# Patient Record
Sex: MALE | Race: WHITE | NOT HISPANIC OR LATINO | Employment: OTHER | URBAN - NONMETROPOLITAN AREA
[De-identification: names, ages, dates, MRNs, and addresses within clinical notes are randomized per-mention and may not be internally consistent; named-entity substitution may affect disease eponyms.]

---

## 2017-04-05 ENCOUNTER — OFFICE VISIT (OUTPATIENT)
Dept: UROLOGY | Facility: CLINIC | Age: 67
End: 2017-04-05

## 2017-04-05 DIAGNOSIS — R79.89 LOW TESTOSTERONE: ICD-10-CM

## 2017-04-05 DIAGNOSIS — R97.20 ELEVATED PROSTATE SPECIFIC ANTIGEN (PSA): ICD-10-CM

## 2017-04-05 DIAGNOSIS — N40.0 BPH WITHOUT URINARY OBSTRUCTION: Primary | ICD-10-CM

## 2017-04-05 LAB — TESTOST SERPL-MCNC: 498.62 NG/DL (ref 86.98–780.1)

## 2017-04-05 PROCEDURE — 84153 ASSAY OF PSA TOTAL: CPT | Performed by: UROLOGY

## 2017-04-05 PROCEDURE — 99214 OFFICE O/P EST MOD 30 MIN: CPT | Performed by: UROLOGY

## 2017-04-05 PROCEDURE — 36415 COLL VENOUS BLD VENIPUNCTURE: CPT | Performed by: UROLOGY

## 2017-04-05 PROCEDURE — 84403 ASSAY OF TOTAL TESTOSTERONE: CPT | Performed by: UROLOGY

## 2017-04-05 PROCEDURE — 84154 ASSAY OF PSA FREE: CPT | Performed by: UROLOGY

## 2017-04-05 NOTE — PROGRESS NOTES
Chief Complaint:          Chief Complaint   Patient presents with   • Benign Prostatic Hypertrophy   • Hypogonadism       HPI:   67 y.o. male.    67-year-old white male who was a relatively poor story and he gets up 1-2 times at night no dysuria no daytime frequency.  His dad  of prostate cancer his PSA is elevated at 4.6 he also has a low testosterone poorly controlled diabetes.  A normal examination and a PSA free and total and repeat his testosterone as he only had a free testosterone he will not be a candidate for testosterone therapy as long as we are  in the midst of a prostate workup      Past Medical History:        Past Medical History:   Diagnosis Date   • COPD (chronic obstructive pulmonary disease)    • Diabetes mellitus    • Hypertension          Current Meds:     Current Outpatient Prescriptions   Medication Sig Dispense Refill   • albuterol (PROVENTIL) (2.5 MG/3ML) 0.083% nebulizer solution Take 2.5 mg by nebulization every 4 (four) hours as needed for wheezing.     • budesonide-formoterol (SYMBICORT) 160-4.5 MCG/ACT inhaler Inhale 2 puffs 2 (two) times a day.     • hydrochlorothiazide (HYDRODIURIL) 25 MG tablet Take 25 mg by mouth daily.     • HYDROcodone-acetaminophen (NORCO) 7.5-325 MG per tablet Take 1 tablet by mouth every 8 (eight) hours as needed for moderate pain (4-6). 10 tablet 0   • insulin NPH-insulin regular (Novolin 70/30) (70-30) 100 UNIT/ML injection Inject 40 Units under the skin 2 (two) times a day with meals.     • lisinopril (PRINIVIL,ZESTRIL) 40 MG tablet Take 40 mg by mouth daily.     • oxyCODONE (ROXICODONE) 5 MG immediate release tablet Take 1 tablet by mouth every 4 (four) hours as needed for moderate pain (4-6) or severe pain (7-10). 10 tablet 0   • simvastatin (ZOCOR) 80 MG tablet Take 40 mg by mouth every night.       No current facility-administered medications for this visit.         Allergies:      No Known Allergies     Past Surgical History:     No past surgical  history on file.      Social History:     Social History     Social History   • Marital status:      Spouse name: N/A   • Number of children: N/A   • Years of education: N/A     Occupational History   • Not on file.     Social History Main Topics   • Smoking status: Current Every Day Smoker     Packs/day: 1.00     Years: 25.00     Types: Cigarettes   • Smokeless tobacco: Not on file   • Alcohol use No   • Drug use: No   • Sexual activity: Defer     Other Topics Concern   • Not on file     Social History Narrative       Family History:     Family History   Problem Relation Age of Onset   • No Known Problems Mother    • Heart failure Father        Review of Systems:     Review of Systems   Constitutional: Negative.    HENT: Negative.    Eyes: Negative.    Respiratory: Negative.    Cardiovascular: Negative.    Gastrointestinal: Negative.    Endocrine: Negative.    Genitourinary: Negative.    Musculoskeletal: Negative.    Allergic/Immunologic: Negative.    Neurological: Negative.    Hematological: Negative.    Psychiatric/Behavioral: Negative.        Physical Exam:     Physical Exam   Constitutional: He is oriented to person, place, and time. He appears well-developed and well-nourished.   HENT:   Head: Normocephalic and atraumatic.   Eyes: Conjunctivae and EOM are normal. Pupils are equal, round, and reactive to light.   Neck: Normal range of motion.   Cardiovascular: Normal rate, regular rhythm, normal heart sounds and intact distal pulses.    Pulmonary/Chest: Effort normal and breath sounds normal.   Abdominal: Soft. Bowel sounds are normal.   Genitourinary: Rectum normal, prostate normal and penis normal.   Genitourinary Comments: Uncircumcised normal phallus freely movable foreskin bilaterally descended testes without hernias good rectal tone and a very firm large about 40 g prostate   Musculoskeletal: Normal range of motion.   Neurological: He is alert and oriented to person, place, and time. He has normal  reflexes.   Skin: Skin is warm and dry.   Psychiatric: He has a normal mood and affect. His behavior is normal. Judgment and thought content normal.   Nursing note and vitals reviewed.      Procedure:       Assessment:   No diagnosis found.  No orders of the defined types were placed in this encounter.      Plan:   Elevated PSA recommend PSA free and total as well as a total testosterone level           This document has been electronically signed by WERNER ELLIOTT MD April 5, 2017 1:05 PM

## 2017-04-07 LAB
PSA FREE MFR SERPL: 29.7 %
PSA FREE SERPL-MCNC: 1.01 NG/ML
PSA SERPL-MCNC: 3.4 NG/ML (ref 0–4)

## 2017-05-31 ENCOUNTER — OFFICE VISIT (OUTPATIENT)
Dept: UROLOGY | Facility: CLINIC | Age: 67
End: 2017-05-31

## 2017-05-31 DIAGNOSIS — R97.20 ELEVATED PROSTATE SPECIFIC ANTIGEN (PSA): ICD-10-CM

## 2017-05-31 DIAGNOSIS — R53.82 CHRONIC FATIGUE: ICD-10-CM

## 2017-05-31 DIAGNOSIS — N40.0 BPH WITHOUT URINARY OBSTRUCTION: Primary | ICD-10-CM

## 2017-05-31 PROCEDURE — 99213 OFFICE O/P EST LOW 20 MIN: CPT | Performed by: NURSE PRACTITIONER

## 2018-01-10 ENCOUNTER — LAB REQUISITION (OUTPATIENT)
Dept: LAB | Facility: HOSPITAL | Age: 68
End: 2018-01-10

## 2018-01-10 DIAGNOSIS — I10 ESSENTIAL (PRIMARY) HYPERTENSION: ICD-10-CM

## 2018-01-10 LAB
ANION GAP SERPL CALCULATED.3IONS-SCNC: 3.9 MMOL/L (ref 3.6–11.2)
BUN BLD-MCNC: 7 MG/DL (ref 7–21)
BUN/CREAT SERPL: 9.3 (ref 7–25)
CALCIUM SPEC-SCNC: 8.9 MG/DL (ref 7.7–10)
CHLORIDE SERPL-SCNC: 93 MMOL/L (ref 99–112)
CO2 SERPL-SCNC: 38.1 MMOL/L (ref 24.3–31.9)
CREAT BLD-MCNC: 0.75 MG/DL (ref 0.43–1.29)
GFR SERPL CREATININE-BSD FRML MDRD: 103 ML/MIN/1.73
GLUCOSE BLD-MCNC: 89 MG/DL (ref 70–110)
OSMOLALITY SERPL CALC.SUM OF ELEC: 267.5 MOSM/KG (ref 273–305)
POTASSIUM BLD-SCNC: 4.6 MMOL/L (ref 3.5–5.3)
SODIUM BLD-SCNC: 135 MMOL/L (ref 135–153)

## 2018-01-10 PROCEDURE — 80048 BASIC METABOLIC PNL TOTAL CA: CPT | Performed by: INTERNAL MEDICINE

## 2018-07-09 ENCOUNTER — HOSPITAL ENCOUNTER (INPATIENT)
Facility: HOSPITAL | Age: 68
LOS: 6 days | Discharge: HOME HEALTH | End: 2018-07-15
Attending: FAMILY MEDICINE | Admitting: INTERNAL MEDICINE

## 2018-07-09 ENCOUNTER — APPOINTMENT (OUTPATIENT)
Dept: GENERAL RADIOLOGY | Facility: HOSPITAL | Age: 68
End: 2018-07-09

## 2018-07-09 ENCOUNTER — APPOINTMENT (OUTPATIENT)
Dept: ULTRASOUND IMAGING | Facility: HOSPITAL | Age: 68
End: 2018-07-09
Attending: FAMILY MEDICINE

## 2018-07-09 DIAGNOSIS — J96.01 ACUTE RESPIRATORY FAILURE WITH HYPOXIA AND HYPERCAPNIA (HCC): Primary | ICD-10-CM

## 2018-07-09 DIAGNOSIS — L03.115 CELLULITIS OF RIGHT LOWER LEG: ICD-10-CM

## 2018-07-09 DIAGNOSIS — I21.4 NSTEMI (NON-ST ELEVATED MYOCARDIAL INFARCTION) (HCC): ICD-10-CM

## 2018-07-09 DIAGNOSIS — J96.02 ACUTE RESPIRATORY FAILURE WITH HYPOXIA AND HYPERCAPNIA (HCC): Primary | ICD-10-CM

## 2018-07-09 LAB
A-A DO2: 130.6 MMHG (ref 0–300)
A-A DO2: 29.5 MMHG (ref 0–300)
A-A DO2: 41.5 MMHG (ref 0–300)
A-A DO2: 44 MMHG (ref 0–300)
A-A DO2: 53.1 MMHG (ref 0–300)
ALBUMIN SERPL-MCNC: 3.6 G/DL (ref 3.4–4.8)
ALBUMIN/GLOB SERPL: 1.1 G/DL (ref 1.5–2.5)
ALP SERPL-CCNC: 96 U/L (ref 40–129)
ALT SERPL W P-5'-P-CCNC: 22 U/L (ref 10–44)
ANION GAP SERPL CALCULATED.3IONS-SCNC: 2.8 MMOL/L (ref 3.6–11.2)
APTT PPP: 35 SECONDS (ref 23.8–36.1)
ARTERIAL PATENCY WRIST A: ABNORMAL
ARTERIAL PATENCY WRIST A: ABNORMAL
ARTERIAL PATENCY WRIST A: POSITIVE
AST SERPL-CCNC: 21 U/L (ref 10–34)
ATMOSPHERIC PRESS: 734 MMHG
BACTERIA UR QL AUTO: ABNORMAL /HPF
BASE EXCESS BLDA CALC-SCNC: 7.5 MMOL/L
BASE EXCESS BLDA CALC-SCNC: 7.9 MMOL/L
BASE EXCESS BLDA CALC-SCNC: 8.7 MMOL/L
BASE EXCESS BLDA CALC-SCNC: 9 MMOL/L
BASE EXCESS BLDA CALC-SCNC: 9.1 MMOL/L
BASOPHILS # BLD AUTO: 0.02 10*3/MM3 (ref 0–0.3)
BASOPHILS NFR BLD AUTO: 0.2 % (ref 0–2)
BDY SITE: ABNORMAL
BILIRUB SERPL-MCNC: 0.3 MG/DL (ref 0.2–1.8)
BILIRUB UR QL STRIP: NEGATIVE
BNP SERPL-MCNC: 69 PG/ML (ref 0–100)
BODY TEMPERATURE: 98.6 C
BUN BLD-MCNC: 16 MG/DL (ref 7–21)
BUN/CREAT SERPL: 16 (ref 7–25)
CALCIUM SPEC-SCNC: 9.1 MG/DL (ref 7.7–10)
CHLORIDE SERPL-SCNC: 89 MMOL/L (ref 99–112)
CK MB SERPL-CCNC: 4.89 NG/ML (ref 0–5)
CK MB SERPL-CCNC: 6.13 NG/ML (ref 0–5)
CK MB SERPL-RTO: 3.8 % (ref 0–3)
CK MB SERPL-RTO: 4.4 % (ref 0–3)
CK SERPL-CCNC: 130 U/L (ref 24–204)
CK SERPL-CCNC: 130 U/L (ref 24–204)
CK SERPL-CCNC: 139 U/L (ref 24–204)
CLARITY UR: CLEAR
CO2 SERPL-SCNC: 38.2 MMOL/L (ref 24.3–31.9)
COHGB MFR BLD: 10.6 % (ref 0–5)
COHGB MFR BLD: 5.4 % (ref 0–5)
COHGB MFR BLD: 6.5 % (ref 0–5)
COHGB MFR BLD: 9.1 % (ref 0–5)
COHGB MFR BLD: 9.8 % (ref 0–5)
COLOR UR: YELLOW
CREAT BLD-MCNC: 1 MG/DL (ref 0.43–1.29)
CREAT UR-MCNC: 47.6 MG/DL
CRP SERPL-MCNC: 1.72 MG/DL (ref 0–0.99)
D-LACTATE SERPL-SCNC: 1.5 MMOL/L (ref 0.5–2)
DEPRECATED RDW RBC AUTO: 47 FL (ref 37–54)
EOSINOPHIL # BLD AUTO: 0.07 10*3/MM3 (ref 0–0.7)
EOSINOPHIL NFR BLD AUTO: 0.9 % (ref 0–7)
EPAP: 7
EPAP: 7
ERYTHROCYTE [DISTWIDTH] IN BLOOD BY AUTOMATED COUNT: 13.7 % (ref 11.5–14.5)
GAS FLOW AIRWAY: 2 LPM
GFR SERPL CREATININE-BSD FRML MDRD: 74 ML/MIN/1.73
GLOBULIN UR ELPH-MCNC: 3.2 GM/DL
GLUCOSE BLD-MCNC: 149 MG/DL (ref 70–110)
GLUCOSE BLDC GLUCOMTR-MCNC: 122 MG/DL (ref 70–130)
GLUCOSE BLDC GLUCOMTR-MCNC: 167 MG/DL (ref 70–130)
GLUCOSE UR STRIP-MCNC: NEGATIVE MG/DL
HCO3 BLDA-SCNC: 34.4 MMOL/L (ref 22–26)
HCO3 BLDA-SCNC: 38.2 MMOL/L (ref 22–26)
HCO3 BLDA-SCNC: 38.5 MMOL/L (ref 22–26)
HCO3 BLDA-SCNC: 38.6 MMOL/L (ref 22–26)
HCO3 BLDA-SCNC: 39.4 MMOL/L (ref 22–26)
HCT VFR BLD AUTO: 51 % (ref 42–52)
HCT VFR BLD CALC: 50 % (ref 42–52)
HCT VFR BLD CALC: 51 % (ref 42–52)
HCT VFR BLD CALC: 51 % (ref 42–52)
HCT VFR BLD CALC: 52 % (ref 42–52)
HCT VFR BLD CALC: 54 % (ref 42–52)
HGB BLD-MCNC: 17.2 G/DL (ref 14–18)
HGB BLDA-MCNC: 17 G/DL (ref 12–16)
HGB BLDA-MCNC: 17.2 G/DL (ref 12–16)
HGB BLDA-MCNC: 17.2 G/DL (ref 12–16)
HGB BLDA-MCNC: 17.8 G/DL (ref 12–16)
HGB BLDA-MCNC: 18.3 G/DL (ref 12–16)
HGB UR QL STRIP.AUTO: ABNORMAL
HOROWITZ INDEX BLD+IHG-RTO: 21 %
HOROWITZ INDEX BLD+IHG-RTO: 28 %
HOROWITZ INDEX BLD+IHG-RTO: 30 %
HOROWITZ INDEX BLD+IHG-RTO: 30 %
HOROWITZ INDEX BLD+IHG-RTO: 40 %
HYALINE CASTS UR QL AUTO: ABNORMAL /LPF
IMM GRANULOCYTES # BLD: 0.01 10*3/MM3 (ref 0–0.03)
IMM GRANULOCYTES NFR BLD: 0.1 % (ref 0–0.5)
INR PPP: 1.03 (ref 0.9–1.1)
IPAP: 20
IPAP: 24
KETONES UR QL STRIP: NEGATIVE
L PNEUMO1 AG UR QL IA: NEGATIVE
LEUKOCYTE ESTERASE UR QL STRIP.AUTO: NEGATIVE
LYMPHOCYTES # BLD AUTO: 0.96 10*3/MM3 (ref 1–3)
LYMPHOCYTES NFR BLD AUTO: 11.7 % (ref 16–46)
M PNEUMO IGM SER QL: NEGATIVE
MCH RBC QN AUTO: 31.6 PG (ref 27–33)
MCHC RBC AUTO-ENTMCNC: 33.7 G/DL (ref 33–37)
MCV RBC AUTO: 93.8 FL (ref 80–94)
METHGB BLD QL: 0.2 % (ref 0–3)
METHGB BLD QL: 0.3 % (ref 0–3)
MODALITY: ABNORMAL
MONOCYTES # BLD AUTO: 0.73 10*3/MM3 (ref 0.1–0.9)
MONOCYTES NFR BLD AUTO: 8.9 % (ref 0–12)
NEUTROPHILS # BLD AUTO: 6.4 10*3/MM3 (ref 1.4–6.5)
NEUTROPHILS NFR BLD AUTO: 78.2 % (ref 40–75)
NITRITE UR QL STRIP: NEGATIVE
OSMOLALITY SERPL CALC.SUM OF ELEC: 264.8 MOSM/KG (ref 273–305)
OXYHGB MFR BLDV: 76.3 % (ref 85–100)
OXYHGB MFR BLDV: 78.4 % (ref 85–100)
OXYHGB MFR BLDV: 86.3 % (ref 85–100)
OXYHGB MFR BLDV: 86.4 % (ref 85–100)
OXYHGB MFR BLDV: 87.8 % (ref 85–100)
PCO2 BLDA: 55.7 MM HG (ref 35–45)
PCO2 BLDA: 71.6 MM HG (ref 35–45)
PCO2 BLDA: 74.7 MM HG (ref 35–45)
PCO2 BLDA: 77 MM HG (ref 35–45)
PCO2 BLDA: 78.1 MM HG (ref 35–45)
PH BLDA: 7.31 PH UNITS (ref 7.35–7.45)
PH BLDA: 7.33 PH UNITS (ref 7.35–7.45)
PH BLDA: 7.33 PH UNITS (ref 7.35–7.45)
PH BLDA: 7.35 PH UNITS (ref 7.35–7.45)
PH BLDA: 7.41 PH UNITS (ref 7.35–7.45)
PH UR STRIP.AUTO: 6.5 [PH] (ref 5–8)
PLATELET # BLD AUTO: 197 10*3/MM3 (ref 130–400)
PMV BLD AUTO: 11.5 FL (ref 6–10)
PO2 BLDA: 47.1 MM HG (ref 80–100)
PO2 BLDA: 48.1 MM HG (ref 80–100)
PO2 BLDA: 61.9 MM HG (ref 80–100)
PO2 BLDA: 71.6 MM HG (ref 80–100)
PO2 BLDA: 76.8 MM HG (ref 80–100)
POTASSIUM BLD-SCNC: 4.9 MMOL/L (ref 3.5–5.3)
PROT SERPL-MCNC: 6.8 G/DL (ref 6–8)
PROT UR QL STRIP: ABNORMAL
PROT UR-MCNC: 182.3 MG/DL
PROT/CREAT UR: 3829.8 MG/G CREA (ref 0–200)
PROTHROMBIN TIME: 13.7 SECONDS (ref 11–15.4)
RBC # BLD AUTO: 5.44 10*6/MM3 (ref 4.7–6.1)
RBC # UR: ABNORMAL /HPF
REF LAB TEST METHOD: ABNORMAL
SAO2 % BLDCOA: 84.9 % (ref 90–100)
SAO2 % BLDCOA: 87.9 % (ref 90–100)
SAO2 % BLDCOA: 91.5 % (ref 90–100)
SAO2 % BLDCOA: 94.2 % (ref 90–100)
SAO2 % BLDCOA: 95.4 % (ref 90–100)
SET MECH RESP RATE: 20
SODIUM BLD-SCNC: 130 MMOL/L (ref 135–153)
SODIUM UR-SCNC: 36 MMOL/L
SP GR UR STRIP: 1.01 (ref 1–1.03)
SQUAMOUS #/AREA URNS HPF: ABNORMAL /HPF
TROPONIN I SERPL-MCNC: 0.18 NG/ML
TROPONIN I SERPL-MCNC: 0.28 NG/ML
TROPONIN I SERPL-MCNC: 0.3 NG/ML
UROBILINOGEN UR QL STRIP: ABNORMAL
WBC NRBC COR # BLD: 8.19 10*3/MM3 (ref 4.5–12.5)
WBC UR QL AUTO: ABNORMAL /HPF

## 2018-07-09 PROCEDURE — 94799 UNLISTED PULMONARY SVC/PX: CPT

## 2018-07-09 PROCEDURE — 25010000002 VANCOMYCIN PER 500 MG: Performed by: FAMILY MEDICINE

## 2018-07-09 PROCEDURE — 82550 ASSAY OF CK (CPK): CPT | Performed by: FAMILY MEDICINE

## 2018-07-09 PROCEDURE — 83050 HGB METHEMOGLOBIN QUAN: CPT | Performed by: FAMILY MEDICINE

## 2018-07-09 PROCEDURE — 82570 ASSAY OF URINE CREATININE: CPT | Performed by: PHYSICIAN ASSISTANT

## 2018-07-09 PROCEDURE — 93005 ELECTROCARDIOGRAM TRACING: CPT | Performed by: FAMILY MEDICINE

## 2018-07-09 PROCEDURE — 82805 BLOOD GASES W/O2 SATURATION: CPT | Performed by: PHYSICIAN ASSISTANT

## 2018-07-09 PROCEDURE — 86140 C-REACTIVE PROTEIN: CPT | Performed by: FAMILY MEDICINE

## 2018-07-09 PROCEDURE — 87040 BLOOD CULTURE FOR BACTERIA: CPT | Performed by: FAMILY MEDICINE

## 2018-07-09 PROCEDURE — 81001 URINALYSIS AUTO W/SCOPE: CPT | Performed by: FAMILY MEDICINE

## 2018-07-09 PROCEDURE — 74022 RADEX COMPL AQT ABD SERIES: CPT

## 2018-07-09 PROCEDURE — 87899 AGENT NOS ASSAY W/OPTIC: CPT | Performed by: PHYSICIAN ASSISTANT

## 2018-07-09 PROCEDURE — 94640 AIRWAY INHALATION TREATMENT: CPT

## 2018-07-09 PROCEDURE — 25010000002 METHYLPREDNISOLONE PER 40 MG: Performed by: PHYSICIAN ASSISTANT

## 2018-07-09 PROCEDURE — 84484 ASSAY OF TROPONIN QUANT: CPT | Performed by: FAMILY MEDICINE

## 2018-07-09 PROCEDURE — 99223 1ST HOSP IP/OBS HIGH 75: CPT | Performed by: INTERNAL MEDICINE

## 2018-07-09 PROCEDURE — 25010000002 HEPARIN (PORCINE) PER 1000 UNITS: Performed by: PHYSICIAN ASSISTANT

## 2018-07-09 PROCEDURE — 82375 ASSAY CARBOXYHB QUANT: CPT | Performed by: PHYSICIAN ASSISTANT

## 2018-07-09 PROCEDURE — 25010000002 METHYLPREDNISOLONE PER 125 MG: Performed by: FAMILY MEDICINE

## 2018-07-09 PROCEDURE — 82550 ASSAY OF CK (CPK): CPT | Performed by: PHYSICIAN ASSISTANT

## 2018-07-09 PROCEDURE — 84156 ASSAY OF PROTEIN URINE: CPT | Performed by: PHYSICIAN ASSISTANT

## 2018-07-09 PROCEDURE — 82375 ASSAY CARBOXYHB QUANT: CPT | Performed by: FAMILY MEDICINE

## 2018-07-09 PROCEDURE — 93010 ELECTROCARDIOGRAM REPORT: CPT | Performed by: INTERNAL MEDICINE

## 2018-07-09 PROCEDURE — 99285 EMERGENCY DEPT VISIT HI MDM: CPT

## 2018-07-09 PROCEDURE — 82553 CREATINE MB FRACTION: CPT | Performed by: FAMILY MEDICINE

## 2018-07-09 PROCEDURE — 83880 ASSAY OF NATRIURETIC PEPTIDE: CPT | Performed by: FAMILY MEDICINE

## 2018-07-09 PROCEDURE — 94660 CPAP INITIATION&MGMT: CPT

## 2018-07-09 PROCEDURE — 36600 WITHDRAWAL OF ARTERIAL BLOOD: CPT | Performed by: HOSPITALIST

## 2018-07-09 PROCEDURE — 93971 EXTREMITY STUDY: CPT

## 2018-07-09 PROCEDURE — 93971 EXTREMITY STUDY: CPT | Performed by: RADIOLOGY

## 2018-07-09 PROCEDURE — 83605 ASSAY OF LACTIC ACID: CPT | Performed by: FAMILY MEDICINE

## 2018-07-09 PROCEDURE — 82805 BLOOD GASES W/O2 SATURATION: CPT | Performed by: HOSPITALIST

## 2018-07-09 PROCEDURE — 85610 PROTHROMBIN TIME: CPT | Performed by: FAMILY MEDICINE

## 2018-07-09 PROCEDURE — 82805 BLOOD GASES W/O2 SATURATION: CPT | Performed by: FAMILY MEDICINE

## 2018-07-09 PROCEDURE — 71045 X-RAY EXAM CHEST 1 VIEW: CPT | Performed by: RADIOLOGY

## 2018-07-09 PROCEDURE — 85025 COMPLETE CBC W/AUTO DIFF WBC: CPT | Performed by: FAMILY MEDICINE

## 2018-07-09 PROCEDURE — 63710000001 INSULIN ASPART PER 5 UNITS: Performed by: NURSE PRACTITIONER

## 2018-07-09 PROCEDURE — 84484 ASSAY OF TROPONIN QUANT: CPT | Performed by: PHYSICIAN ASSISTANT

## 2018-07-09 PROCEDURE — 25010000002 MORPHINE PER 10 MG: Performed by: HOSPITALIST

## 2018-07-09 PROCEDURE — 93926 LOWER EXTREMITY STUDY: CPT

## 2018-07-09 PROCEDURE — 84300 ASSAY OF URINE SODIUM: CPT | Performed by: PHYSICIAN ASSISTANT

## 2018-07-09 PROCEDURE — 25010000002 ONDANSETRON PER 1 MG: Performed by: FAMILY MEDICINE

## 2018-07-09 PROCEDURE — 82962 GLUCOSE BLOOD TEST: CPT

## 2018-07-09 PROCEDURE — 83050 HGB METHEMOGLOBIN QUAN: CPT | Performed by: PHYSICIAN ASSISTANT

## 2018-07-09 PROCEDURE — 36600 WITHDRAWAL OF ARTERIAL BLOOD: CPT | Performed by: PHYSICIAN ASSISTANT

## 2018-07-09 PROCEDURE — 86738 MYCOPLASMA ANTIBODY: CPT | Performed by: PHYSICIAN ASSISTANT

## 2018-07-09 PROCEDURE — 36415 COLL VENOUS BLD VENIPUNCTURE: CPT

## 2018-07-09 PROCEDURE — 83050 HGB METHEMOGLOBIN QUAN: CPT | Performed by: HOSPITALIST

## 2018-07-09 PROCEDURE — 74022 RADEX COMPL AQT ABD SERIES: CPT | Performed by: RADIOLOGY

## 2018-07-09 PROCEDURE — 25010000002 PIPERACILLIN-TAZOBACTAM: Performed by: FAMILY MEDICINE

## 2018-07-09 PROCEDURE — 93926 LOWER EXTREMITY STUDY: CPT | Performed by: RADIOLOGY

## 2018-07-09 PROCEDURE — 85730 THROMBOPLASTIN TIME PARTIAL: CPT | Performed by: FAMILY MEDICINE

## 2018-07-09 PROCEDURE — 71045 X-RAY EXAM CHEST 1 VIEW: CPT

## 2018-07-09 PROCEDURE — 82375 ASSAY CARBOXYHB QUANT: CPT | Performed by: HOSPITALIST

## 2018-07-09 PROCEDURE — 36600 WITHDRAWAL OF ARTERIAL BLOOD: CPT | Performed by: FAMILY MEDICINE

## 2018-07-09 PROCEDURE — 25010000002 MORPHINE PER 10 MG: Performed by: FAMILY MEDICINE

## 2018-07-09 PROCEDURE — 80053 COMPREHEN METABOLIC PANEL: CPT | Performed by: FAMILY MEDICINE

## 2018-07-09 PROCEDURE — 82553 CREATINE MB FRACTION: CPT | Performed by: PHYSICIAN ASSISTANT

## 2018-07-09 RX ORDER — METHYLPREDNISOLONE SODIUM SUCCINATE 125 MG/2ML
125 INJECTION, POWDER, LYOPHILIZED, FOR SOLUTION INTRAMUSCULAR; INTRAVENOUS ONCE
Status: COMPLETED | OUTPATIENT
Start: 2018-07-09 | End: 2018-07-09

## 2018-07-09 RX ORDER — NICOTINE POLACRILEX 4 MG
15 LOZENGE BUCCAL
Status: DISCONTINUED | OUTPATIENT
Start: 2018-07-09 | End: 2018-07-14

## 2018-07-09 RX ORDER — METHYLPREDNISOLONE SODIUM SUCCINATE 40 MG/ML
40 INJECTION, POWDER, LYOPHILIZED, FOR SOLUTION INTRAMUSCULAR; INTRAVENOUS EVERY 12 HOURS
Status: DISCONTINUED | OUTPATIENT
Start: 2018-07-09 | End: 2018-07-11

## 2018-07-09 RX ORDER — LEVOCETIRIZINE DIHYDROCHLORIDE 5 MG/1
5 TABLET, FILM COATED ORAL DAILY PRN
COMMUNITY

## 2018-07-09 RX ORDER — NICOTINE 21 MG/24HR
1 PATCH, TRANSDERMAL 24 HOURS TRANSDERMAL EVERY 24 HOURS
Status: DISCONTINUED | OUTPATIENT
Start: 2018-07-10 | End: 2018-07-15 | Stop reason: HOSPADM

## 2018-07-09 RX ORDER — HEPARIN SODIUM 5000 [USP'U]/ML
5000 INJECTION, SOLUTION INTRAVENOUS; SUBCUTANEOUS EVERY 12 HOURS SCHEDULED
Status: DISCONTINUED | OUTPATIENT
Start: 2018-07-09 | End: 2018-07-15 | Stop reason: HOSPADM

## 2018-07-09 RX ORDER — SODIUM CHLORIDE 0.9 % (FLUSH) 0.9 %
10 SYRINGE (ML) INJECTION AS NEEDED
Status: DISCONTINUED | OUTPATIENT
Start: 2018-07-09 | End: 2018-07-15 | Stop reason: HOSPADM

## 2018-07-09 RX ORDER — IPRATROPIUM BROMIDE AND ALBUTEROL SULFATE 2.5; .5 MG/3ML; MG/3ML
3 SOLUTION RESPIRATORY (INHALATION) ONCE
Status: COMPLETED | OUTPATIENT
Start: 2018-07-09 | End: 2018-07-09

## 2018-07-09 RX ORDER — ALBUTEROL SULFATE 90 UG/1
2 AEROSOL, METERED RESPIRATORY (INHALATION) EVERY 6 HOURS PRN
COMMUNITY

## 2018-07-09 RX ORDER — IPRATROPIUM BROMIDE AND ALBUTEROL SULFATE 2.5; .5 MG/3ML; MG/3ML
3 SOLUTION RESPIRATORY (INHALATION) EVERY 6 HOURS PRN
Status: DISCONTINUED | OUTPATIENT
Start: 2018-07-09 | End: 2018-07-10

## 2018-07-09 RX ORDER — NICOTINE POLACRILEX 4 MG
15 LOZENGE BUCCAL
Status: DISCONTINUED | OUTPATIENT
Start: 2018-07-09 | End: 2018-07-15 | Stop reason: HOSPADM

## 2018-07-09 RX ORDER — ONDANSETRON 2 MG/ML
4 INJECTION INTRAMUSCULAR; INTRAVENOUS ONCE
Status: COMPLETED | OUTPATIENT
Start: 2018-07-09 | End: 2018-07-09

## 2018-07-09 RX ORDER — IPRATROPIUM BROMIDE AND ALBUTEROL SULFATE 2.5; .5 MG/3ML; MG/3ML
3 SOLUTION RESPIRATORY (INHALATION)
Status: DISCONTINUED | OUTPATIENT
Start: 2018-07-09 | End: 2018-07-15 | Stop reason: HOSPADM

## 2018-07-09 RX ORDER — DONEPEZIL HYDROCHLORIDE 10 MG/1
10 TABLET, FILM COATED ORAL NIGHTLY
COMMUNITY

## 2018-07-09 RX ORDER — DEXTROSE MONOHYDRATE 25 G/50ML
25 INJECTION, SOLUTION INTRAVENOUS
Status: DISCONTINUED | OUTPATIENT
Start: 2018-07-09 | End: 2018-07-14

## 2018-07-09 RX ORDER — ASPIRIN 81 MG/1
324 TABLET, CHEWABLE ORAL ONCE
Status: COMPLETED | OUTPATIENT
Start: 2018-07-09 | End: 2018-07-09

## 2018-07-09 RX ORDER — TAMSULOSIN HYDROCHLORIDE 0.4 MG/1
1 CAPSULE ORAL EVERY EVENING
COMMUNITY

## 2018-07-09 RX ORDER — DEXTROSE MONOHYDRATE 25 G/50ML
25 INJECTION, SOLUTION INTRAVENOUS
Status: DISCONTINUED | OUTPATIENT
Start: 2018-07-09 | End: 2018-07-15 | Stop reason: HOSPADM

## 2018-07-09 RX ORDER — HYDROCHLOROTHIAZIDE 25 MG/1
25 TABLET ORAL DAILY
Status: CANCELLED | OUTPATIENT
Start: 2018-07-09

## 2018-07-09 RX ORDER — SODIUM CHLORIDE 0.9 % (FLUSH) 0.9 %
1-10 SYRINGE (ML) INJECTION AS NEEDED
Status: DISCONTINUED | OUTPATIENT
Start: 2018-07-09 | End: 2018-07-15 | Stop reason: HOSPADM

## 2018-07-09 RX ORDER — ONDANSETRON HYDROCHLORIDE 8 MG/1
8 TABLET, FILM COATED ORAL EVERY 12 HOURS PRN
COMMUNITY
End: 2018-07-15 | Stop reason: HOSPADM

## 2018-07-09 RX ORDER — GABAPENTIN 800 MG/1
800 TABLET ORAL 3 TIMES DAILY
COMMUNITY

## 2018-07-09 RX ORDER — ASPIRIN 81 MG/1
81 TABLET ORAL DAILY
Status: DISCONTINUED | OUTPATIENT
Start: 2018-07-10 | End: 2018-07-10 | Stop reason: SDUPTHER

## 2018-07-09 RX ORDER — ASPIRIN 325 MG
325 TABLET ORAL DAILY
COMMUNITY

## 2018-07-09 RX ORDER — MORPHINE SULFATE 2 MG/ML
1 INJECTION, SOLUTION INTRAMUSCULAR; INTRAVENOUS EVERY 4 HOURS PRN
Status: DISCONTINUED | OUTPATIENT
Start: 2018-07-09 | End: 2018-07-11

## 2018-07-09 RX ADMIN — MORPHINE SULFATE 1 MG: 2 INJECTION, SOLUTION INTRAMUSCULAR; INTRAVENOUS at 22:14

## 2018-07-09 RX ADMIN — MORPHINE SULFATE 2 MG: 4 INJECTION, SOLUTION INTRAMUSCULAR; INTRAVENOUS at 10:08

## 2018-07-09 RX ADMIN — ONDANSETRON 4 MG: 2 INJECTION INTRAMUSCULAR; INTRAVENOUS at 11:38

## 2018-07-09 RX ADMIN — METHYLPREDNISOLONE SODIUM SUCCINATE 125 MG: 125 INJECTION, POWDER, FOR SOLUTION INTRAMUSCULAR; INTRAVENOUS at 14:03

## 2018-07-09 RX ADMIN — Medication 10 ML: at 10:09

## 2018-07-09 RX ADMIN — INSULIN ASPART 2 UNITS: 100 INJECTION, SOLUTION INTRAVENOUS; SUBCUTANEOUS at 21:57

## 2018-07-09 RX ADMIN — IPRATROPIUM BROMIDE AND ALBUTEROL SULFATE 3 ML: .5; 3 SOLUTION RESPIRATORY (INHALATION) at 13:45

## 2018-07-09 RX ADMIN — VANCOMYCIN HYDROCHLORIDE 2000 MG: 5 INJECTION, POWDER, LYOPHILIZED, FOR SOLUTION INTRAVENOUS at 13:32

## 2018-07-09 RX ADMIN — HEPARIN SODIUM 5000 UNITS: 5000 INJECTION, SOLUTION INTRAVENOUS; SUBCUTANEOUS at 21:28

## 2018-07-09 RX ADMIN — TAZOBACTAM SODIUM AND PIPERACILLIN SODIUM 4.5 G: .5; 4 INJECTION, POWDER, LYOPHILIZED, FOR SOLUTION INTRAVENOUS at 16:15

## 2018-07-09 RX ADMIN — IPRATROPIUM BROMIDE AND ALBUTEROL SULFATE 3 ML: .5; 3 SOLUTION RESPIRATORY (INHALATION) at 18:11

## 2018-07-09 RX ADMIN — ASPIRIN 324 MG: 81 TABLET, CHEWABLE ORAL at 11:37

## 2018-07-09 RX ADMIN — PIPERACILLIN SODIUM,TAZOBACTAM SODIUM 3.38 G: 3; .375 INJECTION, POWDER, FOR SOLUTION INTRAVENOUS at 23:04

## 2018-07-09 RX ADMIN — METHYLPREDNISOLONE SODIUM SUCCINATE 40 MG: 40 INJECTION, POWDER, FOR SOLUTION INTRAMUSCULAR; INTRAVENOUS at 18:35

## 2018-07-09 RX ADMIN — MORPHINE SULFATE 2 MG: 4 INJECTION, SOLUTION INTRAMUSCULAR; INTRAVENOUS at 14:04

## 2018-07-09 NOTE — ED PROVIDER NOTES
Subjective   69 yo male with history of COPD- states he has oxygen he wears when he feels like he needs it ; continuesto smoke; reports increasing SOA, says he feels like his belly is swollen and that he is retaining fluid; however he says his biggest concern is his right lower leg- home health has been trying to tend to it- they apparently treated his left leg; he says the pain and redness is worse. HE says he feels terrible and when the breathing issue started yesterday he just couldn't get comfortble        History provided by:  Patient  Illness   Location:  SOA/ leg tenderness/ abdominal bloating  Severity:  Moderate  Onset quality:  Gradual  Timing:  Intermittent  Progression:  Worsening  Chronicity:  Recurrent  Context:  HIstory of COPD/ and has had venous stasis ulcers on lower legs for a long time but the last week his right leg has gotten worse  Relieved by:  Nothing  Worsened by:  Nothing  Ineffective treatments:  None  Associated symptoms: cough, fatigue, myalgias, nausea and shortness of breath    Associated symptoms: no abdominal pain and no diarrhea    Shortness of breath:     Severity:  Moderate    Onset quality:  Gradual    Duration:  3 days    Timing:  Intermittent    Progression:  Worsening      Review of Systems   Constitutional: Positive for fatigue.   HENT: Negative for drooling, facial swelling, mouth sores, trouble swallowing and voice change.    Eyes: Negative for pain and itching.   Respiratory: Positive for cough and shortness of breath. Negative for apnea and chest tightness.    Cardiovascular: Negative for leg swelling.   Gastrointestinal: Positive for nausea. Negative for abdominal pain, constipation, diarrhea and rectal pain.   Endocrine: Negative for cold intolerance, heat intolerance, polydipsia, polyphagia and polyuria.   Genitourinary: Negative for decreased urine volume.   Musculoskeletal: Positive for myalgias. Negative for neck pain and neck stiffness.   Skin: Negative for color  change and pallor.   Allergic/Immunologic: Negative for immunocompromised state.   Neurological: Negative for dizziness, tremors, facial asymmetry and speech difficulty.   Hematological: Negative for adenopathy.   Psychiatric/Behavioral: Negative for agitation, behavioral problems, decreased concentration and dysphoric mood. The patient is not nervous/anxious.    All other systems reviewed and are negative.      Past Medical History:   Diagnosis Date   • COPD (chronic obstructive pulmonary disease) (CMS/[facility])    • Diabetes mellitus (CMS/[facility])    • Hypertension        No Known Allergies    History reviewed. No pertinent surgical history.    Family History   Problem Relation Age of Onset   • No Known Problems Mother    • Heart failure Father        Social History     Social History   • Marital status:      Social History Main Topics   • Smoking status: Current Every Day Smoker     Packs/day: 1.00     Years: 25.00     Types: Cigarettes   • Smokeless tobacco: Never Used   • Alcohol use No   • Drug use: No   • Sexual activity: Defer     Other Topics Concern   • Not on file           Objective   Physical Exam   Constitutional: He is oriented to person, place, and time. He appears well-developed and well-nourished. He has a sickly appearance.   Cardiovascular: Normal rate and regular rhythm.    Pulmonary/Chest: Effort normal. He has decreased breath sounds. He has wheezes.   Abdominal: Soft. Bowel sounds are normal. He exhibits no distension and no mass. There is no tenderness. There is no guarding.   Musculoskeletal: He exhibits edema and tenderness.   Venous stasis changes present on both lower extremities; redness and blisters and painful to touch on right lower extremity  Decreased pulses on R>L lower extremitiy   Neurological: He is alert and oriented to person, place, and time.   Skin: Skin is warm. Capillary refill takes less than 2 seconds.   Psychiatric: He has a normal mood and affect. His behavior is  normal. Judgment and thought content normal.   Nursing note and vitals reviewed.      Procedures           ED Course  ED Course as of Jul 10 1100   Mon Jul 09, 2018   1308 EKG interpretation time is 10:00 normal sinus rhythm with arrhythmia 74 bpm QRS is 80 QT is 344 QTc is 381 nonspecific ST changes are noted however no evidence of acute ischemic change  [MH]   1457 DIscussed with Dr Stern - agree to admit to CCU- increase IPA to 24  [MH]      ED Course User Index  [MH] Jasmin Roberson DO                  MDM  Number of Diagnoses or Management Options  Acute respiratory failure with hypoxia and hypercapnia (CMS/HCC): new and requires workup  Cellulitis of right lower leg: new and requires workup  NSTEMI (non-ST elevated myocardial infarction) (CMS/HCC): new and requires workup     Amount and/or Complexity of Data Reviewed  Clinical lab tests: ordered and reviewed  Tests in the radiology section of CPT®: reviewed and ordered  Tests in the medicine section of CPT®: reviewed and ordered  Decide to obtain previous medical records or to obtain history from someone other than the patient: yes  Discuss the patient with other providers: yes  Independent visualization of images, tracings, or specimens: yes    Risk of Complications, Morbidity, and/or Mortality  Presenting problems: high  Diagnostic procedures: high  Management options: high    Critical Care  Total time providing critical care: 30-74 minutes (40 min)    Patient Progress  Patient progress: (guarded)        Final diagnoses:   Acute respiratory failure with hypoxia and hypercapnia (CMS/HCC)   NSTEMI (non-ST elevated myocardial infarction) (CMS/HCC)            Jasmin Roberson DO  07/10/18 0452

## 2018-07-09 NOTE — NURSING NOTE
Patient says he is going home if he doesn't get the BIPAP off, something to eat, and something for pain. Discussed risks with patient. Attempted to call [facility]. Home phone is out of service. Cell phone is wrong number. Dr. Hebert's paged.

## 2018-07-09 NOTE — ED NOTES
Pt remains on 2lpm nc, oxygen sat 89%, pt to be placed on bipap.      Clementine Cueto, RN  07/09/18 2325

## 2018-07-09 NOTE — ED NOTES
Pt alert and oriented, skin pwd, pt remains on bipap with even and unlabored respirations. Pt remains in normal sinus rhythm. rr 12.      Clementine Cueto RN  07/09/18 [id number]

## 2018-07-09 NOTE — ED NOTES
Pt alert and oriented, skin pwd, respirations even and unlabored, pt continues to cough, remains on 2lpm nc, remains in ns rhythm. Fall precautions maintained. Wctm.      Clementine Cueto RN  07/09/18 0149

## 2018-07-09 NOTE — PLAN OF CARE
Problem: Fall Risk (Adult)  Goal: Identify Related Risk Factors and Signs and Symptoms   07/09/18 1752   Fall Risk (Adult)   Related Risk Factors (Fall Risk) gait/mobility problems;slippery/uneven surfaces;environment unfamiliar   Signs and Symptoms (Fall Risk) presence of risk factors     Goal: Absence of Fall   07/09/18 1752   Fall Risk (Adult)   Absence of Fall making progress toward outcome      07/09/18 1752   Fall Risk (Adult)   Absence of Fall making progress toward outcome       Problem: Skin Injury Risk (Adult)  Goal: Identify Related Risk Factors and Signs and Symptoms   07/09/18 1752   Skin Injury Risk (Adult)   Related Risk Factors (Skin Injury Risk) body weight extremes;critical care admission;mobility impaired     Goal: Skin Health and Integrity   07/09/18 1752   Skin Injury Risk (Adult)   Skin Health and Integrity making progress toward outcome       Problem: Breathing Pattern Ineffective (Adult)  Goal: Identify Related Risk Factors and Signs and Symptoms   07/09/18 1752   Breathing Pattern Ineffective (Adult)   Related Risk Factors (Breathing Pattern Ineffective) immobility;pain;underlying condition   Signs and Symptoms (Breathing Pattern Ineffective) activity intolerance;breath sounds abnormal     Goal: Effective Oxygenation/Ventilation   07/09/18 1752   Breathing Pattern Ineffective (Adult)   Effective Oxygenation/Ventilation making progress toward outcome     Goal: Anxiety/Fear Reduction   07/09/18 1752   Breathing Pattern Ineffective (Adult)   Anxiety/Fear Reduction making progress toward outcome

## 2018-07-09 NOTE — ED NOTES
md made aware of pt oxygen sat, new orders noted. Called respiratory for a repeat abg.     Clementine Cueto RN  07/09/18 [id number]

## 2018-07-09 NOTE — H&P
[facility] Medicine Services  History & Physical    Patient Identification:  Name:  Heath Bajwa  Age:  68 y.o.  Sex:  male  :  1950  MRN:  [id number]   Visit Number:  [id number]  Primary Care Physician:  No Known Provider    I have seen the patient in conjunction with Ivon Hernandze PA-C and I agree with the following statements:    Subjective        Chief complaint:  Shortness of breath       History of presenting illness:      Heath Bajwa is a 68 y.o. male Heath Bajwa presented to the ED via EMS secondary to shortness of breath. He has known COPD and reports worsened shortness of breath over the course of the last 2 weeks but significant worsening over the past 48 hours making daily activities of living difficult to complete. He reports cough productive of yellow/green sputum.  He denies chest pain.  His initial ABG revealed pH 7.408, pCO2 of 55.7, pO2 of 48.1, and bicarbonate of 34.4.   When placed on 2 liters via NC, Mr. Bajwa began retaining CO2 with increase of PCO2 to 78.1 with an accompanying lower pH of 7.307.   He was placed on BiPAP with only slight improvement of pCO2 with improvement of pO2.   CXR revealed some bibasilar atelectasis but without mention of acute infiltrate.   Given acute hypoxic and hypercarbic respiratory failure, Mr. Bajwa has been admitted to the CCU for further evaluation and treatment.  He denies chest pain or known history of heart disease, but was found to have indeterminate troponin.  Much of his ROS and HPI were obtained from the patient, though very difficult to understand secondary to BiPAP treatment, though this could obviously not be removed at this time.      In addition, Mr. Bajwa has noted cellulitis of the bilateral lower extremities with ulcerations about the calf with the appearance of possible Unna boots or dressings, though it is currently difficulty for Mr. Bajwa to relay to me what he has been putting on his legs given  "BiPAP mask.  He does tell me his RLE has increased redness, but that his LLE (also with cellulitis) looks around baseline.   He does also tell me that he is supposed to wear 2 liters of oxygen when he sleeps at night.  He is able to tell me that his legs were more swollen but this has since went down.     Heinss (she does state cellulitis feels like a sharp pain in his legs.  This is of moderate intensity at least and worsens when he tries to walk.  He states the erythema worsened has been there for about a week.  There is also some ulcerations especially on his right leg he thinks has been there about a week.  He has had no associated fever.  He states he has had no increase cough but has been bringing up more phlegm but this is been white.  No nausea no vomiting, he states his abdomen feels bloated at times however he states he does not feel like he is voiding like he should, his bowels have been moving.  No emesis.  Patient states she wears oxygen at home \"when he needs it\" he continues to smoke and is well aware the need to quit, patient ambulates at home with a cane)  ---------------------------------------------------------------------------------------------------------------------     Review of Systems   Constitutional: Negative for activity change and chills.   HENT: Negative for congestion and drooling.    Eyes: Negative for pain and discharge.   Respiratory: Positive for cough, shortness of breath and wheezing.    Cardiovascular: Positive for leg swelling. Negative for chest pain.   Gastrointestinal: Negative for abdominal distention, diarrhea and nausea.   Endocrine: Negative for cold intolerance and heat intolerance.   Genitourinary: Negative for difficulty urinating and dysuria.   Musculoskeletal: Negative for arthralgias and back pain.   Skin: Positive for wound. Negative for color change.   Allergic/Immunologic: Negative for environmental allergies and food allergies.   Neurological: Negative for " dizziness and headaches.   Psychiatric/Behavioral: Negative for agitation and confusion.      ---------------------------------------------------------------------------------------------------------------------   Past Medical History:   Diagnosis Date   • COPD (chronic obstructive pulmonary disease) (CMS/HCC)    • Diabetes mellitus (CMS/HCC)    • Hypertension      History reviewed. No pertinent surgical history.  Family History   Problem Relation Age of Onset   • No Known Problems Mother    • Heart failure Father      Social History     Social History   • Marital status:      Social History Main Topics   • Smoking status: Current Every Day Smoker     Packs/day: 1.00     Years: 25.00     Types: Cigarettes   • Smokeless tobacco: Never Used   • Alcohol use No   • Drug use: No   • Sexual activity: Defer     Other Topics Concern   • Not on file     ---------------------------------------------------------------------------------------------------------------------   Allergies:  Patient has no known allergies.  ---------------------------------------------------------------------------------------------------------------------     Home medications:    Medications below are reported home medications pulling from within the system; at this time, these medications have not been reconciled unless otherwise specified and are in the verification process in order to verify them as current home medications.    (Not in a hospital admission)      Hospital Scheduled Meds:    piperacillin-tazobactam 4.5 g Intravenous Once          Current listed hospital scheduled medications may not yet reflect those currently placed in orders that are signed and held awaiting patient's arrival to floor.   ---------------------------------------------------------------------------------------------------------------------     Objective     Vital Signs:  Temp:  [98.2 °F (36.8 °C)] 98.2 °F (36.8 °C)  Heart Rate:  [65-84] 76  Resp:  [20-25]  23  BP: (110-151)/(60-98) 116/60  FiO2 (%):  [30 %] 30 %  1    07/09/18  0934   Weight: 109 kg (240 lb)     Body mass index is 32.55 kg/m².  ---------------------------------------------------------------------------------------------------------------------       Physical Exam    Physical Exam:    Constitutional: Awake, alert, well-nourished, well-developed, nontoxic, appears chronically ill however.  BiPAP in place.  HEENT: Normocephalic, atraumatic. PERRLA, EOMI, sclerae anicteric, conjunctivae without injection, mucous membranes moist, no oropharyngeal erythema appreciated.    Neck: Supple. No JVD appreciated.  Pulmonary: Diminished breath sounds bilaterally. Bilateral wheezing appreciated throughout.  No significant rhonchi or rales appreciated.  Wheezing was mostly in the upper lobes.  Mildly increased respiratory effort.   CV: Normal rate, regular rhythm. Normal s1/s2 with no murmur appreciated.   Abdominal: Soft, No distension or tenderness appreciated. Bowel sounds appreciated in all four quadrants, no guarding or rebound  Musculoskeletal: No erythema or swelling in joints of upper and lower extremities   Extremities: No clubbing, cyanosis,1+edema  Vascular: Decreased but palpable DP/PT pulses bilaterally, warm extremities Semsation intact in ext  Skin: RLE with cellulitis about the foot extending proximally into the calf and stopping had a fairly well demarcated line below the knee with some vesicles noted proximal tibial area the erythema appears to be fairly circumferential..   LLE with cellulitis about the calf that is less impressive than the RLE.  RLE with multiple open wounds/ulcerations about the skin   Neuro: Alert and oriented to person, place, and time. Strength symmetric in all extremities, Cranial Nerves grossly intact to confrontation, speech clear, sensation intact to fine touch..  No slurred speech.  No facial droop.    Psych: Appropriate mood and affect.  Judgement and though content  appropriate.       ---------------------------------------------------------------------------------------------------------------------  EKG:        Telemetry:   SR in the 60s-80s during bedside evaluation     I have personally looked at both the EKG and the telemetry strips.  EKG fairly benign  ---------------------------------------------------------------------------------------------------------------------     Results from last 7 days  Lab Units 07/09/18  0953   CRP mg/dL 1.72*   LACTATE mmol/L 1.5   WBC 10*3/mm3 8.19   HEMOGLOBIN g/dL 17.2   HEMATOCRIT % 51.0   MCV fL 93.8   MCHC g/dL 33.7   PLATELETS 10*3/mm3 197   INR  1.03       Results from last 7 days  Lab Units 07/09/18  1336   PH, ARTERIAL pH units 7.330*   PO2 ART mm Hg 76.8*   PCO2, ARTERIAL mm Hg 74.7*   HCO3 ART mmol/L 38.5*           Results from last 7 days  Lab Units 07/09/18  0953   SODIUM mmol/L 130*   POTASSIUM mmol/L 4.9   CHLORIDE mmol/L 89*   CO2 mmol/L 38.2*   BUN mg/dL 16   CREATININE mg/dL 1.00   EGFR IF NONAFRICN AM mL/min/1.73 74   CALCIUM mg/dL 9.1   GLUCOSE mg/dL 149*   ALBUMIN g/dL 3.60   BILIRUBIN mg/dL 0.3   ALK PHOS U/L 96   AST (SGOT) U/L 21   ALT (SGPT) U/L 22   Estimated Creatinine Clearance: 87.7 mL/min (by C-G formula based on SCr of 1 mg/dL).  No results found for: AMMONIA    Results from last 7 days  Lab Units 07/09/18  1153 07/09/18  0953   CK TOTAL U/L  --  139   TROPONIN I ng/mL 0.275* 0.303*   CK MB INDEX %  --  4.4*         Lab Results   Component Value Date    HGBA1C 7.10 (H) 08/05/2016     No results found for: TSH, FREET4  No results found for: PREGTESTUR, PREGSERUM, HCG, HCGQUANT  Pain Management Panel     There is no flowsheet data to display.                        ---------------------------------------------------------------------------------------------------------------------  Imaging Results (last 7 days)     Procedure Component Value Units Date/Time    XR Abdomen 2 View With Chest 1 View [[id number]]  Collected:  07/09/18 1552     Updated:  07/09/18 1556    Narrative:          XR ABDOMEN 2 VW W CHEST 1 VW-     CLINICAL INDICATION: abd distention; J96.01-Acute respiratory failure  with hypoxia; J96.02-Acute respiratory failure with hypercapnia;  I21.4-Non-ST elevation (NSTEMI) myocardial infarction          COMPARISON: 07/09/2018      FINDINGS:  Chest:  Bibasilar airspace disease, either atelectasis or pneumonia     Abdomen:  Two views of the abdomen show no free air. I do not see abnormal bowel  distention to suggest complete obstruction. The bony structures are  intact. No abnormal soft tissue masses are seen. No suspicious appearing  calcifications are identified on today's exam.       Impression:       1. 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 Bibasilar airspace disease.  3. No evidence of bowel obstruction.  4. No free air.     5.            This report was finalized on 7/9/2018 3:54 PM by Dr. Jose Luis Romero MD.       US Arterial Doppler Lower Extremity Right [[id number]] Collected:  07/09/18 1339     Updated:  07/09/18 1407    Narrative:       US ARTERIAL DOPPLER LOWER EXTREMITY RIGHT-     REASON FOR EXAM:  Color change/ decreased pulses.     TECHNIQUE:  Multiple real-time color doppler images were obtained.  M-mode Doppler was used for velocity determination and spectral pattern.  Stenosis was evaluated using the NASCET or similar measurement  technique.     FINDINGS:   Right leg: The external iliac artery had extensive calcific plaque in  the lumen. It is narrowed. The Doppler wave pattern was monophasic. The  velocity is significantly elevated measuring 416 cm/s. There is calcific  plaque at the common femoral artery level. The Doppler wave pattern was  monophasic. The peak systolic velocity was 213 cm/s. Color Doppler flow  was seen in the profunda femoral and superficial femoral artery. There  were no areas of occlusion in the superficial femoral artery. The  Doppler wave pattern was monophasic. The popliteal  artery has a  monophasic flow pattern velocity of 87 cm/s. There was monophasic flow  in the posterior tibial artery. At the ankle the velocity measured 26  cm/s.       Impression:       Heavy atherosclerotic plaquing and stenosis is noted at the  level of the external iliac and common femoral arteries in the proximal  leg. The velocities were elevated to over 400 cm/s. The runoff arteries      showed no focal areas of stenosis or occlusion in the right leg.     This report was finalized on 7/9/2018 2:05 PM by Dr. Jose Ramon Hatch II, MD.       US Venous Doppler Lower Extremity Right (duplex) [[id number]] Collected:  07/09/18 1311     Updated:  07/09/18 1313    Narrative:       US VENOUS DOPPLER LOWER EXTREMITY RIGHT (DUPLEX)-     REASON FOR EXAM:  redness and pain     Multiple real-time images were obtained. The deep veins were well  demonstrated sonographically. There is good color doppler signal seen  filling the deep veins. They were completely compressed by the  ultrasound transducer. There was good spontaneous venous flow and  augmentation. There are no echoes seen along the deep veins to suggest  clot.          Impression:       No sonographic findings of DVT in the right lower extremity     This report was finalized on 7/9/2018 1:11 PM by Dr. Jose Ramon Hatch II, MD.       XR Chest 1 View [[id number]] Collected:  07/09/18 1006     Updated:  07/09/18 1017    Narrative:       XR CHEST 1 VW-     CLINICAL INDICATION: soa          COMPARISON: 01/16/2016      TECHNIQUE: Single frontal view of the chest.     FINDINGS:     Bibasilar atelectasis  The cardiac silhouette is normal. The pulmonary vasculature is  unremarkable.  There is no evidence of an acute osseous abnormality.   There are no suspicious-appearing parenchymal soft tissue nodules.            Impression:       Bibasilar atelectasis         This report was finalized on 7/9/2018 10:06 AM by Dr. Jose Luis Romero MD.         Chest x-ray appears to have bilateral  atelectasis, no obstructive pattern on acute abdominal series, this is to my viewing    Cultures:         I have personally reviewed the radiology images and read the final radiology report.  ---------------------------------------------------------------------------------------------------------------------  Assessment / Plan       Assessment and Plan:    -Acute hypoxic and hypercarbic respiratory failure superimposed on chronic lung disease, likely 2/2 to COPD exacerbation:  Mr. Bajwa has been admitted to the CCU for further evaluation and treatment.  BiPAP settings were increased while in ED with orders to continue in CCU with ABG following arrival. Discussed important of BiPAP compliance with patient at bedside, as he did express not being pleased with wearing it.  IV Rocephin and doxycycline initially ordered given COPD exacerbation; however, Rocephin was escalated to Zosyn following evaluation of bilateral lower extremity cellulitis.  Doxycycline.  Considering there is no obvious pneumonia just atelectasis and need vancomycin for the cellulitis.  Pulmonlogy consultation has been placed.  IV solumedrol 40 mg BID has been ordered in addition to duoneb treatments. Testing for atypicals ordered     -Bilateral cellulitis, Right>Left with multiple diabetic ulcers of legs:  IV Zosyn has been ordered for further coverage.  IV vancomycin ordered as well.   Will consult ID for further evaluation and treatment.  Will consult wound care.  WBC and lactic acid unremarkable.  C-RP elevated mildly.   HR is less than 90.  Mr. Bajwa has been afebrile. Venous dopplers in ED unremarkable.  Arterial doppler with heavy plaquing and stenosis at the level fo the externial iliac and common femoral arteries in the proximal leg with arterial doppler report available within document.      Abdominal bloating, KUB negative, no free air, he feels like he is having some urinary hesitancy, will check post void residual    -Indeterminate  troponin possibly 2/2 to strain from respiratory issues as previously outlined: Serial cardiac isoenzymes have been ordered.  Echocardiogram and cardiology consultation ordered.  Will add daily baby aspirin.     -Proteinuria: Urine protein/creatinine ratio has been added.     -Hypoosmolar Hyponatremia: Will review home medications.  Previously on thiazide diuretic, unclear at present if he is still taking this at home. Urine sodium is pending.  Denies alcohol use.  Will repeat a basic profile on arrival to the CCU.    -Diabetes mellitus type II, ID: [id number].  Hemoglobin a1c has been ordered.      -DVT Prophylaxis: SQ Heparin     INPATIENT status due to the need for care which can only be reasonably provided in an hospital setting such as aggressive/expedited ancillary services and/or consultation services, the necessity for IV medications, close physician monitoring and/or the possible need for procedures.  In such, I feel patient’s risk for adverse outcomes and need for care warrant INPATIENT evaluation and predict the patient’s care encounter to likely last beyond 2 midnights.    Considered high risk 2/2 to acute hypoxic and hypercarbic respiratory failure with underlying exacerbation of COPD. (severe exacerbation of chronic illness)    Ivon Hernandez PA-C  07/09/18  4:06 PM  ---------------------------------------------------------------------------------------------------------------------

## 2018-07-09 NOTE — ED NOTES
Oxygen therapy initiated by rt edin at 2lpm nc.      Clementine Cueto RN  07/09/18 1012       Clementine Cueto RN  07/09/18 1019

## 2018-07-09 NOTE — PROGRESS NOTES
Patient initiated on vancomycin and Zosyn for cellulitis. Based on patient and population kinetics (ke = 0.055 hrs-1, t 1/2 = 12.7 hrs, Vd = 61.2 L), will start vancomycin at 1.5 gm q18 hrs. Will check a trough level at steady state and follow. Will start Zosyn at 3.375 gm q8 hrs extended interval dosing. Will follow.    Thank you,    Georgiana Simmons, RPH

## 2018-07-09 NOTE — ED NOTES
Non-invasive in room with patient at this time. Will draw blood when finished.     Bridget Holguin  07/09/18 1146

## 2018-07-09 NOTE — ED NOTES
Pt alert and oriented, skin pwd, pt in normal sinus rhythm, pt remains on bipap. Pt watching television, complains of leg pain, fall precautions maintained, wctm.      Clementine Cueto RN  07/09/18 5658

## 2018-07-10 ENCOUNTER — APPOINTMENT (OUTPATIENT)
Dept: ULTRASOUND IMAGING | Facility: HOSPITAL | Age: 68
End: 2018-07-10
Attending: INTERNAL MEDICINE

## 2018-07-10 ENCOUNTER — APPOINTMENT (OUTPATIENT)
Dept: CARDIOLOGY | Facility: HOSPITAL | Age: 68
End: 2018-07-10

## 2018-07-10 PROBLEM — I87.2 VENOUS STASIS DERMATITIS OF BOTH LOWER EXTREMITIES: Status: ACTIVE | Noted: 2018-07-10

## 2018-07-10 LAB
A-A DO2: 127.3 MMHG (ref 0–300)
A-A DO2: 159.3 MMHG (ref 0–300)
ANION GAP SERPL CALCULATED.3IONS-SCNC: 0.6 MMOL/L (ref 3.6–11.2)
ARTERIAL PATENCY WRIST A: POSITIVE
ARTERIAL PATENCY WRIST A: POSITIVE
ATMOSPHERIC PRESS: 734 MMHG
ATMOSPHERIC PRESS: 734 MMHG
BACTERIA UR QL AUTO: ABNORMAL /HPF
BASE EXCESS BLDA CALC-SCNC: 3.1 MMOL/L
BASE EXCESS BLDA CALC-SCNC: 9.2 MMOL/L
BASOPHILS # BLD AUTO: 0 10*3/MM3 (ref 0–0.3)
BASOPHILS NFR BLD AUTO: 0 % (ref 0–2)
BDY SITE: ABNORMAL
BDY SITE: ABNORMAL
BH CV ECHO MEAS - ACS: 2.5 CM
BH CV ECHO MEAS - AO ROOT AREA (BSA CORRECTED): 1.7
BH CV ECHO MEAS - AO ROOT AREA: 12 CM^2
BH CV ECHO MEAS - AO ROOT DIAM: 3.9 CM
BH CV ECHO MEAS - BSA(HAYCOCK): 2.5 M^2
BH CV ECHO MEAS - BSA: 2.4 M^2
BH CV ECHO MEAS - BZI_BMI: 34.3 KILOGRAMS/M^2
BH CV ECHO MEAS - BZI_METRIC_HEIGHT: 182.9 CM
BH CV ECHO MEAS - BZI_METRIC_WEIGHT: 114.8 KG
BH CV ECHO MEAS - CONTRAST EF 4CH: 66.1 ML/M^2
BH CV ECHO MEAS - EDV(MOD-SP4): 127 ML
BH CV ECHO MEAS - EF(MOD-SP4): 66.1 %
BH CV ECHO MEAS - ESV(MOD-SP4): 43 ML
BH CV ECHO MEAS - LA DIMENSION: 3.9 CM
BH CV ECHO MEAS - LA/AO: 0.99
BH CV ECHO MEAS - LV DIASTOLIC VOL/BSA (35-75): 53.9 ML/M^2
BH CV ECHO MEAS - LV SYSTOLIC VOL/BSA (12-30): 18.3 ML/M^2
BH CV ECHO MEAS - LVLD AP4: 9.4 CM
BH CV ECHO MEAS - LVLS AP4: 7.2 CM
BH CV ECHO MEAS - LVOT AREA (M): 4.2 CM^2
BH CV ECHO MEAS - LVOT AREA: 4.2 CM^2
BH CV ECHO MEAS - LVOT DIAM: 2.3 CM
BH CV ECHO MEAS - MV A MAX VEL: 95.3 CM/SEC
BH CV ECHO MEAS - MV E MAX VEL: 101.2 CM/SEC
BH CV ECHO MEAS - MV E/A: 1.1
BH CV ECHO MEAS - PA ACC SLOPE: 1744 CM/SEC^2
BH CV ECHO MEAS - PA ACC TIME: 0.07 SEC
BH CV ECHO MEAS - PA PR(ACCEL): 45.7 MMHG
BH CV ECHO MEAS - RAP SYSTOLE: 10 MMHG
BH CV ECHO MEAS - RVSP: 41.9 MMHG
BH CV ECHO MEAS - SI(MOD-SP4): 35.7 ML/M^2
BH CV ECHO MEAS - SV(MOD-SP4): 84 ML
BH CV ECHO MEAS - TR MAX VEL: 282.3 CM/SEC
BILIRUB UR QL STRIP: NEGATIVE
BODY TEMPERATURE: 98.6 C
BODY TEMPERATURE: 98.6 C
BUN BLD-MCNC: 21 MG/DL (ref 7–21)
BUN/CREAT SERPL: 18.1 (ref 7–25)
CALCIUM SPEC-SCNC: 8.6 MG/DL (ref 7.7–10)
CHLORIDE SERPL-SCNC: 90 MMOL/L (ref 99–112)
CHLORIDE UR-SCNC: 50 MMOL/L
CK MB SERPL-CCNC: 4.67 NG/ML (ref 0–5)
CK MB SERPL-CCNC: 5.92 NG/ML (ref 0–5)
CK MB SERPL-RTO: 2.8 % (ref 0–3)
CK MB SERPL-RTO: 3.9 % (ref 0–3)
CK SERPL-CCNC: 153 U/L (ref 24–204)
CK SERPL-CCNC: 165 U/L (ref 24–204)
CK SERPL-CCNC: 165 U/L (ref 24–204)
CLARITY UR: CLEAR
CO2 SERPL-SCNC: 36.4 MMOL/L (ref 24.3–31.9)
COHGB MFR BLD: 3.6 % (ref 0–5)
COHGB MFR BLD: 4.4 % (ref 0–5)
COLOR UR: YELLOW
CREAT BLD-MCNC: 1.16 MG/DL (ref 0.43–1.29)
DEPRECATED RDW RBC AUTO: 45.1 FL (ref 37–54)
EOSINOPHIL # BLD AUTO: 0 10*3/MM3 (ref 0–0.7)
EOSINOPHIL NFR BLD AUTO: 0 % (ref 0–7)
ERYTHROCYTE [DISTWIDTH] IN BLOOD BY AUTOMATED COUNT: 13.7 % (ref 11.5–14.5)
GAS FLOW AIRWAY: 30 LPM
GFR SERPL CREATININE-BSD FRML MDRD: 62 ML/MIN/1.73
GLUCOSE BLD-MCNC: 321 MG/DL (ref 70–110)
GLUCOSE BLDC GLUCOMTR-MCNC: 108 MG/DL (ref 70–130)
GLUCOSE BLDC GLUCOMTR-MCNC: 176 MG/DL (ref 70–130)
GLUCOSE BLDC GLUCOMTR-MCNC: 251 MG/DL (ref 70–130)
GLUCOSE BLDC GLUCOMTR-MCNC: 93 MG/DL (ref 70–130)
GLUCOSE UR STRIP-MCNC: ABNORMAL MG/DL
HCO3 BLDA-SCNC: 31.4 MMOL/L (ref 22–26)
HCO3 BLDA-SCNC: 38.3 MMOL/L (ref 22–26)
HCT VFR BLD AUTO: 48.3 % (ref 42–52)
HCT VFR BLD CALC: 49 % (ref 42–52)
HCT VFR BLD CALC: 50 % (ref 42–52)
HGB BLD-MCNC: 15.9 G/DL (ref 14–18)
HGB BLDA-MCNC: 16.7 G/DL (ref 12–16)
HGB BLDA-MCNC: 16.9 G/DL (ref 12–16)
HGB UR QL STRIP.AUTO: ABNORMAL
HOROWITZ INDEX BLD+IHG-RTO: 38 %
HOROWITZ INDEX BLD+IHG-RTO: 45 %
HYALINE CASTS UR QL AUTO: ABNORMAL /LPF
IMM GRANULOCYTES # BLD: 0.01 10*3/MM3 (ref 0–0.03)
IMM GRANULOCYTES NFR BLD: 0.1 % (ref 0–0.5)
KETONES UR QL STRIP: NEGATIVE
LEUKOCYTE ESTERASE UR QL STRIP.AUTO: NEGATIVE
LYMPHOCYTES # BLD AUTO: 0.41 10*3/MM3 (ref 1–3)
LYMPHOCYTES NFR BLD AUTO: 5.5 % (ref 16–46)
MAGNESIUM SERPL-MCNC: 2.2 MG/DL (ref 1.7–2.6)
MAXIMAL PREDICTED HEART RATE: 150 BPM
MCH RBC QN AUTO: 30.9 PG (ref 27–33)
MCHC RBC AUTO-ENTMCNC: 32.9 G/DL (ref 33–37)
MCV RBC AUTO: 94 FL (ref 80–94)
METHGB BLD QL: 0.4 % (ref 0–3)
METHGB BLD QL: 0.4 % (ref 0–3)
MODALITY: ABNORMAL
MODALITY: ABNORMAL
MONOCYTES # BLD AUTO: 0.2 10*3/MM3 (ref 0.1–0.9)
MONOCYTES NFR BLD AUTO: 2.7 % (ref 0–12)
NEUTROPHILS # BLD AUTO: 6.77 10*3/MM3 (ref 1.4–6.5)
NEUTROPHILS NFR BLD AUTO: 91.7 % (ref 40–75)
NITRITE UR QL STRIP: NEGATIVE
OSMOLALITY SERPL CALC.SUM OF ELEC: 270.6 MOSM/KG (ref 273–305)
OXYHGB MFR BLDV: 86.1 % (ref 85–100)
OXYHGB MFR BLDV: 90.1 % (ref 85–100)
PCO2 BLDA: 62.6 MM HG (ref 35–45)
PCO2 BLDA: 70.2 MM HG (ref 35–45)
PH BLDA: 7.32 PH UNITS (ref 7.35–7.45)
PH BLDA: 7.36 PH UNITS (ref 7.35–7.45)
PH UR STRIP.AUTO: 6 [PH] (ref 5–8)
PHOSPHATE SERPL-MCNC: 4 MG/DL (ref 2.7–4.5)
PLATELET # BLD AUTO: 224 10*3/MM3 (ref 130–400)
PMV BLD AUTO: 10.8 FL (ref 6–10)
PO2 BLDA: 61.5 MM HG (ref 80–100)
PO2 BLDA: 70 MM HG (ref 80–100)
POTASSIUM BLD-SCNC: 5 MMOL/L (ref 3.5–5.3)
POTASSIUM BLD-SCNC: 5.8 MMOL/L (ref 3.5–5.3)
POTASSIUM UR-SCNC: 75.5 MMOL/L
PROT UR QL STRIP: ABNORMAL
RBC # BLD AUTO: 5.14 10*6/MM3 (ref 4.7–6.1)
RBC # UR: ABNORMAL /HPF
REF LAB TEST METHOD: ABNORMAL
SAO2 % BLDCOA: 90.4 % (ref 90–100)
SAO2 % BLDCOA: 93.9 % (ref 90–100)
SODIUM BLD-SCNC: 127 MMOL/L (ref 135–153)
SODIUM UR-SCNC: 43 MMOL/L
SP GR UR STRIP: 1.02 (ref 1–1.03)
SQUAMOUS #/AREA URNS HPF: ABNORMAL /HPF
STRESS TARGET HR: 128 BPM
TROPONIN I SERPL-MCNC: 0.13 NG/ML
TROPONIN I SERPL-MCNC: 0.14 NG/ML
UROBILINOGEN UR QL STRIP: ABNORMAL
WBC NRBC COR # BLD: 7.39 10*3/MM3 (ref 4.5–12.5)
WBC UR QL AUTO: ABNORMAL /HPF

## 2018-07-10 PROCEDURE — 93306 TTE W/DOPPLER COMPLETE: CPT

## 2018-07-10 PROCEDURE — 84133 ASSAY OF URINE POTASSIUM: CPT | Performed by: INTERNAL MEDICINE

## 2018-07-10 PROCEDURE — 99222 1ST HOSP IP/OBS MODERATE 55: CPT | Performed by: INTERNAL MEDICINE

## 2018-07-10 PROCEDURE — 25010000002 PIPERACILLIN-TAZOBACTAM: Performed by: INTERNAL MEDICINE

## 2018-07-10 PROCEDURE — 25010000002 HEPARIN (PORCINE) PER 1000 UNITS: Performed by: PHYSICIAN ASSISTANT

## 2018-07-10 PROCEDURE — 93005 ELECTROCARDIOGRAM TRACING: CPT | Performed by: HOSPITALIST

## 2018-07-10 PROCEDURE — 82375 ASSAY CARBOXYHB QUANT: CPT | Performed by: HOSPITALIST

## 2018-07-10 PROCEDURE — 25010000002 VANCOMYCIN PER 500 MG: Performed by: INTERNAL MEDICINE

## 2018-07-10 PROCEDURE — 83735 ASSAY OF MAGNESIUM: CPT | Performed by: PHYSICIAN ASSISTANT

## 2018-07-10 PROCEDURE — 36600 WITHDRAWAL OF ARTERIAL BLOOD: CPT | Performed by: HOSPITALIST

## 2018-07-10 PROCEDURE — 82962 GLUCOSE BLOOD TEST: CPT

## 2018-07-10 PROCEDURE — 84300 ASSAY OF URINE SODIUM: CPT | Performed by: INTERNAL MEDICINE

## 2018-07-10 PROCEDURE — 25010000002 METHYLPREDNISOLONE PER 40 MG: Performed by: PHYSICIAN ASSISTANT

## 2018-07-10 PROCEDURE — 94799 UNLISTED PULMONARY SVC/PX: CPT

## 2018-07-10 PROCEDURE — 84484 ASSAY OF TROPONIN QUANT: CPT | Performed by: FAMILY MEDICINE

## 2018-07-10 PROCEDURE — 86235 NUCLEAR ANTIGEN ANTIBODY: CPT | Performed by: INTERNAL MEDICINE

## 2018-07-10 PROCEDURE — 93926 LOWER EXTREMITY STUDY: CPT | Performed by: RADIOLOGY

## 2018-07-10 PROCEDURE — 63710000001 INSULIN ASPART PER 5 UNITS: Performed by: NURSE PRACTITIONER

## 2018-07-10 PROCEDURE — 81001 URINALYSIS AUTO W/SCOPE: CPT | Performed by: INTERNAL MEDICINE

## 2018-07-10 PROCEDURE — 93926 LOWER EXTREMITY STUDY: CPT

## 2018-07-10 PROCEDURE — 82805 BLOOD GASES W/O2 SATURATION: CPT | Performed by: HOSPITALIST

## 2018-07-10 PROCEDURE — 93010 ELECTROCARDIOGRAM REPORT: CPT | Performed by: INTERNAL MEDICINE

## 2018-07-10 PROCEDURE — 25010000002 MORPHINE PER 10 MG: Performed by: HOSPITALIST

## 2018-07-10 PROCEDURE — 84100 ASSAY OF PHOSPHORUS: CPT | Performed by: PHYSICIAN ASSISTANT

## 2018-07-10 PROCEDURE — 82550 ASSAY OF CK (CPK): CPT | Performed by: PHYSICIAN ASSISTANT

## 2018-07-10 PROCEDURE — 84484 ASSAY OF TROPONIN QUANT: CPT | Performed by: PHYSICIAN ASSISTANT

## 2018-07-10 PROCEDURE — 82553 CREATINE MB FRACTION: CPT | Performed by: PHYSICIAN ASSISTANT

## 2018-07-10 PROCEDURE — 63710000001 INSULIN REGULAR HUMAN PER 5 UNITS: Performed by: HOSPITALIST

## 2018-07-10 PROCEDURE — 84132 ASSAY OF SERUM POTASSIUM: CPT | Performed by: HOSPITALIST

## 2018-07-10 PROCEDURE — 87205 SMEAR GRAM STAIN: CPT | Performed by: INTERNAL MEDICINE

## 2018-07-10 PROCEDURE — 82436 ASSAY OF URINE CHLORIDE: CPT | Performed by: INTERNAL MEDICINE

## 2018-07-10 PROCEDURE — 83050 HGB METHEMOGLOBIN QUAN: CPT | Performed by: HOSPITALIST

## 2018-07-10 PROCEDURE — 63710000001 INSULIN ASPART PER 5 UNITS: Performed by: INTERNAL MEDICINE

## 2018-07-10 PROCEDURE — 99233 SBSQ HOSP IP/OBS HIGH 50: CPT | Performed by: INTERNAL MEDICINE

## 2018-07-10 PROCEDURE — 80048 BASIC METABOLIC PNL TOTAL CA: CPT | Performed by: PHYSICIAN ASSISTANT

## 2018-07-10 PROCEDURE — 99223 1ST HOSP IP/OBS HIGH 75: CPT | Performed by: INTERNAL MEDICINE

## 2018-07-10 PROCEDURE — 93306 TTE W/DOPPLER COMPLETE: CPT | Performed by: INTERNAL MEDICINE

## 2018-07-10 PROCEDURE — 85025 COMPLETE CBC W/AUTO DIFF WBC: CPT | Performed by: PHYSICIAN ASSISTANT

## 2018-07-10 PROCEDURE — 99407 BEHAV CHNG SMOKING > 10 MIN: CPT | Performed by: INTERNAL MEDICINE

## 2018-07-10 RX ORDER — SODIUM POLYSTYRENE SULFONATE 15 G/60ML
30 SUSPENSION ORAL; RECTAL ONCE
Status: DISCONTINUED | OUTPATIENT
Start: 2018-07-10 | End: 2018-07-10 | Stop reason: ALTCHOICE

## 2018-07-10 RX ORDER — DOXYCYCLINE 100 MG/1
100 CAPSULE ORAL EVERY 12 HOURS SCHEDULED
Status: DISCONTINUED | OUTPATIENT
Start: 2018-07-10 | End: 2018-07-15 | Stop reason: HOSPADM

## 2018-07-10 RX ORDER — SODIUM CHLORIDE 0.9 % (FLUSH) 0.9 %
5 SYRINGE (ML) INJECTION AS NEEDED
Status: DISCONTINUED | OUTPATIENT
Start: 2018-07-10 | End: 2018-07-15 | Stop reason: HOSPADM

## 2018-07-10 RX ORDER — ATORVASTATIN CALCIUM 20 MG/1
20 TABLET, FILM COATED ORAL DAILY
Status: DISCONTINUED | OUTPATIENT
Start: 2018-07-10 | End: 2018-07-10

## 2018-07-10 RX ORDER — DONEPEZIL HYDROCHLORIDE 5 MG/1
10 TABLET, FILM COATED ORAL NIGHTLY
Status: DISCONTINUED | OUTPATIENT
Start: 2018-07-10 | End: 2018-07-15 | Stop reason: HOSPADM

## 2018-07-10 RX ORDER — ATORVASTATIN CALCIUM 40 MG/1
40 TABLET, FILM COATED ORAL DAILY
Status: DISCONTINUED | OUTPATIENT
Start: 2018-07-11 | End: 2018-07-15 | Stop reason: HOSPADM

## 2018-07-10 RX ORDER — ONDANSETRON 4 MG/1
8 TABLET, FILM COATED ORAL EVERY 12 HOURS PRN
Status: DISCONTINUED | OUTPATIENT
Start: 2018-07-10 | End: 2018-07-14

## 2018-07-10 RX ORDER — L.ACID,PARA/B.BIFIDUM/S.THERM 8B CELL
1 CAPSULE ORAL DAILY
Status: DISCONTINUED | OUTPATIENT
Start: 2018-07-10 | End: 2018-07-15 | Stop reason: HOSPADM

## 2018-07-10 RX ORDER — BUDESONIDE AND FORMOTEROL FUMARATE DIHYDRATE 160; 4.5 UG/1; UG/1
2 AEROSOL RESPIRATORY (INHALATION)
Status: DISCONTINUED | OUTPATIENT
Start: 2018-07-10 | End: 2018-07-15 | Stop reason: HOSPADM

## 2018-07-10 RX ORDER — INSULIN ASPART 100 [IU]/ML
30 INJECTION, SUSPENSION SUBCUTANEOUS 2 TIMES DAILY WITH MEALS
Status: DISCONTINUED | OUTPATIENT
Start: 2018-07-10 | End: 2018-07-11

## 2018-07-10 RX ORDER — CETIRIZINE HYDROCHLORIDE 10 MG/1
10 TABLET ORAL DAILY
Status: DISCONTINUED | OUTPATIENT
Start: 2018-07-10 | End: 2018-07-15 | Stop reason: HOSPADM

## 2018-07-10 RX ORDER — GABAPENTIN 300 MG/1
600 CAPSULE ORAL EVERY 8 HOURS SCHEDULED
Status: DISCONTINUED | OUTPATIENT
Start: 2018-07-10 | End: 2018-07-15 | Stop reason: HOSPADM

## 2018-07-10 RX ORDER — ASPIRIN 325 MG
325 TABLET ORAL DAILY
Status: DISCONTINUED | OUTPATIENT
Start: 2018-07-10 | End: 2018-07-15 | Stop reason: HOSPADM

## 2018-07-10 RX ORDER — ALBUTEROL SULFATE 2.5 MG/3ML
2.5 SOLUTION RESPIRATORY (INHALATION) EVERY 6 HOURS PRN
Status: DISCONTINUED | OUTPATIENT
Start: 2018-07-10 | End: 2018-07-15 | Stop reason: HOSPADM

## 2018-07-10 RX ORDER — CEFDINIR 300 MG/1
300 CAPSULE ORAL EVERY 12 HOURS SCHEDULED
Status: DISCONTINUED | OUTPATIENT
Start: 2018-07-10 | End: 2018-07-15 | Stop reason: HOSPADM

## 2018-07-10 RX ORDER — SODIUM POLYSTYRENE SULFONATE 4.1 MEQ/G
30 POWDER, FOR SUSPENSION ORAL; RECTAL ONCE
Status: COMPLETED | OUTPATIENT
Start: 2018-07-10 | End: 2018-07-10

## 2018-07-10 RX ORDER — SODIUM CHLORIDE 0.9 % (FLUSH) 0.9 %
3 SYRINGE (ML) INJECTION EVERY 8 HOURS
Status: DISCONTINUED | OUTPATIENT
Start: 2018-07-10 | End: 2018-07-15 | Stop reason: HOSPADM

## 2018-07-10 RX ORDER — TAMSULOSIN HYDROCHLORIDE 0.4 MG/1
0.4 CAPSULE ORAL EVERY EVENING
Status: DISCONTINUED | OUTPATIENT
Start: 2018-07-10 | End: 2018-07-15 | Stop reason: HOSPADM

## 2018-07-10 RX ORDER — LISINOPRIL 10 MG/1
40 TABLET ORAL DAILY
Status: DISCONTINUED | OUTPATIENT
Start: 2018-07-10 | End: 2018-07-15 | Stop reason: HOSPADM

## 2018-07-10 RX ORDER — SODIUM CHLORIDE 9 MG/ML
75 INJECTION, SOLUTION INTRAVENOUS CONTINUOUS
Status: DISCONTINUED | OUTPATIENT
Start: 2018-07-10 | End: 2018-07-10

## 2018-07-10 RX ORDER — FLUTICASONE PROPIONATE 50 MCG
1 SPRAY, SUSPENSION (ML) NASAL 2 TIMES DAILY
Status: DISCONTINUED | OUTPATIENT
Start: 2018-07-10 | End: 2018-07-15 | Stop reason: HOSPADM

## 2018-07-10 RX ADMIN — TAMSULOSIN HYDROCHLORIDE 0.4 MG: 0.4 CAPSULE ORAL at 17:23

## 2018-07-10 RX ADMIN — MORPHINE SULFATE 1 MG: 2 INJECTION, SOLUTION INTRAMUSCULAR; INTRAVENOUS at 10:10

## 2018-07-10 RX ADMIN — DOXYCYCLINE 100 MG: 100 CAPSULE ORAL at 21:23

## 2018-07-10 RX ADMIN — CETIRIZINE HCL 10 MG: 10 TABLET ORAL at 10:17

## 2018-07-10 RX ADMIN — HEPARIN SODIUM 5000 UNITS: 5000 INJECTION, SOLUTION INTRAVENOUS; SUBCUTANEOUS at 10:16

## 2018-07-10 RX ADMIN — FLUTICASONE PROPIONATE 1 SPRAY: 50 SPRAY, METERED NASAL at 21:23

## 2018-07-10 RX ADMIN — SODIUM CHLORIDE 500 ML: 9 INJECTION, SOLUTION INTRAVENOUS at 10:17

## 2018-07-10 RX ADMIN — CEFDINIR 300 MG: 300 CAPSULE ORAL at 10:15

## 2018-07-10 RX ADMIN — INSULIN ASPART 30 UNITS: 100 INJECTION, SUSPENSION SUBCUTANEOUS at 10:44

## 2018-07-10 RX ADMIN — NICOTINE 1 PATCH: 21 PATCH TRANSDERMAL at 00:38

## 2018-07-10 RX ADMIN — ASPIRIN 325 MG: 325 TABLET ORAL at 10:20

## 2018-07-10 RX ADMIN — IPRATROPIUM BROMIDE AND ALBUTEROL SULFATE 3 ML: .5; 3 SOLUTION RESPIRATORY (INHALATION) at 00:57

## 2018-07-10 RX ADMIN — Medication 1 CAPSULE: at 17:23

## 2018-07-10 RX ADMIN — INSULIN ASPART 6 UNITS: 100 INJECTION, SOLUTION INTRAVENOUS; SUBCUTANEOUS at 08:28

## 2018-07-10 RX ADMIN — INSULIN ASPART 2 UNITS: 100 INJECTION, SOLUTION INTRAVENOUS; SUBCUTANEOUS at 12:05

## 2018-07-10 RX ADMIN — GABAPENTIN 600 MG: 300 CAPSULE ORAL at 13:46

## 2018-07-10 RX ADMIN — METHYLPREDNISOLONE SODIUM SUCCINATE 40 MG: 40 INJECTION, POWDER, FOR SOLUTION INTRAMUSCULAR; INTRAVENOUS at 18:42

## 2018-07-10 RX ADMIN — FLUTICASONE PROPIONATE 1 SPRAY: 50 SPRAY, METERED NASAL at 10:15

## 2018-07-10 RX ADMIN — MORPHINE SULFATE 1 MG: 2 INJECTION, SOLUTION INTRAMUSCULAR; INTRAVENOUS at 18:43

## 2018-07-10 RX ADMIN — MORPHINE SULFATE 1 MG: 2 INJECTION, SOLUTION INTRAMUSCULAR; INTRAVENOUS at 13:46

## 2018-07-10 RX ADMIN — DOXYCYCLINE 100 MG: 100 CAPSULE ORAL at 10:14

## 2018-07-10 RX ADMIN — VANCOMYCIN HYDROCHLORIDE 1500 MG: 5 INJECTION, POWDER, LYOPHILIZED, FOR SOLUTION INTRAVENOUS at 06:28

## 2018-07-10 RX ADMIN — CEFDINIR 300 MG: 300 CAPSULE ORAL at 21:23

## 2018-07-10 RX ADMIN — BUDESONIDE AND FORMOTEROL FUMARATE DIHYDRATE 2 PUFF: 160; 4.5 AEROSOL RESPIRATORY (INHALATION) at 10:52

## 2018-07-10 RX ADMIN — IPRATROPIUM BROMIDE AND ALBUTEROL SULFATE 3 ML: .5; 3 SOLUTION RESPIRATORY (INHALATION) at 07:02

## 2018-07-10 RX ADMIN — NICOTINE 1 PATCH: 21 PATCH TRANSDERMAL at 23:51

## 2018-07-10 RX ADMIN — Medication 3 ML: at 21:24

## 2018-07-10 RX ADMIN — MORPHINE SULFATE 1 MG: 2 INJECTION, SOLUTION INTRAMUSCULAR; INTRAVENOUS at 05:08

## 2018-07-10 RX ADMIN — IPRATROPIUM BROMIDE AND ALBUTEROL SULFATE 3 ML: .5; 3 SOLUTION RESPIRATORY (INHALATION) at 13:14

## 2018-07-10 RX ADMIN — DONEPEZIL HYDROCHLORIDE 10 MG: 5 TABLET, FILM COATED ORAL at 21:23

## 2018-07-10 RX ADMIN — METHYLPREDNISOLONE SODIUM SUCCINATE 40 MG: 40 INJECTION, POWDER, FOR SOLUTION INTRAMUSCULAR; INTRAVENOUS at 05:08

## 2018-07-10 RX ADMIN — IPRATROPIUM BROMIDE AND ALBUTEROL SULFATE 3 ML: .5; 3 SOLUTION RESPIRATORY (INHALATION) at 18:12

## 2018-07-10 RX ADMIN — SODIUM POLYSTYRENE SULFONATE 30 G: 1 POWDER ORAL; RECTAL at 07:59

## 2018-07-10 RX ADMIN — HUMAN INSULIN 10 UNITS: 100 INJECTION, SOLUTION SUBCUTANEOUS at 07:59

## 2018-07-10 RX ADMIN — Medication 3 ML: at 05:08

## 2018-07-10 RX ADMIN — GABAPENTIN 600 MG: 300 CAPSULE ORAL at 21:23

## 2018-07-10 RX ADMIN — SODIUM CHLORIDE 75 ML/HR: 9 INJECTION, SOLUTION INTRAVENOUS at 08:00

## 2018-07-10 RX ADMIN — LISINOPRIL 40 MG: 10 TABLET ORAL at 10:17

## 2018-07-10 RX ADMIN — HEPARIN SODIUM 5000 UNITS: 5000 INJECTION, SOLUTION INTRAVENOUS; SUBCUTANEOUS at 21:23

## 2018-07-10 RX ADMIN — Medication 3 ML: at 13:47

## 2018-07-10 NOTE — CONSULTS
Date of Admit: 7/9/2018  Date of Consult: 07/10/18  No ref. provider found      Principal Problem:    Venous stasis dermatitis of both lower extremities  Active Problems:    Acute respiratory failure with hypoxia and hypercapnia (CMS/[facility])        Assessment:    1. Indeterminate range troponin elevation (possible small acute non-ST elevation myocardial infarction) which is probably due to demand ischemia from acute hypoxic respiratory failure, cannot exclude underlying significant coronary artery disease given his age and risk factors for coronary artery disease.  2. Acute hypoxic/hypercapnic respiratory failure due to acute exacerbation of COPD, being managed by the hospitalist service.  3. Cellulitis of the lower extremities, being managed by the hospitalist service.  4. Tobacco abuse.  5. Peripheral arterial disease of both lower extremities with ulcers on the right leg.      Recommendations:    1. Continue treatment with aspirin and statin for now.  2. Continued to follow the troponin trend.  3. Consider further cardiac evaluation later with a dobutamine sestamibi study when his respiratory status improves adequately.  4. I have also strongly emphasized for him to quit smoking and explained the risks of continued smoking.  5. Evaluate his LV wall motion and systolic function with an echo Doppler study and tailor further therapy accordingly.  6. Evaluate his peripheral arterial disease with arterial duplex scan of both lower extremities.  7. Check fasting lipid panel and adjust dose of statin as needed.    Reason for consultation: Elevated troponin.    Lobo Bajwa is a 68 y.o. male with problems as listed above presents with    History of Present Illness: Mr. Bajwa is a pleasant 68-year-old  male with no history of known coronary artery disease, has long history of smoking of one to 2 packs a day for about 25 years with underlying significant COPD, was admitted with complaints of  "increasing shortness of breath for the last 2-3 days and was noted to be having acute exacerbation of COPD with acute hypoxic/hypercarbic respiratory failure.  Since admission his troponins have been elevated in the indeterminate range and have been trending down since.  It peaked at 0.303 at admission and has been trending down.  On further questioning Mr. Bajwa denies any compressive chest pain or discomfort in the recent or remote past.  He denies any history of known coronary artery disease.  His main complaint is having shortness of breath.  He does complain of occasional \"gas-like pains\".  He has been on aspirin 325 mg daily and atorvastatin 20 mg daily.  His EKG revealed normal sinus rhythm with poor R wave progression across leads V1 through V4.  His echo Doppler study which I reviewed at the bedside while it was being done revealed normal LV wall motion and systolic function with an estimated ejection fraction of about 60-65%.  There was mild mitral regurgitation with evidence of mild to moderate pulmonary hypertension.    Cardiac risk factors:diabetes mellitus, hypertension, Obesity and Age and male gender.      Past Medical History:   Diagnosis Date   • COPD (chronic obstructive pulmonary disease) (CMS/[facility])    • Diabetes mellitus (CMS/[facility])    • Hypertension      History reviewed. No pertinent surgical history.  Family History   Problem Relation Age of Onset   • No Known Problems Mother    • Heart failure Father      Social History   Substance Use Topics   • Smoking status: Current Every Day Smoker     Packs/day: 1.00     Years: 25.00     Types: Cigarettes   • Smokeless tobacco: Never Used   • Alcohol use No     Prescriptions Prior to Admission   Medication Sig Dispense Refill Last Dose   • albuterol (PROVENTIL HFA;VENTOLIN HFA) 108 (90 Base) MCG/ACT inhaler Inhale 2 puffs Every 6 (Six) Hours As Needed for Wheezing.   7/8/2018 at Unknown time   • aspirin 325 MG tablet Take 325 mg by mouth Daily.   " 7/8/2018 at Unknown time   • budesonide-formoterol (SYMBICORT) 160-4.5 MCG/ACT inhaler Inhale 2 puffs 2 (two) times a day.   7/8/2018 at Unknown time   • donepezil (ARICEPT) 10 MG tablet Take 10 mg by mouth Every Night.   7/8/2018 at Unknown time   • gabapentin (NEURONTIN) 800 MG tablet Take 800 mg by mouth 3 (Three) Times a Day.   7/8/2018 at Unknown time   • hydrochlorothiazide (HYDRODIURIL) 25 MG tablet Take 25 mg by mouth daily.   7/8/2018 at Unknown time   • insulin NPH-insulin regular (Novolin 70/30) (70-30) 100 UNIT/ML injection Inject 30 Units under the skin 2 (Two) Times a Day With Meals.   7/8/2018 at Unknown time   • levocetirizine (XYZAL) 5 MG tablet Take 5 mg by mouth Daily As Needed for Allergies.   7/8/2018 at Unknown time   • Liraglutide (VICTOZA) 18 MG/3ML solution pen-injector injection Inject 1.8 mg under the skin Daily.   7/8/2018 at Unknown time   • lisinopril (PRINIVIL,ZESTRIL) 40 MG tablet Take 40 mg by mouth daily.   7/8/2018 at Unknown time   • ondansetron (ZOFRAN) 8 MG tablet Take 8 mg by mouth Every 12 (Twelve) Hours As Needed for Nausea or Vomiting.   7/8/2018 at Unknown time   • simvastatin (ZOCOR) 80 MG tablet Take 40 mg by mouth every night.   7/8/2018 at Unknown time   • tamsulosin (FLOMAX) 0.4 MG capsule 24 hr capsule Take 1 capsule by mouth Every Evening.   Unknown at Unknown time     Allergies:  Patient has no known allergies.    Review of Systems   Constitutional: Negative for appetite change, chills and fever.   HENT: Negative for congestion, ear discharge, ear pain and sore throat.    Eyes: Negative for pain and redness.   Respiratory: Positive for shortness of breath. Negative for cough and wheezing.    Cardiovascular: Positive for leg swelling. Negative for palpitations.   Gastrointestinal: Negative for abdominal pain, diarrhea, nausea and vomiting.   Endocrine: Negative for cold intolerance, heat intolerance, polydipsia and polyuria.   Genitourinary: Negative for dysuria and  hematuria.   Skin: Negative for rash.        Has erythema and induration of the skin over both lower extremities.   Neurological: Negative for seizures, syncope and headaches.   Psychiatric/Behavioral: Negative for confusion. The patient is not nervous/anxious.          Objective      Vital Signs  Temp:  [96.8 °F (36 °C)-98.7 °F (37.1 °C)] 98.7 °F (37.1 °C)  Heart Rate:  [] 92  Resp:  [16-30] 22  BP: ()/(56-92) 152/84  FiO2 (%):  [28 %-30 %] 28 %  Vital Signs (last 72 hrs)       07/07 0700  -  07/08 0659 07/08 0700  -  07/09 0659 07/09 0700  -  07/10 0659 07/10 0700  -  07/10 0934   Most Recent    Temp (°F)     96.8 -  98.7       98.7 (37.1)    Heart Rate     65 -  108      92     92    Resp     16 -  (!)30      22     22    BP     97/62 -  171/92       152/84    SpO2 (%)     (!)86 -  99      92     92        Body mass index is 34.34 kg/m².    Intake/Output Summary (Last 24 hours) at 07/10/18 0934  Last data filed at 07/10/18 0744   Gross per 24 hour   Intake             1400 ml   Output             1650 ml   Net             -250 ml     Physical Exam   Constitutional: He appears well-developed and well-nourished.   Well-developed, well-nourished  male who is alert, oriented ×3 and is in moderate respiratory distress.  Wearing high flow oxygen with O2 sats in the mid 90s on the monitor.   HENT:   Head: Normocephalic and atraumatic.   Eyes: EOM are normal. Right eye exhibits no discharge. Left eye exhibits no discharge.   Neck: No JVD present. No tracheal deviation present. No thyromegaly present.   Cardiovascular: S1 normal and S2 normal.  PMI is not displaced.  Exam reveals no gallop, no S3, no S4 and no friction rub.    Murmur heard.   Systolic murmur is present with a grade of 2/6   Pulses:       Dorsalis pedis pulses are 0 on the right side, and 0 on the left side.        Posterior tibial pulses are 0 on the right side, and 0 on the left side.   Pulmonary/Chest: No stridor. No respiratory  distress. He has wheezes (and mild expiratory wheezing bilaterally.). He has no rales. He exhibits no tenderness.   Abdominal: Soft. He exhibits no distension and no mass. There is no tenderness. There is no guarding.   Musculoskeletal: He exhibits edema (as 1+ edema with erythema and induration on both lower extremities with superficial ulcers on the right lower extremity.).   Neurological: He is alert.   Skin: Skin is warm and dry. There is erythema.   Has erythema and induration of skin of both lower extremities.         Results Review:    I reviewed the patient's new clinical results.    Results from last 7 days  Lab Units 07/10/18  0545 07/10/18  0026 07/09/18  1737 07/09/18  1153 07/09/18  0953   CK TOTAL U/L 165  165 153 130  130  --  139   TROPONIN I ng/mL 0.144* 0.126* 0.184* 0.275* 0.303*   CKMB ng/mL 4.67 5.92* 4.89  --  6.13*       Results from last 7 days  Lab Units 07/10/18  0545 07/09/18  0953   WBC 10*3/mm3 7.39 8.19   HEMOGLOBIN g/dL 15.9 17.2   PLATELETS 10*3/mm3 224 197       Results from last 7 days  Lab Units 07/10/18  0545 07/09/18  0953   SODIUM mmol/L 127* 130*   POTASSIUM mmol/L 5.8* 4.9   CHLORIDE mmol/L 90* 89*   CO2 mmol/L 36.4* 38.2*   BUN mg/dL 21 16   CREATININE mg/dL 1.16 1.00   CALCIUM mg/dL 8.6 9.1   GLUCOSE mg/dL 321* 149*   ALT (SGPT) U/L  --  22   AST (SGOT) U/L  --  21     Lab Results   Component Value Date    INR 1.03 07/09/2018     Lab Results   Component Value Date    MG 2.2 07/10/2018        Lab Results   Component Value Date    BNP 69.0 07/09/2018    BNP 12 01/16/2016    BNP 34 04/10/2015       ECG        ECG/EMG Results (last 24 hours)     Procedure Component Value Units Date/Time    ECG 12 Lead [[id number]] Collected:  07/09/18 0937     Updated:  07/09/18 1049    ECG 12 Lead [[id number]] Collected:  07/10/18 0741     Updated:  07/10/18 0743    Narrative:       Test Reason : Hyperkalemia  Blood Pressure : **/** mmHG  Vent. Rate : 071 BPM     Atrial Rate : 071 BPM     P-R  Int : 190 ms          QRS Dur : 086 ms      QT Int : 362 ms       P-R-T Axes : 064 014 045 degrees     QTc Int : 393 ms    Normal sinus rhythm  Anteroseptal infarct (cited on or before 09-JUL-2018)  Abnormal ECG  When compared with ECG of 09-JUL-2018 09:37, (Unconfirmed)  No significant change was found    Referred By:  DEVENDRA           Confirmed By:           Imaging Results (last 72 hours)     Procedure Component Value Units Date/Time    XR Abdomen 2 View With Chest 1 View [[id number]] Collected:  07/09/18 1552     Updated:  07/09/18 1556    Narrative:          XR ABDOMEN 2 VW W CHEST 1 VW-     CLINICAL INDICATION: abd distention; J96.01-Acute respiratory failure  with hypoxia; J96.02-Acute respiratory failure with hypercapnia;  I21.4-Non-ST elevation (NSTEMI) myocardial infarction          COMPARISON: 07/09/2018      FINDINGS:  Chest:  Bibasilar airspace disease, either atelectasis or pneumonia     Abdomen:  Two views of the abdomen show no free air. I do not see abnormal bowel  distention to suggest complete obstruction. The bony structures are  intact. No abnormal soft tissue masses are seen. No suspicious appearing  calcifications are identified on today's exam.       Impression:       1. 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 Bibasilar airspace disease.  3. No evidence of bowel obstruction.  4. No free air.     5.            This report was finalized on 7/9/2018 3:54 PM by Dr. Jose Luis Romero MD.       US Arterial Doppler Lower Extremity Right [[id number]] Collected:  07/09/18 1339     Updated:  07/09/18 1407    Narrative:       US ARTERIAL DOPPLER LOWER EXTREMITY RIGHT-     REASON FOR EXAM:  Color change/ decreased pulses.     TECHNIQUE:  Multiple real-time color doppler images were obtained.  M-mode Doppler was used for velocity determination and spectral pattern.  Stenosis was evaluated using the NASCET or similar measurement  technique.     FINDINGS:   Right leg: The external iliac artery had extensive calcific  plaque in  the lumen. It is narrowed. The Doppler wave pattern was monophasic. The  velocity is significantly elevated measuring 416 cm/s. There is calcific  plaque at the common femoral artery level. The Doppler wave pattern was  monophasic. The peak systolic velocity was 213 cm/s. Color Doppler flow  was seen in the profunda femoral and superficial femoral artery. There  were no areas of occlusion in the superficial femoral artery. The  Doppler wave pattern was monophasic. The popliteal artery has a  monophasic flow pattern velocity of 87 cm/s. There was monophasic flow  in the posterior tibial artery. At the ankle the velocity measured 26  cm/s.       Impression:       Heavy atherosclerotic plaquing and stenosis is noted at the  level of the external iliac and common femoral arteries in the proximal  leg. The velocities were elevated to over 400 cm/s. The runoff arteries      showed no focal areas of stenosis or occlusion in the right leg.     This report was finalized on 7/9/2018 2:05 PM by Dr. Jose Ramon Hatch II, MD.       US Venous Doppler Lower Extremity Right (duplex) [[id number]] Collected:  07/09/18 1311     Updated:  07/09/18 1313    Narrative:       US VENOUS DOPPLER LOWER EXTREMITY RIGHT (DUPLEX)-     REASON FOR EXAM:  redness and pain     Multiple real-time images were obtained. The deep veins were well  demonstrated sonographically. There is good color doppler signal seen  filling the deep veins. They were completely compressed by the  ultrasound transducer. There was good spontaneous venous flow and  augmentation. There are no echoes seen along the deep veins to suggest  clot.          Impression:       No sonographic findings of DVT in the right lower extremity     This report was finalized on 7/9/2018 1:11 PM by Dr. Jose Ramon Hatch II, MD.       XR Chest 1 View [[id number]] Collected:  07/09/18 1006     Updated:  07/09/18 1017    Narrative:       XR CHEST 1 VW-     CLINICAL INDICATION: soa           COMPARISON: 01/16/2016      TECHNIQUE: Single frontal view of the chest.     FINDINGS:     Bibasilar atelectasis  The cardiac silhouette is normal. The pulmonary vasculature is  unremarkable.  There is no evidence of an acute osseous abnormality.   There are no suspicious-appearing parenchymal soft tissue nodules.            Impression:       Bibasilar atelectasis         This report was finalized on 7/9/2018 10:06 AM by Dr. Jose Luis Romero MD.                 Thank you very much for asking us to be involved in this patient's care.  We will follow along with you.      Jose Stewart MD,[facility]  07/10/18  9:34 AM    Dragon disclaimer:  Much of this encounter note is an electronic transcription/translation of spoken language to printed text. The electronic translation of spoken language may permit erroneous, or at times, nonsensical words or phrases to be inadvertently transcribed; Although I have reviewed the note for such errors, some may still exist.

## 2018-07-10 NOTE — PROGRESS NOTES
Assisted By: Fifi RAO, female friend also present, patient gave consent to talk in front of her.    CC: Follow-up on respiratory failure    Interview History/HPI: Patient states he is feeling better and breathing better his friend states that his color looks much better as well.  Patient denies any acute shortness of breath no chest pain.  He still having some leg pain but this overall has improved as well.    ROS: Tolerating his diet without abdominal pain no dysphagia    Vitals:    07/10/18 0702   BP:    Pulse: 92   Resp: 22   Temp:    SpO2: 92%       Intake/Output Summary (Last 24 hours) at 07/10/18 0903  Last data filed at 07/10/18 0744   Gross per 24 hour   Intake             1400 ml   Output             1650 ml   Net             -250 ml       EXAM: Patient overall looks to feel better.  He is not using any accessory muscles to breathe.  His lungs have better air movement some faint crackles midline anterior otherwise no wheezing no rhonchi.  Heart regular rate and rhythm without murmur gallop.  Sclerae nonicteric.  Abdomen is rounded soft benign.  Extremities look about the same there is no extremity edema light pulses ulcerations persist on his legs as previously described the right leg is significantly more erythematous than his left.  Mood is good, besides the erythema in the ulcerations described above skin warm and dry, no joint effusions.  Sclerae nonicteric conjunctiva unremarkable speech normal neuro exam nonfocal he is alert    Tele: Sinus rhythm, reviewed    LABS:   Lab Results (last 48 hours)     Procedure Component Value Units Date/Time    POC Glucose Once [[id number]]  (Abnormal) Collected:  07/10/18 0758    Specimen:  Blood Updated:  07/10/18 0814     Glucose 251 (H) mg/dL     Narrative:       Meter: [id number] : [id number] Medardo [place] [[id number]]  (Normal) Collected:  07/10/18 0545    Specimen:  Blood Updated:  07/10/18 0654     Creatine Kinase 165 U/L     Osmolality,  Calculated [[id number]]  (Abnormal) Collected:  07/10/18 0545    Specimen:  Blood Updated:  07/10/18 0650     Osmolality Calc 270.6 (L) mOsm/kg     Basic Metabolic Panel [[id number]]  (Abnormal) Collected:  07/10/18 0545    Specimen:  Blood Updated:  07/10/18 0650     Glucose 321 (H) mg/dL      BUN 21 mg/dL      Creatinine 1.16 mg/dL      Sodium 127 (L) mmol/L      Potassium 5.8 (C) mmol/L      Chloride 90 (L) mmol/L      CO2 36.4 (H) mmol/L      Calcium 8.6 mg/dL      eGFR Non African Amer 62 mL/min/1.73      BUN/Creatinine Ratio 18.1     Anion Gap 0.6 (L) mmol/L     Narrative:       GFR Normal >60  Chronic Kidney Disease <60  Kidney Failure <15    Troponin [[id number]]  (Abnormal) Collected:  07/10/18 0545    Specimen:  Blood Updated:  07/10/18 0647     Troponin I 0.144 (H) ng/mL     Narrative:       Ultra Troponin I Reference Range:         <=0.039 ng/mL: Negative    0.04-0.779 ng/mL: Indeterminate Range. Suspicious of MI.  Clinical correlation required.       >=0.78  ng/mL: Consistent with myocardial injury.  Clinical correlation required.    CK Total & CKMB [[id number]]  (Normal) Collected:  07/10/18 0545    Specimen:  Blood Updated:  07/10/18 0643     CKMB 4.67 ng/mL      Creatine Kinase 165 U/L     CK-MB Index [[id number]]  (Normal) Collected:  07/10/18 0545    Specimen:  Blood Updated:  07/10/18 0643     CK-MB Index 2.8 %     Magnesium [[id number]]  (Normal) Collected:  07/10/18 0545    Specimen:  Blood Updated:  07/10/18 0633     Magnesium 2.2 mg/dL     Phosphorus [[id number]]  (Normal) Collected:  07/10/18 0545    Specimen:  Blood Updated:  07/10/18 0633     Phosphorus 4.0 mg/dL     CBC & Differential [[id number]] Collected:  07/10/18 0545    Specimen:  Blood Updated:  07/10/18 0609    Narrative:       The following orders were created for panel order CBC & Differential.  Procedure                               Abnormality         Status                     ---------                               -----------          ------                     CBC Auto Differential[[id number]]        Abnormal            Final result                 Please view results for these tests on the individual orders.    CBC Auto Differential [[id number]]  (Abnormal) Collected:  07/10/18 0545    Specimen:  Blood Updated:  07/10/18 0609     WBC 7.39 10*3/mm3      RBC 5.14 10*6/mm3      Hemoglobin 15.9 g/dL      Hematocrit 48.3 %      MCV 94.0 fL      MCH 30.9 pg      MCHC 32.9 (L) g/dL      RDW 13.7 %      RDW-SD 45.1 fl      MPV 10.8 (H) fL      Platelets 224 10*3/mm3      Neutrophil % 91.7 (H) %      Lymphocyte % 5.5 (L) %      Monocyte % 2.7 %      Eosinophil % 0.0 %      Basophil % 0.0 %      Immature Grans % 0.1 %      Neutrophils, Absolute 6.77 (H) 10*3/mm3      Lymphocytes, Absolute 0.41 (L) 10*3/mm3      Monocytes, Absolute 0.20 10*3/mm3      Eosinophils, Absolute 0.00 10*3/mm3      Basophils, Absolute 0.00 10*3/mm3      Immature Grans, Absolute 0.01 10*3/mm3     Blood Gas, Arterial [[id number]]  (Abnormal) Collected:  07/10/18 0442    Specimen:  Arterial Blood Updated:  07/10/18 0454     Site Arterial: right radial     Chandan's Test Positive     pH, Arterial 7.355 pH units      pCO2, Arterial 70.2 (C) mm Hg      pO2, Arterial 70.0 (L) mm Hg      HCO3, Arterial 38.3 (C) mmol/L      Base Excess, Arterial 9.2 mmol/L      O2 Saturation, Arterial 93.9 %      Hemoglobin, Blood Gas 16.9 (H) g/dL      Hematocrit, Blood Gas 50.0 %      Oxyhemoglobin 90.1 %      Methemoglobin 0.40 %      Carboxyhemoglobin 3.6 %      A-a Gradiant 159.3 mmHg      Temperature 98.6 C      Barometric Pressure for Blood Gas 734 mmHg      Modality Cannula - High Flow     FIO2 45 %      Flow Rate 30 lpm     CK-MB Index [[id number]]  (Abnormal) Collected:  07/10/18 0026    Specimen:  Blood Updated:  07/10/18 0150     CK-MB Index 3.9 (H) %     CK Total & CKMB [[id number]]  (Abnormal) Collected:  07/10/18 0026    Specimen:  Blood Updated:  07/10/18 0150     CKMB 5.92 (C) ng/mL       Creatine Kinase 153 U/L     Troponin [[id number]]  (Abnormal) Collected:  07/10/18 0026    Specimen:  Blood Updated:  07/10/18 0144     Troponin I 0.126 (H) ng/mL     Narrative:       Ultra Troponin I Reference Range:         <=0.039 ng/mL: Negative    0.04-0.779 ng/mL: Indeterminate Range. Suspicious of MI.  Clinical correlation required.       >=0.78  ng/mL: Consistent with myocardial injury.  Clinical correlation required.    Blood Gas, Arterial [[id number]]  (Abnormal) Collected:  07/10/18 0056    Specimen:  Arterial Blood Updated:  07/10/18 0105     Site Arterial: right radial     Chandan's Test Positive     pH, Arterial 7.318 (L) pH units      pCO2, Arterial 62.6 (C) mm Hg      pO2, Arterial 61.5 (L) mm Hg      HCO3, Arterial 31.4 (C) mmol/L      Base Excess, Arterial 3.1 mmol/L      O2 Saturation, Arterial 90.4 %      Hemoglobin, Blood Gas 16.7 (H) g/dL      Hematocrit, Blood Gas 49.0 %      Oxyhemoglobin 86.1 %      Methemoglobin 0.40 %      Carboxyhemoglobin 4.4 %      A-a Gradiant 127.3 mmHg      Temperature 98.6 C      Barometric Pressure for Blood Gas 734 mmHg      Modality HFNC     FIO2 38 %     Blood Culture - Blood, [[id number]]  (Normal) Collected:  07/09/18 0945    Specimen:  Blood from Arm, Right Updated:  07/09/18 2215     Blood Culture No growth at less than 24 hours    Blood Culture - Blood, [[id number]]  (Normal) Collected:  07/09/18 0953    Specimen:  Blood from Arm, Left Updated:  07/09/18 2215     Blood Culture No growth at less than 24 hours    Blood Gas, Arterial [[id number]]  (Abnormal) Collected:  07/09/18 2144    Specimen:  Arterial Blood Updated:  07/09/18 2153     Site Arterial: right radial     Chandan's Test Positive     pH, Arterial 7.349 (L) pH units      pCO2, Arterial 71.6 (C) mm Hg      pO2, Arterial 61.9 (L) mm Hg      HCO3, Arterial 38.6 (C) mmol/L      Base Excess, Arterial 9.1 mmol/L      O2 Saturation, Arterial 91.5 %      Hemoglobin, Blood Gas 17.8 (H) g/dL      Hematocrit, Blood  Gas 52.0 %      Oxyhemoglobin 86.3 %      Methemoglobin 0.30 %      Carboxyhemoglobin 5.4 (H) %      A-a Gradiant 130.6 mmHg      Temperature 98.6 C      Barometric Pressure for Blood Gas 734 mmHg      Modality Nasal Cannula     FIO2 40 %     POC Glucose Once [[id number]]  (Abnormal) Collected:  07/09/18 2126    Specimen:  Blood Updated:  07/09/18 2143     Glucose 167 (H) mg/dL     Narrative:       Meter: [id number] : [id number] [place] Valle    CK Total & CKMB [[id number]]  (Normal) Collected:  07/09/18 1737    Specimen:  Blood Updated:  07/09/18 1841     CKMB 4.89 ng/mL      Creatine Kinase 130 U/L     Protein / Creatinine Ratio, Urine - Urine, Clean Catch [[id number]]  (Abnormal) Collected:  07/09/18 1112    Specimen:  Urine from Urine, Clean Catch Updated:  07/09/18 1839     Protein/Creatinine Ratio, Urine 3,829.8 (H) mg/G Crea      Creatinine, Urine 47.6 mg/dL      Total Protein, Urine 182.3 mg/dL     Mycoplasma Pneumoniae Antibody, IgM [[id number]]  (Normal) Collected:  07/09/18 1737    Specimen:  Blood Updated:  07/09/18 1831     Mycoplasma pneumo IgM Negative    Troponin [[id number]]  (Abnormal) Collected:  07/09/18 1737    Specimen:  Blood Updated:  07/09/18 1826     Troponin I 0.184 (H) ng/mL     Narrative:       Ultra Troponin I Reference Range:         <=0.039 ng/mL: Negative    0.04-0.779 ng/mL: Indeterminate Range. Suspicious of MI.  Clinical correlation required.       >=0.78  ng/mL: Consistent with myocardial injury.  Clinical correlation required.    CK-MB Index [[id number]]  (Abnormal) Collected:  07/09/18 1737    Specimen:  Blood Updated:  07/09/18 1826     CK-MB Index 3.8 (H) %     CK [[id number]]  (Normal) Collected:  07/09/18 1737    Specimen:  Blood Updated:  07/09/18 1821     Creatine Kinase 130 U/L     Sodium, Urine, Random - Urine, Clean Catch [[id number]] Collected:  07/09/18 1112    Specimen:  Urine from Urine, Clean Catch Updated:  07/09/18 1809     Sodium, Urine 36 mmol/L     Legionella  Antigen, Urine - Urine, Urine, Clean Catch [[id number]]  (Normal) Collected:  07/09/18 1112    Specimen:  Urine from Urine, Clean Catch Updated:  07/09/18 1742     LEGIONELLA ANTIGEN, URINE Negative    Narrative:         Presumptive negative for L. pneumophilia serogroup 1 antigen, suggesting no recent or current infection.    POC Glucose Once [[id number]]  (Normal) Collected:  07/09/18 1725    Specimen:  Blood Updated:  07/09/18 1741     Glucose 122 mg/dL     Narrative:       Meter: [id number] : [id number] herman stern    Blood Gas, Arterial [[id number]]  (Abnormal) Collected:  07/09/18 1727    Specimen:  Arterial Blood Updated:  07/09/18 1740     Site Arterial: right radial     Chandan's Test Positive     pH, Arterial 7.327 (L) pH units      pCO2, Arterial 77.0 (C) mm Hg      pO2, Arterial 71.6 (L) mm Hg      HCO3, Arterial 39.4 (C) mmol/L      Base Excess, Arterial 9.0 mmol/L      O2 Saturation, Arterial 94.2 %      Hemoglobin, Blood Gas 18.3 (C) g/dL      Hematocrit, Blood Gas 54.0 (H) %      Oxyhemoglobin 87.8 %      Methemoglobin 0.30 %      Carboxyhemoglobin 6.5 (H) %      A-a Gradiant 44.0 mmHg      Temperature 98.6 C      Barometric Pressure for Blood Gas 734 mmHg      Modality BiPap     FIO2 30 %      IPAP 24     EPAP 7    Blood Gas, Arterial [[id number]]  (Abnormal) Collected:  07/09/18 1336    Specimen:  Arterial Blood Updated:  07/09/18 1343     Site Arterial: right radial     Chandan's Test Positive     pH, Arterial 7.330 (L) pH units      pCO2, Arterial 74.7 (C) mm Hg      pO2, Arterial 76.8 (L) mm Hg      HCO3, Arterial 38.5 (C) mmol/L      Base Excess, Arterial 8.7 mmol/L      O2 Saturation, Arterial 95.4 %      Hemoglobin, Blood Gas 17.0 (H) g/dL      Hematocrit, Blood Gas 50.0 %      Oxyhemoglobin 86.4 %      Methemoglobin 0.30 %      Carboxyhemoglobin 9.1 (H) %      A-a Gradiant 41.5 mmHg      Temperature 98.6 C      Barometric Pressure for Blood Gas 734 mmHg      Modality BiPap     FIO2 30 %       Set [facility] Resp Rate 20     IPAP 20     EPAP 7    Blood Gas, Arterial [[id number]]  (Abnormal) Collected:  07/09/18 1204    Specimen:  Arterial Blood Updated:  07/09/18 1225     Site Arterial: left brachial     Chandan's Test N/A     pH, Arterial 7.307 (L) pH units      pCO2, Arterial 78.1 (C) mm Hg      pO2, Arterial 47.1 (C) mm Hg      HCO3, Arterial 38.2 (C) mmol/L      Base Excess, Arterial 7.9 mmol/L      O2 Saturation, Arterial 84.9 (C) %      Hemoglobin, Blood Gas 17.2 (H) g/dL      Hematocrit, Blood Gas 51.0 %      Oxyhemoglobin 76.3 (L) %      Methemoglobin 0.30 %      Carboxyhemoglobin 9.8 (H) %      A-a Gradiant 53.1 mmHg      Temperature 98.6 C      Barometric Pressure for Blood Gas 734 mmHg      Modality Cannula - Nasal     FIO2 28 %      Flow Rate 2 lpm     Troponin [[id number]]  (Abnormal) Collected:  07/09/18 1153    Specimen:  Blood from Arm, Left Updated:  07/09/18 1225     Troponin I 0.275 (H) ng/mL     Narrative:       Ultra Troponin I Reference Range:         <=0.039 ng/mL: Negative    0.04-0.779 ng/mL: Indeterminate Range. Suspicious of MI.  Clinical correlation required.       >=0.78  ng/mL: Consistent with myocardial injury.  Clinical correlation required.    CK-MB Index [[id number]]  (Abnormal) Collected:  07/09/18 0953    Specimen:  Blood Updated:  07/09/18 1216     CK-MB Index 4.4 (H) %     CK-MB [[id number]]  (Abnormal) Collected:  07/09/18 0953    Specimen:  Blood Updated:  07/09/18 1214     CKMB 6.13 (C) ng/mL     Urinalysis, Microscopic Only - Urine, Clean Catch [[id number]]  (Abnormal) Collected:  07/09/18 1112    Specimen:  Urine from Urine, Clean Catch Updated:  07/09/18 1200     RBC, UA 6-12 (A) /HPF      WBC, UA 0-2 /HPF      Bacteria, UA Trace (A) /HPF      Squamous Epithelial Cells, UA 3-6 (A) /HPF      Hyaline Casts, UA None Seen /LPF      Methodology Automated Microscopy    CK [[id number]]  (Normal) Collected:  07/09/18 0953    Specimen:  Blood Updated:  07/09/18 1159     Creatine  Kinase 139 U/L     Urinalysis With Culture If Indicated - Urine, Clean Catch [[id number]]  (Abnormal) Collected:  07/09/18 1112    Specimen:  Urine from Urine, Clean Catch Updated:  07/09/18 1124     Color, UA Yellow     Appearance, UA Clear     pH, UA 6.5     Specific Gravity, UA 1.013     Glucose, UA Negative     Ketones, UA Negative     Bilirubin, UA Negative     Blood, UA Small (1+) (A)     Protein, UA >=300 mg/dL (3+) (A)     Leuk Esterase, UA Negative     Nitrite, UA Negative     Urobilinogen, UA 0.2 E.U./dL    BNP [[id number]]  (Normal) Collected:  07/09/18 0953    Specimen:  Blood Updated:  07/09/18 1116     BNP 69.0 pg/mL     Comprehensive Metabolic Panel [[id number]]  (Abnormal) Collected:  07/09/18 0953    Specimen:  Blood Updated:  07/09/18 1039     Glucose 149 (H) mg/dL      BUN 16 mg/dL      Creatinine 1.00 mg/dL      Sodium 130 (L) mmol/L      Potassium 4.9 mmol/L      Chloride 89 (L) mmol/L      CO2 38.2 (H) mmol/L      Calcium 9.1 mg/dL      Total Protein 6.8 g/dL      Albumin 3.60 g/dL      ALT (SGPT) 22 U/L      AST (SGOT) 21 U/L      Alkaline Phosphatase 96 U/L      Comment: Note New Reference Ranges        Total Bilirubin 0.3 mg/dL      eGFR Non African Amer 74 mL/min/1.73      Globulin 3.2 gm/dL      A/G Ratio 1.1 (L) g/dL      BUN/Creatinine Ratio 16.0     Anion Gap 2.8 (L) mmol/L     Osmolality, Calculated [[id number]]  (Abnormal) Collected:  07/09/18 0953    Specimen:  Blood Updated:  07/09/18 1039     Osmolality Calc 264.8 (L) mOsm/kg     Troponin [[id number]]  (Abnormal) Collected:  07/09/18 0953    Specimen:  Blood Updated:  07/09/18 1031     Troponin I 0.303 (H) ng/mL     Narrative:       Ultra Troponin I Reference Range:         <=0.039 ng/mL: Negative    0.04-0.779 ng/mL: Indeterminate Range. Suspicious of MI.  Clinical correlation required.       >=0.78  ng/mL: Consistent with myocardial injury.  Clinical correlation required.    Protime-INR [[id number]]  (Normal) Collected:  07/09/18 0979     Specimen:  Blood Updated:  07/09/18 1026     Protime 13.7 Seconds      Comment: Note new Reference Range        INR 1.03    Narrative:       Suggested INR therapeutic range for stable oral anticoagulant therapy:    Low Intensity therapy:   1.5-2.0  Moderate Intensity therapy:   2.0-3.0  High Intensity therapy:   2.5-4.0    aPTT [[id number]]  (Normal) Collected:  07/09/18 0953    Specimen:  Blood Updated:  07/09/18 1026     PTT 35.0 seconds      Comment: Note new Reference Range       Narrative:       PTT Heparin Therapeutic Range:  59 - 95 seconds    C-reactive Protein [[id number]]  (Abnormal) Collected:  07/09/18 0953    Specimen:  Blood Updated:  07/09/18 1022     C-Reactive Protein 1.72 (H) mg/dL     Lactic Acid, Plasma [[id number]]  (Normal) Collected:  07/09/18 0953    Specimen:  Blood Updated:  07/09/18 1017     Lactate 1.5 mmol/L     CBC & Differential [[id number]] Collected:  07/09/18 0953    Specimen:  Blood Updated:  07/09/18 1008    Narrative:       The following orders were created for panel order CBC & Differential.  Procedure                               Abnormality         Status                     ---------                               -----------         ------                     CBC Auto Differential[[id number]]        Abnormal            Final result                 Please view results for these tests on the individual orders.    CBC Auto Differential [[id number]]  (Abnormal) Collected:  07/09/18 0953    Specimen:  Blood Updated:  07/09/18 1008     WBC 8.19 10*3/mm3      RBC 5.44 10*6/mm3      Hemoglobin 17.2 g/dL      Hematocrit 51.0 %      MCV 93.8 fL      MCH 31.6 pg      MCHC 33.7 g/dL      RDW 13.7 %      RDW-SD 47.0 fl      MPV 11.5 (H) fL      Platelets 197 10*3/mm3      Neutrophil % 78.2 (H) %      Lymphocyte % 11.7 (L) %      Monocyte % 8.9 %      Eosinophil % 0.9 %      Basophil % 0.2 %      Immature Grans % 0.1 %      Neutrophils, Absolute 6.40 10*3/mm3      Lymphocytes, Absolute 0.96  (L) 10*3/mm3      Monocytes, Absolute 0.73 10*3/mm3      Eosinophils, Absolute 0.07 10*3/mm3      Basophils, Absolute 0.02 10*3/mm3      Immature Grans, Absolute 0.01 10*3/mm3     Blood Gas, Arterial [[id number]]  (Abnormal) Collected:  07/09/18 0952    Specimen:  Arterial Blood Updated:  07/09/18 1001     Site Arterial: left brachial     Chandan's Test N/A     pH, Arterial 7.408 pH units      pCO2, Arterial 55.7 (C) mm Hg      pO2, Arterial 48.1 (C) mm Hg      HCO3, Arterial 34.4 (C) mmol/L      Base Excess, Arterial 7.5 mmol/L      O2 Saturation, Arterial 87.9 (L) %      Hemoglobin, Blood Gas 17.2 (H) g/dL      Hematocrit, Blood Gas 51.0 %      Oxyhemoglobin 78.4 (L) %      Methemoglobin 0.20 %      Carboxyhemoglobin 10.6 (H) %      A-a Gradiant 29.5 mmHg      Temperature 98.6 C      Barometric Pressure for Blood Gas 734 mmHg      Modality Room Air     FIO2 21 %           Radiology:  Imaging Results (last 72 hours)     Procedure Component Value Units Date/Time    XR Abdomen 2 View With Chest 1 View [[id number]] Collected:  07/09/18 1552     Updated:  07/09/18 1556    Narrative:          XR ABDOMEN 2 VW W CHEST 1 VW-     CLINICAL INDICATION: abd distention; J96.01-Acute respiratory failure  with hypoxia; J96.02-Acute respiratory failure with hypercapnia;  I21.4-Non-ST elevation (NSTEMI) myocardial infarction          COMPARISON: 07/09/2018      FINDINGS:  Chest:  Bibasilar airspace disease, either atelectasis or pneumonia     Abdomen:  Two views of the abdomen show no free air. I do not see abnormal bowel  distention to suggest complete obstruction. The bony structures are  intact. No abnormal soft tissue masses are seen. No suspicious appearing  calcifications are identified on today's exam.       Impression:       1. 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 Bibasilar airspace disease.  3. No evidence of bowel obstruction.  4. No free air.     5.            This report was finalized on 7/9/2018 3:54 PM by Dr. Jose Luis Romero,  MD.       US Arterial Doppler Lower Extremity Right [[id number]] Collected:  07/09/18 1339     Updated:  07/09/18 1407    Narrative:       US ARTERIAL DOPPLER LOWER EXTREMITY RIGHT-     REASON FOR EXAM:  Color change/ decreased pulses.     TECHNIQUE:  Multiple real-time color doppler images were obtained.  M-mode Doppler was used for velocity determination and spectral pattern.  Stenosis was evaluated using the NASCET or similar measurement  technique.     FINDINGS:   Right leg: The external iliac artery had extensive calcific plaque in  the lumen. It is narrowed. The Doppler wave pattern was monophasic. The  velocity is significantly elevated measuring 416 cm/s. There is calcific  plaque at the common femoral artery level. The Doppler wave pattern was  monophasic. The peak systolic velocity was 213 cm/s. Color Doppler flow  was seen in the profunda femoral and superficial femoral artery. There  were no areas of occlusion in the superficial femoral artery. The  Doppler wave pattern was monophasic. The popliteal artery has a  monophasic flow pattern velocity of 87 cm/s. There was monophasic flow  in the posterior tibial artery. At the ankle the velocity measured 26  cm/s.       Impression:       Heavy atherosclerotic plaquing and stenosis is noted at the  level of the external iliac and common femoral arteries in the proximal  leg. The velocities were elevated to over 400 cm/s. The runoff arteries      showed no focal areas of stenosis or occlusion in the right leg.     This report was finalized on 7/9/2018 2:05 PM by Dr. Jose Ramon Hatch II, MD.       US Venous Doppler Lower Extremity Right (duplex) [[id number]] Collected:  07/09/18 1311     Updated:  07/09/18 1313    Narrative:       US VENOUS DOPPLER LOWER EXTREMITY RIGHT (DUPLEX)-     REASON FOR EXAM:  redness and pain     Multiple real-time images were obtained. The deep veins were well  demonstrated sonographically. There is good color doppler signal seen  filling  the deep veins. They were completely compressed by the  ultrasound transducer. There was good spontaneous venous flow and  augmentation. There are no echoes seen along the deep veins to suggest  clot.          Impression:       No sonographic findings of DVT in the right lower extremity     This report was finalized on 7/9/2018 1:11 PM by Dr. Jose Ramon Hatch II, MD.       XR Chest 1 View [[id number]] Collected:  07/09/18 1006     Updated:  07/09/18 1017    Narrative:       XR CHEST 1 VW-     CLINICAL INDICATION: soa          COMPARISON: 01/16/2016      TECHNIQUE: Single frontal view of the chest.     FINDINGS:     Bibasilar atelectasis  The cardiac silhouette is normal. The pulmonary vasculature is  unremarkable.  There is no evidence of an acute osseous abnormality.   There are no suspicious-appearing parenchymal soft tissue nodules.            Impression:       Bibasilar atelectasis         This report was finalized on 7/9/2018 10:06 AM by Dr. Jose Luis Romero MD.           Discussed with Dr. Sahni       Cultures negative    Assessment/Plan:     Acute hypercarbic respiratory failure secondary to COPD exacerbation with possible pneumonia.  Legionella and mycoplasma screens negative, patient was on vancomycin and Zosyn for this and other reasons.  Infectious disease has change this to Omnicef and doxycycline.  Clinically is improving as is his blood gas, he is now on high flow and sees be tolerating this with an overall compensated ABG.    Cellulitis, ulcerations, on now Omnicef and doxycycline, per infectious,  Will follow the CRP trend.    Hyperkalemia, he was given Kayexalate and some insulin for this.  This is to be followed up.    Proteinuria over 3 g.  Patient was unaware of this, I've consult to nephrology.    Hyponatremia, he overall appears euvolemic to hypervolemic.  This being the case I have stopped IV fluids, urine sodium was in the 30s, I'm going to fluid restrict and I have asked nephrology to  evaluate    Peripheral vascular disease, surgery to see    Diabetes, patient is to have his Victoza restarted as well as sliding scale insulin and 70/30, follow on this regimen, will check A1c in the a.m.    DVT prophylaxis, subcutaneous heparin    Indeterminant troponin, echo pending, possibly type II, EKG nonspecific, CPK normal, trend is stable, cardiology is to evaluate    Ongoing tobacco use, again counseled to quit

## 2018-07-10 NOTE — CONSULTS
Nephrology Consult Note    Referring Provider: Dr. Soria  Reason for Consultation: Proteinuria    Subjective short of breath      History of present illness:  Heath Bajwa is a 68 y.o. male who presented to [facility] emergency department with chief complaint of shortness of breath.  Patient has a history of COPD which he has been waiting oxygen at home also for last 2 weeks it has been getting worse his oxygen requirement is also going up into the hospital has bilateral swelling and multiple ulcers of the legs to 3.2 g of proteinuria patient stated that he has seen a kidney doctor long time ago but has not followed up in making urine and has diabetes for many years also patient denies any urgency frequency fever chills now but his shortness of breath distal not that bad he has a history of hypertension and diabetes for many years.  No  Chronic NSAIDS use. Patient denies hematuria, dysuria, difficulty passing urine. No prior history of renal stones . No family history of renal disease    Acute Kidney Injury Risk Factors :    Medications : Reviewed   Contrast : No   NSAIDS : No   Volume depletion : no   Hemodynamic Instability : No   History  Past Medical History:   Diagnosis Date   • COPD (chronic obstructive pulmonary disease) (CMS/[facility])    • Diabetes mellitus (CMS/[facility])    • Hypertension    , History reviewed. No pertinent surgical history., Family History   Problem Relation Age of Onset   • No Known Problems Mother    • Heart failure Father    , Social History   Substance Use Topics   • Smoking status: Current Every Day Smoker     Packs/day: 1.00     Years: 25.00     Types: Cigarettes   • Smokeless tobacco: Never Used   • Alcohol use No   , Prescriptions Prior to Admission   Medication Sig Dispense Refill Last Dose   • albuterol (PROVENTIL HFA;VENTOLIN HFA) 108 (90 Base) MCG/ACT inhaler Inhale 2 puffs Every 6 (Six) Hours As Needed for Wheezing.   7/8/2018 at Unknown time   • aspirin 325 MG tablet Take  325 mg by mouth Daily.   7/8/2018 at Unknown time   • budesonide-formoterol (SYMBICORT) 160-4.5 MCG/ACT inhaler Inhale 2 puffs 2 (two) times a day.   7/8/2018 at Unknown time   • donepezil (ARICEPT) 10 MG tablet Take 10 mg by mouth Every Night.   7/8/2018 at Unknown time   • gabapentin (NEURONTIN) 800 MG tablet Take 800 mg by mouth 3 (Three) Times a Day.   7/8/2018 at Unknown time   • hydrochlorothiazide (HYDRODIURIL) 25 MG tablet Take 25 mg by mouth daily.   7/8/2018 at Unknown time   • insulin NPH-insulin regular (Novolin 70/30) (70-30) 100 UNIT/ML injection Inject 30 Units under the skin 2 (Two) Times a Day With Meals.   7/8/2018 at Unknown time   • levocetirizine (XYZAL) 5 MG tablet Take 5 mg by mouth Daily As Needed for Allergies.   7/8/2018 at Unknown time   • Liraglutide (VICTOZA) 18 MG/3ML solution pen-injector injection Inject 1.8 mg under the skin Daily.   7/8/2018 at Unknown time   • lisinopril (PRINIVIL,ZESTRIL) 40 MG tablet Take 40 mg by mouth daily.   7/8/2018 at Unknown time   • ondansetron (ZOFRAN) 8 MG tablet Take 8 mg by mouth Every 12 (Twelve) Hours As Needed for Nausea or Vomiting.   7/8/2018 at Unknown time   • simvastatin (ZOCOR) 80 MG tablet Take 40 mg by mouth every night.   7/8/2018 at Unknown time   • tamsulosin (FLOMAX) 0.4 MG capsule 24 hr capsule Take 1 capsule by mouth Every Evening.   Unknown at Unknown time   , Scheduled Meds:    aspirin 325 mg Oral Daily   [START ON 7/11/2018] atorvastatin 40 mg Oral Daily   budesonide-formoterol 2 puff Inhalation BID - RT   cefdinir 300 mg Oral Q12H   cetirizine 10 mg Oral Daily   donepezil 10 mg Oral Nightly   doxycycline 100 mg Oral Q12H   fluticasone 1 spray Each Nare BID   gabapentin 600 mg Oral Q8H   heparin (porcine) 5,000 Units Subcutaneous Q12H   insulin aspart 0-9 Units Subcutaneous 4x Daily AC & at Bedtime   insulin aspart prot-insulin aspart 30 Units Subcutaneous BID With Meals   ipratropium-albuterol 3 mL Nebulization 4x Daily - RT    Liraglutide 1.8 mg Subcutaneous Daily   lisinopril 40 mg Oral Daily   methylPREDNISolone sodium succinate 40 mg Intravenous Q12H   nicotine 1 patch Transdermal Q24H   sodium chloride 3 mL Intravenous Q8H   tamsulosin 0.4 mg Oral Q PM   , Continuous Infusions:   , PRN Meds:  •  albuterol  •  dextrose  •  dextrose  •  dextrose  •  dextrose  •  glucagon (human recombinant)  •  glucagon (human recombinant)  •  Morphine  •  ondansetron  •  sodium chloride  •  Insert peripheral IV **AND** sodium chloride  •  sodium chloride and Allergies:  Patient has no known allergies.    Review of Systems  More than 10 point review of systems was done. Pertinent items are noted in HPI, all other systems reviewed and negative    Objective     Vital Signs  Temp:  [96.8 °F (36 °C)-98.7 °F (37.1 °C)] 98 °F (36.7 °C)  Heart Rate:  [] 96  Resp:  [16-30] 22  BP: ()/(56-92) 175/91  FiO2 (%):  [28 %-30 %] 28 %    I/O this shift:  In: 500 [IV Piggyback:500]  Out: 200 [Urine:200]  I/O last 3 completed shifts:  In: 1400 [P.O.:200; I.V.:600; IV Piggyback:600]  Out: 1450 [Urine:1450]    Physical Examination:    General Appearance : alert, appears stated age, cooperative   Head : normocephalic, without obvious abnormality and atraumatic  Eyes : conjunctivae and sclerae normal, no icterus, no pallor and PERRLA  Throat : oral mucosa moist  Neck: no adenopathy, suppple, no carotid bruit and no JVD  Lungs :Poor air entry   Heart : regular rhythm & normal rate, normal S1, S2, no murmur, no gillian, no rub Abdomen :  normal bowel sounds, no masses and soft non-tender  Rectal : Deferred  Extremities : moves extremities well, no redness and 2+  Edema,   Pulses :  palpable and equal bilaterally  Skin :Lower extremity has some redness and small ulcers on lower shin area   Neurologic : orientated to person, place, time, grossly no focal deficitis    Laboratory Data :      WBC WBC   Date Value Ref Range Status   07/10/2018 7.39 4.50 - 12.50 10*3/mm3  Final   07/09/2018 8.19 4.50 - 12.50 10*3/mm3 Final      HGB Hemoglobin   Date Value Ref Range Status   07/10/2018 15.9 14.0 - 18.0 g/dL Final   07/09/2018 17.2 14.0 - 18.0 g/dL Final      HCT Hematocrit   Date Value Ref Range Status   07/10/2018 48.3 42.0 - 52.0 % Final   07/09/2018 51.0 42.0 - 52.0 % Final      Platlets No results found for: LABPLAT   MCV MCV   Date Value Ref Range Status   07/10/2018 94.0 80.0 - 94.0 fL Final   07/09/2018 93.8 80.0 - 94.0 fL Final          Sodium Sodium   Date Value Ref Range Status   07/10/2018 127 (L) 135 - 153 mmol/L Final   07/09/2018 130 (L) 135 - 153 mmol/L Final      Potassium Potassium   Date Value Ref Range Status   07/10/2018 5.8 (C) 3.5 - 5.3 mmol/L Final   07/09/2018 4.9 3.5 - 5.3 mmol/L Final      Chloride Chloride   Date Value Ref Range Status   07/10/2018 90 (L) 99 - 112 mmol/L Final   07/09/2018 89 (L) 99 - 112 mmol/L Final      CO2 CO2   Date Value Ref Range Status   07/10/2018 36.4 (H) 24.3 - 31.9 mmol/L Final   07/09/2018 38.2 (H) 24.3 - 31.9 mmol/L Final      BUN BUN   Date Value Ref Range Status   07/10/2018 21 7 - 21 mg/dL Final   07/09/2018 16 7 - 21 mg/dL Final      Creatinine Creatinine   Date Value Ref Range Status   07/10/2018 1.16 0.43 - 1.29 mg/dL Final   07/09/2018 1.00 0.43 - 1.29 mg/dL Final      Calcium Calcium   Date Value Ref Range Status   07/10/2018 8.6 7.7 - 10.0 mg/dL Final   07/09/2018 9.1 7.7 - 10.0 mg/dL Final      PO4 No results found for: CAPO4   Albumin Albumin   Date Value Ref Range Status   07/09/2018 3.60 3.40 - 4.80 g/dL Final      Magnesium Magnesium   Date Value Ref Range Status   07/10/2018 2.2 1.7 - 2.6 mg/dL Final      Uric Acid No results found for: URICACID     Radiology results :     Imaging Results (last 72 hours)     Procedure Component Value Units Date/Time    US Arterial Doppler Lower Extremity Left [[id number]] Updated:  07/10/18 1119    XR Abdomen 2 View With Chest 1 View [[id number]] Collected:  07/09/18 1555      Updated:  07/09/18 1556    Narrative:          XR ABDOMEN 2 VW W CHEST 1 VW-     CLINICAL INDICATION: abd distention; J96.01-Acute respiratory failure  with hypoxia; J96.02-Acute respiratory failure with hypercapnia;  I21.4-Non-ST elevation (NSTEMI) myocardial infarction          COMPARISON: 07/09/2018      FINDINGS:  Chest:  Bibasilar airspace disease, either atelectasis or pneumonia     Abdomen:  Two views of the abdomen show no free air. I do not see abnormal bowel  distention to suggest complete obstruction. The bony structures are  intact. No abnormal soft tissue masses are seen. No suspicious appearing  calcifications are identified on today's exam.       Impression:       1. 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 Bibasilar airspace disease.  3. No evidence of bowel obstruction.  4. No free air.     5.            This report was finalized on 7/9/2018 3:54 PM by Dr. Jose Luis Romero MD.       US Arterial Doppler Lower Extremity Right [[id number]] Collected:  07/09/18 1339     Updated:  07/09/18 1407    Narrative:       US ARTERIAL DOPPLER LOWER EXTREMITY RIGHT-     REASON FOR EXAM:  Color change/ decreased pulses.     TECHNIQUE:  Multiple real-time color doppler images were obtained.  M-mode Doppler was used for velocity determination and spectral pattern.  Stenosis was evaluated using the NASCET or similar measurement  technique.     FINDINGS:   Right leg: The external iliac artery had extensive calcific plaque in  the lumen. It is narrowed. The Doppler wave pattern was monophasic. The  velocity is significantly elevated measuring 416 cm/s. There is calcific  plaque at the common femoral artery level. The Doppler wave pattern was  monophasic. The peak systolic velocity was 213 cm/s. Color Doppler flow  was seen in the profunda femoral and superficial femoral artery. There  were no areas of occlusion in the superficial femoral artery. The  Doppler wave pattern was monophasic. The popliteal artery has a  monophasic flow  pattern velocity of 87 cm/s. There was monophasic flow  in the posterior tibial artery. At the ankle the velocity measured 26  cm/s.       Impression:       Heavy atherosclerotic plaquing and stenosis is noted at the  level of the external iliac and common femoral arteries in the proximal  leg. The velocities were elevated to over 400 cm/s. The runoff arteries      showed no focal areas of stenosis or occlusion in the right leg.     This report was finalized on 7/9/2018 2:05 PM by Dr. Jose Ramon Hatch II, MD.       US Venous Doppler Lower Extremity Right (duplex) [[id number]] Collected:  07/09/18 1311     Updated:  07/09/18 1313    Narrative:       US VENOUS DOPPLER LOWER EXTREMITY RIGHT (DUPLEX)-     REASON FOR EXAM:  redness and pain     Multiple real-time images were obtained. The deep veins were well  demonstrated sonographically. There is good color doppler signal seen  filling the deep veins. They were completely compressed by the  ultrasound transducer. There was good spontaneous venous flow and  augmentation. There are no echoes seen along the deep veins to suggest  clot.          Impression:       No sonographic findings of DVT in the right lower extremity     This report was finalized on 7/9/2018 1:11 PM by Dr. Jose Ramon Hatch II, MD.       XR Chest 1 View [[id number]] Collected:  07/09/18 1006     Updated:  07/09/18 1017    Narrative:       XR CHEST 1 VW-     CLINICAL INDICATION: soa          COMPARISON: 01/16/2016      TECHNIQUE: Single frontal view of the chest.     FINDINGS:     Bibasilar atelectasis  The cardiac silhouette is normal. The pulmonary vasculature is  unremarkable.  There is no evidence of an acute osseous abnormality.   There are no suspicious-appearing parenchymal soft tissue nodules.            Impression:       Bibasilar atelectasis         This report was finalized on 7/9/2018 10:06 AM by Dr. Jose Luis Romero MD.               Medications:        aspirin 325 mg Oral Daily   [START ON  7/11/2018] atorvastatin 40 mg Oral Daily   budesonide-formoterol 2 puff Inhalation BID - RT   cefdinir 300 mg Oral Q12H   cetirizine 10 mg Oral Daily   donepezil 10 mg Oral Nightly   doxycycline 100 mg Oral Q12H   fluticasone 1 spray Each Nare BID   gabapentin 600 mg Oral Q8H   heparin (porcine) 5,000 Units Subcutaneous Q12H   insulin aspart 0-9 Units Subcutaneous 4x Daily AC & at Bedtime   insulin aspart prot-insulin aspart 30 Units Subcutaneous BID With Meals   ipratropium-albuterol 3 mL Nebulization 4x Daily - RT   Liraglutide 1.8 mg Subcutaneous Daily   lisinopril 40 mg Oral Daily   methylPREDNISolone sodium succinate 40 mg Intravenous Q12H   nicotine 1 patch Transdermal Q24H   sodium chloride 3 mL Intravenous Q8H   tamsulosin 0.4 mg Oral Q PM          Assessment/Plan     Principal Problem:    Venous stasis dermatitis of both lower extremities  Active Problems:    Acute respiratory failure with hypoxia and hypercapnia (CMS/HCC)      1.  Nephrotic syndrome: Patient creatinine is 1.0 stable.  Proteinuria is 3800 mg, patient has diabetes mellitus for 20 years, along with hypertension can be all diabetic nephropathy we will check for vasculitis serology and also will add lisinopril back and the patient was taken at home.    2.  Bilateral edema: Most likely multifactorial as the patient has nephrotic syndrome and can have on May hypertension also secondary to severe COPD    3.  Hypertension; will adjust medicine may need to add lisinopril back    4.  Hyponatremia: Most likely dilutional, fluid restrict the patient to 1200 cc/h check urine lites and TSH and cortisol    5.  Diabetes mellitus type 2    6.  lower extremity cellulitis; on antibiotic     Prognosis guarded    Thanks Dr Marr for the consult. We will follow with you.  I discussed the patient's findings and my recommendations with patient and nursing staff    S Denis Brooks MD  07/10/18  12:08 PM

## 2018-07-10 NOTE — PROGRESS NOTES
Brief Hospitalist Note:    Patient has now decided he will no longer wear bipap, again talking about leaving AMA. Spoke with him in depth once again at the bedside. RN Ivon, girlfriend, House Supervisor Alex and respiratory therapist all present for conversation. Patient voices understanding of risks of refusing bipap, including risk for worsening respiratory failure particularly with hypercarbia which could lead to confusion, somnolence and even death. Oxygen requirements continue to increase, now on 6 L to keep sat at 88%. He is agreeable to intubation if his condition worsens. Also agreeable to CPR. Will try high flow oxygen to decrease FiO2 requirements. Repeat ABG in 1 hour. If ABG shows significantly worsening hypercarbia or if he shows clinical signs of deterioration such as respiratory distress, confusion or somnolence, will proceed with intubation.

## 2018-07-10 NOTE — PROGRESS NOTES
THC Physician - Brief Progress Note  PERMANENT  07/09/2018 20:32    [facility]  [facility] - U - 10 - [place], [place] ([facility])    SAAD MCKENNA.    Date of Service 07/09/2018 20:32    HPI/Events of Note AICU Provider Assessment Note    69 y/o male admitted to ICU from ED with acute respiratory failure, COPD exacerbation, bilateral LE cellulitis    Hx of COPD, on oxygen at home, smoker, DM, HTN  Pt presented with c/o SOB. CXR 7/9/2018: Bibasilar atelectasis. ABG on room air: pH 7.408, pCO2-55.7, pO2-48.1, bicarb 34.4. Tx to ICU on BiPAP.     Current VS: HR 80, /73, RR 24, O2 sat 92% on room air, temp 96.8 F    Labs: Sodium 130, potassium 4.9, glucose 149, creatinine 1, lactate 1.5, CRP 1.72, troponin 0.303 -> 0.275 -> 0.184, CK-MB 6.13 -> 4.89, WBC 8.19    ABG on room air: pH 7.408, pCO2-55.7, pO2-48.1, bicarb 34.4  ABG on 2 L NC:  pH 7.307, pCO2-78.1, pO2-47.1, bicarb 38.2  Repeat ABG: pH 7.327, pCO2-77, pO2-71.6, bicarb 39.4    CXR 7/9/2018: Bibasilar atelectasis.    XR abdomen 7/9/2018:  Arthritic change in the spine. Bibasilar airspace disease. No evidence of bowel obstruction. No free air    US RLE arterial doppler 7/9/2018: Heavy atherosclerotic plaquing and stenosis is noted at the level of the external iliac and common femoral arteries in the proximal leg. The velocities were elevated to over 400 cm/s. The runoff arteries showed no focal   areas of stenosis or occlusion in the right leg.    US RLE venous dopper 7/9/2018: No sonographic findings of DVT in the right lower extremity.    EKG 7/9/2018: Normal sinus rhythm with sinu arrhythmia rate 74. Anteroseptal infarct.    Assessment and Plan:    1. Acute hypoxic hypercapnic respiratory failure likely secondary to COPD exacerbation  - Pulmonary consulted  - Off BiPAP and on 6 L nasal cannula with O2 sat 92%. No SOB at this time, AAOx3. Pt refuses to wear a BiPAP at this time.  - Neb treatments  - Repeat ABG to be done now per bedside  RN  - Solumedrol 125 mg IV x1, then 40 mg IV q12h. Will order accuchecks SSI AC/HS  - ECHO ordered    2. Severe sepsis secondary to bilateral LE cellulitis, diabetic ulcers of legs  - Zosyn and IV Vancomycin  - Blood and urine cultures ordered  - ID consulted  - Genral surgery consulted to evaluate PVD    3. Indeterminate troponins  - Cardiology consulted  - Aspirin  - Monitor EKG and troponins  - No pain at this time      __Y___   Video Assessment performed  __Y___   Most recent labs reviewed  __Y___   Vital Signs reviewed  __Y___   Best Practices addressed:                 VTE prophylaxis: Heparin subq                 SUP (when indicated): N/A                 Glycemic control:                      Please notify bedside physician when present or [facility] if glc > 180 X 2                 Sepsis guidelines: Yes                 Lung protective strategy: N/A    __Y___     Spoke with bedside RN  __Y___     Orders written    Contact [facility] for any needs if bedside physician is not present.  Note drafted by Lisette Streeter, ELIZABET-BC, CCRN      Interventions Intermediate-Respiratory distress - evaluation and management        Electronically Signed by: Juan Marie) on 07/09/2018 21:42

## 2018-07-10 NOTE — PLAN OF CARE
Problem: Patient Care Overview  Goal: Plan of Care Review  Outcome: Ongoing (interventions implemented as appropriate)      Problem: Skin Injury Risk (Adult)  Goal: Identify Related Risk Factors and Signs and Symptoms  Outcome: Ongoing (interventions implemented as appropriate)    Goal: Skin Health and Integrity  Outcome: Ongoing (interventions implemented as appropriate)      Problem: Breathing Pattern Ineffective (Adult)  Goal: Identify Related Risk Factors and Signs and Symptoms  Outcome: Ongoing (interventions implemented as appropriate)    Goal: Effective Oxygenation/Ventilation  Outcome: Ongoing (interventions implemented as appropriate)    Goal: Anxiety/Fear Reduction  Outcome: Ongoing (interventions implemented as appropriate)

## 2018-07-10 NOTE — PLAN OF CARE
Problem: Fall Risk (Adult)  Goal: Identify Related Risk Factors and Signs and Symptoms  Outcome: Ongoing (interventions implemented as appropriate)      Problem: Patient Care Overview  Goal: Plan of Care Review  Outcome: Ongoing (interventions implemented as appropriate)    Goal: Individualization and Mutuality  Outcome: Ongoing (interventions implemented as appropriate)    Goal: Discharge Needs Assessment  Outcome: Ongoing (interventions implemented as appropriate)    Goal: Interprofessional Rounds/Family Conf  Outcome: Ongoing (interventions implemented as appropriate)      Problem: Skin Injury Risk (Adult)  Goal: Identify Related Risk Factors and Signs and Symptoms  Outcome: Ongoing (interventions implemented as appropriate)    Goal: Skin Health and Integrity  Outcome: Ongoing (interventions implemented as appropriate)      Problem: Breathing Pattern Ineffective (Adult)  Goal: Identify Related Risk Factors and Signs and Symptoms  Outcome: Ongoing (interventions implemented as appropriate)    Goal: Effective Oxygenation/Ventilation  Outcome: Ongoing (interventions implemented as appropriate)    Goal: Anxiety/Fear Reduction  Outcome: Ongoing (interventions implemented as appropriate)

## 2018-07-10 NOTE — CONSULTS
Referring Provider: Dr. Stern  Reason for Consultation: respiratory distress      Chief complaint shortness of breath      History of present illness:  Mr. Bajwa is a 68 year old male that presented to the emergency department for evaluation of shortness of breath.  He has a history of COPD and reports worsening shortness of breath over the last couple of weeks.  He states that it became significantly worse 48 hours before coming to the emergency department.  He does report a cough that is productive of yellowish-green sputum.  He was hypoxic on ABG and placed on 2 L nasal cannula but started to retain CO2.  He was placed on BiPAP at this time.  Chest x-ray revealed bibasilar atelectasis.  He was admitted to the critical care unit for further evaluation and treatment.    Upon assessment this morning Mr. Bajwa was in respiratory distress and using accessory muscles to breathe.  He states that he is a smoker.    Review Of Systems:    Review of Systems - History obtained from chart review and the patient  General ROS: negative for - chills, fatigue or fever  Psychological ROS: negative for - anxiety or depression  ENT ROS: negative for - headaches, visual changes or vocal changes  Allergy and Immunology ROS: negative for - nasal congestion, postnasal drip or seasonal allergies  Endocrine ROS: negative for - polydipsia/polyuria  Respiratory ROS: positive for shortness of breath and cough  Cardiovascular ROS: no chest pain or dyspnea on exertion  Gastrointestinal ROS: no abdominal pain, change in bowel habits, or black or bloody stools  Musculoskeletal ROS: negative for - joint pain, joint stiffness or joint swelling  Neurological ROS: no TIA or stroke symptoms        History  Past Medical History:   Diagnosis Date   • COPD (chronic obstructive pulmonary disease) (CMS/[facility])    • Diabetes mellitus (CMS/[facility])    • Hypertension    , History reviewed. No pertinent surgical history., Family History   Problem Relation Age  of Onset   • No Known Problems Mother    • Heart failure Father    , Social History   Substance Use Topics   • Smoking status: Current Every Day Smoker     Packs/day: 1.00     Years: 25.00     Types: Cigarettes   • Smokeless tobacco: Never Used   • Alcohol use No   , Prescriptions Prior to Admission   Medication Sig Dispense Refill Last Dose   • albuterol (PROVENTIL HFA;VENTOLIN HFA) 108 (90 Base) MCG/ACT inhaler Inhale 2 puffs Every 6 (Six) Hours As Needed for Wheezing.   7/8/2018 at Unknown time   • aspirin 325 MG tablet Take 325 mg by mouth Daily.   7/8/2018 at Unknown time   • budesonide-formoterol (SYMBICORT) 160-4.5 MCG/ACT inhaler Inhale 2 puffs 2 (two) times a day.   7/8/2018 at Unknown time   • donepezil (ARICEPT) 10 MG tablet Take 10 mg by mouth Every Night.   7/8/2018 at Unknown time   • gabapentin (NEURONTIN) 800 MG tablet Take 800 mg by mouth 3 (Three) Times a Day.   7/8/2018 at Unknown time   • hydrochlorothiazide (HYDRODIURIL) 25 MG tablet Take 25 mg by mouth daily.   7/8/2018 at Unknown time   • insulin NPH-insulin regular (Novolin 70/30) (70-30) 100 UNIT/ML injection Inject 30 Units under the skin 2 (Two) Times a Day With Meals.   7/8/2018 at Unknown time   • levocetirizine (XYZAL) 5 MG tablet Take 5 mg by mouth Daily As Needed for Allergies.   7/8/2018 at Unknown time   • Liraglutide (VICTOZA) 18 MG/3ML solution pen-injector injection Inject 1.8 mg under the skin Daily.   7/8/2018 at Unknown time   • lisinopril (PRINIVIL,ZESTRIL) 40 MG tablet Take 40 mg by mouth daily.   7/8/2018 at Unknown time   • ondansetron (ZOFRAN) 8 MG tablet Take 8 mg by mouth Every 12 (Twelve) Hours As Needed for Nausea or Vomiting.   7/8/2018 at Unknown time   • simvastatin (ZOCOR) 80 MG tablet Take 40 mg by mouth every night.   7/8/2018 at Unknown time   • tamsulosin (FLOMAX) 0.4 MG capsule 24 hr capsule Take 1 capsule by mouth Every Evening.   Unknown at Unknown time   , Scheduled Meds:    aspirin 325 mg Oral Daily    [START ON 7/11/2018] atorvastatin 40 mg Oral Daily   budesonide-formoterol 2 puff Inhalation BID - RT   cefdinir 300 mg Oral Q12H   cetirizine 10 mg Oral Daily   donepezil 10 mg Oral Nightly   doxycycline 100 mg Oral Q12H   fluticasone 1 spray Each Nare BID   gabapentin 600 mg Oral Q8H   heparin (porcine) 5,000 Units Subcutaneous Q12H   insulin aspart 0-9 Units Subcutaneous 4x Daily AC & at Bedtime   insulin aspart prot-insulin aspart 30 Units Subcutaneous BID With Meals   ipratropium-albuterol 3 mL Nebulization 4x Daily - RT   Liraglutide 1.8 mg Subcutaneous Daily   lisinopril 40 mg Oral Daily   methylPREDNISolone sodium succinate 40 mg Intravenous Q12H   nicotine 1 patch Transdermal Q24H   sodium chloride 500 mL Intravenous Once   sodium chloride 3 mL Intravenous Q8H   tamsulosin 0.4 mg Oral Q PM   , Continuous Infusions:    and Allergies:  Patient has no known allergies.    Objective     Vital Signs   Temp:  [96.8 °F (36 °C)-98.7 °F (37.1 °C)] 98 °F (36.7 °C)  Heart Rate:  [] 93  Resp:  [16-30] 22  BP: ()/(56-92) 135/88  FiO2 (%):  [28 %-30 %] 28 %    Physical Exam:               GENERAL APPEARANCE: Well developed, well nourished, alert and cooperative, and appears to be in no acute distress.    HEAD: normocephalic.    EYES: PERRL, EOMI. Fundi normal, vision is grossly intact.    THROAT: Oral cavity and pharynx normal. No inflammation, swelling, exudate, or lesions.     NECK: Neck supple.     CARDIAC: Normal S1 and S2. No S3, S4 or murmurs. Rhythm is regular. There is no peripheral edema, cyanosis or pallor. Extremities are warm and well perfused. Capillary refill is less than 2 seconds. No carotid bruits.    RESPIRATORY: Clear to auscultation without rales, rhonchi, wheezing or diminished breath sounds.    GI: Positive bowel sounds. Soft, nondistended, nontender.     MUSCULOSKELETAL: No significant deformity or joint abnormality. No edema. Peripheral pulses intact. No varicosities.    NEUROLOGICAL:  Strength and sensation symmetric and intact throughout.     PSYCHIATRIC: The mental examination revealed the patient was oriented to person, place, and time.                Results Review:    LABS:    Lab Results   Component Value Date    GLUCOSE 321 (H) 07/10/2018    BUN 21 07/10/2018    CREATININE 1.16 07/10/2018    EGFRIFNONA 62 07/10/2018    BCR 18.1 07/10/2018    CO2 36.4 (H) 07/10/2018    CALCIUM 8.6 07/10/2018    ALBUMIN 3.60 07/09/2018    LABIL2 1.1 (L) 07/09/2018    AST 21 07/09/2018    ALT 22 07/09/2018    WBC 7.39 07/10/2018    HGB 15.9 07/10/2018    HCT 48.3 07/10/2018    MCV 94.0 07/10/2018     07/10/2018     (L) 07/10/2018    K 5.8 (C) 07/10/2018    CL 90 (L) 07/10/2018    ANIONGAP 0.6 (L) 07/10/2018       Lab Results   Component Value Date    INR 1.03 07/09/2018    PROTIME 13.7 07/09/2018         Results from last 7 days  Lab Units 07/09/18  0953   INR  1.03   APTT seconds 35.0                     I reviewed the patient's new clinical results.  I reviewed the patient's new imaging results and agree with the interpretation.      Assessment/Plan       Neuro: Patient is alert and awake. No acute mental status changes.  Will continue to monitor mental status.    Respiratory failure:  Likely related to underlying lung disease-COPD.  ABG on BiPAP versus reviewed.  He is on settings of 24/7.  Continue BiPAP at night and as needed.     Started patient on high flow oxygen for more comfort during the day.  He is currently on 30 L and 46%.    Chest x-ray reviewed.  We'll continue IV steroids, scheduled inhalants and as needed neb treatments.    ID:  Continue current antibiotics.  Continue to monitor current lab trends.    Cardiac: BP within normal range.  NSR.  Continuous ECG, BP and pulse ox monitoring. Maintain MAP > 65.    Elevated troponin: likely related to respiratory distress.  Cardiology has been consulted.    Renal: BUN/Creatinine WNL.  Strict I&Os.   Continue to monitor.  Creatinine is  increasing-1.16 today.  Chloride has also increased.  This could likely be related to dehydration.  Ordered a 500 ml bolus of normal saline.  Continue current IV fluids.    Endocrine:  hyperkalemia and hyponatremia noted-could be related to adrenal insufficiency.  We'll continue IV steroids.    monitor glucose targeting 140-180.      Electrolytes:  Monitor levels, manage and replete per protocols.    GI: Continue current diet.  Continue GI prophylaxis.    DVT prophylaxis:  Continue heparin.      Smoker:  I advised the patient of the risks in continuing to use tobacco, and I provided this patient with smoking cessation educational materials.  I also discussed how to quit smoking and the patient has expressed the willingness to quit.      During this visit, I spent over 10 minutes counseling the patient regarding smoking cessation.     Principal Problem:    Venous stasis dermatitis of both lower extremities  Active Problems:    Acute respiratory failure with hypoxia and hypercapnia (CMS/HCC)          Findings and my recommendations were discussed with patient, family, nursing staff and consulting provider    PANCHO Brito  07/10/18  10:38 AM      Attestation: Scribed for Dr. Sahni by PANCHO Gillespie        I, Carlos Sahni M.D. attest that the above note accurately reflects the work and decisions made  by me.  Patient was seen and evaluated by Dr. Sahni, including history of present illness, physical exam, assessment, and treatment plan.  The above note was reviewed and edited by Dr. Sahni.

## 2018-07-10 NOTE — PROGRESS NOTES
Brief Hospitalist Note:    Informed by RN that patient was threatening to leave AMA. Spoke with patient and urged him to continue with bipap, which he wanted to remove. He states he is hungry and wants pain medication for his legs. At that time, O2 sat was in mid 90s on bipap. Most recent ABG from 1727 showed stable but mildly low pH with rising PCO2 of 77. However, checked with respiratory therapist and no changes had been made to bipap settings after this last gas. Placed orders to increase rate and titrate fiO2 down to keep O2 sat at or slightly above 90%. I informed patient that if gas improved with bipap changes, I would give him a break off mask, allow him to eat if tolerated, and order him some low-dose IV morphine for pain related to his cellulitis once he went back on the bipap later this evening. Patient was agreeable to this plan initially. However, shortly thereafter, he removed his bipap mask and decided to leave AMA. He voiced understanding of the risks of leaving prematurely, including worsening respiratory failure and possibly death. However, he stated he was miserable. I once again urged him to reconsider, and offered to give him a trial off the bipap for now so he could eat, then we could check an ABG on him in about 30 minute to see how he was ventilating on his own with nasal cannula only (on 5 L at this time). Again, he agreed to this trial. Repeat ABG showed nearly compensated pH with improved pCO2, but RN informed me this was only after about 15 minutes on nasal cannula; prior to that, he had been on the bipap with my new settings. Instructed RN to repeat ABG for midnight, will reassess then and if bipap is still indicated, will discuss with patient further and urge him to wear  bipap through the night.

## 2018-07-10 NOTE — NURSING NOTE
Patient has general surgery consult for PVD.  Will defer tx until seen by surgery.  Spoke to Sirisha

## 2018-07-10 NOTE — CONSULTS
INFECTIOUS DISEASE CONSULTATION REPORT      Referring Provider: Dr. Stern   Reason for Consultation: Cellulitis of Bilateral Lower Extremities      Principal problem:     Subjective .     History of present illness:    As you well know Dr. Stern, Mr. Heath Bajwa is a 68 y.o. years old male with past medical history significant for COPD and diabetes, who presented to [facility] Emergency Department on 7/9/2018 for shortness of breath, oxygen dependent at home.  Upon arrival to the ED patient was found to have significant bilateral lower extremity ulcers significant ascending erythema, foul odor, and drainage.  Patient wearing unaboots at home for treatment.  WBC normal.  Mycoplasma pneumoniae negative.  Urinary Legionella negative.  Chest x-ray reveals bibasilar airspace disease/atelectasis.  Blood cultures show no growth at this time.    Infectious Disease consultation was requested for antimicrobial management.     History taken from: patient chart RN    Case was discussed with patient, nursing staff, primary care team and consulting provider    Review of Systems     Constitutional: See history of present illness  Eyes: no eye drainage, itching or redness.  HEENT: no mouth sores, dysphagia or nose bleed.  Respiratory: See history of present illness  Cardiovascular: no chest pain, no palpitations, no orthopnea.  Gastrointestinal: no nausea, vomiting or diarrhea. No abdominal pain, hematemesis or rectal bleeding.  Genitourinary: no dysuria or polyuria.  Hematologic/lymphatic: no lymph node abnormalities, no easy bruising or easy bleeding.  Musculoskeletal: See history of present illness  Skin: No rash and no itching.  Neurological: no loss of consciousness, no seizure, no headache.  Behavioral/Psych: no depression or suicidal ideation.  Endocrine: no hot flashes.  Immunologic: negative.    Past Medical History    Past Medical History:   Diagnosis Date   • COPD (chronic obstructive pulmonary disease)  "(CMS/Formerly [facility])    • Diabetes mellitus (CMS/Formerly [facility])    • Hypertension        Past Surgical History    History reviewed. No pertinent surgical history.    Family History    Family History   Problem Relation Age of Onset   • No Known Problems Mother    • Heart failure Father        Social History    Social History   Substance Use Topics   • Smoking status: Current Every Day Smoker     Packs/day: 1.00     Years: 25.00     Types: Cigarettes   • Smokeless tobacco: Never Used   • Alcohol use No       Allergies    Patient has no known allergies.    Objective     /84   Pulse 92   Temp 98.7 °F (37.1 °C) (Oral)   Resp 22   Ht 182.9 cm (72\")   Wt 115 kg (253 lb 3.2 oz)   SpO2 92%   BMI 34.34 kg/m²     Temp:  [96.8 °F (36 °C)-98.7 °F (37.1 °C)] 98.7 °F (37.1 °C)        Intake/Output Summary (Last 24 hours) at 07/10/18 0907  Last data filed at 07/10/18 0744   Gross per 24 hour   Intake             1400 ml   Output             1650 ml   Net             -250 ml         Physical Exam:      General Appearance:    Alert, cooperative, in no acute distress, lady friend at bedside, poor hygiene    Head:    Normocephalic, without obvious abnormality, atraumatic   Eyes:            Lids and lashes normal, conjunctivae and sclerae normal, no   icterus, no pallor, corneas clear, PERRLA   Ears:    Ears appear intact with no abnormalities noted   Throat:   No oral lesions, no thrush, oral mucosa moist   Neck:   No adenopathy, supple, trachea midline, no thyromegaly, no   carotid bruit, no JVD   Back:     No tenderness to percussion or palpation, range of motion   normal   Lungs:     Decreased at the bases,respirations regular, even and unlabored. No wheezing, no ronchi and no crackles.    Heart:    Regular rhythm and normal rate, normal S1 and S2, no            murmur, no gallop, no rub, no click   Chest Wall:    No abnormalities observed   Abdomen:     Normal bowel sounds, no masses, no organomegaly, soft        non-tender, non-distended, " no guarding, no rebound                tenderness   Rectal:     Deferred   Extremities:  Bilateral lower extremity venous stasis dermatitis with foul odor and drainage, associated redness and warmth.  Right worse than left.    Pulses:   Pulses palpable and equal bilaterally   Skin:   Bilateral lower extremity venous stasis dermatitis with foul odor and drainage, associated redness and warmth.  Right worse than left.   Lymph nodes:   No palpable adenopathy   Neurologic:   Awake, alert and oriented x 3. Following commands.       Results:      Results from last 7 days  Lab Units 07/10/18  0545 07/09/18  0953   WBC 10*3/mm3 7.39 8.19     Lab Results   Component Value Date    NEUTROABS 6.77 (H) 07/10/2018         Results from last 7 days  Lab Units 07/10/18  0545   CREATININE mg/dL 1.16         Results from last 7 days  Lab Units 07/09/18  0953   CRP mg/dL 1.72*       Imaging Results (last 24 hours)     Procedure Component Value Units Date/Time    XR Abdomen 2 View With Chest 1 View [[id number]] Collected:  07/09/18 1552     Updated:  07/09/18 1556    Narrative:          XR ABDOMEN 2 VW W CHEST 1 VW-     CLINICAL INDICATION: abd distention; J96.01-Acute respiratory failure  with hypoxia; J96.02-Acute respiratory failure with hypercapnia;  I21.4-Non-ST elevation (NSTEMI) myocardial infarction          COMPARISON: 07/09/2018      FINDINGS:  Chest:  Bibasilar airspace disease, either atelectasis or pneumonia     Abdomen:  Two views of the abdomen show no free air. I do not see abnormal bowel  distention to suggest complete obstruction. The bony structures are  intact. No abnormal soft tissue masses are seen. No suspicious appearing  calcifications are identified on today's exam.       Impression:       1. 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 Bibasilar airspace disease.  3. No evidence of bowel obstruction.  4. No free air.     5.            This report was finalized on 7/9/2018 3:54 PM by Dr. Jose Luis Romero MD.        Arterial  Doppler Lower Extremity Right [[id number]] Collected:  07/09/18 1339     Updated:  07/09/18 1407    Narrative:       US ARTERIAL DOPPLER LOWER EXTREMITY RIGHT-     REASON FOR EXAM:  Color change/ decreased pulses.     TECHNIQUE:  Multiple real-time color doppler images were obtained.  M-mode Doppler was used for velocity determination and spectral pattern.  Stenosis was evaluated using the NASCET or similar measurement  technique.     FINDINGS:   Right leg: The external iliac artery had extensive calcific plaque in  the lumen. It is narrowed. The Doppler wave pattern was monophasic. The  velocity is significantly elevated measuring 416 cm/s. There is calcific  plaque at the common femoral artery level. The Doppler wave pattern was  monophasic. The peak systolic velocity was 213 cm/s. Color Doppler flow  was seen in the profunda femoral and superficial femoral artery. There  were no areas of occlusion in the superficial femoral artery. The  Doppler wave pattern was monophasic. The popliteal artery has a  monophasic flow pattern velocity of 87 cm/s. There was monophasic flow  in the posterior tibial artery. At the ankle the velocity measured 26  cm/s.       Impression:       Heavy atherosclerotic plaquing and stenosis is noted at the  level of the external iliac and common femoral arteries in the proximal  leg. The velocities were elevated to over 400 cm/s. The runoff arteries      showed no focal areas of stenosis or occlusion in the right leg.     This report was finalized on 7/9/2018 2:05 PM by Dr. Jose Ramon Hatch II, MD.       US Venous Doppler Lower Extremity Right (duplex) [[id number]] Collected:  07/09/18 1311     Updated:  07/09/18 1313    Narrative:       US VENOUS DOPPLER LOWER EXTREMITY RIGHT (DUPLEX)-     REASON FOR EXAM:  redness and pain     Multiple real-time images were obtained. The deep veins were well  demonstrated sonographically. There is good color doppler signal seen  filling the deep veins. They  were completely compressed by the  ultrasound transducer. There was good spontaneous venous flow and  augmentation. There are no echoes seen along the deep veins to suggest  clot.          Impression:       No sonographic findings of DVT in the right lower extremity     This report was finalized on 7/9/2018 1:11 PM by Dr. Jose Ramon Hatch II, MD.       XR Chest 1 View [[id number]] Collected:  07/09/18 1006     Updated:  07/09/18 1017    Narrative:       XR CHEST 1 VW-     CLINICAL INDICATION: soa          COMPARISON: 01/16/2016      TECHNIQUE: Single frontal view of the chest.     FINDINGS:     Bibasilar atelectasis  The cardiac silhouette is normal. The pulmonary vasculature is  unremarkable.  There is no evidence of an acute osseous abnormality.   There are no suspicious-appearing parenchymal soft tissue nodules.            Impression:       Bibasilar atelectasis         This report was finalized on 7/9/2018 10:06 AM by Dr. Jose Luis Romero MD.               Cultures:    Blood Culture   Date Value Ref Range Status   07/09/2018 No growth at less than 24 hours  Preliminary   07/09/2018 No growth at less than 24 hours  Preliminary       Results Review:    I have personally reviewed laboratory data, culture results, radiology studies and antimicrobial therapy.    Hospital Medications (active)       Dose Frequency Start End    albuterol (PROVENTIL) nebulizer solution 0.083% 2.5 mg/3mL 2.5 mg Every 6 Hours PRN 7/10/2018     Sig - Route: Take 2.5 mg by nebulization Every 6 (Six) Hours As Needed for Wheezing or Shortness of Air. - Nebulization    aspirin chewable tablet 324 mg 324 mg Once 7/9/2018 7/9/2018    Sig - Route: Chew 4 tablets 1 (One) Time. - Oral    aspirin EC tablet 81 mg 81 mg Daily 7/10/2018     Sig - Route: Take 1 tablet by mouth Daily. - Oral    Cosign for Ordering: Accepted by Julian Stern MD on 7/10/2018  8:54 AM    aspirin tablet 325 mg 325 mg Daily 7/10/2018     Sig - Route: Take 1 tablet by mouth Daily.  - Oral    atorvastatin (LIPITOR) tablet 20 mg 20 mg Daily 7/10/2018     Sig - Route: Take 1 tablet by mouth Daily. - Oral    budesonide-formoterol (SYMBICORT) 160-4.5 MCG/ACT inhaler 2 puff 2 puff 2 Times Daily - RT 7/10/2018     Sig - Route: Inhale 2 puffs 2 (Two) Times a Day. - Inhalation    cefdinir (OMNICEF) capsule 300 mg 300 mg Every 12 Hours Scheduled 7/10/2018 7/24/2018    Sig - Route: Take 1 capsule by mouth Every 12 (Twelve) Hours. - Oral    cetirizine (zyrTEC) tablet 10 mg 10 mg Daily 7/10/2018     Sig - Route: Take 1 tablet by mouth Daily. - Oral    dextrose (D50W) solution 25 g 25 g Every 15 Minutes PRN 7/9/2018     Sig - Route: Infuse 50 mL into a venous catheter Every 15 (Fifteen) Minutes As Needed for Low Blood Sugar (Blood Sugar Less Than 70). - Intravenous    Cosign for Ordering: Accepted by Julian Stern MD on 7/9/2018  5:29 PM    dextrose (D50W) solution 25 g 25 g Every 15 Minutes PRN 7/9/2018     Sig - Route: Infuse 50 mL into a venous catheter Every 15 (Fifteen) Minutes As Needed for Low Blood Sugar (Blood Sugar Less Than 70). - Intravenous    dextrose (GLUTOSE) oral gel 15 g 15 g Every 15 Minutes PRN 7/9/2018     Sig - Route: Take 15 g by mouth Every 15 (Fifteen) Minutes As Needed for Low Blood Sugar (Blood sugar less than 70). - Oral    Cosign for Ordering: Accepted by Julian Stern MD on 7/9/2018  5:29 PM    dextrose (GLUTOSE) oral gel 15 g 15 g Every 15 Minutes PRN 7/9/2018     Sig - Route: Take 15 g by mouth Every 15 (Fifteen) Minutes As Needed for Low Blood Sugar (Blood sugar less than 70). - Oral    donepezil (ARICEPT) tablet 10 mg 10 mg Nightly 7/10/2018     Sig - Route: Take 2 tablets by mouth Every Night. - Oral    doxycycline (MONODOX) capsule 100 mg 100 mg Every 12 Hours Scheduled 7/10/2018 7/24/2018    Sig - Route: Take 1 capsule by mouth Every 12 (Twelve) Hours. - Oral    fluticasone (FLONASE) 50 MCG/ACT nasal spray 1 spray 1 spray 2 Times Daily 7/10/2018     Sig - Route: 1  spray by Each Nare route 2 (Two) Times a Day. - Each Nare    gabapentin (NEURONTIN) capsule 600 mg 600 mg Every 8 Hours Scheduled 7/10/2018     Sig - Route: Take 2 capsules by mouth Every 8 (Eight) Hours. - Oral    glucagon (human recombinant) (GLUCAGEN DIAGNOSTIC) injection 1 mg 1 mg As Needed 7/9/2018     Sig - Route: Inject 1 mg under the skin As Needed (Blood Glucose Less Than 70). - Subcutaneous    Cosign for Ordering: Accepted by Julian Stern MD on 7/9/2018  5:29 PM    glucagon (human recombinant) (GLUCAGEN DIAGNOSTIC) injection 1 mg 1 mg As Needed 7/9/2018     Sig - Route: Inject 1 mg under the skin As Needed (Blood Glucose Less Than 70). - Subcutaneous    heparin (porcine) 5000 UNIT/ML injection 5,000 Units 5,000 Units Every 12 Hours Scheduled 7/9/2018     Sig - Route: Inject 1 mL under the skin Every 12 (Twelve) Hours. - Subcutaneous    Cosign for Ordering: Accepted by Julian Stern MD on 7/9/2018  5:29 PM    insulin aspart (novoLOG) injection 0-9 Units 0-9 Units 4 Times Daily Before Meals & Nightly 7/9/2018     Sig - Route: Inject 0-9 Units under the skin 4 (Four) Times a Day Before Meals & at Bedtime. - Subcutaneous    insulin aspart prot-insulin aspart (novoLOG 70/30) injection 30 Units 30 Units 2 Times Daily With Meals 7/10/2018     Sig - Route: Inject 30 Units under the skin 2 (Two) Times a Day With Meals. - Subcutaneous    insulin regular (humuLIN R,novoLIN R) injection 10 Units 10 Units Once 7/10/2018 7/10/2018    Sig - Route: Infuse 10 Units into a venous catheter 1 (One) Time. - Intravenous    ipratropium-albuterol (DUO-NEB) nebulizer solution 3 mL 3 mL Once 7/9/2018 7/9/2018    Sig - Route: Take 3 mL by nebulization 1 (One) Time. - Nebulization    ipratropium-albuterol (DUO-NEB) nebulizer solution 3 mL 3 mL 4 Times Daily - RT 7/9/2018     Sig - Route: Take 3 mL by nebulization 4 (Four) Times a Day. - Nebulization    Cosign for Ordering: Accepted by Julian Stern MD on 7/9/2018  5:29 PM     ipratropium-albuterol (DUO-NEB) nebulizer solution 3 mL 3 mL Every 6 Hours PRN 7/9/2018     Sig - Route: Take 3 mL by nebulization Every 6 (Six) Hours As Needed for Shortness of Air. - Nebulization    Cosign for Ordering: Accepted by Julian Stern MD on 7/9/2018  5:29 PM    lisinopril (PRINIVIL,ZESTRIL) tablet 40 mg 40 mg Daily 7/10/2018     Sig - Route: Take 4 tablets by mouth Daily. - Oral    methylPREDNISolone sodium succinate (SOLU-Medrol) injection 125 mg 125 mg Once 7/9/2018 7/9/2018    Sig - Route: Infuse 2 mL into a venous catheter 1 (One) Time. - Intravenous    methylPREDNISolone sodium succinate (SOLU-Medrol) injection 40 mg 40 mg Every 12 Hours 7/9/2018     Sig - Route: Infuse 1 mL into a venous catheter Every 12 (Twelve) Hours. - Intravenous    Cosign for Ordering: Accepted by Julian Stern MD on 7/9/2018  5:29 PM    morphine injection 1 mg 1 mg Every 4 Hours PRN 7/9/2018 7/19/2018    Sig - Route: Infuse 0.5 mL into a venous catheter Every 4 (Four) Hours As Needed for Severe Pain  (hold for oversedation, SBP<100). - Intravenous    morphine injection 2 mg 2 mg Once 7/9/2018 7/9/2018    Sig - Route: Infuse 0.5 mL into a venous catheter 1 (One) Time. - Intravenous    morphine injection 2 mg 2 mg Once 7/9/2018 7/9/2018    Sig - Route: Infuse 0.5 mL into a venous catheter 1 (One) Time. - Intravenous    nicotine (NICODERM CQ) 21 MG/24HR patch 1 patch 1 patch Every 24 Hours 7/10/2018     Sig - Route: Place 1 patch on the skin Daily. - Transdermal    ondansetron (ZOFRAN) injection 4 mg 4 mg Once 7/9/2018 7/9/2018    Sig - Route: Infuse 2 mL into a venous catheter 1 (One) Time. - Intravenous    ondansetron (ZOFRAN) tablet 8 mg 8 mg Every 12 Hours PRN 7/10/2018     Sig - Route: Take 2 tablets by mouth Every 12 (Twelve) Hours As Needed for Nausea or Vomiting. - Oral    PATIENT SUPPLIED MEDICATION 1.8 mg Daily 7/10/2018     Sig - Route: Inject 1.8 mg under the skin Daily. - Subcutaneous    Non-formulary Exception  "Code: Patient supplied medication    piperacillin-tazobactam (ZOSYN) 4.5 g/100 mL 0.9% NS IVPB (mbp) 4.5 g Once 7/9/2018 7/9/2018    Sig - Route: Infuse 100 mL into a venous catheter 1 (One) Time. - Intravenous    sodium chloride 0.9 % bolus 500 mL 500 mL Once 7/10/2018     Sig - Route: Infuse 500 mL into a venous catheter 1 (One) Time. - Intravenous    sodium chloride 0.9 % flush 1-10 mL 1-10 mL As Needed 7/9/2018     Sig - Route: Infuse 1-10 mL into a venous catheter As Needed for Line Care. - Intravenous    Cosign for Ordering: Accepted by Julian Stern MD on 7/9/2018  5:29 PM    sodium chloride 0.9 % flush 10 mL 10 mL As Needed 7/9/2018     Sig - Route: Infuse 10 mL into a venous catheter As Needed for Line Care. - Intravenous    Linked Group 1:  \"And\" Linked Group Details        sodium chloride 0.9 % flush 3 mL 3 mL Every 8 Hours 7/10/2018     Sig - Route: Infuse 3 mL into a venous catheter Every 8 (Eight) Hours. - Intravenous    sodium chloride 0.9 % flush 5 mL 5 mL As Needed 7/10/2018     Sig - Route: Infuse 5 mL into a venous catheter As Needed for Line Care (After Medication Administration or Blood Draw). - Intravenous    sodium polystyrene (KAYEXALATE) powder 30 g 30 g Once 7/10/2018 7/10/2018    Sig - Route: Take 30 g by mouth 1 (One) Time. - Oral    tamsulosin (FLOMAX) 24 hr capsule 0.4 mg 0.4 mg Every Evening 7/10/2018     Sig - Route: Take 1 capsule by mouth Every Evening. - Oral    vancomycin (VANCOCIN) 2,000 mg in sodium chloride 0.9 % 500 mL IVPB 2,000 mg Once 7/9/2018 7/9/2018    Sig - Route: Infuse 2,000 mg into a venous catheter 1 (One) Time. - Intravenous    Pharmacy Consult - Pharmacy to dose (Discontinued)  Continuous PRN 7/9/2018 7/9/2018    Sig - Route: Continuous As Needed for Consult. - Does not apply    Reason for Discontinue: Dose adjustment    Pharmacy to Dose Zosyn (Discontinued)  Continuous PRN 7/9/2018 7/9/2018    Sig - Route: Continuous As Needed for Consult (Please discontinue " Rocephin.  It will not allow me at present, as it is in signed and held.). - Does not apply    Reason for Discontinue: Dose adjustment    Cosign for Ordering: Accepted by Julian Stern MD on 7/9/2018  5:29 PM    piperacillin-tazobactam (ZOSYN) 3.375 g/100 mL 0.9% NS IVPB (mbp) (Discontinued) 3.375 g Every 8 Hours 7/10/2018 7/10/2018    Sig - Route: Infuse 100 mL into a venous catheter Every 8 (Eight) Hours. - Intravenous    sodium chloride 0.9 % infusion (Discontinued) 75 mL/hr Continuous 7/10/2018 7/10/2018    Sig - Route: Infuse 75 mL/hr into a venous catheter Continuous. - Intravenous    sodium polystyrene (KAYEXALATE) 15 GM/60ML suspension 30 g (Discontinued) 30 g Once 7/10/2018 7/10/2018    Sig - Route: Take 120 mL by mouth 1 (One) Time. - Oral    Reason for Discontinue: Alternate therapy    vancomycin (VANCOCIN) 1,500 mg in sodium chloride 0.9 % 500 mL IVPB (Discontinued) 1,500 mg Every 18 Hours 7/10/2018 7/10/2018    Sig - Route: Infuse 1,500 mg into a venous catheter Every 18 (Eighteen) Hours. - Intravenous              Assessment/Plan     ASSESSMENT:    1.  Venous stasis dermatitis   2.  Pneumonia versus COPD exacerbation    PLAN:    Patient presents with shortness of breath, oxygen dependent at home.  Upon arrival to the ED patient was found to have significant bilateral lower extremity ulcers significant ascending erythema, foul odor, and drainage.  Patient wearing unaboots at home for treatment.  WBC normal.  Mycoplasma pneumoniae negative.  Urinary Legionella negative.  Chest x-ray reveals bibasilar airspace disease/atelectasis.  Blood cultures show no growth at this time.    Unfortunately patient's poor hygiene will jeopardize and impair patient's healing process and outcomes.  In the setting of lack of clinical evidence of sepsis patient's antibiotic therapy was de-escalated to Doxy 100 mg by mouth twice a day and Omnicef 300 mg by mouth twice a day both to continue through 7/23/18.  We'll continue  to follow closely and adjust antibiotic therapy as needed.  CRP ordered for a.m.        Patient's findings and recommendations were discussed with patient, nursing staff, primary care team and consulting provider    Code Status:   Code Status and Medical Interventions:   Ordered at: 07/09/18 1509     Code Status:    CPR     Medical Interventions (Level of Support Prior to Arrest):    Full       Scribed for Dr. Beatriz Mehta.      Diana Chao, PANCHO  07/10/18  9:07 AM     Physician Attestation:    The documentation recorded by the scribe accurately reflects the service I personally performed and the decisions made by me.    Beatriz Mehta MD  Infectious Diseases  07/10/18  5:00 PM

## 2018-07-10 NOTE — PROGRESS NOTES
Discharge Planning Assessment   José Miguel     Patient Name: Heath Bajwa  MRN: [id number]  Today's Date: 7/10/2018    Admit Date: 7/9/2018          Discharge Needs Assessment     Row Name 07/10/18 1600       Living Environment    Lives With alone    Current Living Arrangements home/apartment/condo    Primary Care Provided by self    Provides Primary Care For no one    Family Caregiver if Needed child(sierra), adult    Quality of Family Relationships helpful;involved;supportive    Able to Return to Prior Arrangements yes       Resource/Environmental Concerns    Resource/Environmental Concerns none    Transportation Concerns car, none       Transition Planning    Patient/Family Anticipates Transition to home    Patient/Family Anticipated Services at Transition none    Transportation Anticipated family or friend will provide       Discharge Needs Assessment    Readmission Within the Last 30 Days no previous admission in last 30 days    Concerns to be Addressed no discharge needs identified    Equipment Currently Used at Home oxygen;nebulizer;wheelchair, motorized   Unknown     Anticipated Changes Related to Illness inability to care for self    Equipment Needed After Discharge none    Outpatient/Agency/Support Group Needs --   Pt utilizes [facility].      Discharge Facility/Level of Care Needs home with home health    Current Discharge Risk lives alone            Discharge Plan     Row Name 07/10/18 1056       Plan    Plan Pt lives at home alone at [facility]. Pt uses [facility].  Pt has home oxygen, nebulizer and electric wheelchair via unknown provider. Pt states he has limited mobilty and only walks short distances.  Pt will be transported home via private auto.  SS will continue to follow.     Patient/Family in Agreement with Plan yes        Expected Discharge Date and Time     Expected Discharge Date Expected Discharge Time    Jul 14, 2018               Demographic Summary     Row  Name 07/10/18 1558       General Information    Admission Type inpatient    Reason for Consult discharge planning    Preferred Language English     Used During This Interaction no            Functional Status     Row Name 07/10/18 1558       Functional Status    Usual Activity Tolerance poor        Deanna Chen

## 2018-07-10 NOTE — PROGRESS NOTES
Antibiotic length of therapy :   ( total abx. Day 2 )    Cefdinir  Day 1 / 14     Doxycycline  Day 1 / 14

## 2018-07-10 NOTE — SIGNIFICANT NOTE
Patient was non compliant with BiPAP.  Dr. Jack wanted to try patient on High-Flow at this time.  Titrating O2 88-90

## 2018-07-11 ENCOUNTER — APPOINTMENT (OUTPATIENT)
Dept: GENERAL RADIOLOGY | Facility: HOSPITAL | Age: 68
End: 2018-07-11

## 2018-07-11 LAB
A-A DO2: 98.6 MMHG (ref 0–300)
ALBUMIN SERPL-MCNC: 3.1 G/DL (ref 3.4–4.8)
ALBUMIN UR-MCNC: 1021.8 MG/L
ALBUMIN/GLOB SERPL: 1.1 G/DL (ref 1.5–2.5)
ALP SERPL-CCNC: 80 U/L (ref 40–129)
ALT SERPL W P-5'-P-CCNC: 18 U/L (ref 10–44)
ANION GAP SERPL CALCULATED.3IONS-SCNC: 3.2 MMOL/L (ref 3.6–11.2)
ANION GAP SERPL CALCULATED.3IONS-SCNC: 3.9 MMOL/L (ref 3.6–11.2)
ARTERIAL PATENCY WRIST A: POSITIVE
AST SERPL-CCNC: 22 U/L (ref 10–34)
ATMOSPHERIC PRESS: 729 MMHG
BASE EXCESS BLDA CALC-SCNC: 7.4 MMOL/L
BASOPHILS # BLD AUTO: 0.01 10*3/MM3 (ref 0–0.3)
BASOPHILS NFR BLD AUTO: 0.1 % (ref 0–2)
BDY SITE: ABNORMAL
BILIRUB SERPL-MCNC: 0.4 MG/DL (ref 0.2–1.8)
BODY TEMPERATURE: 98.6 C
BUN BLD-MCNC: 26 MG/DL (ref 7–21)
BUN BLD-MCNC: 29 MG/DL (ref 7–21)
BUN/CREAT SERPL: 27.1 (ref 7–25)
BUN/CREAT SERPL: 29 (ref 7–25)
C3 SERPL-MCNC: 123 MG/DL (ref 90–170)
C4 SERPL-MCNC: 30.1 MG/DL (ref 12–36)
CALCIUM SPEC-SCNC: 8.5 MG/DL (ref 7.7–10)
CALCIUM SPEC-SCNC: 8.5 MG/DL (ref 7.7–10)
CHLORIDE SERPL-SCNC: 91 MMOL/L (ref 99–112)
CHLORIDE SERPL-SCNC: 92 MMOL/L (ref 99–112)
CHOLEST SERPL-MCNC: 152 MG/DL (ref 0–200)
CO2 SERPL-SCNC: 34.8 MMOL/L (ref 24.3–31.9)
CO2 SERPL-SCNC: 35.1 MMOL/L (ref 24.3–31.9)
COHGB MFR BLD: 1.5 % (ref 0–5)
CORTIS AM PEAK SERPL-MCNC: 13 MCG/DL (ref 4.3–22.4)
CREAT BLD-MCNC: 0.96 MG/DL (ref 0.43–1.29)
CREAT BLD-MCNC: 1 MG/DL (ref 0.43–1.29)
CREAT UR-MCNC: 36.5 MG/DL
CREAT UR-MCNC: 36.5 MG/DL
CRP SERPL-MCNC: 1.04 MG/DL (ref 0–0.99)
DEPRECATED RDW RBC AUTO: 45.7 FL (ref 37–54)
EOSINOPHIL # BLD AUTO: 0 10*3/MM3 (ref 0–0.7)
EOSINOPHIL NFR BLD AUTO: 0 % (ref 0–7)
EOSINOPHIL SPEC QL WRIGHT STN: NORMAL %
ERYTHROCYTE [DISTWIDTH] IN BLOOD BY AUTOMATED COUNT: 14 % (ref 11.5–14.5)
GFR SERPL CREATININE-BSD FRML MDRD: 74 ML/MIN/1.73
GFR SERPL CREATININE-BSD FRML MDRD: 77 ML/MIN/1.73
GLOBULIN UR ELPH-MCNC: 2.9 GM/DL
GLUCOSE BLD-MCNC: 139 MG/DL (ref 70–110)
GLUCOSE BLD-MCNC: 193 MG/DL (ref 70–110)
GLUCOSE BLDC GLUCOMTR-MCNC: 221 MG/DL (ref 70–130)
GLUCOSE BLDC GLUCOMTR-MCNC: 223 MG/DL (ref 70–130)
GLUCOSE BLDC GLUCOMTR-MCNC: 270 MG/DL (ref 70–130)
GLUCOSE BLDC GLUCOMTR-MCNC: 283 MG/DL (ref 70–130)
HAV IGM SERPL QL IA: ABNORMAL
HBA1C MFR BLD: 6.9 % (ref 4.5–5.7)
HBV CORE IGM SERPL QL IA: ABNORMAL
HBV SURFACE AG SERPL QL IA: ABNORMAL
HCO3 BLDA-SCNC: 34.2 MMOL/L (ref 22–26)
HCT VFR BLD AUTO: 48.8 % (ref 42–52)
HCT VFR BLD CALC: 51 % (ref 42–52)
HCV AB SER DONR QL: REACTIVE
HDLC SERPL-MCNC: 50 MG/DL (ref 60–100)
HGB BLD-MCNC: 15.9 G/DL (ref 14–18)
HGB BLDA-MCNC: 17.2 G/DL (ref 12–16)
HOROWITZ INDEX BLD+IHG-RTO: 33 %
IMM GRANULOCYTES # BLD: 0.02 10*3/MM3 (ref 0–0.03)
IMM GRANULOCYTES NFR BLD: 0.2 % (ref 0–0.5)
LDLC SERPL CALC-MCNC: 88 MG/DL (ref 0–100)
LDLC/HDLC SERPL: 1.76 {RATIO}
LYMPHOCYTES # BLD AUTO: 0.44 10*3/MM3 (ref 1–3)
LYMPHOCYTES NFR BLD AUTO: 4.7 % (ref 16–46)
MAGNESIUM SERPL-MCNC: 2.3 MG/DL (ref 1.7–2.6)
MCH RBC QN AUTO: 30.4 PG (ref 27–33)
MCHC RBC AUTO-ENTMCNC: 32.6 G/DL (ref 33–37)
MCV RBC AUTO: 93.3 FL (ref 80–94)
METHGB BLD QL: 0.3 % (ref 0–3)
MICROALBUMIN/CREAT UR: 2799.5 MG/G
MODALITY: ABNORMAL
MONOCYTES # BLD AUTO: 0.23 10*3/MM3 (ref 0.1–0.9)
MONOCYTES NFR BLD AUTO: 2.4 % (ref 0–12)
NEUTROPHILS # BLD AUTO: 8.72 10*3/MM3 (ref 1.4–6.5)
NEUTROPHILS NFR BLD AUTO: 92.6 % (ref 40–75)
OSMOLALITY SERPL CALC.SUM OF ELEC: 267.8 MOSM/KG (ref 273–305)
OSMOLALITY SERPL CALC.SUM OF ELEC: 271.9 MOSM/KG (ref 273–305)
OXYHGB MFR BLDV: 89.7 % (ref 85–100)
PCO2 BLDA: 54.9 MM HG (ref 35–45)
PCO2 TEMP ADJ BLD: ABNORMAL MM HG (ref 35–48)
PH BLDA: 7.41 PH UNITS (ref 7.35–7.45)
PH, TEMP CORRECTED: ABNORMAL PH UNITS (ref 7.35–7.45)
PLATELET # BLD AUTO: 214 10*3/MM3 (ref 130–400)
PMV BLD AUTO: 10.7 FL (ref 6–10)
PO2 BLDA: 62.4 MM HG (ref 80–100)
PO2 TEMP ADJ BLD: ABNORMAL MM HG (ref 83–108)
POTASSIUM BLD-SCNC: 5.2 MMOL/L (ref 3.5–5.3)
POTASSIUM BLD-SCNC: 5.3 MMOL/L (ref 3.5–5.3)
PROT SERPL-MCNC: 6 G/DL (ref 6–8)
PROT UR-MCNC: 141 MG/DL
PROT/CREAT UR: 3863 MG/G CREA (ref 0–200)
RBC # BLD AUTO: 5.23 10*6/MM3 (ref 4.7–6.1)
SAO2 % BLDCOA: 91.3 % (ref 90–100)
SODIUM BLD-SCNC: 130 MMOL/L (ref 135–153)
SODIUM BLD-SCNC: 130 MMOL/L (ref 135–153)
TRIGL SERPL-MCNC: 69 MG/DL (ref 0–150)
TSH SERPL DL<=0.05 MIU/L-ACNC: 2.19 MIU/ML (ref 0.55–4.78)
VLDLC SERPL-MCNC: 13.8 MG/DL
WBC NRBC COR # BLD: 9.42 10*3/MM3 (ref 4.5–12.5)

## 2018-07-11 PROCEDURE — 83883 ASSAY NEPHELOMETRY NOT SPEC: CPT | Performed by: INTERNAL MEDICINE

## 2018-07-11 PROCEDURE — 84443 ASSAY THYROID STIM HORMONE: CPT | Performed by: INTERNAL MEDICINE

## 2018-07-11 PROCEDURE — 86160 COMPLEMENT ANTIGEN: CPT | Performed by: INTERNAL MEDICINE

## 2018-07-11 PROCEDURE — 25010000002 MORPHINE PER 10 MG: Performed by: HOSPITALIST

## 2018-07-11 PROCEDURE — 83520 IMMUNOASSAY QUANT NOS NONAB: CPT | Performed by: INTERNAL MEDICINE

## 2018-07-11 PROCEDURE — 63710000001 INSULIN ASPART PER 5 UNITS: Performed by: NURSE PRACTITIONER

## 2018-07-11 PROCEDURE — 86038 ANTINUCLEAR ANTIBODIES: CPT | Performed by: INTERNAL MEDICINE

## 2018-07-11 PROCEDURE — 84165 PROTEIN E-PHORESIS SERUM: CPT | Performed by: INTERNAL MEDICINE

## 2018-07-11 PROCEDURE — 80053 COMPREHEN METABOLIC PANEL: CPT | Performed by: INTERNAL MEDICINE

## 2018-07-11 PROCEDURE — 82784 ASSAY IGA/IGD/IGG/IGM EACH: CPT | Performed by: INTERNAL MEDICINE

## 2018-07-11 PROCEDURE — 80074 ACUTE HEPATITIS PANEL: CPT | Performed by: INTERNAL MEDICINE

## 2018-07-11 PROCEDURE — 71045 X-RAY EXAM CHEST 1 VIEW: CPT

## 2018-07-11 PROCEDURE — 86225 DNA ANTIBODY NATIVE: CPT | Performed by: INTERNAL MEDICINE

## 2018-07-11 PROCEDURE — 84156 ASSAY OF PROTEIN URINE: CPT | Performed by: INTERNAL MEDICINE

## 2018-07-11 PROCEDURE — 83516 IMMUNOASSAY NONANTIBODY: CPT | Performed by: INTERNAL MEDICINE

## 2018-07-11 PROCEDURE — 99231 SBSQ HOSP IP/OBS SF/LOW 25: CPT | Performed by: INTERNAL MEDICINE

## 2018-07-11 PROCEDURE — 86256 FLUORESCENT ANTIBODY TITER: CPT | Performed by: INTERNAL MEDICINE

## 2018-07-11 PROCEDURE — 25010000002 FUROSEMIDE PER 20 MG: Performed by: INTERNAL MEDICINE

## 2018-07-11 PROCEDURE — 86140 C-REACTIVE PROTEIN: CPT | Performed by: NURSE PRACTITIONER

## 2018-07-11 PROCEDURE — 25010000002 METHYLPREDNISOLONE PER 40 MG: Performed by: INTERNAL MEDICINE

## 2018-07-11 PROCEDURE — 83050 HGB METHEMOGLOBIN QUAN: CPT | Performed by: INTERNAL MEDICINE

## 2018-07-11 PROCEDURE — 25010000002 METHYLPREDNISOLONE PER 40 MG: Performed by: PHYSICIAN ASSISTANT

## 2018-07-11 PROCEDURE — 85025 COMPLETE CBC W/AUTO DIFF WBC: CPT | Performed by: INTERNAL MEDICINE

## 2018-07-11 PROCEDURE — 80061 LIPID PANEL: CPT | Performed by: INTERNAL MEDICINE

## 2018-07-11 PROCEDURE — 25010000002 MORPHINE PER 10 MG: Performed by: INTERNAL MEDICINE

## 2018-07-11 PROCEDURE — 63710000001 INSULIN ASPART PER 5 UNITS: Performed by: INTERNAL MEDICINE

## 2018-07-11 PROCEDURE — 83735 ASSAY OF MAGNESIUM: CPT | Performed by: INTERNAL MEDICINE

## 2018-07-11 PROCEDURE — 82375 ASSAY CARBOXYHB QUANT: CPT | Performed by: INTERNAL MEDICINE

## 2018-07-11 PROCEDURE — 71045 X-RAY EXAM CHEST 1 VIEW: CPT | Performed by: RADIOLOGY

## 2018-07-11 PROCEDURE — 82043 UR ALBUMIN QUANTITATIVE: CPT | Performed by: INTERNAL MEDICINE

## 2018-07-11 PROCEDURE — 82570 ASSAY OF URINE CREATININE: CPT | Performed by: INTERNAL MEDICINE

## 2018-07-11 PROCEDURE — 82962 GLUCOSE BLOOD TEST: CPT

## 2018-07-11 PROCEDURE — 83036 HEMOGLOBIN GLYCOSYLATED A1C: CPT | Performed by: INTERNAL MEDICINE

## 2018-07-11 PROCEDURE — 82533 TOTAL CORTISOL: CPT | Performed by: INTERNAL MEDICINE

## 2018-07-11 PROCEDURE — 82805 BLOOD GASES W/O2 SATURATION: CPT | Performed by: INTERNAL MEDICINE

## 2018-07-11 PROCEDURE — 86334 IMMUNOFIX E-PHORESIS SERUM: CPT | Performed by: INTERNAL MEDICINE

## 2018-07-11 PROCEDURE — 25010000002 HEPARIN (PORCINE) PER 1000 UNITS: Performed by: PHYSICIAN ASSISTANT

## 2018-07-11 PROCEDURE — 99233 SBSQ HOSP IP/OBS HIGH 50: CPT | Performed by: INTERNAL MEDICINE

## 2018-07-11 PROCEDURE — 94799 UNLISTED PULMONARY SVC/PX: CPT

## 2018-07-11 PROCEDURE — 36600 WITHDRAWAL OF ARTERIAL BLOOD: CPT | Performed by: INTERNAL MEDICINE

## 2018-07-11 PROCEDURE — 81050 URINALYSIS VOLUME MEASURE: CPT | Performed by: INTERNAL MEDICINE

## 2018-07-11 PROCEDURE — 87522 HEPATITIS C REVRS TRNSCRPJ: CPT | Performed by: INTERNAL MEDICINE

## 2018-07-11 RX ORDER — DIAPER,BRIEF,INFANT-TODD,DISP
EACH MISCELLANEOUS EVERY 12 HOURS SCHEDULED
Status: DISCONTINUED | OUTPATIENT
Start: 2018-07-11 | End: 2018-07-15 | Stop reason: HOSPADM

## 2018-07-11 RX ORDER — METHYLPREDNISOLONE SODIUM SUCCINATE 40 MG/ML
20 INJECTION, POWDER, LYOPHILIZED, FOR SOLUTION INTRAMUSCULAR; INTRAVENOUS EVERY 12 HOURS
Status: DISCONTINUED | OUTPATIENT
Start: 2018-07-11 | End: 2018-07-12

## 2018-07-11 RX ORDER — FUROSEMIDE 10 MG/ML
20 INJECTION INTRAMUSCULAR; INTRAVENOUS ONCE
Status: COMPLETED | OUTPATIENT
Start: 2018-07-11 | End: 2018-07-11

## 2018-07-11 RX ORDER — DOCUSATE SODIUM 100 MG/1
100 CAPSULE, LIQUID FILLED ORAL 2 TIMES DAILY
Status: DISCONTINUED | OUTPATIENT
Start: 2018-07-11 | End: 2018-07-15 | Stop reason: HOSPADM

## 2018-07-11 RX ORDER — CLONIDINE HYDROCHLORIDE 0.1 MG/1
0.1 TABLET ORAL EVERY 8 HOURS PRN
Status: DISCONTINUED | OUTPATIENT
Start: 2018-07-11 | End: 2018-07-15 | Stop reason: HOSPADM

## 2018-07-11 RX ORDER — MORPHINE SULFATE 2 MG/ML
2 INJECTION, SOLUTION INTRAMUSCULAR; INTRAVENOUS
Status: DISCONTINUED | OUTPATIENT
Start: 2018-07-11 | End: 2018-07-14

## 2018-07-11 RX ORDER — INSULIN ASPART 100 [IU]/ML
20 INJECTION, SUSPENSION SUBCUTANEOUS 2 TIMES DAILY WITH MEALS
Status: DISCONTINUED | OUTPATIENT
Start: 2018-07-11 | End: 2018-07-12

## 2018-07-11 RX ORDER — AMLODIPINE BESYLATE 5 MG/1
5 TABLET ORAL
Status: DISCONTINUED | OUTPATIENT
Start: 2018-07-11 | End: 2018-07-12

## 2018-07-11 RX ORDER — LACTULOSE 10 G/15ML
30 SOLUTION ORAL ONCE
Status: COMPLETED | OUTPATIENT
Start: 2018-07-11 | End: 2018-07-11

## 2018-07-11 RX ADMIN — GABAPENTIN 600 MG: 300 CAPSULE ORAL at 21:29

## 2018-07-11 RX ADMIN — ASPIRIN 325 MG: 325 TABLET ORAL at 09:12

## 2018-07-11 RX ADMIN — Medication 3 ML: at 05:45

## 2018-07-11 RX ADMIN — LISINOPRIL 40 MG: 10 TABLET ORAL at 09:12

## 2018-07-11 RX ADMIN — FLUTICASONE PROPIONATE 1 SPRAY: 50 SPRAY, METERED NASAL at 21:28

## 2018-07-11 RX ADMIN — DONEPEZIL HYDROCHLORIDE 10 MG: 5 TABLET, FILM COATED ORAL at 21:28

## 2018-07-11 RX ADMIN — CETIRIZINE HCL 10 MG: 10 TABLET ORAL at 09:13

## 2018-07-11 RX ADMIN — FUROSEMIDE 20 MG: 10 INJECTION, SOLUTION INTRAMUSCULAR; INTRAVENOUS at 09:13

## 2018-07-11 RX ADMIN — GABAPENTIN 600 MG: 300 CAPSULE ORAL at 13:27

## 2018-07-11 RX ADMIN — CLONIDINE HYDROCHLORIDE 0.1 MG: 0.1 TABLET ORAL at 18:05

## 2018-07-11 RX ADMIN — BUDESONIDE AND FORMOTEROL FUMARATE DIHYDRATE 2 PUFF: 160; 4.5 AEROSOL RESPIRATORY (INHALATION) at 06:23

## 2018-07-11 RX ADMIN — DOXYCYCLINE 100 MG: 100 CAPSULE ORAL at 09:12

## 2018-07-11 RX ADMIN — METHYLPREDNISOLONE SODIUM SUCCINATE 20 MG: 40 INJECTION, POWDER, FOR SOLUTION INTRAMUSCULAR; INTRAVENOUS at 17:07

## 2018-07-11 RX ADMIN — METHYLPREDNISOLONE SODIUM SUCCINATE 40 MG: 40 INJECTION, POWDER, FOR SOLUTION INTRAMUSCULAR; INTRAVENOUS at 05:44

## 2018-07-11 RX ADMIN — IPRATROPIUM BROMIDE AND ALBUTEROL SULFATE 3 ML: .5; 3 SOLUTION RESPIRATORY (INHALATION) at 06:23

## 2018-07-11 RX ADMIN — DOXYCYCLINE 100 MG: 100 CAPSULE ORAL at 21:29

## 2018-07-11 RX ADMIN — ATORVASTATIN CALCIUM 40 MG: 40 TABLET, FILM COATED ORAL at 09:12

## 2018-07-11 RX ADMIN — DOCUSATE SODIUM 100 MG: 100 CAPSULE, LIQUID FILLED ORAL at 21:28

## 2018-07-11 RX ADMIN — BUDESONIDE AND FORMOTEROL FUMARATE DIHYDRATE 2 PUFF: 160; 4.5 AEROSOL RESPIRATORY (INHALATION) at 18:51

## 2018-07-11 RX ADMIN — CEFDINIR 300 MG: 300 CAPSULE ORAL at 09:12

## 2018-07-11 RX ADMIN — IPRATROPIUM BROMIDE AND ALBUTEROL SULFATE 3 ML: .5; 3 SOLUTION RESPIRATORY (INHALATION) at 01:09

## 2018-07-11 RX ADMIN — Medication 3 ML: at 21:29

## 2018-07-11 RX ADMIN — INSULIN ASPART 6 UNITS: 100 INJECTION, SOLUTION INTRAVENOUS; SUBCUTANEOUS at 09:28

## 2018-07-11 RX ADMIN — MORPHINE SULFATE 2 MG: 2 INJECTION, SOLUTION INTRAMUSCULAR; INTRAVENOUS at 19:52

## 2018-07-11 RX ADMIN — MORPHINE SULFATE 2 MG: 2 INJECTION, SOLUTION INTRAMUSCULAR; INTRAVENOUS at 17:09

## 2018-07-11 RX ADMIN — GABAPENTIN 600 MG: 300 CAPSULE ORAL at 05:44

## 2018-07-11 RX ADMIN — BACITRACIN ZINC: 500 OINTMENT TOPICAL at 13:27

## 2018-07-11 RX ADMIN — INSULIN ASPART 4 UNITS: 100 INJECTION, SOLUTION INTRAVENOUS; SUBCUTANEOUS at 11:20

## 2018-07-11 RX ADMIN — BACITRACIN ZINC: 500 OINTMENT TOPICAL at 21:28

## 2018-07-11 RX ADMIN — Medication 3 ML: at 13:27

## 2018-07-11 RX ADMIN — LACTULOSE 30 G: 20 SOLUTION ORAL at 17:07

## 2018-07-11 RX ADMIN — TAMSULOSIN HYDROCHLORIDE 0.4 MG: 0.4 CAPSULE ORAL at 17:31

## 2018-07-11 RX ADMIN — MORPHINE SULFATE 2 MG: 2 INJECTION, SOLUTION INTRAMUSCULAR; INTRAVENOUS at 22:59

## 2018-07-11 RX ADMIN — MORPHINE SULFATE 1 MG: 2 INJECTION, SOLUTION INTRAMUSCULAR; INTRAVENOUS at 01:52

## 2018-07-11 RX ADMIN — HEPARIN SODIUM 5000 UNITS: 5000 INJECTION, SOLUTION INTRAVENOUS; SUBCUTANEOUS at 21:29

## 2018-07-11 RX ADMIN — Medication 1 CAPSULE: at 09:12

## 2018-07-11 RX ADMIN — INSULIN ASPART 6 UNITS: 100 INJECTION, SOLUTION INTRAVENOUS; SUBCUTANEOUS at 21:28

## 2018-07-11 RX ADMIN — IPRATROPIUM BROMIDE AND ALBUTEROL SULFATE 3 ML: .5; 3 SOLUTION RESPIRATORY (INHALATION) at 18:51

## 2018-07-11 RX ADMIN — CEFDINIR 300 MG: 300 CAPSULE ORAL at 21:29

## 2018-07-11 RX ADMIN — MORPHINE SULFATE 2 MG: 2 INJECTION, SOLUTION INTRAMUSCULAR; INTRAVENOUS at 13:27

## 2018-07-11 RX ADMIN — AMLODIPINE BESYLATE 5 MG: 5 TABLET ORAL at 11:17

## 2018-07-11 RX ADMIN — IPRATROPIUM BROMIDE AND ALBUTEROL SULFATE 3 ML: .5; 3 SOLUTION RESPIRATORY (INHALATION) at 12:33

## 2018-07-11 RX ADMIN — INSULIN ASPART 20 UNITS: 100 INJECTION, SUSPENSION SUBCUTANEOUS at 17:07

## 2018-07-11 RX ADMIN — HEPARIN SODIUM 5000 UNITS: 5000 INJECTION, SOLUTION INTRAVENOUS; SUBCUTANEOUS at 09:13

## 2018-07-11 RX ADMIN — INSULIN ASPART 4 UNITS: 100 INJECTION, SOLUTION INTRAVENOUS; SUBCUTANEOUS at 17:33

## 2018-07-11 RX ADMIN — MORPHINE SULFATE 1 MG: 2 INJECTION, SOLUTION INTRAMUSCULAR; INTRAVENOUS at 09:25

## 2018-07-11 NOTE — PLAN OF CARE
Problem: Fall Risk (Adult)  Goal: Identify Related Risk Factors and Signs and Symptoms  Outcome: Ongoing (interventions implemented as appropriate)      Problem: Patient Care Overview  Goal: Plan of Care Review  Outcome: Ongoing (interventions implemented as appropriate)      Problem: Skin Injury Risk (Adult)  Goal: Identify Related Risk Factors and Signs and Symptoms  Outcome: Ongoing (interventions implemented as appropriate)      Problem: Breathing Pattern Ineffective (Adult)  Goal: Identify Related Risk Factors and Signs and Symptoms  Outcome: Ongoing (interventions implemented as appropriate)

## 2018-07-11 NOTE — PROGRESS NOTES
Nephrology Note    Subjective       He is on HFNC. Denies chest pain or SOB. He has type 2 DM for around 20 yrs with possible retinopathy.  He complains of constipation    Objective     Vital Signs  Temp:  [98 °F (36.7 °C)-98.4 °F (36.9 °C)] 98.4 °F (36.9 °C)  Heart Rate:  [57-92] 72  Resp:  [12-24] 22  BP: ()/() 161/66    I/O this shift:  In: 250 [P.O.:250]  Out: 300 [Urine:300]  I/O last 3 completed shifts:  In: 1640 [P.O.:540; IV Piggyback:1100]  Out: 2450 [Urine:2450]    Physical Examination:    General Appearance : alert, appears stated age, cooperative   Head : normocephalic, without obvious abnormality and atraumatic  Eyes : conjunctivae and sclerae normal, no icterus, no pallor and PERRLA  Throat : oral mucosa moist  Neck: no JVD  Lungs :reduced air entry  b/l  Heart : regular rhythm & normal rate, normal S1, S2, no murmur, no gillian, no rub   Abdomen :  normal bowel sounds, no masses and soft non-tender  Rectal : Deferred  Extremities : 1+  Edema  Pulses :  palpable and equal bilaterally  Skin  RLE venous ulcers, venous stasis both legs  Neurologic : orientated to person, place, time, grossly no focal deficitis    Laboratory Data :      WBC WBC   Date Value Ref Range Status   07/11/2018 9.42 4.50 - 12.50 10*3/mm3 Final   07/10/2018 7.39 4.50 - 12.50 10*3/mm3 Final   07/09/2018 8.19 4.50 - 12.50 10*3/mm3 Final      HGB Hemoglobin   Date Value Ref Range Status   07/11/2018 15.9 14.0 - 18.0 g/dL Final   07/10/2018 15.9 14.0 - 18.0 g/dL Final   07/09/2018 17.2 14.0 - 18.0 g/dL Final      HCT Hematocrit   Date Value Ref Range Status   07/11/2018 48.8 42.0 - 52.0 % Final   07/10/2018 48.3 42.0 - 52.0 % Final   07/09/2018 51.0 42.0 - 52.0 % Final      Platlets No results found for: LABPLAT   MCV MCV   Date Value Ref Range Status   07/11/2018 93.3 80.0 - 94.0 fL Final   07/10/2018 94.0 80.0 - 94.0 fL Final   07/09/2018 93.8 80.0 - 94.0 fL Final          Sodium Sodium   Date Value Ref Range Status    07/11/2018 130 (L) 135 - 153 mmol/L Final   07/11/2018 130 (L) 135 - 153 mmol/L Final   07/10/2018 127 (L) 135 - 153 mmol/L Final   07/09/2018 130 (L) 135 - 153 mmol/L Final      Potassium Potassium   Date Value Ref Range Status   07/11/2018 5.3 3.5 - 5.3 mmol/L Final   07/11/2018 5.2 3.5 - 5.3 mmol/L Final   07/10/2018 5.0 3.5 - 5.3 mmol/L Final   07/10/2018 5.8 (C) 3.5 - 5.3 mmol/L Final   07/09/2018 4.9 3.5 - 5.3 mmol/L Final      Chloride Chloride   Date Value Ref Range Status   07/11/2018 91 (L) 99 - 112 mmol/L Final   07/11/2018 92 (L) 99 - 112 mmol/L Final   07/10/2018 90 (L) 99 - 112 mmol/L Final   07/09/2018 89 (L) 99 - 112 mmol/L Final      CO2 CO2   Date Value Ref Range Status   07/11/2018 35.1 (H) 24.3 - 31.9 mmol/L Final   07/11/2018 34.8 (H) 24.3 - 31.9 mmol/L Final   07/10/2018 36.4 (H) 24.3 - 31.9 mmol/L Final   07/09/2018 38.2 (H) 24.3 - 31.9 mmol/L Final      BUN BUN   Date Value Ref Range Status   07/11/2018 29 (H) 7 - 21 mg/dL Final   07/11/2018 26 (H) 7 - 21 mg/dL Final   07/10/2018 21 7 - 21 mg/dL Final   07/09/2018 16 7 - 21 mg/dL Final      Creatinine Creatinine   Date Value Ref Range Status   07/11/2018 1.00 0.43 - 1.29 mg/dL Final   07/11/2018 0.96 0.43 - 1.29 mg/dL Final   07/10/2018 1.16 0.43 - 1.29 mg/dL Final   07/09/2018 1.00 0.43 - 1.29 mg/dL Final      Calcium Calcium   Date Value Ref Range Status   07/11/2018 8.5 7.7 - 10.0 mg/dL Final   07/11/2018 8.5 7.7 - 10.0 mg/dL Final   07/10/2018 8.6 7.7 - 10.0 mg/dL Final   07/09/2018 9.1 7.7 - 10.0 mg/dL Final      PO4 No results found for: CAPO4   Albumin Albumin   Date Value Ref Range Status   07/11/2018 3.10 (L) 3.40 - 4.80 g/dL Final   07/09/2018 3.60 3.40 - 4.80 g/dL Final      Magnesium Magnesium   Date Value Ref Range Status   07/11/2018 2.3 1.7 - 2.6 mg/dL Final   07/10/2018 2.2 1.7 - 2.6 mg/dL Final      Uric Acid No results found for: URICACID     Radiology results :   Imaging Results (last 24 hours)     Procedure Component  Value Units Date/Time    XR Chest 1 View [[id number]] Collected:  07/11/18 1119     Updated:  07/11/18 1121    Narrative:       XR CHEST 1 VW-     CLINICAL INDICATION: sob; J96.01-Acute respiratory failure with hypoxia;  J96.02-Acute respiratory failure with hypercapnia; I21.4-Non-ST  elevation (NSTEMI) myocardial infarction; L03.115-Cellulitis of right  lower limb          COMPARISON: 07/09/2018      TECHNIQUE: Single frontal view of the chest.     FINDINGS:     Trace bibasilar effusions and bibasilar airspace disease, probably  atelectasis, but cannot exclude pneumonia  The cardiac silhouette is normal. The pulmonary vasculature is  unremarkable.  There is no evidence of an acute osseous abnormality.   There are no suspicious-appearing parenchymal soft tissue nodules.            Impression:       Trace bibasilar effusions and bibasilar consolidation         This report was finalized on 7/11/2018 11:19 AM by Dr. Jose Luis Romero MD.                   Medications:        amLODIPine 5 mg Oral Q24H   aspirin 325 mg Oral Daily   atorvastatin 40 mg Oral Daily   bacitracin  Topical Q12H   budesonide-formoterol 2 puff Inhalation BID - RT   cefdinir 300 mg Oral Q12H   cetirizine 10 mg Oral Daily   docusate sodium 100 mg Oral BID   donepezil 10 mg Oral Nightly   doxycycline 100 mg Oral Q12H   fluticasone 1 spray Each Nare BID   gabapentin 600 mg Oral Q8H   heparin (porcine) 5,000 Units Subcutaneous Q12H   insulin aspart 0-9 Units Subcutaneous 4x Daily AC & at Bedtime   insulin aspart prot-insulin aspart 20 Units Subcutaneous BID With Meals   ipratropium-albuterol 3 mL Nebulization 4x Daily - RT   lactobacillus acidophilus 1 capsule Oral Daily   lactulose 30 g Oral Once   Liraglutide 1.8 mg Subcutaneous Daily   lisinopril 40 mg Oral Daily   methylPREDNISolone sodium succinate 20 mg Intravenous Q12H   nicotine 1 patch Transdermal Q24H   sodium chloride 3 mL Intravenous Q8H   tamsulosin 0.4 mg Oral Q PM       Pharmacy Consult         Assessment/Plan     Principal Problem:    Venous stasis dermatitis of both lower extremities  Active Problems:    Acute respiratory failure with hypoxia and hypercapnia (CMS/HCC)      1.  Nephrotic range proteinuria : Patient creatinine is 1.0 ., repeat urine Protein/creatinine is 3.8 gm/gm  He has diabetes mellitus for 20 years so likely diabetic nephropathy . Complements are normal, HEp C is + so milton cehck HCV RNA. Rest serologies pending. Continue lisinopril    2.  Leg edema:  Agree with lasix    3.  Hypertension : uncontrolled. Noted addition of norvasc. Will monitor    4. Dilutional hyponatremia : Na is 130 today, continue fluid restrict  1200 cc/day. TSH and cortisol normal    5.  Diabetes mellitus type 2    6.  Leg ulcer :  on antibiotic     7. Constipation : will give lactulose and add colace    8. Borderline high potassium will monitor with lisinopril      I discussed the patient's findings and my recommendations with patient and nursing staff, Dr Jarred Padilla MD  07/11/18  3:07 PM

## 2018-07-11 NOTE — PLAN OF CARE
Problem: Fall Risk (Adult)  Goal: Identify Related Risk Factors and Signs and Symptoms  Outcome: Ongoing (interventions implemented as appropriate)    Goal: Absence of Fall  Outcome: Ongoing (interventions implemented as appropriate)      Problem: Patient Care Overview  Goal: Individualization and Mutuality  Outcome: Ongoing (interventions implemented as appropriate)    Goal: Discharge Needs Assessment  Outcome: Ongoing (interventions implemented as appropriate)    Goal: Interprofessional Rounds/Family Conf  Outcome: Ongoing (interventions implemented as appropriate)      Problem: Skin Injury Risk (Adult)  Goal: Identify Related Risk Factors and Signs and Symptoms  Outcome: Ongoing (interventions implemented as appropriate)    Goal: Skin Health and Integrity  Outcome: Ongoing (interventions implemented as appropriate)      Problem: Breathing Pattern Ineffective (Adult)  Goal: Identify Related Risk Factors and Signs and Symptoms  Outcome: Ongoing (interventions implemented as appropriate)    Goal: Effective Oxygenation/Ventilation  Outcome: Ongoing (interventions implemented as appropriate)    Goal: Anxiety/Fear Reduction  Outcome: Ongoing (interventions implemented as appropriate)

## 2018-07-11 NOTE — PROGRESS NOTES
"     LOS: 2 days     Name: Heath Bajwa  Age/Sex: 68 y.o. male  :  1950        PCP: No Known Provider  REF: No ref. provider found    Principal Problem:    Venous stasis dermatitis of both lower extremities  Active Problems:    Acute respiratory failure with hypoxia and hypercapnia (CMS/HCC)      Reason for follow-up: Elevated troponin    Subjective  Mr. Bajwa is a pleasant 68-year-old  male with no history of known coronary artery disease, has long history of smoking of one to 2 packs a day for about 25 years with underlying significant COPD, was admitted with complaints of increasing shortness of breath for the last 2-3 days and was noted to be having acute exacerbation of COPD with acute hypoxic/hypercarbic respiratory failure.  Since admission his troponins have been elevated in the indeterminate range and have been trending down since.  It peaked at 0.303 at admission and has been trending down.  On further questioning Mr. Bajwa denies any compressive chest pain or discomfort in the recent or remote past.  He denies any history of known coronary artery disease.  His main complaint is having shortness of breath.  He does complain of occasional \"gas-like pains\".  He has been on aspirin 325 mg daily and atorvastatin 20 mg daily.  His EKG revealed normal sinus rhythm with poor R wave progression across leads V1 through V4.  His echo Doppler study which I reviewed at the bedside while it was being done revealed normal LV wall motion and systolic function with an estimated ejection fraction of about 60-65%.  There was mild mitral regurgitation with evidence of mild to moderate pulmonary hypertension.      Interval History: Patient denies any complaints of chest pains.  His breathing seems to have improved significantly the last 24 hours.    ROS    Vital Signs  Temp:  [98 °F (36.7 °C)-98.4 °F (36.9 °C)] 98.3 °F (36.8 °C)  Heart Rate:  [57-96] 76  Resp:  [12-24] 22  BP: ()/() " 187/86  Vital Signs (last 72 hrs)       07/08 0700  -  07/09 0659 07/09 0700  -  07/10 0659 07/10 0700  -  07/11 0659 07/11 0700  -  07/11 0936   Most Recent    Temp (°F)   96.8 -  98.7    98 -  98.4      98.3     98.3 (36.8)    Heart Rate   65 -  108    57 -  97    69 -  78     76    Resp   16 -  (!)30    12 -  24      22     22    BP   97/62 -  171/92    94/55 -  176/98    174/79 -  (!) 187/86     (!) 187/86    SpO2 (%)   (!)86 -  99    (!)86 -  97    94 -  95     94        Body mass index is 34.34 kg/m².    Intake/Output Summary (Last 24 hours) at 07/11/18 0936  Last data filed at 07/11/18 0803   Gross per 24 hour   Intake             1160 ml   Output             1400 ml   Net             -240 ml     Objective        Physical Exam:     General Appearance:    Alert, cooperative, in no acute distress   Head:    Normocephalic, without obvious abnormality, atraumatic   Eyes:            Conjunctivae and sclerae normal, no   icterus, no pallor, corneas clear.   Neck:   No adenopathy, supple, trachea midline, no thyromegaly, no   carotid bruit, no JVD   Lungs:     Clear to auscultation,respirations regular, even and                  unlabored    Heart:    Regular rhythm and normal rate, normal S1 and S2, no            murmur, no gallop, no rub, no click   Chest Wall:    No abnormalities observed   Abdomen:     Normal bowel sounds, no masses, no organomegaly, soft        non-tender, non-distended, no guarding, no rebound                tenderness   Extremities:   Moves all extremities well, the erythema and edema on the lower extremities is also better, no cyanosis.   Pulses: Decreased pulses in both feet.              Procedures    Results Review:     Results from last 7 days  Lab Units 07/11/18  0106 07/10/18  0545 07/09/18  0953   WBC 10*3/mm3 9.42 7.39 8.19   HEMOGLOBIN g/dL 15.9 15.9 17.2   PLATELETS 10*3/mm3 214 224 197       Results from last 7 days  Lab Units 07/11/18  0715 07/11/18  0106 07/10/18  1151  07/10/18  0545 07/09/18  0953   SODIUM mmol/L 130* 130*  --  127* 130*   POTASSIUM mmol/L 5.3 5.2 5.0 5.8* 4.9   CHLORIDE mmol/L 91* 92*  --  90* 89*   CO2 mmol/L 35.1* 34.8*  --  36.4* 38.2*   BUN mg/dL 29* 26*  --  21 16   CREATININE mg/dL 1.00 0.96  --  1.16 1.00   CALCIUM mg/dL 8.5 8.5  --  8.6 9.1   GLUCOSE mg/dL 193* 139*  --  321* 149*   ALT (SGPT) U/L  --  18  --   --  22   AST (SGOT) U/L  --  22  --   --  21       Results from last 7 days  Lab Units 07/10/18  0545 07/10/18  0026 07/09/18  1737 07/09/18  1153 07/09/18  0953   CK TOTAL U/L 165  165 153 130  130  --  139   TROPONIN I ng/mL 0.144* 0.126* 0.184* 0.275* 0.303*   CKMB ng/mL 4.67 5.92* 4.89  --  6.13*     Lab Results   Component Value Date    INR 1.03 07/09/2018     Lab Results   Component Value Date    MG 2.3 07/11/2018    MG 2.2 07/10/2018     Lab Results   Component Value Date    TSH 2.186 07/11/2018    PSA 3.4 04/05/2017    CHLPL 107 04/11/2015    TRIG 69 07/11/2018    HDL 50 (L) 07/11/2018    LDL 88 07/11/2018      Lab Results   Component Value Date    BNP 69.0 07/09/2018    BNP 12 01/16/2016    BNP 34 04/10/2015         ECG      Echo   Interpretation Summary     · Normal left ventricular cavity size and wall thickness noted. All left ventricular wall segments contract normally.  · Estimated EF appears to be in the range of 61 - 65%.  · The aortic valve is structurally normal. No aortic valve regurgitation is present. No aortic valve stenosis is present.  · The mitral valve is normal in structure. No mitral valve regurgitation is present. No significant mitral valve stenosis is present.  · The tricuspid valve is normal. No tricuspid valve stenosis is present. Mild tricuspid valve regurgitation is present. Estimated right ventricular systolic pressure from tricuspid regurgitation is mildly elevated (35-45 mmHg).  · Mild pulmonary hypertension is present.  · There is no evidence of pericardial effusion.           I reviewed the patient's new  clinical results.    Telemetry:Sinus rhythm in the 70s.       Medication Review:     aspirin 325 mg Oral Daily   atorvastatin 40 mg Oral Daily   budesonide-formoterol 2 puff Inhalation BID - RT   cefdinir 300 mg Oral Q12H   cetirizine 10 mg Oral Daily   donepezil 10 mg Oral Nightly   doxycycline 100 mg Oral Q12H   fluticasone 1 spray Each Nare BID   gabapentin 600 mg Oral Q8H   heparin (porcine) 5,000 Units Subcutaneous Q12H   insulin aspart 0-9 Units Subcutaneous 4x Daily AC & at Bedtime   insulin aspart prot-insulin aspart 20 Units Subcutaneous BID With Meals   ipratropium-albuterol 3 mL Nebulization 4x Daily - RT   lactobacillus acidophilus 1 capsule Oral Daily   Liraglutide 1.8 mg Subcutaneous Daily   lisinopril 40 mg Oral Daily   methylPREDNISolone sodium succinate 20 mg Intravenous Q12H   nicotine 1 patch Transdermal Q24H   sodium chloride 3 mL Intravenous Q8H   tamsulosin 0.4 mg Oral Q PM         Pharmacy Consult        Assessment:  1. Indeterminate range troponin elevation (possible small acute non-ST elevation myocardial infarction) which is probably due to demand ischemia from acute hypoxic respiratory failure, cannot exclude underlying significant coronary artery disease given his age and risk factors for coronary artery disease.  2. Uncontrolled hypertension.  3. Acute hypoxic/hypercapnic respiratory failure due to acute exacerbation of COPD, being managed by the hospitalist service.  4. Cellulitis of the lower extremities, being managed by the hospitalist service.  5. Tobacco abuse.  6. Peripheral arterial disease of both lower extremities with ulcers on the right leg.     Recommendations:  1. Continue with aspirin and atorvastatin at current doses.  2. Add amlodipine for his uncontrolled hypertension.  3. Will consider evaluation for any underlying myocardial ischemia with dobutamine sestamibi study in a.m.    I discussed the patients findings and my recommendations with patient.        Jose BOOTH  MD [facility]  07/11/18  9:36 AM    Dragon disclaimer:  Much of this encounter note is an electronic transcription/translation of spoken language to printed text. The electronic translation of spoken language may permit erroneous, or at times, nonsensical words or phrases to be inadvertently transcribed; Although I have reviewed the note for such errors, some may still exist.

## 2018-07-11 NOTE — NURSING NOTE
[facility] ADMISSION NOTE:  Patient enrolled in the [facility] program to assist with education and support during transition home from hospital. [facility] home  will see patient at home within 48 hours of discharge and will follow up with patient in home and telephonically for 6 weeks post discharge under the [place] Model.    Clinical Assessment Instrument Scores:  Short Portable Mental Status Questionnaire- 7  Geriatric Depression Scale-6  Instrumental Activities of Daily Living-2  MADRID Activities of Daily Living-5  Overall Quality of Life- 2  Subjective Health Rating- 2  Symptom Bother Scale-23  Generalized Anxiety Scale-10  Health Literacy Test- 4

## 2018-07-11 NOTE — PROGRESS NOTES
Discharge Planning Assessment  JACKY Valdez     Patient Name: Heath Bajwa  MRN: [id number]  Today's Date: 7/11/2018    Admit Date: 7/9/2018    Discharge Plan     Row Name 07/11/18 1526       Plan    Plan Pt lives alone at [facility] and will return at discharge. Pt utilizes [facility]. Pt has home oxygen, a nebulizer, and an electric wheelchair via unknown provider. SS will continue to follow and assist as needed with discharge planning.     Patient/Family in Agreement with Plan yes     Bridget Soliman

## 2018-07-11 NOTE — PROGRESS NOTES
"  I have personally seen and examined the patient today and discussed overnight interval progress and pertinent issues with nursing staff.    Subjective:    Patient sitting up in chair currently eating breakfast.  Patient on high flow nasal cannula with no apparent distress.  No fever or diarrhea reported.  WBC normal.  Improving CRP of 1.04.  Patient tolerating oral antibiotic therapy well.      History taken from: patient chart RN      Vital Signs    BP (!) 187/86   Pulse 76   Temp 98.3 °F (36.8 °C) (Oral)   Resp 22   Ht 182.9 cm (72\")   Wt 115 kg (253 lb 3.2 oz)   SpO2 94%   BMI 34.34 kg/m²     Temp:  [98 °F (36.7 °C)-98.4 °F (36.9 °C)] 98.3 °F (36.8 °C)      Intake/Output Summary (Last 24 hours) at 07/11/18 0951  Last data filed at 07/11/18 0803   Gross per 24 hour   Intake             1160 ml   Output             1400 ml   Net             -240 ml     Intake & Output (last 3 days)       07/08 0701 - 07/09 0700 07/09 0701 - 07/10 0700 07/10 0701 - 07/11 0700 07/11 0701 - 07/12 0700    P.O.  200 540 120    I.V. (mL/kg)  600 (5.2)      IV Piggyback  600 500     Total Intake(mL/kg)  1400 (12.2) 1040 (9) 120 (1)    Urine (mL/kg/hr)  1450 1600 (0.6)     Total Output   1450 1600      Net   -50 -560 +120                  Physical Exam:                 General Appearance:    Alert, cooperative, in no acute distress, poor hygiene    Head:    Normocephalic, without obvious abnormality, atraumatic   Eyes:            Lids and lashes normal, conjunctivae and sclerae normal, no icterus, no pallor, corneas clear, PERRLA   Ears:    Ears appear intact with no abnormalities noted   Throat:   No oral lesions, no thrush, oral mucosa moist   Neck:   No adenopathy, supple, trachea midline, no thyromegaly, no   carotid bruit, no JVD   Back:     No tenderness to percussion or palpation, range of motion   normal   Lungs:     Decreased at the bases,respirations regular, even and unlabored. No wheezing, no ronchi and no crackles.    " Heart:    Regular rhythm and normal rate, normal S1 and S2, no            murmur, no gallop, no rub, no click   Chest Wall:    No abnormalities observed   Abdomen:     Normal bowel sounds, no masses, no organomegaly, soft   non-tender, non-distended, no guarding, no rebound tenderness   Rectal:     Deferred   Extremities:  Bilateral lower extremity venous stasis dermatitis with foul odor and drainage, associated redness and warmth.  Right worse than left.    Pulses:   Pulses palpable and equal bilaterally   Skin:   Bilateral lower extremity venous stasis dermatitis with foul odor and drainage, associated redness and warmth.  Right worse than left.   Lymph nodes:   No palpable adenopathy   Neurologic:   Awake, alert and oriented x 3. Following commands.       Results:      Results from last 7 days  Lab Units 07/11/18  0106 07/10/18  0545 07/09/18  0953   WBC 10*3/mm3 9.42 7.39 8.19     Lab Results   Component Value Date    NEUTROABS 8.72 (H) 07/11/2018         Results from last 7 days  Lab Units 07/11/18  0715   CREATININE mg/dL 1.00         Results from last 7 days  Lab Units 07/11/18  0106 07/09/18  0953   CRP mg/dL 1.04* 1.72*       Imaging Results (last 24 hours)     Procedure Component Value Units Date/Time    US Arterial Doppler Lower Extremity Left [[id number]] Collected:  07/10/18 1339     Updated:  07/10/18 1351    Narrative:       US ARTERIAL DOPPLER LOWER EXTREMITY LEFT-     REASON FOR EXAM:  PVD; J96.01-Acute respiratory failure with hypoxia;  J96.02-Acute respiratory failure with hypercapnia; I21.4-Non-ST  elevation (NSTEMI) myocardial infarction; L03.115-Cellulitis of right  lower limb. Decreased pulses on physical exam in the lower extremities.     TECHNIQUE:  Multiple real-time color doppler images were obtained.  M-mode Doppler was used for velocity determination and spectral pattern.  Stenosis was evaluated using the NASCET or similar measurement  technique.     FINDINGS:   Left leg: The external  iliac artery was normal in caliber. The Doppler  wave pattern was monophasic with peak systolic velocity of 140 cm/s.  There was monophasic flow in the common femoral artery with a velocity  of 319 cm/s. There is some calcific plaque in the common femoral artery.  Color Doppler flow is seen in the profunda femoral and superficial  femoral arteries in the leg. The Doppler wave pattern is monophasic in  the thigh. No occluded segments were demonstrated. The popliteal artery  has a monophasic flow pattern and a velocity of 48 cm/s. There was  monophasic flow in the posterior tibial artery, the velocity measured 37  cm/s.       Impression:       Atherosclerotic plaquing is noted predominantly at the level  of the common femoral artery where there is velocity elevation. The  Doppler wave pattern throughout the leg was monophasic suggesting  possibility of more proximal disease above the external iliac artery. In  the runoff vessels no areas of occlusion were demonstrated.             This report was finalized on 7/10/2018 1:48 PM by Dr. Jose Ramon Hatch II, MD.               Results Review:    I have personally reviewed laboratory data, culture results, radiology studies and antimicrobial therapy.    Hospital Medications (active)       Dose Frequency Start End    albuterol (PROVENTIL) nebulizer solution 0.083% 2.5 mg/3mL 2.5 mg Every 6 Hours PRN 7/10/2018     Sig - Route: Take 2.5 mg by nebulization Every 6 (Six) Hours As Needed for Wheezing or Shortness of Air. - Nebulization    amLODIPine (NORVASC) tablet 5 mg 5 mg Every 24 Hours Scheduled 7/11/2018     Sig - Route: Take 1 tablet by mouth Daily. - Oral    aspirin tablet 325 mg 325 mg Daily 7/10/2018     Sig - Route: Take 1 tablet by mouth Daily. - Oral    atorvastatin (LIPITOR) tablet 40 mg 40 mg Daily 7/11/2018     Sig - Route: Take 1 tablet by mouth Daily. - Oral    budesonide-formoterol (SYMBICORT) 160-4.5 MCG/ACT inhaler 2 puff 2 puff 2 Times Daily - RT 7/10/2018      Sig - Route: Inhale 2 puffs 2 (Two) Times a Day. - Inhalation    cefdinir (OMNICEF) capsule 300 mg 300 mg Every 12 Hours Scheduled 7/10/2018 7/24/2018    Sig - Route: Take 1 capsule by mouth Every 12 (Twelve) Hours. - Oral    cetirizine (zyrTEC) tablet 10 mg 10 mg Daily 7/10/2018     Sig - Route: Take 1 tablet by mouth Daily. - Oral    dextrose (D50W) solution 25 g 25 g Every 15 Minutes PRN 7/9/2018     Sig - Route: Infuse 50 mL into a venous catheter Every 15 (Fifteen) Minutes As Needed for Low Blood Sugar (Blood Sugar Less Than 70). - Intravenous    Cosign for Ordering: Accepted by Julian Stern MD on 7/9/2018  5:29 PM    dextrose (D50W) solution 25 g 25 g Every 15 Minutes PRN 7/9/2018     Sig - Route: Infuse 50 mL into a venous catheter Every 15 (Fifteen) Minutes As Needed for Low Blood Sugar (Blood Sugar Less Than 70). - Intravenous    dextrose (GLUTOSE) oral gel 15 g 15 g Every 15 Minutes PRN 7/9/2018     Sig - Route: Take 15 g by mouth Every 15 (Fifteen) Minutes As Needed for Low Blood Sugar (Blood sugar less than 70). - Oral    Cosign for Ordering: Accepted by Julian Stern MD on 7/9/2018  5:29 PM    dextrose (GLUTOSE) oral gel 15 g 15 g Every 15 Minutes PRN 7/9/2018     Sig - Route: Take 15 g by mouth Every 15 (Fifteen) Minutes As Needed for Low Blood Sugar (Blood sugar less than 70). - Oral    donepezil (ARICEPT) tablet 10 mg 10 mg Nightly 7/10/2018     Sig - Route: Take 2 tablets by mouth Every Night. - Oral    doxycycline (MONODOX) capsule 100 mg 100 mg Every 12 Hours Scheduled 7/10/2018 7/24/2018    Sig - Route: Take 1 capsule by mouth Every 12 (Twelve) Hours. - Oral    fluticasone (FLONASE) 50 MCG/ACT nasal spray 1 spray 1 spray 2 Times Daily 7/10/2018     Sig - Route: 1 spray by Each Nare route 2 (Two) Times a Day. - Each Nare    furosemide (LASIX) injection 20 mg 20 mg Once 7/11/2018 7/11/2018    Sig - Route: Infuse 2 mL into a venous catheter 1 (One) Time. - Intravenous    gabapentin (NEURONTIN)  capsule 600 mg 600 mg Every 8 Hours Scheduled 7/10/2018     Sig - Route: Take 2 capsules by mouth Every 8 (Eight) Hours. - Oral    glucagon (human recombinant) (GLUCAGEN DIAGNOSTIC) injection 1 mg 1 mg As Needed 7/9/2018     Sig - Route: Inject 1 mg under the skin As Needed (Blood Glucose Less Than 70). - Subcutaneous    Cosign for Ordering: Accepted by Julian Stern MD on 7/9/2018  5:29 PM    glucagon (human recombinant) (GLUCAGEN DIAGNOSTIC) injection 1 mg 1 mg As Needed 7/9/2018     Sig - Route: Inject 1 mg under the skin As Needed (Blood Glucose Less Than 70). - Subcutaneous    heparin (porcine) 5000 UNIT/ML injection 5,000 Units 5,000 Units Every 12 Hours Scheduled 7/9/2018     Sig - Route: Inject 1 mL under the skin Every 12 (Twelve) Hours. - Subcutaneous    Cosign for Ordering: Accepted by Julian Stern MD on 7/9/2018  5:29 PM    insulin aspart (novoLOG) injection 0-9 Units 0-9 Units 4 Times Daily Before Meals & Nightly 7/9/2018     Sig - Route: Inject 0-9 Units under the skin 4 (Four) Times a Day Before Meals & at Bedtime. - Subcutaneous    insulin aspart prot-insulin aspart (novoLOG 70/30) injection 20 Units 20 Units 2 Times Daily With Meals 7/11/2018     Sig - Route: Inject 20 Units under the skin 2 (Two) Times a Day With Meals. - Subcutaneous    ipratropium-albuterol (DUO-NEB) nebulizer solution 3 mL 3 mL 4 Times Daily - RT 7/9/2018     Sig - Route: Take 3 mL by nebulization 4 (Four) Times a Day. - Nebulization    Cosign for Ordering: Accepted by Julian Stern MD on 7/9/2018  5:29 PM    lactobacillus acidophilus (RISAQUAD) capsule 1 capsule 1 capsule Daily 7/10/2018     Sig - Route: Take 1 capsule by mouth Daily. - Oral    Liraglutide (VICTOZA) injection 1.8 mg 1.8 mg Daily 7/10/2018     Sig - Route: Inject 1.8 mg under the skin Daily. - Subcutaneous    Non-formulary Exception Code: Patient supplied medication    lisinopril (PRINIVIL,ZESTRIL) tablet 40 mg 40 mg Daily 7/10/2018     Sig - Route: Take 4  "tablets by mouth Daily. - Oral    methylPREDNISolone sodium succinate (SOLU-Medrol) injection 20 mg 20 mg Every 12 Hours 7/11/2018     Sig - Route: Infuse 0.5 mL into a venous catheter Every 12 (Twelve) Hours. - Intravenous    morphine injection 1 mg 1 mg Every 4 Hours PRN 7/9/2018 7/19/2018    Sig - Route: Infuse 0.5 mL into a venous catheter Every 4 (Four) Hours As Needed for Severe Pain  (hold for oversedation, SBP<100). - Intravenous    nicotine (NICODERM CQ) 21 MG/24HR patch 1 patch 1 patch Every 24 Hours 7/10/2018     Sig - Route: Place 1 patch on the skin Daily. - Transdermal    ondansetron (ZOFRAN) tablet 8 mg 8 mg Every 12 Hours PRN 7/10/2018     Sig - Route: Take 2 tablets by mouth Every 12 (Twelve) Hours As Needed for Nausea or Vomiting. - Oral    Pharmacy Consult  Continuous PRN 7/10/2018     Sig - Route: Continuous As Needed for Consult. - Does not apply    sodium chloride 0.9 % bolus 500 mL 500 mL Once 7/10/2018 7/10/2018    Sig - Route: Infuse 500 mL into a venous catheter 1 (One) Time. - Intravenous    sodium chloride 0.9 % flush 1-10 mL 1-10 mL As Needed 7/9/2018     Sig - Route: Infuse 1-10 mL into a venous catheter As Needed for Line Care. - Intravenous    Cosign for Ordering: Accepted by Julian Stern MD on 7/9/2018  5:29 PM    sodium chloride 0.9 % flush 10 mL 10 mL As Needed 7/9/2018     Sig - Route: Infuse 10 mL into a venous catheter As Needed for Line Care. - Intravenous    Linked Group 1:  \"And\" Linked Group Details        sodium chloride 0.9 % flush 3 mL 3 mL Every 8 Hours 7/10/2018     Sig - Route: Infuse 3 mL into a venous catheter Every 8 (Eight) Hours. - Intravenous    sodium chloride 0.9 % flush 5 mL 5 mL As Needed 7/10/2018     Sig - Route: Infuse 5 mL into a venous catheter As Needed for Line Care (After Medication Administration or Blood Draw). - Intravenous    tamsulosin (FLOMAX) 24 hr capsule 0.4 mg 0.4 mg Every Evening 7/10/2018     Sig - Route: Take 1 capsule by mouth Every " Evening. - Oral    atorvastatin (LIPITOR) tablet 20 mg (Discontinued) 20 mg Daily 7/10/2018 7/10/2018    Sig - Route: Take 1 tablet by mouth Daily. - Oral    insulin aspart prot-insulin aspart (novoLOG 70/30) injection 30 Units (Discontinued) 30 Units 2 Times Daily With Meals 7/10/2018 7/11/2018    Sig - Route: Inject 30 Units under the skin 2 (Two) Times a Day With Meals. - Subcutaneous    ipratropium-albuterol (DUO-NEB) nebulizer solution 3 mL (Discontinued) 3 mL Every 6 Hours PRN 7/9/2018 7/10/2018    Sig - Route: Take 3 mL by nebulization Every 6 (Six) Hours As Needed for Shortness of Air. - Nebulization    Cosign for Ordering: Accepted by Julian Stern MD on 7/9/2018  5:29 PM    methylPREDNISolone sodium succinate (SOLU-Medrol) injection 40 mg (Discontinued) 40 mg Every 12 Hours 7/9/2018 7/11/2018    Sig - Route: Infuse 1 mL into a venous catheter Every 12 (Twelve) Hours. - Intravenous    Cosign for Ordering: Accepted by Julian Stern MD on 7/9/2018  5:29 PM            Cultures:    Blood Culture   Date Value Ref Range Status   07/09/2018 No growth at 24 hours  Preliminary   07/09/2018 No growth at 24 hours  Preliminary           Assessment/Plan     ASSESSMENT:    1.  Venous stasis dermatitis   2.  Pneumonia versus COPD exacerbation       PLAN:    Patient sitting up in chair currently eating breakfast.  Patient on high flow nasal cannula with no apparent distress.  No fever or diarrhea reported.  WBC normal.  Improving CRP of 1.04.  Patient tolerating oral antibiotic therapy well.    Mycoplasma pneumoniae negative.  Urinary Legionella negative.  Chest x-ray reveals bibasilar airspace disease/atelectasis.  Blood cultures show no growth at this time.     Unfortunately patient's poor hygiene will jeopardize and impair patient's healing process and outcomes.  Plans to continue current  antibiotic therapy of  Doxycycline 100 mg by mouth twice a day and Omnicef 300 mg by mouth twice a day both to continue through  7/23/18.  We'll continue to follow closely and adjust antibiotic therapy as needed.  CRP ordered for a.m.    Current Antimicrobials:  Doxycycline 100mg PO BID through 7/23/18  Omnicef 300mg PO BID through 7/23/18      Patient's findings and recommendations were discussed with patient, nursing staff, primary care team and consulting provider    Code Status:   Code Status and Medical Interventions:   Ordered at: 07/09/18 1509     Code Status:    CPR     Medical Interventions (Level of Support Prior to Arrest):    Full       Diana Chao, PANCHO  07/11/18  9:51 AM

## 2018-07-11 NOTE — PROGRESS NOTES
LOS: 2 days     Chief Complaint:  Pulmonology is following for respiratory distress    Subjective     Interval History: Mr. Bajwa is sitting in a chair.  He remains on high flow oxygen.  No acute distress noted at this time.    History taken from: patient chart RN    Review of Systems:     Review of Systems - History obtained from chart review and the patient  General ROS: negative for - chills, fatigue or fever  Psychological ROS: negative for - anxiety or depression  ENT ROS: negative for - headaches, visual changes or vocal changes  Allergy and Immunology ROS: negative for - nasal congestion, postnasal drip or seasonal allergies  Endocrine ROS: negative for - polydipsia/polyuria  Respiratory ROS: positive for - shortness of breath  Cardiovascular ROS: no chest pain or dyspnea on exertion  Gastrointestinal ROS: no abdominal pain, change in bowel habits, or black or bloody stools  Musculoskeletal ROS: negative for - joint pain, joint stiffness or joint swelling  Neurological ROS: no TIA or stroke symptoms                    Objective     Vital Signs  Temp:  [98 °F (36.7 °C)-98.3 °F (36.8 °C)] 98.3 °F (36.8 °C)  Heart Rate:  [57-95] 92  Resp:  [12-24] 22  BP: ()/() 160/81  Body mass index is 34.34 kg/m².    Intake/Output Summary (Last 24 hours) at 07/11/18 1259  Last data filed at 07/11/18 1233   Gross per 24 hour   Intake              790 ml   Output             1700 ml   Net             -910 ml     I/O this shift:  In: 250 [P.O.:250]  Out: 300 [Urine:300]    Physical Exam:  GENERAL APPEARANCE: Well developed, well nourished, alert and cooperative, and appears to be in no acute distress.    HEAD: normocephalic.    EYES: PERRL    NECK: Neck supple.     CARDIAC: Normal S1 and S2. No S3, S4 or murmurs. Rhythm is regular. There is no peripheral edema, cyanosis or pallor. Extremities are warm and well perfused. Capillary refill is less than 2 seconds. No carotid bruits.    RESPIRATORY: Clear to  auscultation and percussion without rales, rhonchi, wheezing or diminished breath sounds.    GI: Positive bowel sounds. Soft, nondistended, nontender.     MUSCULOSKELTAL: No significant deformity or joint abnormality. No edema. Peripheral pulses intact.     NEUROLOGICAL: Strength and sensation symmetric and intact throughout.     PSYCHIATRIC: The mental examination revealed the patient was oriented to person, place, and time.                 Results Review:                I reviewed the patient's new clinical results.  I reviewed the patient's new imaging results and agree with the interpretation.    Results from last 7 days  Lab Units 07/11/18  0106 07/10/18  0545 07/09/18  0953   WBC 10*3/mm3 9.42 7.39 8.19   HEMOGLOBIN g/dL 15.9 15.9 17.2   PLATELETS 10*3/mm3 214 224 197       Results from last 7 days  Lab Units 07/11/18  0715 07/11/18  0106 07/10/18  1151 07/10/18  0545   SODIUM mmol/L 130* 130*  --  127*   POTASSIUM mmol/L 5.3 5.2 5.0 5.8*   CHLORIDE mmol/L 91* 92*  --  90*   CO2 mmol/L 35.1* 34.8*  --  36.4*   BUN mg/dL 29* 26*  --  21   CREATININE mg/dL 1.00 0.96  --  1.16   CALCIUM mg/dL 8.5 8.5  --  8.6   GLUCOSE mg/dL 193* 139*  --  321*   MAGNESIUM mg/dL  --  2.3  --  2.2     Lab Results   Component Value Date    INR 1.03 07/09/2018    PROTIME 13.7 07/09/2018       Results from last 7 days  Lab Units 07/11/18  0106 07/09/18  0953   ALK PHOS U/L 80 96   BILIRUBIN mg/dL 0.4 0.3   ALT (SGPT) U/L 18 22   AST (SGOT) U/L 22 21       Results from last 7 days  Lab Units 07/11/18  0845   PH, ARTERIAL pH units 7.412   PO2 ART mm Hg 62.4*   PCO2, ARTERIAL mm Hg 54.9*   HCO3 ART mmol/L 34.2*     Imaging Results (last 24 hours)     Procedure Component Value Units Date/Time    XR Chest 1 View [[id number]] Collected:  07/11/18 1119     Updated:  07/11/18 1121    Narrative:       XR CHEST 1 VW-     CLINICAL INDICATION: sob; J96.01-Acute respiratory failure with hypoxia;  J96.02-Acute respiratory failure with hypercapnia;  I21.4-Non-ST  elevation (NSTEMI) myocardial infarction; L03.115-Cellulitis of right  lower limb          COMPARISON: 07/09/2018      TECHNIQUE: Single frontal view of the chest.     FINDINGS:     Trace bibasilar effusions and bibasilar airspace disease, probably  atelectasis, but cannot exclude pneumonia  The cardiac silhouette is normal. The pulmonary vasculature is  unremarkable.  There is no evidence of an acute osseous abnormality.   There are no suspicious-appearing parenchymal soft tissue nodules.            Impression:       Trace bibasilar effusions and bibasilar consolidation         This report was finalized on 7/11/2018 11:19 AM by Dr. Jose Luis Romero MD.       US Arterial Doppler Lower Extremity Left [[id number]] Collected:  07/10/18 1339     Updated:  07/10/18 1351    Narrative:       US ARTERIAL DOPPLER LOWER EXTREMITY LEFT-     REASON FOR EXAM:  PVD; J96.01-Acute respiratory failure with hypoxia;  J96.02-Acute respiratory failure with hypercapnia; I21.4-Non-ST  elevation (NSTEMI) myocardial infarction; L03.115-Cellulitis of right  lower limb. Decreased pulses on physical exam in the lower extremities.     TECHNIQUE:  Multiple real-time color doppler images were obtained.  M-mode Doppler was used for velocity determination and spectral pattern.  Stenosis was evaluated using the NASCET or similar measurement  technique.     FINDINGS:   Left leg: The external iliac artery was normal in caliber. The Doppler  wave pattern was monophasic with peak systolic velocity of 140 cm/s.  There was monophasic flow in the common femoral artery with a velocity  of 319 cm/s. There is some calcific plaque in the common femoral artery.  Color Doppler flow is seen in the profunda femoral and superficial  femoral arteries in the leg. The Doppler wave pattern is monophasic in  the thigh. No occluded segments were demonstrated. The popliteal artery  has a monophasic flow pattern and a velocity of 48 cm/s. There was  monophasic  flow in the posterior tibial artery, the velocity measured 37  cm/s.       Impression:       Atherosclerotic plaquing is noted predominantly at the level  of the common femoral artery where there is velocity elevation. The  Doppler wave pattern throughout the leg was monophasic suggesting  possibility of more proximal disease above the external iliac artery. In  the runoff vessels no areas of occlusion were demonstrated.             This report was finalized on 7/10/2018 1:48 PM by Dr. Jose Ramon Hatch II, MD.                Medication Review:   Scheduled Medications:    amLODIPine 5 mg Oral Q24H   aspirin 325 mg Oral Daily   atorvastatin 40 mg Oral Daily   bacitracin  Topical Q12H   budesonide-formoterol 2 puff Inhalation BID - RT   cefdinir 300 mg Oral Q12H   cetirizine 10 mg Oral Daily   donepezil 10 mg Oral Nightly   doxycycline 100 mg Oral Q12H   fluticasone 1 spray Each Nare BID   gabapentin 600 mg Oral Q8H   heparin (porcine) 5,000 Units Subcutaneous Q12H   insulin aspart 0-9 Units Subcutaneous 4x Daily AC & at Bedtime   insulin aspart prot-insulin aspart 20 Units Subcutaneous BID With Meals   ipratropium-albuterol 3 mL Nebulization 4x Daily - RT   lactobacillus acidophilus 1 capsule Oral Daily   Liraglutide 1.8 mg Subcutaneous Daily   lisinopril 40 mg Oral Daily   methylPREDNISolone sodium succinate 20 mg Intravenous Q12H   nicotine 1 patch Transdermal Q24H   sodium chloride 3 mL Intravenous Q8H   tamsulosin 0.4 mg Oral Q PM     Continuous infusions:    Pharmacy Consult        Assessment/Plan      Neuro: Patient is alert and awake. No acute mental status changes.  Will continue to monitor mental status.     Respiratory failure:  Likely related to underlying lung disease-COPD.   patient remains on high flow oxygen at 30 L and 33%.  Repeat ABG has improved.    Chest x-ray reviewed.  Ordered 20 mg of Lasix IV.  Continue scheduled inhalants and as needed neb treatments.     ID:  Continue current antibiotics.   Continue to monitor current lab trends.     Cardiac: BP within normal range.  NSR.  Continuous ECG, BP and pulse ox monitoring. Maintain MAP > 65.     Elevated troponin: likely related to respiratory distress.   etiology is following.     Renal: BUN/Creatinine WNL.  Strict I&Os.   Continue to monitor.     Endocrine:  monitor glucose targeting 140-180.     Electrolytes:  Monitor levels, manage and replete per protocols.     GI: Continue current diet.  Continue GI prophylaxis.     DVT prophylaxis:  Continue heparin.    Patient is stable from respiratory standpoint and can be transferred to the progressive care unit for further management.      Patient Active Problem List   Diagnosis Code   • BPH without urinary obstruction N40.0   • Elevated prostate specific antigen (PSA) R97.20   • Acute respiratory failure with hypoxia and hypercapnia (CMS/HCC) J96.01, J96.02   • Venous stasis dermatitis of both lower extremities I87.2             PANCHO Brito  07/11/18  12:59 PM        Attestation: Scribed for Dr. Sahni, by PANCHO Gillespie    I, Carlos Sahni M.D. attest that the above note accurately reflects the work and decisions made  by me.  Patient was seen and evaluated by Dr. Sahni, including history of present illness, physical exam, assessment, and treatment plan.  The above note was reviewed and edited by Dr. Sahni.

## 2018-07-11 NOTE — PROGRESS NOTES
"  Assisted By: Georgiana RAO    CC: Follow-up respiratory failure     Interview History/HPI: Patient states his legs are hurting his back is hurting but these are his chronic pain.  He feels like his breathing although improved over admission he still gets short winded when he tries to move around.  He is tolerating his diet, he has been fluid restricted.  No chest pain minimal cough, he does feel weak.  No chills.  Patient states he still smoke about a pack of cigarettes a day at home, he did use some home oxygen but only when he \"needed it\" and at night mostly.      Vitals:    07/11/18 0803   BP: 174/79   Pulse: 78   Resp: 22   Temp: 98.3 °F (36.8 °C)   SpO2: 94%       Intake/Output Summary (Last 24 hours) at 07/11/18 0858  Last data filed at 07/11/18 0803   Gross per 24 hour   Intake             1160 ml   Output             1400 ml   Net             -240 ml       EXAM: Chronically appearing male however appears improved over admission.  Pupils are reactive, conjunctiva unremarkable.  Speech is normal neurologically he is nonfocal.  Alert.   High flow oxygen is in place but he can speak in fairly full sentences now.  No increased respiratory effort.  Lungs have bilateral breath sounds with improving air excursion no rhonchi rales or wheezing heard today.  Heart is a regular rate and rhythm somewhat distant but no murmur or gallop, abdomen is soft benign bowel sounds are active no organomegaly or masses, extremities light pulses, there is some erythema right greater than left but appears to be lessening, no significant edema, right leg does still have some healing ulcerations.  Saturations currently 95% on 30% high flow    Tele: Sinus, reviewed    LABS:   Lab Results (last 48 hours)     Procedure Component Value Units Date/Time    Cortisol - AM [[id number]]  (Normal) Collected:  07/11/18 0715    Specimen:  Blood Updated:  07/11/18 0805     Cortisol - AM 13.00 mcg/dL     Basic Metabolic Panel [[id number]]  (Abnormal) " Collected:  07/11/18 0715    Specimen:  Blood Updated:  07/11/18 0755     Glucose 193 (H) mg/dL      BUN 29 (H) mg/dL      Creatinine 1.00 mg/dL      Sodium 130 (L) mmol/L      Potassium 5.3 mmol/L      Chloride 91 (L) mmol/L      CO2 35.1 (H) mmol/L      Calcium 8.5 mg/dL      eGFR Non African Amer 74 mL/min/1.73      BUN/Creatinine Ratio 29.0 (H)     Anion Gap 3.9 mmol/L     Narrative:       GFR Normal >60  Chronic Kidney Disease <60  Kidney Failure <15    Osmolality, Calculated [[id number]]  (Abnormal) Collected:  07/11/18 0715    Specimen:  Blood Updated:  07/11/18 0755     Osmolality Calc 271.9 (L) mOsm/kg     Hepatitis Panel, Acute [[id number]]  (Abnormal) Collected:  07/11/18 0106    Specimen:  Blood Updated:  07/11/18 0247     Hepatitis B Surface Ag Non-Reactive     Hep A IgM Non-Reactive     Hep B C IgM Non-Reactive     Hepatitis C Ab Reactive (A)    TSH [[id number]]  (Normal) Collected:  07/11/18 0106    Specimen:  Blood Updated:  07/11/18 0220     TSH 2.186 mIU/mL     C-reactive Protein [[id number]]  (Abnormal) Collected:  07/11/18 0106    Specimen:  Blood Updated:  07/11/18 0213     C-Reactive Protein 1.04 (H) mg/dL     Osmolality, Calculated [[id number]]  (Abnormal) Collected:  07/11/18 0106    Specimen:  Blood Updated:  07/11/18 0213     Osmolality Calc 267.8 (L) mOsm/kg     Comprehensive Metabolic Panel [[id number]]  (Abnormal) Collected:  07/11/18 0106    Specimen:  Blood Updated:  07/11/18 0213     Glucose 139 (H) mg/dL      BUN 26 (H) mg/dL      Creatinine 0.96 mg/dL      Sodium 130 (L) mmol/L      Potassium 5.2 mmol/L      Chloride 92 (L) mmol/L      CO2 34.8 (H) mmol/L      Calcium 8.5 mg/dL      Total Protein 6.0 g/dL      Albumin 3.10 (L) g/dL      ALT (SGPT) 18 U/L      AST (SGOT) 22 U/L      Alkaline Phosphatase 80 U/L      Comment: Note New Reference Ranges        Total Bilirubin 0.4 mg/dL      eGFR Non African Amer 77 mL/min/1.73      Globulin 2.9 gm/dL      A/G Ratio 1.1 (L) g/dL       BUN/Creatinine Ratio 27.1 (H)     Anion Gap 3.2 (L) mmol/L     C3 Complement [[id number]]  (Normal) Collected:  07/11/18 0106    Specimen:  Blood Updated:  07/11/18 0212     C3 Complement 123.0 mg/dl     C4 Complement [[id number]]  (Normal) Collected:  07/11/18 0106    Specimen:  Blood Updated:  07/11/18 0212     C4 Complement 30.1 mg/dl     Magnesium [[id number]]  (Normal) Collected:  07/11/18 0106    Specimen:  Blood Updated:  07/11/18 0211     Magnesium 2.3 mg/dL     Lipid Panel [[id number]]  (Abnormal) Collected:  07/11/18 0106    Specimen:  Blood Updated:  07/11/18 0211     Total Cholesterol 152 mg/dL      Triglycerides 69 mg/dL      HDL Cholesterol 50 (L) mg/dL      LDL Cholesterol  88 mg/dL      VLDL Cholesterol 13.8 mg/dL      LDL/HDL Ratio 1.76    Narrative:       Cholesterol Reference Ranges  (U.S. Department of Health and Human Services ATP III Classifications)    Desirable          <200 mg/dL  Borderline High    200-239 mg/dL  High Risk          >240 mg/dL      Triglyceride Reference Ranges  (U.S. Department of Health and Human Services ATP III Classifications)    Normal           <150 mg/dL  Borderline High  150-199 mg/dL  High             200-499 mg/dL  Very High        >500 mg/dL    HDL Reference Ranges  (U.S. Department of Health and Human Services ATP III Classifcations)    Low     <40 mg/dl (major risk factor for CHD)  High    >60 mg/dl ('negative' risk factor for CHD)        LDL Reference Ranges  (U.S. Department of Health and Human Services ATP III Classifcations)    Optimal          <100 mg/dL  Near Optimal     100-129 mg/dL  Borderline High  130-159 mg/dL  High             160-189 mg/dL  Very High        >189 mg/dL    Hemoglobin A1c [[id number]]  (Abnormal) Collected:  07/11/18 0106    Specimen:  Blood Updated:  07/11/18 0159     Hemoglobin A1C 6.90 (H) %     CBC & Differential [[id number]] Collected:  07/11/18 0106    Specimen:  Blood Updated:  07/11/18 0140    Narrative:       The following orders  were created for panel order CBC & Differential.  Procedure                               Abnormality         Status                     ---------                               -----------         ------                     CBC Auto Differential[[id number]]        Abnormal            Final result                 Please view results for these tests on the individual orders.    CBC Auto Differential [[id number]]  (Abnormal) Collected:  07/11/18 0106    Specimen:  Blood Updated:  07/11/18 0140     WBC 9.42 10*3/mm3      RBC 5.23 10*6/mm3      Hemoglobin 15.9 g/dL      Hematocrit 48.8 %      MCV 93.3 fL      MCH 30.4 pg      MCHC 32.6 (L) g/dL      RDW 14.0 %      RDW-SD 45.7 fl      MPV 10.7 (H) fL      Platelets 214 10*3/mm3      Neutrophil % 92.6 (H) %      Lymphocyte % 4.7 (L) %      Monocyte % 2.4 %      Eosinophil % 0.0 %      Basophil % 0.1 %      Immature Grans % 0.2 %      Neutrophils, Absolute 8.72 (H) 10*3/mm3      Lymphocytes, Absolute 0.44 (L) 10*3/mm3      Monocytes, Absolute 0.23 10*3/mm3      Eosinophils, Absolute 0.00 10*3/mm3      Basophils, Absolute 0.01 10*3/mm3      Immature Grans, Absolute 0.02 10*3/mm3     Anti-DNA Antibody, Double-stranded [[id number]] Collected:  07/11/18 0106    Specimen:  Blood Updated:  07/11/18 0134    Antinuclear Antibodies Direct [[id number]] Collected:  07/11/18 0106    Specimen:  Blood Updated:  07/11/18 0134    Immunoglobulin Free LT Chains Blood [[id number]] Collected:  07/11/18 0106    Specimen:  Blood Updated:  07/11/18 0134    ANCA Panel [[id number]] Collected:  07/11/18 0106    Specimen:  Blood Updated:  07/11/18 0134    Glomerular Basement Membrane Antibodies [[id number]] Collected:  07/11/18 0106    Specimen:  Blood Updated:  07/11/18 0134    KENYATTA + PE [[id number]] Collected:  07/11/18 0106    Specimen:  Blood Updated:  07/11/18 0134    POC Glucose Once [[id number]]  (Normal) Collected:  07/10/18 2122    Specimen:  Blood Updated:  07/10/18 2137     Glucose 108 mg/dL      Narrative:       Meter: [id number] : [id number] Lei Cosby    POC Glucose Once [[id number]]  (Normal) Collected:  07/10/18 1713    Specimen:  Blood Updated:  07/10/18 1738     Glucose 93 mg/dL     Narrative:       Meter: [id number] : [id number] Medardo Grimm    Urinalysis With Microscopic - Urine, Clean Catch [[id number]] Collected:  07/10/18 1351    Specimen:  Urine from Urine, Clean Catch Updated:  07/10/18 1437    Narrative:       The following orders were created for panel order Urinalysis With Microscopic - Urine, Clean Catch.  Procedure                               Abnormality         Status                     ---------                               -----------         ------                     Urinalysis without micro...[[id number]]  Abnormal            Final result               Urinalysis, Microscopic ...[[id number]]  Abnormal            Final result                 Please view results for these tests on the individual orders.    Urinalysis, Microscopic Only - Urine, Clean Catch [[id number]]  (Abnormal) Collected:  07/10/18 1351    Specimen:  Urine from Urine, Clean Catch Updated:  07/10/18 1437     RBC, UA 6-12 (A) /HPF      WBC, UA 3-5 (A) /HPF      Bacteria, UA None Seen /HPF      Squamous Epithelial Cells, UA 0-2 /HPF      Hyaline Casts, UA 3-6 /LPF      Methodology Automated Microscopy    Urinalysis without microscopic (no culture) - Urine, Clean Catch [[id number]]  (Abnormal) Collected:  07/10/18 1351    Specimen:  Urine from Urine, Clean Catch Updated:  07/10/18 1437     Color, UA Yellow     Appearance, UA Clear     pH, UA 6.0     Specific Gravity, UA 1.024     Glucose,  mg/dL (Trace) (A)     Ketones, UA Negative     Bilirubin, UA Negative     Blood, UA Trace (A)     Protein, UA >=300 mg/dL (3+) (A)     Leuk Esterase, UA Negative     Nitrite, UA Negative     Urobilinogen, UA 0.2 E.U./dL    Sodium, Urine, Random - Urine, Clean Catch [[id number]] Collected:  07/10/18 1351     Specimen:  Urine from Urine, Clean Catch Updated:  07/10/18 1434     Sodium, Urine 43 mmol/L     Chloride, Urine, Random - Urine, Clean Catch [[id number]] Collected:  07/10/18 1351    Specimen:  Urine from Urine, Clean Catch Updated:  07/10/18 1434     Chloride, Urine 50 mmol/L     Potassium, Urine, Random - Urine, Clean Catch [[id number]] Collected:  07/10/18 1351    Specimen:  Urine from Urine, Clean Catch Updated:  07/10/18 1434     Potassium, Urine 75.5 mmol/L     Antihistone Antibodies [[id number]] Collected:  07/10/18 1425    Specimen:  Blood Updated:  07/10/18 1433    Eosinophil Smear - Urine, Urine, Clean Catch [[id number]] Collected:  07/10/18 1351    Specimen:  Urine from Urine, Clean Catch Updated:  07/10/18 1420    [place] / Lambda Light Chains, Urine, 24 Hour - Urine, Clean Catch [[id number]] Updated:  07/10/18 1419    Specimen:  24 Hour Urine from Urine, Clean Catch     Potassium [[id number]]  (Normal) Collected:  07/10/18 1151    Specimen:  Blood Updated:  07/10/18 1236     Potassium 5.0 mmol/L     POC Glucose Once [[id number]]  (Abnormal) Collected:  07/10/18 1159    Specimen:  Blood Updated:  07/10/18 1224     Glucose 176 (H) mg/dL     Narrative:       Meter: [id number] : [id number] Medardo Grimm    Blood Culture - Blood, [[id number]]  (Normal) Collected:  07/09/18 0945    Specimen:  Blood from Arm, Right Updated:  07/10/18 1015     Blood Culture No growth at 24 hours    Blood Culture - Blood, [[id number]]  (Normal) Collected:  07/09/18 0953    Specimen:  Blood from Arm, Left Updated:  07/10/18 1015     Blood Culture No growth at 24 hours    POC Glucose Once [[id number]]  (Abnormal) Collected:  07/10/18 0758    Specimen:  Blood Updated:  07/10/18 0814     Glucose 251 (H) mg/dL     Narrative:       Meter: [id number] : [id number] Medardo Grimm    CK [[id number]]  (Normal) Collected:  07/10/18 0545    Specimen:  Blood Updated:  07/10/18 0654     Creatine Kinase 165 U/L     Osmolality,  Calculated [[id number]]  (Abnormal) Collected:  07/10/18 0545    Specimen:  Blood Updated:  07/10/18 0650     Osmolality Calc 270.6 (L) mOsm/kg     Basic Metabolic Panel [[id number]]  (Abnormal) Collected:  07/10/18 0545    Specimen:  Blood Updated:  07/10/18 0650     Glucose 321 (H) mg/dL      BUN 21 mg/dL      Creatinine 1.16 mg/dL      Sodium 127 (L) mmol/L      Potassium 5.8 (C) mmol/L      Chloride 90 (L) mmol/L      CO2 36.4 (H) mmol/L      Calcium 8.6 mg/dL      eGFR Non African Amer 62 mL/min/1.73      BUN/Creatinine Ratio 18.1     Anion Gap 0.6 (L) mmol/L     Narrative:       GFR Normal >60  Chronic Kidney Disease <60  Kidney Failure <15    Troponin [[id number]]  (Abnormal) Collected:  07/10/18 0545    Specimen:  Blood Updated:  07/10/18 0647     Troponin I 0.144 (H) ng/mL     Narrative:       Ultra Troponin I Reference Range:         <=0.039 ng/mL: Negative    0.04-0.779 ng/mL: Indeterminate Range. Suspicious of MI.  Clinical correlation required.       >=0.78  ng/mL: Consistent with myocardial injury.  Clinical correlation required.    CK Total & CKMB [[id number]]  (Normal) Collected:  07/10/18 0545    Specimen:  Blood Updated:  07/10/18 0643     CKMB 4.67 ng/mL      Creatine Kinase 165 U/L     CK-MB Index [[id number]]  (Normal) Collected:  07/10/18 0545    Specimen:  Blood Updated:  07/10/18 0643     CK-MB Index 2.8 %     Magnesium [[id number]]  (Normal) Collected:  07/10/18 0545    Specimen:  Blood Updated:  07/10/18 0633     Magnesium 2.2 mg/dL     Phosphorus [[id number]]  (Normal) Collected:  07/10/18 0545    Specimen:  Blood Updated:  07/10/18 0633     Phosphorus 4.0 mg/dL     CBC & Differential [[id number]] Collected:  07/10/18 0545    Specimen:  Blood Updated:  07/10/18 0609    Narrative:       The following orders were created for panel order CBC & Differential.  Procedure                               Abnormality         Status                     ---------                               -----------          ------                     CBC Auto Differential[[id number]]        Abnormal            Final result                 Please view results for these tests on the individual orders.    CBC Auto Differential [[id number]]  (Abnormal) Collected:  07/10/18 0545    Specimen:  Blood Updated:  07/10/18 0609     WBC 7.39 10*3/mm3      RBC 5.14 10*6/mm3      Hemoglobin 15.9 g/dL      Hematocrit 48.3 %      MCV 94.0 fL      MCH 30.9 pg      MCHC 32.9 (L) g/dL      RDW 13.7 %      RDW-SD 45.1 fl      MPV 10.8 (H) fL      Platelets 224 10*3/mm3      Neutrophil % 91.7 (H) %      Lymphocyte % 5.5 (L) %      Monocyte % 2.7 %      Eosinophil % 0.0 %      Basophil % 0.0 %      Immature Grans % 0.1 %      Neutrophils, Absolute 6.77 (H) 10*3/mm3      Lymphocytes, Absolute 0.41 (L) 10*3/mm3      Monocytes, Absolute 0.20 10*3/mm3      Eosinophils, Absolute 0.00 10*3/mm3      Basophils, Absolute 0.00 10*3/mm3      Immature Grans, Absolute 0.01 10*3/mm3     Blood Gas, Arterial [[id number]]  (Abnormal) Collected:  07/10/18 0442    Specimen:  Arterial Blood Updated:  07/10/18 0454     Site Arterial: right radial     Chandan's Test Positive     pH, Arterial 7.355 pH units      pCO2, Arterial 70.2 (C) mm Hg      pO2, Arterial 70.0 (L) mm Hg      HCO3, Arterial 38.3 (C) mmol/L      Base Excess, Arterial 9.2 mmol/L      O2 Saturation, Arterial 93.9 %      Hemoglobin, Blood Gas 16.9 (H) g/dL      Hematocrit, Blood Gas 50.0 %      Oxyhemoglobin 90.1 %      Methemoglobin 0.40 %      Carboxyhemoglobin 3.6 %      A-a Gradiant 159.3 mmHg      Temperature 98.6 C      Barometric Pressure for Blood Gas 734 mmHg      Modality Cannula - High Flow     FIO2 45 %      Flow Rate 30 lpm     CK-MB Index [[id number]]  (Abnormal) Collected:  07/10/18 0026    Specimen:  Blood Updated:  07/10/18 0150     CK-MB Index 3.9 (H) %     CK Total & CKMB [[id number]]  (Abnormal) Collected:  07/10/18 0026    Specimen:  Blood Updated:  07/10/18 0150     CKMB 5.92 (C) ng/mL       Creatine Kinase 153 U/L     Troponin [[id number]]  (Abnormal) Collected:  07/10/18 0026    Specimen:  Blood Updated:  07/10/18 0144     Troponin I 0.126 (H) ng/mL     Narrative:       Ultra Troponin I Reference Range:         <=0.039 ng/mL: Negative    0.04-0.779 ng/mL: Indeterminate Range. Suspicious of MI.  Clinical correlation required.       >=0.78  ng/mL: Consistent with myocardial injury.  Clinical correlation required.    Blood Gas, Arterial [[id number]]  (Abnormal) Collected:  07/10/18 0056    Specimen:  Arterial Blood Updated:  07/10/18 0105     Site Arterial: right radial     Chandan's Test Positive     pH, Arterial 7.318 (L) pH units      pCO2, Arterial 62.6 (C) mm Hg      pO2, Arterial 61.5 (L) mm Hg      HCO3, Arterial 31.4 (C) mmol/L      Base Excess, Arterial 3.1 mmol/L      O2 Saturation, Arterial 90.4 %      Hemoglobin, Blood Gas 16.7 (H) g/dL      Hematocrit, Blood Gas 49.0 %      Oxyhemoglobin 86.1 %      Methemoglobin 0.40 %      Carboxyhemoglobin 4.4 %      A-a Gradiant 127.3 mmHg      Temperature 98.6 C      Barometric Pressure for Blood Gas 734 mmHg      Modality HFNC     FIO2 38 %     Blood Gas, Arterial [[id number]]  (Abnormal) Collected:  07/09/18 2144    Specimen:  Arterial Blood Updated:  07/09/18 2153     Site Arterial: right radial     Chandan's Test Positive     pH, Arterial 7.349 (L) pH units      pCO2, Arterial 71.6 (C) mm Hg      pO2, Arterial 61.9 (L) mm Hg      HCO3, Arterial 38.6 (C) mmol/L      Base Excess, Arterial 9.1 mmol/L      O2 Saturation, Arterial 91.5 %      Hemoglobin, Blood Gas 17.8 (H) g/dL      Hematocrit, Blood Gas 52.0 %      Oxyhemoglobin 86.3 %      Methemoglobin 0.30 %      Carboxyhemoglobin 5.4 (H) %      A-a Gradiant 130.6 mmHg      Temperature 98.6 C      Barometric Pressure for Blood Gas 734 mmHg      Modality Nasal Cannula     FIO2 40 %     POC Glucose Once [[id number]]  (Abnormal) Collected:  07/09/18 2126    Specimen:  Blood Updated:  07/09/18 2143     Glucose  167 (H) mg/dL     Narrative:       Meter: [id number] : [id number] Yoel Del Valle    CK Total & CKMB [[id number]]  (Normal) Collected:  07/09/18 1737    Specimen:  Blood Updated:  07/09/18 1841     CKMB 4.89 ng/mL      Creatine Kinase 130 U/L     Protein / Creatinine Ratio, Urine - Urine, Clean Catch [[id number]]  (Abnormal) Collected:  07/09/18 1112    Specimen:  Urine from Urine, Clean Catch Updated:  07/09/18 1839     Protein/Creatinine Ratio, Urine 3,829.8 (H) mg/G Crea      Creatinine, Urine 47.6 mg/dL      Total Protein, Urine 182.3 mg/dL     Mycoplasma Pneumoniae Antibody, IgM [[id number]]  (Normal) Collected:  07/09/18 1737    Specimen:  Blood Updated:  07/09/18 1831     Mycoplasma pneumo IgM Negative    Troponin [[id number]]  (Abnormal) Collected:  07/09/18 1737    Specimen:  Blood Updated:  07/09/18 1826     Troponin I 0.184 (H) ng/mL     Narrative:       Ultra Troponin I Reference Range:         <=0.039 ng/mL: Negative    0.04-0.779 ng/mL: Indeterminate Range. Suspicious of MI.  Clinical correlation required.       >=0.78  ng/mL: Consistent with myocardial injury.  Clinical correlation required.    CK-MB Index [[id number]]  (Abnormal) Collected:  07/09/18 1737    Specimen:  Blood Updated:  07/09/18 1826     CK-MB Index 3.8 (H) %     CK [[id number]]  (Normal) Collected:  07/09/18 1737    Specimen:  Blood Updated:  07/09/18 1821     Creatine Kinase 130 U/L     Sodium, Urine, Random - Urine, Clean Catch [[id number]] Collected:  07/09/18 1112    Specimen:  Urine from Urine, Clean Catch Updated:  07/09/18 1804     Sodium, Urine 36 mmol/L     Legionella Antigen, Urine - Urine, Urine, Clean Catch [[id number]]  (Normal) Collected:  07/09/18 1112    Specimen:  Urine from Urine, Clean Catch Updated:  07/09/18 1742     LEGIONELLA ANTIGEN, URINE Negative    Narrative:         Presumptive negative for L. pneumophilia serogroup 1 antigen, suggesting no recent or current infection.    POC Glucose Once [[id number]]  (Normal)  Collected:  07/09/18 1725    Specimen:  Blood Updated:  07/09/18 1741     Glucose 122 mg/dL     Narrative:       Meter: [id number] : [id number] herman stern    Blood Gas, Arterial [[id number]]  (Abnormal) Collected:  07/09/18 1727    Specimen:  Arterial Blood Updated:  07/09/18 1740     Site Arterial: right radial     Chandan's Test Positive     pH, Arterial 7.327 (L) pH units      pCO2, Arterial 77.0 (C) mm Hg      pO2, Arterial 71.6 (L) mm Hg      HCO3, Arterial 39.4 (C) mmol/L      Base Excess, Arterial 9.0 mmol/L      O2 Saturation, Arterial 94.2 %      Hemoglobin, Blood Gas 18.3 (C) g/dL      Hematocrit, Blood Gas 54.0 (H) %      Oxyhemoglobin 87.8 %      Methemoglobin 0.30 %      Carboxyhemoglobin 6.5 (H) %      A-a Gradiant 44.0 mmHg      Temperature 98.6 C      Barometric Pressure for Blood Gas 734 mmHg      Modality BiPap     FIO2 30 %      IPAP 24     EPAP 7    Blood Gas, Arterial [[id number]]  (Abnormal) Collected:  07/09/18 1336    Specimen:  Arterial Blood Updated:  07/09/18 1343     Site Arterial: right radial     Chandan's Test Positive     pH, Arterial 7.330 (L) pH units      pCO2, Arterial 74.7 (C) mm Hg      pO2, Arterial 76.8 (L) mm Hg      HCO3, Arterial 38.5 (C) mmol/L      Base Excess, Arterial 8.7 mmol/L      O2 Saturation, Arterial 95.4 %      Hemoglobin, Blood Gas 17.0 (H) g/dL      Hematocrit, Blood Gas 50.0 %      Oxyhemoglobin 86.4 %      Methemoglobin 0.30 %      Carboxyhemoglobin 9.1 (H) %      A-a Gradiant 41.5 mmHg      Temperature 98.6 C      Barometric Pressure for Blood Gas 734 mmHg      Modality BiPap     FIO2 30 %      Set Mech Resp Rate 20     IPAP 20     EPAP 7    Blood Gas, Arterial [[id number]]  (Abnormal) Collected:  07/09/18 1204    Specimen:  Arterial Blood Updated:  07/09/18 1225     Site Arterial: left brachial     Chandan's Test N/A     pH, Arterial 7.307 (L) pH units      pCO2, Arterial 78.1 (C) mm Hg      pO2, Arterial 47.1 (C) mm Hg      HCO3, Arterial 38.2 (C) mmol/L       Base Excess, Arterial 7.9 mmol/L      O2 Saturation, Arterial 84.9 (C) %      Hemoglobin, Blood Gas 17.2 (H) g/dL      Hematocrit, Blood Gas 51.0 %      Oxyhemoglobin 76.3 (L) %      Methemoglobin 0.30 %      Carboxyhemoglobin 9.8 (H) %      A-a Gradiant 53.1 mmHg      Temperature 98.6 C      Barometric Pressure for Blood Gas 734 mmHg      Modality Cannula - Nasal     FIO2 28 %      Flow Rate 2 lpm     Troponin [[id number]]  (Abnormal) Collected:  07/09/18 1153    Specimen:  Blood from Arm, Left Updated:  07/09/18 1225     Troponin I 0.275 (H) ng/mL     Narrative:       Ultra Troponin I Reference Range:         <=0.039 ng/mL: Negative    0.04-0.779 ng/mL: Indeterminate Range. Suspicious of MI.  Clinical correlation required.       >=0.78  ng/mL: Consistent with myocardial injury.  Clinical correlation required.    CK-MB Index [[id number]]  (Abnormal) Collected:  07/09/18 0953    Specimen:  Blood Updated:  07/09/18 1216     CK-MB Index 4.4 (H) %     CK-MB [[id number]]  (Abnormal) Collected:  07/09/18 0953    Specimen:  Blood Updated:  07/09/18 1214     CKMB 6.13 (C) ng/mL     Urinalysis, Microscopic Only - Urine, Clean Catch [[id number]]  (Abnormal) Collected:  07/09/18 1112    Specimen:  Urine from Urine, Clean Catch Updated:  07/09/18 1200     RBC, UA 6-12 (A) /HPF      WBC, UA 0-2 /HPF      Bacteria, UA Trace (A) /HPF      Squamous Epithelial Cells, UA 3-6 (A) /HPF      Hyaline Casts, UA None Seen /LPF      Methodology Automated Microscopy    CK [[id number]]  (Normal) Collected:  07/09/18 0953    Specimen:  Blood Updated:  07/09/18 1159     Creatine Kinase 139 U/L     Urinalysis With Culture If Indicated - Urine, Clean Catch [[id number]]  (Abnormal) Collected:  07/09/18 1112    Specimen:  Urine from Urine, Clean Catch Updated:  07/09/18 1124     Color, UA Yellow     Appearance, UA Clear     pH, UA 6.5     Specific Gravity, UA 1.013     Glucose, UA Negative     Ketones, UA Negative     Bilirubin, UA Negative      Blood, UA Small (1+) (A)     Protein, UA >=300 mg/dL (3+) (A)     Leuk Esterase, UA Negative     Nitrite, UA Negative     Urobilinogen, UA 0.2 E.U./dL    BNP [[id number]]  (Normal) Collected:  07/09/18 0953    Specimen:  Blood Updated:  07/09/18 1116     BNP 69.0 pg/mL     Comprehensive Metabolic Panel [[id number]]  (Abnormal) Collected:  07/09/18 0953    Specimen:  Blood Updated:  07/09/18 1039     Glucose 149 (H) mg/dL      BUN 16 mg/dL      Creatinine 1.00 mg/dL      Sodium 130 (L) mmol/L      Potassium 4.9 mmol/L      Chloride 89 (L) mmol/L      CO2 38.2 (H) mmol/L      Calcium 9.1 mg/dL      Total Protein 6.8 g/dL      Albumin 3.60 g/dL      ALT (SGPT) 22 U/L      AST (SGOT) 21 U/L      Alkaline Phosphatase 96 U/L      Comment: Note New Reference Ranges        Total Bilirubin 0.3 mg/dL      eGFR Non African Amer 74 mL/min/1.73      Globulin 3.2 gm/dL      A/G Ratio 1.1 (L) g/dL      BUN/Creatinine Ratio 16.0     Anion Gap 2.8 (L) mmol/L     Osmolality, Calculated [[id number]]  (Abnormal) Collected:  07/09/18 0953    Specimen:  Blood Updated:  07/09/18 1039     Osmolality Calc 264.8 (L) mOsm/kg     Troponin [[id number]]  (Abnormal) Collected:  07/09/18 0953    Specimen:  Blood Updated:  07/09/18 1031     Troponin I 0.303 (H) ng/mL     Narrative:       Ultra Troponin I Reference Range:         <=0.039 ng/mL: Negative    0.04-0.779 ng/mL: Indeterminate Range. Suspicious of MI.  Clinical correlation required.       >=0.78  ng/mL: Consistent with myocardial injury.  Clinical correlation required.    Protime-INR [[id number]]  (Normal) Collected:  07/09/18 0953    Specimen:  Blood Updated:  07/09/18 1026     Protime 13.7 Seconds      Comment: Note new Reference Range        INR 1.03    Narrative:       Suggested INR therapeutic range for stable oral anticoagulant therapy:    Low Intensity therapy:   1.5-2.0  Moderate Intensity therapy:   2.0-3.0  High Intensity therapy:   2.5-4.0    aPTT [[id number]]  (Normal) Collected:   07/09/18 0953    Specimen:  Blood Updated:  07/09/18 1026     PTT 35.0 seconds      Comment: Note new Reference Range       Narrative:       PTT Heparin Therapeutic Range:  59 - 95 seconds    C-reactive Protein [[id number]]  (Abnormal) Collected:  07/09/18 0953    Specimen:  Blood Updated:  07/09/18 1022     C-Reactive Protein 1.72 (H) mg/dL     Lactic Acid, Plasma [[id number]]  (Normal) Collected:  07/09/18 0953    Specimen:  Blood Updated:  07/09/18 1017     Lactate 1.5 mmol/L     CBC & Differential [[id number]] Collected:  07/09/18 0953    Specimen:  Blood Updated:  07/09/18 1008    Narrative:       The following orders were created for panel order CBC & Differential.  Procedure                               Abnormality         Status                     ---------                               -----------         ------                     CBC Auto Differential[[id number]]        Abnormal            Final result                 Please view results for these tests on the individual orders.    CBC Auto Differential [[id number]]  (Abnormal) Collected:  07/09/18 0953    Specimen:  Blood Updated:  07/09/18 1008     WBC 8.19 10*3/mm3      RBC 5.44 10*6/mm3      Hemoglobin 17.2 g/dL      Hematocrit 51.0 %      MCV 93.8 fL      MCH 31.6 pg      MCHC 33.7 g/dL      RDW 13.7 %      RDW-SD 47.0 fl      MPV 11.5 (H) fL      Platelets 197 10*3/mm3      Neutrophil % 78.2 (H) %      Lymphocyte % 11.7 (L) %      Monocyte % 8.9 %      Eosinophil % 0.9 %      Basophil % 0.2 %      Immature Grans % 0.1 %      Neutrophils, Absolute 6.40 10*3/mm3      Lymphocytes, Absolute 0.96 (L) 10*3/mm3      Monocytes, Absolute 0.73 10*3/mm3      Eosinophils, Absolute 0.07 10*3/mm3      Basophils, Absolute 0.02 10*3/mm3      Immature Grans, Absolute 0.01 10*3/mm3     Blood Gas, Arterial [[id number]]  (Abnormal) Collected:  07/09/18 0952    Specimen:  Arterial Blood Updated:  07/09/18 1001     Site Arterial: left brachial     Chandan's Test N/A     pH,  Arterial 7.408 pH units      pCO2, Arterial 55.7 (C) mm Hg      pO2, Arterial 48.1 (C) mm Hg      HCO3, Arterial 34.4 (C) mmol/L      Base Excess, Arterial 7.5 mmol/L      O2 Saturation, Arterial 87.9 (L) %      Hemoglobin, Blood Gas 17.2 (H) g/dL      Hematocrit, Blood Gas 51.0 %      Oxyhemoglobin 78.4 (L) %      Methemoglobin 0.20 %      Carboxyhemoglobin 10.6 (H) %      A-a Gradiant 29.5 mmHg      Temperature 98.6 C      Barometric Pressure for Blood Gas 734 mmHg      Modality Room Air     FIO2 21 %           Radiology:  Imaging Results (last 72 hours)     Procedure Component Value Units Date/Time    US Arterial Doppler Lower Extremity Left [[id number]] Collected:  07/10/18 1339     Updated:  07/10/18 1351    Narrative:       US ARTERIAL DOPPLER LOWER EXTREMITY LEFT-     REASON FOR EXAM:  PVD; J96.01-Acute respiratory failure with hypoxia;  J96.02-Acute respiratory failure with hypercapnia; I21.4-Non-ST  elevation (NSTEMI) myocardial infarction; L03.115-Cellulitis of right  lower limb. Decreased pulses on physical exam in the lower extremities.     TECHNIQUE:  Multiple real-time color doppler images were obtained.  M-mode Doppler was used for velocity determination and spectral pattern.  Stenosis was evaluated using the NASCET or similar measurement  technique.     FINDINGS:   Left leg: The external iliac artery was normal in caliber. The Doppler  wave pattern was monophasic with peak systolic velocity of 140 cm/s.  There was monophasic flow in the common femoral artery with a velocity  of 319 cm/s. There is some calcific plaque in the common femoral artery.  Color Doppler flow is seen in the profunda femoral and superficial  femoral arteries in the leg. The Doppler wave pattern is monophasic in  the thigh. No occluded segments were demonstrated. The popliteal artery  has a monophasic flow pattern and a velocity of 48 cm/s. There was  monophasic flow in the posterior tibial artery, the velocity measured  37  cm/s.       Impression:       Atherosclerotic plaquing is noted predominantly at the level  of the common femoral artery where there is velocity elevation. The  Doppler wave pattern throughout the leg was monophasic suggesting  possibility of more proximal disease above the external iliac artery. In  the runoff vessels no areas of occlusion were demonstrated.             This report was finalized on 7/10/2018 1:48 PM by Dr. Jose Ramon Hatch II, MD.       XR Abdomen 2 View With Chest 1 View [[id number]] Collected:  07/09/18 1552     Updated:  07/09/18 1556    Narrative:          XR ABDOMEN 2 VW W CHEST 1 VW-     CLINICAL INDICATION: abd distention; J96.01-Acute respiratory failure  with hypoxia; J96.02-Acute respiratory failure with hypercapnia;  I21.4-Non-ST elevation (NSTEMI) myocardial infarction          COMPARISON: 07/09/2018      FINDINGS:  Chest:  Bibasilar airspace disease, either atelectasis or pneumonia     Abdomen:  Two views of the abdomen show no free air. I do not see abnormal bowel  distention to suggest complete obstruction. The bony structures are  intact. No abnormal soft tissue masses are seen. No suspicious appearing  calcifications are identified on today's exam.       Impression:       1. 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 Bibasilar airspace disease.  3. No evidence of bowel obstruction.  4. No free air.     5.            This report was finalized on 7/9/2018 3:54 PM by Dr. Jose Luis Romero MD.       US Arterial Doppler Lower Extremity Right [[id number]] Collected:  07/09/18 1339     Updated:  07/09/18 1407    Narrative:       US ARTERIAL DOPPLER LOWER EXTREMITY RIGHT-     REASON FOR EXAM:  Color change/ decreased pulses.     TECHNIQUE:  Multiple real-time color doppler images were obtained.  M-mode Doppler was used for velocity determination and spectral pattern.  Stenosis was evaluated using the NASCET or similar measurement  technique.     FINDINGS:   Right leg: The external iliac artery had  extensive calcific plaque in  the lumen. It is narrowed. The Doppler wave pattern was monophasic. The  velocity is significantly elevated measuring 416 cm/s. There is calcific  plaque at the common femoral artery level. The Doppler wave pattern was  monophasic. The peak systolic velocity was 213 cm/s. Color Doppler flow  was seen in the profunda femoral and superficial femoral artery. There  were no areas of occlusion in the superficial femoral artery. The  Doppler wave pattern was monophasic. The popliteal artery has a  monophasic flow pattern velocity of 87 cm/s. There was monophasic flow  in the posterior tibial artery. At the ankle the velocity measured 26  cm/s.       Impression:       Heavy atherosclerotic plaquing and stenosis is noted at the  level of the external iliac and common femoral arteries in the proximal  leg. The velocities were elevated to over 400 cm/s. The runoff arteries      showed no focal areas of stenosis or occlusion in the right leg.     This report was finalized on 7/9/2018 2:05 PM by Dr. Jose Ramon Hatch II, MD.       US Venous Doppler Lower Extremity Right (duplex) [[id number]] Collected:  07/09/18 1311     Updated:  07/09/18 1313    Narrative:       US VENOUS DOPPLER LOWER EXTREMITY RIGHT (DUPLEX)-     REASON FOR EXAM:  redness and pain     Multiple real-time images were obtained. The deep veins were well  demonstrated sonographically. There is good color doppler signal seen  filling the deep veins. They were completely compressed by the  ultrasound transducer. There was good spontaneous venous flow and  augmentation. There are no echoes seen along the deep veins to suggest  clot.          Impression:       No sonographic findings of DVT in the right lower extremity     This report was finalized on 7/9/2018 1:11 PM by Dr. Jose Ramon Hatch II, MD.       XR Chest 1 View [[id number]] Collected:  07/09/18 1006     Updated:  07/09/18 1017    Narrative:       XR CHEST 1 VW-     CLINICAL  INDICATION: soa          COMPARISON: 01/16/2016      TECHNIQUE: Single frontal view of the chest.     FINDINGS:     Bibasilar atelectasis  The cardiac silhouette is normal. The pulmonary vasculature is  unremarkable.  There is no evidence of an acute osseous abnormality.   There are no suspicious-appearing parenchymal soft tissue nodules.            Impression:       Bibasilar atelectasis         This report was finalized on 7/9/2018 10:06 AM by Dr. Jose Luis Romero MD.           Discussed with Dr. Padilla    Results for orders placed during the hospital encounter of 07/09/18   Adult Transthoracic Echo Complete W/ Cont if Necessary Per Protocol    Narrative · Normal left ventricular cavity size and wall thickness noted. All left   ventricular wall segments contract normally.  · Estimated EF appears to be in the range of 61 - 65%.  · The aortic valve is structurally normal. No aortic valve regurgitation   is present. No aortic valve stenosis is present.  · The mitral valve is normal in structure. No mitral valve regurgitation   is present. No significant mitral valve stenosis is present.  · The tricuspid valve is normal. No tricuspid valve stenosis is present.   Mild tricuspid valve regurgitation is present. Estimated right ventricular   systolic pressure from tricuspid regurgitation is mildly elevated (35-45   mmHg).  · Mild pulmonary hypertension is present.  · There is no evidence of pericardial effusion.            Assessment/Plan:     COPD with exacerbation with acute hypercarbic and hypoxic respiratory failure.  Patient appears to be improving, I am going to try to get him off of high flow today.  Continue antibiotics as guided by infectious disease as well as steroids.  Continue inhalants.  Cultures remain negative.     Cellulitis, on Omnicef and doxycycline, cultures negative, CRP improving, clinically improving, follow, awaiting Dr. Cordon input on PVD    Diabetes, controlled, as steroids waned have decreased his  insulin to try to avoid hypoglycemia.    Weakness, physical therapy ordered    Hyperkalemia, discussed with nephrology, cathartics added, lisinopril being continued at least at this point.    Hyponatremia, on fluid restriction.    Proteinuria probably related to diabetes and hypertension, vasculitic workup in process per nephrology.    DVT prophylaxis, subcutaneous heparin    Indeterminate troponin, possible dobutamine stress test when clinical status will allow.  EF is normal.  Lipid status is acceptable, he is chest pain-free.    Tobacco abuse, counseled to quit

## 2018-07-12 ENCOUNTER — APPOINTMENT (OUTPATIENT)
Dept: NUCLEAR MEDICINE | Facility: HOSPITAL | Age: 68
End: 2018-07-12
Attending: INTERNAL MEDICINE

## 2018-07-12 ENCOUNTER — APPOINTMENT (OUTPATIENT)
Dept: CARDIOLOGY | Facility: HOSPITAL | Age: 68
End: 2018-07-12
Attending: INTERNAL MEDICINE

## 2018-07-12 LAB
ALBUMIN SERPL-MCNC: 2.5 G/DL (ref 2.9–4.4)
ALBUMIN/GLOB SERPL: 0.9 {RATIO} (ref 0.7–1.7)
ALPHA1 GLOB FLD ELPH-MCNC: 0.2 G/DL (ref 0–0.4)
ALPHA2 GLOB SERPL ELPH-MCNC: 0.9 G/DL (ref 0.4–1)
ANA SER QL: NEGATIVE
ANION GAP SERPL CALCULATED.3IONS-SCNC: 4.4 MMOL/L (ref 3.6–11.2)
B-GLOBULIN SERPL ELPH-MCNC: 1 G/DL (ref 0.7–1.3)
BASOPHILS # BLD AUTO: 0 10*3/MM3 (ref 0–0.3)
BASOPHILS NFR BLD AUTO: 0 % (ref 0–2)
BH CV NUCLEAR PRIOR STUDY: 3
BH CV STRESS BP STAGE 1: NORMAL
BH CV STRESS BP STAGE 2: NORMAL
BH CV STRESS BP STAGE 3: NORMAL
BH CV STRESS BP STAGE 4: NORMAL
BH CV STRESS DOSE DOBUTAMINE STAGE 1: 10
BH CV STRESS DOSE DOBUTAMINE STAGE 2: 20
BH CV STRESS DOSE DOBUTAMINE STAGE 3: 30
BH CV STRESS DOSE DOBUTAMINE STAGE 4: 40
BH CV STRESS DURATION MIN STAGE 1: 2
BH CV STRESS DURATION MIN STAGE 2: 2
BH CV STRESS DURATION MIN STAGE 3: 2
BH CV STRESS DURATION MIN STAGE 4: 2
BH CV STRESS DURATION SEC STAGE 1: 0
BH CV STRESS DURATION SEC STAGE 2: 0
BH CV STRESS DURATION SEC STAGE 3: 0
BH CV STRESS DURATION SEC STAGE 4: 0
BH CV STRESS HR STAGE 1: 80
BH CV STRESS HR STAGE 2: 87
BH CV STRESS HR STAGE 3: 108
BH CV STRESS HR STAGE 4: 129
BH CV STRESS PROTOCOL 1: NORMAL
BH CV STRESS RECOVERY BP: NORMAL MMHG
BH CV STRESS RECOVERY HR: 98 BPM
BH CV STRESS STAGE 1: 1
BH CV STRESS STAGE 2: 2
BH CV STRESS STAGE 3: 3
BH CV STRESS STAGE 4: 4
BUN BLD-MCNC: 27 MG/DL (ref 7–21)
BUN/CREAT SERPL: 30.7 (ref 7–25)
CALCIUM SPEC-SCNC: 8.6 MG/DL (ref 7.7–10)
CHLORIDE SERPL-SCNC: 92 MMOL/L (ref 99–112)
CO2 SERPL-SCNC: 34.6 MMOL/L (ref 24.3–31.9)
CREAT BLD-MCNC: 0.88 MG/DL (ref 0.43–1.29)
CRP SERPL-MCNC: <0.5 MG/DL (ref 0–0.99)
DEPRECATED RDW RBC AUTO: 45.7 FL (ref 37–54)
DSDNA AB SER-ACNC: 2 IU/ML (ref 0–9)
EOSINOPHIL # BLD AUTO: 0 10*3/MM3 (ref 0–0.7)
EOSINOPHIL NFR BLD AUTO: 0 % (ref 0–7)
ERYTHROCYTE [DISTWIDTH] IN BLOOD BY AUTOMATED COUNT: 13.9 % (ref 11.5–14.5)
GAMMA GLOB SERPL ELPH-MCNC: 0.9 G/DL (ref 0.4–1.8)
GFR SERPL CREATININE-BSD FRML MDRD: 86 ML/MIN/1.73
GLOBULIN SER CALC-MCNC: 3 G/DL (ref 2.2–3.9)
GLUCOSE BLD-MCNC: 209 MG/DL (ref 70–110)
GLUCOSE BLDC GLUCOMTR-MCNC: 159 MG/DL (ref 70–130)
GLUCOSE BLDC GLUCOMTR-MCNC: 184 MG/DL (ref 70–130)
GLUCOSE BLDC GLUCOMTR-MCNC: 201 MG/DL (ref 70–130)
GLUCOSE BLDC GLUCOMTR-MCNC: 319 MG/DL (ref 70–130)
HCT VFR BLD AUTO: 49.2 % (ref 42–52)
HGB BLD-MCNC: 16.3 G/DL (ref 14–18)
HISTONE IGG SER IA-ACNC: 1.7 UNITS (ref 0–0.9)
IGA SERPL-MCNC: 359 MG/DL (ref 61–437)
IGG SERPL-MCNC: 747 MG/DL (ref 700–1600)
IGM SERPL-MCNC: 129 MG/DL (ref 20–172)
IMM GRANULOCYTES # BLD: 0.02 10*3/MM3 (ref 0–0.03)
IMM GRANULOCYTES NFR BLD: 0.2 % (ref 0–0.5)
INTERPRETATION SERPL IEP-IMP: ABNORMAL
KAPPA LC SERPL-MCNC: 38.7 MG/L (ref 3.3–19.4)
KAPPA LC/LAMBDA SER: 0.95 {RATIO} (ref 0.26–1.65)
LAMBDA LC FREE SERPL-MCNC: 40.8 MG/L (ref 5.7–26.3)
LV EF NUC BP: 69 %
LYMPHOCYTES # BLD AUTO: 0.45 10*3/MM3 (ref 1–3)
LYMPHOCYTES NFR BLD AUTO: 5.2 % (ref 16–46)
Lab: ABNORMAL
M-SPIKE: ABNORMAL G/DL
MAXIMAL PREDICTED HEART RATE: 150 BPM
MCH RBC QN AUTO: 30.9 PG (ref 27–33)
MCHC RBC AUTO-ENTMCNC: 33.1 G/DL (ref 33–37)
MCV RBC AUTO: 93.2 FL (ref 80–94)
MONOCYTES # BLD AUTO: 0.53 10*3/MM3 (ref 0.1–0.9)
MONOCYTES NFR BLD AUTO: 6.1 % (ref 0–12)
NEUTROPHILS # BLD AUTO: 7.68 10*3/MM3 (ref 1.4–6.5)
NEUTROPHILS NFR BLD AUTO: 88.5 % (ref 40–75)
OSMOLALITY SERPL CALC.SUM OF ELEC: 273.9 MOSM/KG (ref 273–305)
PERCENT MAX PREDICTED HR: 86 %
PLATELET # BLD AUTO: 194 10*3/MM3 (ref 130–400)
PMV BLD AUTO: 10.5 FL (ref 6–10)
POTASSIUM BLD-SCNC: 5.1 MMOL/L (ref 3.5–5.3)
PROT SERPL-MCNC: 5.5 G/DL (ref 6–8.5)
RBC # BLD AUTO: 5.28 10*6/MM3 (ref 4.7–6.1)
SODIUM BLD-SCNC: 131 MMOL/L (ref 135–153)
STRESS BASELINE BP: NORMAL MMHG
STRESS BASELINE HR: 74 BPM
STRESS PERCENT HR: 101 %
STRESS POST PEAK BP: NORMAL MMHG
STRESS POST PEAK HR: 129 BPM
STRESS TARGET HR: 128 BPM
WBC NRBC COR # BLD: 8.68 10*3/MM3 (ref 4.5–12.5)

## 2018-07-12 PROCEDURE — G8978 MOBILITY CURRENT STATUS: HCPCS

## 2018-07-12 PROCEDURE — 25010000002 METHYLPREDNISOLONE PER 40 MG: Performed by: INTERNAL MEDICINE

## 2018-07-12 PROCEDURE — 93018 CV STRESS TEST I&R ONLY: CPT | Performed by: INTERNAL MEDICINE

## 2018-07-12 PROCEDURE — 94799 UNLISTED PULMONARY SVC/PX: CPT

## 2018-07-12 PROCEDURE — 97530 THERAPEUTIC ACTIVITIES: CPT

## 2018-07-12 PROCEDURE — 99231 SBSQ HOSP IP/OBS SF/LOW 25: CPT | Performed by: INTERNAL MEDICINE

## 2018-07-12 PROCEDURE — 63710000001 INSULIN ASPART PER 5 UNITS: Performed by: NURSE PRACTITIONER

## 2018-07-12 PROCEDURE — 25010000002 DOBUTAMINE PER 250 MG: Performed by: INTERNAL MEDICINE

## 2018-07-12 PROCEDURE — 97162 PT EVAL MOD COMPLEX 30 MIN: CPT

## 2018-07-12 PROCEDURE — 25010000002 MORPHINE PER 10 MG: Performed by: INTERNAL MEDICINE

## 2018-07-12 PROCEDURE — 86140 C-REACTIVE PROTEIN: CPT | Performed by: NURSE PRACTITIONER

## 2018-07-12 PROCEDURE — 78452 HT MUSCLE IMAGE SPECT MULT: CPT | Performed by: INTERNAL MEDICINE

## 2018-07-12 PROCEDURE — 0 TECHNETIUM SESTAMIBI: Performed by: INTERNAL MEDICINE

## 2018-07-12 PROCEDURE — 63710000001 INSULIN ASPART PER 5 UNITS: Performed by: INTERNAL MEDICINE

## 2018-07-12 PROCEDURE — 97116 GAIT TRAINING THERAPY: CPT

## 2018-07-12 PROCEDURE — 82962 GLUCOSE BLOOD TEST: CPT

## 2018-07-12 PROCEDURE — 78452 HT MUSCLE IMAGE SPECT MULT: CPT

## 2018-07-12 PROCEDURE — 93017 CV STRESS TEST TRACING ONLY: CPT

## 2018-07-12 PROCEDURE — G8979 MOBILITY GOAL STATUS: HCPCS

## 2018-07-12 PROCEDURE — 80048 BASIC METABOLIC PNL TOTAL CA: CPT | Performed by: PHYSICIAN ASSISTANT

## 2018-07-12 PROCEDURE — A9500 TC99M SESTAMIBI: HCPCS | Performed by: INTERNAL MEDICINE

## 2018-07-12 PROCEDURE — 25010000002 HEPARIN (PORCINE) PER 1000 UNITS: Performed by: PHYSICIAN ASSISTANT

## 2018-07-12 PROCEDURE — 99233 SBSQ HOSP IP/OBS HIGH 50: CPT | Performed by: INTERNAL MEDICINE

## 2018-07-12 PROCEDURE — G8980 MOBILITY D/C STATUS: HCPCS

## 2018-07-12 PROCEDURE — 85025 COMPLETE CBC W/AUTO DIFF WBC: CPT | Performed by: PHYSICIAN ASSISTANT

## 2018-07-12 RX ORDER — FUROSEMIDE 10 MG/ML
20 INJECTION INTRAMUSCULAR; INTRAVENOUS ONCE
Status: DISCONTINUED | OUTPATIENT
Start: 2018-07-12 | End: 2018-07-12

## 2018-07-12 RX ORDER — HYDROCHLOROTHIAZIDE 12.5 MG/1
12.5 CAPSULE, GELATIN COATED ORAL DAILY
Status: DISCONTINUED | OUTPATIENT
Start: 2018-07-12 | End: 2018-07-13

## 2018-07-12 RX ORDER — DOBUTAMINE HYDROCHLORIDE 200 MG/100ML
10 INJECTION INTRAVENOUS
Status: DISCONTINUED | OUTPATIENT
Start: 2018-07-12 | End: 2018-07-14

## 2018-07-12 RX ORDER — AMLODIPINE BESYLATE 10 MG/1
10 TABLET ORAL
Status: DISCONTINUED | OUTPATIENT
Start: 2018-07-12 | End: 2018-07-15 | Stop reason: HOSPADM

## 2018-07-12 RX ORDER — INSULIN ASPART 100 [IU]/ML
25 INJECTION, SUSPENSION SUBCUTANEOUS ONCE
Status: DISCONTINUED | OUTPATIENT
Start: 2018-07-12 | End: 2018-07-14

## 2018-07-12 RX ORDER — INSULIN ASPART 100 [IU]/ML
25 INJECTION, SUSPENSION SUBCUTANEOUS 2 TIMES DAILY WITH MEALS
Status: DISCONTINUED | OUTPATIENT
Start: 2018-07-12 | End: 2018-07-13

## 2018-07-12 RX ORDER — PREDNISONE 20 MG/1
40 TABLET ORAL
Status: DISCONTINUED | OUTPATIENT
Start: 2018-07-13 | End: 2018-07-13

## 2018-07-12 RX ADMIN — BACITRACIN ZINC: 500 OINTMENT TOPICAL at 12:30

## 2018-07-12 RX ADMIN — TECHNETIUM TC 99M SESTAMIBI 1 DOSE: 1 INJECTION INTRAVENOUS at 08:30

## 2018-07-12 RX ADMIN — CETIRIZINE HCL 10 MG: 10 TABLET ORAL at 12:29

## 2018-07-12 RX ADMIN — GABAPENTIN 600 MG: 300 CAPSULE ORAL at 21:02

## 2018-07-12 RX ADMIN — BUDESONIDE AND FORMOTEROL FUMARATE DIHYDRATE 2 PUFF: 160; 4.5 AEROSOL RESPIRATORY (INHALATION) at 06:40

## 2018-07-12 RX ADMIN — INSULIN ASPART 7 UNITS: 100 INJECTION, SOLUTION INTRAVENOUS; SUBCUTANEOUS at 12:49

## 2018-07-12 RX ADMIN — ATORVASTATIN CALCIUM 40 MG: 40 TABLET, FILM COATED ORAL at 12:29

## 2018-07-12 RX ADMIN — MORPHINE SULFATE 2 MG: 2 INJECTION, SOLUTION INTRAMUSCULAR; INTRAVENOUS at 19:31

## 2018-07-12 RX ADMIN — INSULIN ASPART 4 UNITS: 100 INJECTION, SOLUTION INTRAVENOUS; SUBCUTANEOUS at 17:49

## 2018-07-12 RX ADMIN — NICOTINE 1 PATCH: 21 PATCH TRANSDERMAL at 23:02

## 2018-07-12 RX ADMIN — INSULIN ASPART 2 UNITS: 100 INJECTION, SOLUTION INTRAVENOUS; SUBCUTANEOUS at 21:01

## 2018-07-12 RX ADMIN — DOCUSATE SODIUM 100 MG: 100 CAPSULE, LIQUID FILLED ORAL at 21:04

## 2018-07-12 RX ADMIN — HYDROCHLOROTHIAZIDE 12.5 MG: 12.5 CAPSULE, GELATIN COATED ORAL at 12:30

## 2018-07-12 RX ADMIN — MORPHINE SULFATE 2 MG: 2 INJECTION, SOLUTION INTRAMUSCULAR; INTRAVENOUS at 05:47

## 2018-07-12 RX ADMIN — Medication 3 ML: at 05:48

## 2018-07-12 RX ADMIN — DOXYCYCLINE 100 MG: 100 CAPSULE ORAL at 21:03

## 2018-07-12 RX ADMIN — MORPHINE SULFATE 2 MG: 2 INJECTION, SOLUTION INTRAMUSCULAR; INTRAVENOUS at 16:12

## 2018-07-12 RX ADMIN — Medication 1 CAPSULE: at 12:29

## 2018-07-12 RX ADMIN — DONEPEZIL HYDROCHLORIDE 10 MG: 5 TABLET, FILM COATED ORAL at 21:03

## 2018-07-12 RX ADMIN — GABAPENTIN 600 MG: 300 CAPSULE ORAL at 05:47

## 2018-07-12 RX ADMIN — BUDESONIDE AND FORMOTEROL FUMARATE DIHYDRATE 2 PUFF: 160; 4.5 AEROSOL RESPIRATORY (INHALATION) at 19:26

## 2018-07-12 RX ADMIN — IPRATROPIUM BROMIDE AND ALBUTEROL SULFATE 3 ML: .5; 3 SOLUTION RESPIRATORY (INHALATION) at 19:26

## 2018-07-12 RX ADMIN — IPRATROPIUM BROMIDE AND ALBUTEROL SULFATE 3 ML: .5; 3 SOLUTION RESPIRATORY (INHALATION) at 01:10

## 2018-07-12 RX ADMIN — HEPARIN SODIUM 5000 UNITS: 5000 INJECTION, SOLUTION INTRAVENOUS; SUBCUTANEOUS at 12:30

## 2018-07-12 RX ADMIN — DOXYCYCLINE 100 MG: 100 CAPSULE ORAL at 12:28

## 2018-07-12 RX ADMIN — CEFDINIR 300 MG: 300 CAPSULE ORAL at 21:03

## 2018-07-12 RX ADMIN — MORPHINE SULFATE 2 MG: 2 INJECTION, SOLUTION INTRAMUSCULAR; INTRAVENOUS at 22:08

## 2018-07-12 RX ADMIN — METHYLPREDNISOLONE SODIUM SUCCINATE 20 MG: 40 INJECTION, POWDER, FOR SOLUTION INTRAMUSCULAR; INTRAVENOUS at 05:47

## 2018-07-12 RX ADMIN — CLONIDINE HYDROCHLORIDE 0.1 MG: 0.1 TABLET ORAL at 21:03

## 2018-07-12 RX ADMIN — ASPIRIN 325 MG: 325 TABLET ORAL at 12:29

## 2018-07-12 RX ADMIN — MORPHINE SULFATE 2 MG: 2 INJECTION, SOLUTION INTRAMUSCULAR; INTRAVENOUS at 12:28

## 2018-07-12 RX ADMIN — LISINOPRIL 40 MG: 10 TABLET ORAL at 12:29

## 2018-07-12 RX ADMIN — AMLODIPINE BESYLATE 10 MG: 10 TABLET ORAL at 12:28

## 2018-07-12 RX ADMIN — NICOTINE 1 PATCH: 21 PATCH TRANSDERMAL at 00:36

## 2018-07-12 RX ADMIN — TECHNETIUM TC 99M SESTAMIBI 1 DOSE: 1 INJECTION INTRAVENOUS at 10:55

## 2018-07-12 RX ADMIN — INSULIN ASPART 25 UNITS: 100 INJECTION, SUSPENSION SUBCUTANEOUS at 17:47

## 2018-07-12 RX ADMIN — TAMSULOSIN HYDROCHLORIDE 0.4 MG: 0.4 CAPSULE ORAL at 17:47

## 2018-07-12 RX ADMIN — HEPARIN SODIUM 5000 UNITS: 5000 INJECTION, SOLUTION INTRAVENOUS; SUBCUTANEOUS at 21:02

## 2018-07-12 RX ADMIN — BACITRACIN ZINC: 500 OINTMENT TOPICAL at 21:01

## 2018-07-12 RX ADMIN — DOCUSATE SODIUM 100 MG: 100 CAPSULE, LIQUID FILLED ORAL at 12:30

## 2018-07-12 RX ADMIN — IPRATROPIUM BROMIDE AND ALBUTEROL SULFATE 3 ML: .5; 3 SOLUTION RESPIRATORY (INHALATION) at 06:40

## 2018-07-12 RX ADMIN — Medication 3 ML: at 21:03

## 2018-07-12 RX ADMIN — CEFDINIR 300 MG: 300 CAPSULE ORAL at 12:29

## 2018-07-12 NOTE — PLAN OF CARE
Problem: Fall Risk (Adult)  Goal: Identify Related Risk Factors and Signs and Symptoms  Outcome: Ongoing (interventions implemented as appropriate)    Goal: Absence of Fall  Outcome: Ongoing (interventions implemented as appropriate)      Problem: Patient Care Overview  Goal: Plan of Care Review  Outcome: Ongoing (interventions implemented as appropriate)    Goal: Individualization and Mutuality  Outcome: Ongoing (interventions implemented as appropriate)    Goal: Discharge Needs Assessment  Outcome: Ongoing (interventions implemented as appropriate)    Goal: Interprofessional Rounds/Family Conf  Outcome: Ongoing (interventions implemented as appropriate)      Problem: Skin Injury Risk (Adult)  Goal: Identify Related Risk Factors and Signs and Symptoms  Outcome: Ongoing (interventions implemented as appropriate)    Goal: Skin Health and Integrity  Outcome: Ongoing (interventions implemented as appropriate)      Problem: Breathing Pattern Ineffective (Adult)  Goal: Identify Related Risk Factors and Signs and Symptoms  Outcome: Ongoing (interventions implemented as appropriate)    Goal: Effective Oxygenation/Ventilation  Outcome: Ongoing (interventions implemented as appropriate)    Goal: Anxiety/Fear Reduction  Outcome: Ongoing (interventions implemented as appropriate)

## 2018-07-12 NOTE — PROGRESS NOTES
"     LOS: 3 days     Name: Heath Bajwa  Age/Sex: 68 y.o. male  :  1950        PCP: No Known Provider  REF: No ref. provider found    Principal Problem:    Venous stasis dermatitis of both lower extremities  Active Problems:    Acute respiratory failure with hypoxia and hypercapnia (CMS/HCC)      Reason for follow-up: Elevated troponin    Subjective  Mr. Bajwa is a pleasant 68-year-old  male with no history of known coronary artery disease, has long history of smoking of one to 2 packs a day for about 25 years with underlying significant COPD, was admitted with complaints of increasing shortness of breath for the last 2-3 days and was noted to be having acute exacerbation of COPD with acute hypoxic/hypercarbic respiratory failure.  Since admission his troponins have been elevated in the indeterminate range and have been trending down since.  It peaked at 0.303 at admission and has been trending down.  On further questioning Mr. Bajwa denies any compressive chest pain or discomfort in the recent or remote past.  He denies any history of known coronary artery disease.  His main complaint is having shortness of breath.  He does complain of occasional \"gas-like pains\".  He has been on aspirin 325 mg daily and atorvastatin 20 mg daily.  His EKG revealed normal sinus rhythm with poor R wave progression across leads V1 through V4.  His echo Doppler study which I reviewed at the bedside while it was being done revealed normal LV wall motion and systolic function with an estimated ejection fraction of about 60-65%.  There was mild mitral regurgitation with evidence of mild to moderate pulmonary hypertension.      Interval History: Patient denies any complaints of chest pains.  His breathing seems to have improved significantly the last 24 hours.    ROS    Vital Signs  Temp:  [97.9 °F (36.6 °C)-98.4 °F (36.9 °C)] 97.9 °F (36.6 °C)  Heart Rate:  [56-92] 61  Resp:  [16-23] 22  BP: (125-184)/() " "181/88  Vital Signs (last 72 hrs)       07/08 0700  -  07/09 0659 07/09 0700  -  07/10 0659 07/10 0700  -  07/11 0659 07/11 0700  -  07/11 0936   Most Recent    Temp (°F)   96.8 -  98.7    98 -  98.4      98.3     98.3 (36.8)    Heart Rate   65 -  108    57 -  97    69 -  78     76    Resp   16 -  (!)30    12 -  24      22     22    BP   97/62 -  171/92    94/55 -  176/98    174/79 -  (!) 187/86     (!) 187/86    SpO2 (%)   (!)86 -  99    (!)86 -  97    94 -  95     94        Body mass index is 34.34 kg/m².    Intake/Output Summary (Last 24 hours) at 07/12/18 1053  Last data filed at 07/12/18 0500   Gross per 24 hour   Intake              930 ml   Output             2850 ml   Net            -1920 ml     Objective:  Vital signs: (most recent): Blood pressure (!) 181/88, pulse 61, temperature 97.9 °F (36.6 °C), temperature source Oral, resp. rate 22, height 182.9 cm (72\"), weight 115 kg (253 lb 3.2 oz), SpO2 94 %.                  Physical Exam:     General Appearance:    Alert, cooperative, in no acute distress   Head:    Normocephalic, without obvious abnormality, atraumatic   Eyes:            Conjunctivae and sclerae normal, no   icterus, no pallor, corneas clear.   Neck:   No adenopathy, supple, trachea midline, no thyromegaly, no   carotid bruit, no JVD   Lungs:     Clear to auscultation,respirations regular, even and                  unlabored    Heart:    Regular rhythm and normal rate, normal S1 and S2, no            murmur, no gallop, no rub, no click   Chest Wall:    No abnormalities observed   Abdomen:     Normal bowel sounds, no masses, no organomegaly, soft        non-tender, non-distended, no guarding, no rebound                tenderness   Extremities:   Moves all extremities well, the erythema and edema on the lower extremities is also better, no cyanosis.   Pulses: Decreased pulses in both feet.              Procedures    Results Review:     Results from last 7 days  Lab Units 07/12/18  0109 " 07/11/18  0106 07/10/18  0545 07/09/18  0953   WBC 10*3/mm3 8.68 9.42 7.39 8.19   HEMOGLOBIN g/dL 16.3 15.9 15.9 17.2   PLATELETS 10*3/mm3 194 214 224 197       Results from last 7 days  Lab Units 07/12/18  0109 07/11/18  0715 07/11/18  0106 07/10/18  1151 07/10/18  0545 07/09/18  0953   SODIUM mmol/L 131* 130* 130*  --  127* 130*   POTASSIUM mmol/L 5.1 5.3 5.2 5.0 5.8* 4.9   CHLORIDE mmol/L 92* 91* 92*  --  90* 89*   CO2 mmol/L 34.6* 35.1* 34.8*  --  36.4* 38.2*   BUN mg/dL 27* 29* 26*  --  21 16   CREATININE mg/dL 0.88 1.00 0.96  --  1.16 1.00   CALCIUM mg/dL 8.6 8.5 8.5  --  8.6 9.1   GLUCOSE mg/dL 209* 193* 139*  --  321* 149*   ALT (SGPT) U/L  --   --  18  --   --  22   AST (SGOT) U/L  --   --  22  --   --  21       Results from last 7 days  Lab Units 07/10/18  0545 07/10/18  0026 07/09/18  1737 07/09/18  1153 07/09/18  0953   CK TOTAL U/L 165  165 153 130  130  --  139   TROPONIN I ng/mL 0.144* 0.126* 0.184* 0.275* 0.303*   CKMB ng/mL 4.67 5.92* 4.89  --  6.13*     Lab Results   Component Value Date    INR 1.03 07/09/2018     Lab Results   Component Value Date    MG 2.3 07/11/2018    MG 2.2 07/10/2018     Lab Results   Component Value Date    TSH 2.186 07/11/2018    PSA 3.4 04/05/2017    CHLPL 107 04/11/2015    TRIG 69 07/11/2018    HDL 50 (L) 07/11/2018    LDL 88 07/11/2018      Lab Results   Component Value Date    BNP 69.0 07/09/2018    BNP 12 01/16/2016    BNP 34 04/10/2015         ECG      Echo   Interpretation Summary     · Normal left ventricular cavity size and wall thickness noted. All left ventricular wall segments contract normally.  · Estimated EF appears to be in the range of 61 - 65%.  · The aortic valve is structurally normal. No aortic valve regurgitation is present. No aortic valve stenosis is present.  · The mitral valve is normal in structure. No mitral valve regurgitation is present. No significant mitral valve stenosis is present.  · The tricuspid valve is normal. No tricuspid valve  stenosis is present. Mild tricuspid valve regurgitation is present. Estimated right ventricular systolic pressure from tricuspid regurgitation is mildly elevated (35-45 mmHg).  · Mild pulmonary hypertension is present.  · There is no evidence of pericardial effusion.           I reviewed the patient's new clinical results.    Telemetry:Sinus rhythm in the 70s.       Medication Review:     amLODIPine 5 mg Oral Q24H   aspirin 325 mg Oral Daily   atorvastatin 40 mg Oral Daily   bacitracin  Topical Q12H   budesonide-formoterol 2 puff Inhalation BID - RT   cefdinir 300 mg Oral Q12H   cetirizine 10 mg Oral Daily   docusate sodium 100 mg Oral BID   donepezil 10 mg Oral Nightly   doxycycline 100 mg Oral Q12H   fluticasone 1 spray Each Nare BID   furosemide 20 mg Intravenous Once   gabapentin 600 mg Oral Q8H   heparin (porcine) 5,000 Units Subcutaneous Q12H   insulin aspart 0-9 Units Subcutaneous 4x Daily AC & at Bedtime   insulin aspart prot-insulin aspart 25 Units Subcutaneous BID With Meals   ipratropium-albuterol 3 mL Nebulization 4x Daily - RT   lactobacillus acidophilus 1 capsule Oral Daily   Liraglutide 1.8 mg Subcutaneous Daily   lisinopril 40 mg Oral Daily   nicotine 1 patch Transdermal Q24H   [START ON 7/13/2018] predniSONE 40 mg Oral Daily With Breakfast   sodium chloride 3 mL Intravenous Q8H   tamsulosin 0.4 mg Oral Q PM         Pharmacy Consult        Assessment:  1. Indeterminate range troponin elevation (possible small acute non-ST elevation myocardial infarction) which is probably due to demand ischemia from acute hypoxic respiratory failure, cannot exclude underlying significant coronary artery disease given his age and risk factors for coronary artery disease.  2. Uncontrolled hypertension.  3. Acute hypoxic/hypercapnic respiratory failure due to acute exacerbation of COPD, being managed by the hospitalist service.  4. Cellulitis of the lower extremities, being managed by the hospitalist  service.  5. Tobacco abuse.  6. Peripheral arterial disease of both lower extremities with ulcers on the right leg.     Recommendations:  1. Continue with aspirin and atorvastatin at current doses.  2. Continue with amlodipine and increase the dose to 10 mg daily and add HCTZ for his uncontrolled hypertension.  3. Patient is due to have dobutamine sestamibi study.    I discussed the patients findings and my recommendations with patient.        MARGO Magallanes  07/12/18  10:53 AM    Dragon disclaimer:  Much of this encounter note is an electronic transcription/translation of spoken language to printed text. The electronic translation of spoken language may permit erroneous, or at times, nonsensical words or phrases to be inadvertently transcribed; Although I have reviewed the note for such errors, some may still exist.

## 2018-07-12 NOTE — PROGRESS NOTES
Assisted By: Georgiana RAO    CC: Follow-up respiratory failure    Interview History/HPI: Patient did have a bowel movement after cathartics.  He states he is breathing better and he is denying any chest pain.  No palpitations.  He tolerated his diet yesterday.  Minimal cough no significant phlegm production.  He states he is voiding.      Vitals:    07/12/18 0703   BP: 167/94   Pulse: 77   Resp: 18   Temp:    SpO2: 94%       Intake/Output Summary (Last 24 hours) at 07/12/18 0929  Last data filed at 07/12/18 0500   Gross per 24 hour   Intake              930 ml   Output             3150 ml   Net            -2220 ml       EXAM: 97.9, patient appears to be comfortable.  He has been able to be maintained on nasal cannula without high flow.  Normocephalic/atraumatic.  He appears in no distress.  Lungs have bilateral breath sounds although diminished are clear no significant increased respiratory effort and he can speak in fairly full sentences.  Heart regular rate and rhythm without murmur gallop, abdomen soft benign.  Bowel sounds are active.  Extremities are improving, the edema he had his improved there some wrinkling of the skin the cellulitis is also improving this was right greater than left and the ulcerations are healing.  He does have light palpable pulses.  Mood is good, skin warm and dry, neurologically he is alert and nonfocal.    Tele: Sinus, reviewed    LABS:   Lab Results (last 48 hours)     Procedure Component Value Units Date/Time    POC Glucose Once [[id number]]  (Abnormal) Collected:  07/12/18 0554    Specimen:  Blood Updated:  07/12/18 0602     Glucose 184 (H) mg/dL     Narrative:       Meter: [id number] : [id number] debby canseco    C-reactive Protein [[id number]]  (Normal) Collected:  07/12/18 0109    Specimen:  Blood Updated:  07/12/18 0228     C-Reactive Protein <0.50 mg/dL     Basic Metabolic Panel [[id number]]  (Abnormal) Collected:  07/12/18 0109    Specimen:  Blood Updated:  07/12/18 0219      Glucose 209 (H) mg/dL      BUN 27 (H) mg/dL      Creatinine 0.88 mg/dL      Sodium 131 (L) mmol/L      Potassium 5.1 mmol/L      Chloride 92 (L) mmol/L      CO2 34.6 (H) mmol/L      Calcium 8.6 mg/dL      eGFR Non African Amer 86 mL/min/1.73      BUN/Creatinine Ratio 30.7 (H)     Anion Gap 4.4 mmol/L     Narrative:       GFR Normal >60  Chronic Kidney Disease <60  Kidney Failure <15    Osmolality, Calculated [[id number]]  (Normal) Collected:  07/12/18 0109    Specimen:  Blood Updated:  07/12/18 0219     Osmolality Calc 273.9 mOsm/kg     CBC & Differential [[id number]] Collected:  07/12/18 0109    Specimen:  Blood Updated:  07/12/18 0153    Narrative:       The following orders were created for panel order CBC & Differential.  Procedure                               Abnormality         Status                     ---------                               -----------         ------                     CBC Auto Differential[[id number]]        Abnormal            Final result                 Please view results for these tests on the individual orders.    CBC Auto Differential [[id number]]  (Abnormal) Collected:  07/12/18 0109    Specimen:  Blood Updated:  07/12/18 0153     WBC 8.68 10*3/mm3      RBC 5.28 10*6/mm3      Hemoglobin 16.3 g/dL      Hematocrit 49.2 %      MCV 93.2 fL      MCH 30.9 pg      MCHC 33.1 g/dL      RDW 13.9 %      RDW-SD 45.7 fl      MPV 10.5 (H) fL      Platelets 194 10*3/mm3      Neutrophil % 88.5 (H) %      Lymphocyte % 5.2 (L) %      Monocyte % 6.1 %      Eosinophil % 0.0 %      Basophil % 0.0 %      Immature Grans % 0.2 %      Neutrophils, Absolute 7.68 (H) 10*3/mm3      Lymphocytes, Absolute 0.45 (L) 10*3/mm3      Monocytes, Absolute 0.53 10*3/mm3      Eosinophils, Absolute 0.00 10*3/mm3      Basophils, Absolute 0.00 10*3/mm3      Immature Grans, Absolute 0.02 10*3/mm3     POC Glucose Once [[id number]]  (Abnormal) Collected:  07/11/18 2112    Specimen:  Blood Updated:  07/11/18 2118     Glucose  270 (H) mg/dL     Narrative:       Meter: [id number] : [id number] debby canseco    Puako / Lambda Light Chains, Urine, 24 Hour - Urine, Clean Catch [[id number]] Collected:  07/11/18 1604    Specimen:  24 Hour Urine from Urine, Clean Catch Updated:  07/11/18 2025    POC Glucose Once [[id number]]  (Abnormal) Collected:  07/11/18 1733    Specimen:  Blood Updated:  07/11/18 1747     Glucose 221 (H) mg/dL     Narrative:       Meter: [id number] : [id number] augustus mcclendon    Hepatitis C RNA, Quantitative, PCR (graph) [[id number]] Collected:  07/11/18 1620    Specimen:  Blood Updated:  07/11/18 1641    Eosinophil Smear - Urine, Urine, Clean Catch [[id number]] Collected:  07/10/18 1351    Specimen:  Urine from Urine, Clean Catch Updated:  07/11/18 1621     Eosinophil, Urine No Eosinophils Seen %      Comment:                                   <5% few or none seen       Narrative:       Performed at:  [address] [place], [place] [id number]  : Tato Dye PhD, Phone:  [phone]    POC Glucose Once [[id number]]  (Abnormal) Collected:  07/11/18 1120    Specimen:  Blood Updated:  07/11/18 1128     Glucose 223 (H) mg/dL     Narrative:       Meter: [id number] : [id number] augustus mcclendon    Protein / Creatinine Ratio, Urine - Urine, Clean Catch [[id number]]  (Abnormal) Collected:  07/11/18 0935    Specimen:  Urine from Urine, Clean Catch Updated:  07/11/18 1116     Protein/Creatinine Ratio, Urine 3,863.0 (H) mg/G Crea      Creatinine, Urine 36.5 mg/dL      Total Protein, Urine 141.0 mg/dL     Microalbumin / Creatinine Urine Ratio - Urine, Clean Catch [[id number]] Collected:  07/11/18 0935    Specimen:  Urine from Urine, Clean Catch Updated:  07/11/18 1116     Microalbumin/Creatinine Ratio 2,799.5 mg/g      Creatinine, Urine 36.5 mg/dL      Microalbumin, Urine 1,021.8 mg/L     Blood Culture - Blood, [[id number]]  (Normal) Collected:  07/09/18 0945    Specimen:  Blood from Arm, Right  Updated:  07/11/18 1015     Blood Culture No growth at 2 days    Blood Culture - Blood, [[id number]]  (Normal) Collected:  07/09/18 0953    Specimen:  Blood from Arm, Left Updated:  07/11/18 1015     Blood Culture No growth at 2 days    POC Glucose Once [[id number]]  (Abnormal) Collected:  07/11/18 0927    Specimen:  Blood Updated:  07/11/18 0934     Glucose 283 (H) mg/dL     Narrative:       Meter: [id number] : [id number] augustus mcclendon    Blood Gas, Arterial With Co-Ox [[id number]]  (Abnormal) Collected:  07/11/18 0845    Specimen:  Arterial Blood Updated:  07/11/18 0907     Site Arterial: right radial     Chandan's Test Positive     pH, Arterial 7.412 pH units      pCO2, Arterial 54.9 (C) mm Hg      pO2, Arterial 62.4 (L) mm Hg      HCO3, Arterial 34.2 (C) mmol/L      Base Excess, Arterial 7.4 mmol/L      O2 Saturation, Arterial 91.3 %      Hemoglobin, Blood Gas 17.2 (H) g/dL      Hematocrit, Blood Gas 51.0 %      Oxyhemoglobin 89.7 %      Methemoglobin 0.30 %      Carboxyhemoglobin 1.5 %      A-a Gradiant 98.6 mmHg      Temperature 98.6 C      Barometric Pressure for Blood Gas 729 mmHg      Modality HFNC     FIO2 33 %      pH, Temp Corrected -- pH Units      pCO2, Temperature Corrected -- mm Hg      pO2, Temperature Corrected -- mm Hg     Cortisol - AM [[id number]]  (Normal) Collected:  07/11/18 0715    Specimen:  Blood Updated:  07/11/18 0805     Cortisol - AM 13.00 mcg/dL     Basic Metabolic Panel [[id number]]  (Abnormal) Collected:  07/11/18 0715    Specimen:  Blood Updated:  07/11/18 0755     Glucose 193 (H) mg/dL      BUN 29 (H) mg/dL      Creatinine 1.00 mg/dL      Sodium 130 (L) mmol/L      Potassium 5.3 mmol/L      Chloride 91 (L) mmol/L      CO2 35.1 (H) mmol/L      Calcium 8.5 mg/dL      eGFR Non African Amer 74 mL/min/1.73      BUN/Creatinine Ratio 29.0 (H)     Anion Gap 3.9 mmol/L     Narrative:       GFR Normal >60  Chronic Kidney Disease <60  Kidney Failure <15    Osmolality, Calculated [[id number]]   (Abnormal) Collected:  07/11/18 0715    Specimen:  Blood Updated:  07/11/18 0755     Osmolality Calc 271.9 (L) mOsm/kg     Hepatitis Panel, Acute [[id number]]  (Abnormal) Collected:  07/11/18 0106    Specimen:  Blood Updated:  07/11/18 0247     Hepatitis B Surface Ag Non-Reactive     Hep A IgM Non-Reactive     Hep B C IgM Non-Reactive     Hepatitis C Ab Reactive (A)    TSH [[id number]]  (Normal) Collected:  07/11/18 0106    Specimen:  Blood Updated:  07/11/18 0220     TSH 2.186 mIU/mL     C-reactive Protein [[id number]]  (Abnormal) Collected:  07/11/18 0106    Specimen:  Blood Updated:  07/11/18 0213     C-Reactive Protein 1.04 (H) mg/dL     Osmolality, Calculated [[id number]]  (Abnormal) Collected:  07/11/18 0106    Specimen:  Blood Updated:  07/11/18 0213     Osmolality Calc 267.8 (L) mOsm/kg     Comprehensive Metabolic Panel [[id number]]  (Abnormal) Collected:  07/11/18 0106    Specimen:  Blood Updated:  07/11/18 0213     Glucose 139 (H) mg/dL      BUN 26 (H) mg/dL      Creatinine 0.96 mg/dL      Sodium 130 (L) mmol/L      Potassium 5.2 mmol/L      Chloride 92 (L) mmol/L      CO2 34.8 (H) mmol/L      Calcium 8.5 mg/dL      Total Protein 6.0 g/dL      Albumin 3.10 (L) g/dL      ALT (SGPT) 18 U/L      AST (SGOT) 22 U/L      Alkaline Phosphatase 80 U/L      Comment: Note New Reference Ranges        Total Bilirubin 0.4 mg/dL      eGFR Non African Amer 77 mL/min/1.73      Globulin 2.9 gm/dL      A/G Ratio 1.1 (L) g/dL      BUN/Creatinine Ratio 27.1 (H)     Anion Gap 3.2 (L) mmol/L     C3 Complement [[id number]]  (Normal) Collected:  07/11/18 0106    Specimen:  Blood Updated:  07/11/18 0212     C3 Complement 123.0 mg/dl     C4 Complement [[id number]]  (Normal) Collected:  07/11/18 0106    Specimen:  Blood Updated:  07/11/18 0212     C4 Complement 30.1 mg/dl     Magnesium [[id number]]  (Normal) Collected:  07/11/18 0106    Specimen:  Blood Updated:  07/11/18 0211     Magnesium 2.3 mg/dL     Lipid Panel [[id number]]  (Abnormal)  Collected:  07/11/18 0106    Specimen:  Blood Updated:  07/11/18 0211     Total Cholesterol 152 mg/dL      Triglycerides 69 mg/dL      HDL Cholesterol 50 (L) mg/dL      LDL Cholesterol  88 mg/dL      VLDL Cholesterol 13.8 mg/dL      LDL/HDL Ratio 1.76    Narrative:       Cholesterol Reference Ranges  (U.S. Department of Health and Human Services ATP III Classifications)    Desirable          <200 mg/dL  Borderline High    200-239 mg/dL  High Risk          >240 mg/dL      Triglyceride Reference Ranges  (U.S. Department of Health and Human Services ATP III Classifications)    Normal           <150 mg/dL  Borderline High  150-199 mg/dL  High             200-499 mg/dL  Very High        >500 mg/dL    HDL Reference Ranges  (U.S. Department of Health and Human Services ATP III Classifcations)    Low     <40 mg/dl (major risk factor for CHD)  High    >60 mg/dl ('negative' risk factor for CHD)        LDL Reference Ranges  (U.S. Department of Health and Human Services ATP III Classifcations)    Optimal          <100 mg/dL  Near Optimal     100-129 mg/dL  Borderline High  130-159 mg/dL  High             160-189 mg/dL  Very High        >189 mg/dL    Hemoglobin A1c [[id number]]  (Abnormal) Collected:  07/11/18 0106    Specimen:  Blood Updated:  07/11/18 0159     Hemoglobin A1C 6.90 (H) %     CBC & Differential [[id number]] Collected:  07/11/18 0106    Specimen:  Blood Updated:  07/11/18 0140    Narrative:       The following orders were created for panel order CBC & Differential.  Procedure                               Abnormality         Status                     ---------                               -----------         ------                     CBC Auto Differential[[id number]]        Abnormal            Final result                 Please view results for these tests on the individual orders.    CBC Auto Differential [[id number]]  (Abnormal) Collected:  07/11/18 0106    Specimen:  Blood Updated:  07/11/18 0140     WBC 9.42  10*3/mm3      RBC 5.23 10*6/mm3      Hemoglobin 15.9 g/dL      Hematocrit 48.8 %      MCV 93.3 fL      MCH 30.4 pg      MCHC 32.6 (L) g/dL      RDW 14.0 %      RDW-SD 45.7 fl      MPV 10.7 (H) fL      Platelets 214 10*3/mm3      Neutrophil % 92.6 (H) %      Lymphocyte % 4.7 (L) %      Monocyte % 2.4 %      Eosinophil % 0.0 %      Basophil % 0.1 %      Immature Grans % 0.2 %      Neutrophils, Absolute 8.72 (H) 10*3/mm3      Lymphocytes, Absolute 0.44 (L) 10*3/mm3      Monocytes, Absolute 0.23 10*3/mm3      Eosinophils, Absolute 0.00 10*3/mm3      Basophils, Absolute 0.01 10*3/mm3      Immature Grans, Absolute 0.02 10*3/mm3     Anti-DNA Antibody, Double-stranded [[id number]] Collected:  07/11/18 0106    Specimen:  Blood Updated:  07/11/18 0134    Antinuclear Antibodies Direct [[id number]] Collected:  07/11/18 0106    Specimen:  Blood Updated:  07/11/18 0134    Immunoglobulin Free LT Chains Blood [[id number]] Collected:  07/11/18 0106    Specimen:  Blood Updated:  07/11/18 0134    ANCA Panel [[id number]] Collected:  07/11/18 0106    Specimen:  Blood Updated:  07/11/18 0134    Glomerular Basement Membrane Antibodies [[id number]] Collected:  07/11/18 0106    Specimen:  Blood Updated:  07/11/18 0134    KENYATTA + PE [[id number]] Collected:  07/11/18 0106    Specimen:  Blood Updated:  07/11/18 0134    POC Glucose Once [[id number]]  (Normal) Collected:  07/10/18 2122    Specimen:  Blood Updated:  07/10/18 2137     Glucose 108 mg/dL     Narrative:       Meter: [id number] : [id number] Lei Cosby    POC Glucose Once [[id number]]  (Normal) Collected:  07/10/18 1713    Specimen:  Blood Updated:  07/10/18 1738     Glucose 93 mg/dL     Narrative:       Meter: [id number] : [id number] Medardo Grimm    Urinalysis With Microscopic - Urine, Clean Catch [[id number]] Collected:  07/10/18 1351    Specimen:  Urine from Urine, Clean Catch Updated:  07/10/18 1437    Narrative:       The following orders were created for panel order  Urinalysis With Microscopic - Urine, Clean Catch.  Procedure                               Abnormality         Status                     ---------                               -----------         ------                     Urinalysis without micro...[[id number]]  Abnormal            Final result               Urinalysis, Microscopic ...[[id number]]  Abnormal            Final result                 Please view results for these tests on the individual orders.    Urinalysis, Microscopic Only - Urine, Clean Catch [[id number]]  (Abnormal) Collected:  07/10/18 1351    Specimen:  Urine from Urine, Clean Catch Updated:  07/10/18 1437     RBC, UA 6-12 (A) /HPF      WBC, UA 3-5 (A) /HPF      Bacteria, UA None Seen /HPF      Squamous Epithelial Cells, UA 0-2 /HPF      Hyaline Casts, UA 3-6 /LPF      Methodology Automated Microscopy    Urinalysis without microscopic (no culture) - Urine, Clean Catch [[id number]]  (Abnormal) Collected:  07/10/18 1351    Specimen:  Urine from Urine, Clean Catch Updated:  07/10/18 1437     Color, UA Yellow     Appearance, UA Clear     pH, UA 6.0     Specific Gravity, UA 1.024     Glucose,  mg/dL (Trace) (A)     Ketones, UA Negative     Bilirubin, UA Negative     Blood, UA Trace (A)     Protein, UA >=300 mg/dL (3+) (A)     Leuk Esterase, UA Negative     Nitrite, UA Negative     Urobilinogen, UA 0.2 E.U./dL    Sodium, Urine, Random - Urine, Clean Catch [[id number]] Collected:  07/10/18 1351    Specimen:  Urine from Urine, Clean Catch Updated:  07/10/18 1434     Sodium, Urine 43 mmol/L     Chloride, Urine, Random - Urine, Clean Catch [[id number]] Collected:  07/10/18 1351    Specimen:  Urine from Urine, Clean Catch Updated:  07/10/18 1434     Chloride, Urine 50 mmol/L     Potassium, Urine, Random - Urine, Clean Catch [[id number]] Collected:  07/10/18 1351    Specimen:  Urine from Urine, Clean Catch Updated:  07/10/18 1434     Potassium, Urine 75.5 mmol/L     Antihistone Antibodies [[id number]]  Collected:  07/10/18 1425    Specimen:  Blood Updated:  07/10/18 1433    Potassium [[id number]]  (Normal) Collected:  07/10/18 1151    Specimen:  Blood Updated:  07/10/18 1236     Potassium 5.0 mmol/L     POC Glucose Once [[id number]]  (Abnormal) Collected:  07/10/18 1159    Specimen:  Blood Updated:  07/10/18 1224     Glucose 176 (H) mg/dL     Narrative:       Meter: [id number] : [id number] Medardo Grimm          Radiology:  Imaging Results (last 72 hours)     Procedure Component Value Units Date/Time    XR Chest 1 View [[id number]] Collected:  07/11/18 1119     Updated:  07/11/18 1121    Narrative:       XR CHEST 1 VW-     CLINICAL INDICATION: sob; J96.01-Acute respiratory failure with hypoxia;  J96.02-Acute respiratory failure with hypercapnia; I21.4-Non-ST  elevation (NSTEMI) myocardial infarction; L03.115-Cellulitis of right  lower limb          COMPARISON: 07/09/2018      TECHNIQUE: Single frontal view of the chest.     FINDINGS:     Trace bibasilar effusions and bibasilar airspace disease, probably  atelectasis, but cannot exclude pneumonia  The cardiac silhouette is normal. The pulmonary vasculature is  unremarkable.  There is no evidence of an acute osseous abnormality.   There are no suspicious-appearing parenchymal soft tissue nodules.            Impression:       Trace bibasilar effusions and bibasilar consolidation         This report was finalized on 7/11/2018 11:19 AM by Dr. Jose Luis Romero MD.       US Arterial Doppler Lower Extremity Left [[id number]] Collected:  07/10/18 1339     Updated:  07/10/18 1351    Narrative:       US ARTERIAL DOPPLER LOWER EXTREMITY LEFT-     REASON FOR EXAM:  PVD; J96.01-Acute respiratory failure with hypoxia;  J96.02-Acute respiratory failure with hypercapnia; I21.4-Non-ST  elevation (NSTEMI) myocardial infarction; L03.115-Cellulitis of right  lower limb. Decreased pulses on physical exam in the lower extremities.     TECHNIQUE:  Multiple real-time color doppler  images were obtained.  M-mode Doppler was used for velocity determination and spectral pattern.  Stenosis was evaluated using the NASCET or similar measurement  technique.     FINDINGS:   Left leg: The external iliac artery was normal in caliber. The Doppler  wave pattern was monophasic with peak systolic velocity of 140 cm/s.  There was monophasic flow in the common femoral artery with a velocity  of 319 cm/s. There is some calcific plaque in the common femoral artery.  Color Doppler flow is seen in the profunda femoral and superficial  femoral arteries in the leg. The Doppler wave pattern is monophasic in  the thigh. No occluded segments were demonstrated. The popliteal artery  has a monophasic flow pattern and a velocity of 48 cm/s. There was  monophasic flow in the posterior tibial artery, the velocity measured 37  cm/s.       Impression:       Atherosclerotic plaquing is noted predominantly at the level  of the common femoral artery where there is velocity elevation. The  Doppler wave pattern throughout the leg was monophasic suggesting  possibility of more proximal disease above the external iliac artery. In  the runoff vessels no areas of occlusion were demonstrated.             This report was finalized on 7/10/2018 1:48 PM by Dr. Jose Ramon Hatch II, MD.       XR Abdomen 2 View With Chest 1 View [[id number]] Collected:  07/09/18 1552     Updated:  07/09/18 1556    Narrative:          XR ABDOMEN 2 VW W CHEST 1 VW-     CLINICAL INDICATION: abd distention; J96.01-Acute respiratory failure  with hypoxia; J96.02-Acute respiratory failure with hypercapnia;  I21.4-Non-ST elevation (NSTEMI) myocardial infarction          COMPARISON: 07/09/2018      FINDINGS:  Chest:  Bibasilar airspace disease, either atelectasis or pneumonia     Abdomen:  Two views of the abdomen show no free air. I do not see abnormal bowel  distention to suggest complete obstruction. The bony structures are  intact. No abnormal soft tissue masses  are seen. No suspicious appearing  calcifications are identified on today's exam.       Impression:       1. 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 Bibasilar airspace disease.  3. No evidence of bowel obstruction.  4. No free air.     5.            This report was finalized on 7/9/2018 3:54 PM by Dr. Jose Luis Romero MD.       US Arterial Doppler Lower Extremity Right [[id number]] Collected:  07/09/18 1339     Updated:  07/09/18 1407    Narrative:       US ARTERIAL DOPPLER LOWER EXTREMITY RIGHT-     REASON FOR EXAM:  Color change/ decreased pulses.     TECHNIQUE:  Multiple real-time color doppler images were obtained.  M-mode Doppler was used for velocity determination and spectral pattern.  Stenosis was evaluated using the NASCET or similar measurement  technique.     FINDINGS:   Right leg: The external iliac artery had extensive calcific plaque in  the lumen. It is narrowed. The Doppler wave pattern was monophasic. The  velocity is significantly elevated measuring 416 cm/s. There is calcific  plaque at the common femoral artery level. The Doppler wave pattern was  monophasic. The peak systolic velocity was 213 cm/s. Color Doppler flow  was seen in the profunda femoral and superficial femoral artery. There  were no areas of occlusion in the superficial femoral artery. The  Doppler wave pattern was monophasic. The popliteal artery has a  monophasic flow pattern velocity of 87 cm/s. There was monophasic flow  in the posterior tibial artery. At the ankle the velocity measured 26  cm/s.       Impression:       Heavy atherosclerotic plaquing and stenosis is noted at the  level of the external iliac and common femoral arteries in the proximal  leg. The velocities were elevated to over 400 cm/s. The runoff arteries      showed no focal areas of stenosis or occlusion in the right leg.     This report was finalized on 7/9/2018 2:05 PM by Dr. Jose Ramon Hatch II, MD.       US Venous Doppler Lower Extremity Right (duplex) [[id number]]  Collected:  07/09/18 1311     Updated:  07/09/18 1313    Narrative:       US VENOUS DOPPLER LOWER EXTREMITY RIGHT (DUPLEX)-     REASON FOR EXAM:  redness and pain     Multiple real-time images were obtained. The deep veins were well  demonstrated sonographically. There is good color doppler signal seen  filling the deep veins. They were completely compressed by the  ultrasound transducer. There was good spontaneous venous flow and  augmentation. There are no echoes seen along the deep veins to suggest  clot.          Impression:       No sonographic findings of DVT in the right lower extremity     This report was finalized on 7/9/2018 1:11 PM by Dr. Jose Ramon Hatch II, MD.       XR Chest 1 View [[id number]] Collected:  07/09/18 1006     Updated:  07/09/18 1017    Narrative:       XR CHEST 1 VW-     CLINICAL INDICATION: soa          COMPARISON: 01/16/2016      TECHNIQUE: Single frontal view of the chest.     FINDINGS:     Bibasilar atelectasis  The cardiac silhouette is normal. The pulmonary vasculature is  unremarkable.  There is no evidence of an acute osseous abnormality.   There are no suspicious-appearing parenchymal soft tissue nodules.            Impression:       Bibasilar atelectasis         This report was finalized on 7/9/2018 10:06 AM by Dr. Jose Luis Romero MD.           Chest x-ray from yesterday reviewed, appears to have some small bilateral effusions, report noted.    Results for orders placed during the hospital encounter of 07/09/18   Adult Transthoracic Echo Complete W/ Cont if Necessary Per Protocol    Narrative · Normal left ventricular cavity size and wall thickness noted. All left   ventricular wall segments contract normally.  · Estimated EF appears to be in the range of 61 - 65%.  · The aortic valve is structurally normal. No aortic valve regurgitation   is present. No aortic valve stenosis is present.  · The mitral valve is normal in structure. No mitral valve regurgitation   is present. No  significant mitral valve stenosis is present.  · The tricuspid valve is normal. No tricuspid valve stenosis is present.   Mild tricuspid valve regurgitation is present. Estimated right ventricular   systolic pressure from tricuspid regurgitation is mildly elevated (35-45   mmHg).  · Mild pulmonary hypertension is present.  · There is no evidence of pericardial effusion.            Assessment/Plan:   COPD with exacerbation acute hypercarbic and hypoxic respiratory failure.  Patient is improved, he is been able to be weaned down to nasal cannula, I'm continuing his steroid wean.  He has been changed to oral prednisone.  Antibiotics are oral as well.    Cellulitis, CRP is negative, completing an oral doxycycline and Omnicef course as recommended by infectious disease, improving, improving with Lasix    Diabetes, not well controlled, he is on Victoza sliding scale and 70/30, not well controlled, 70/30 adjusted.  Follow as steroids weaned as well.    DVT prophylaxis subcutaneous heparin    Hyponatremia, improving with fluid restriction    Indeterminate troponin, stress test to be done today.  EF normal    Proteinuria, workup in process per nephrology    Hyperkalemia, stable, bowels move yesterday.    Hypertension, when necessary clonidine added, Norvasc started yesterday, follow

## 2018-07-12 NOTE — PROGRESS NOTES
Nephrology Note    Subjective       He has been weaned Down to nasal canula. denies SOB. Had BM with lactulose. BP elevated.     Objective     Vital Signs  Temp:  [97.9 °F (36.6 °C)-98.4 °F (36.9 °C)] 97.9 °F (36.6 °C)  Heart Rate:  [56-92] 61  Resp:  [16-23] 22  BP: (125-184)/() 181/88    No intake/output data recorded.  I/O last 3 completed shifts:  In: 1350 [P.O.:1350]  Out: 4150 [Urine:4150]    Physical Examination:    General Appearance : alert, appears stated age, cooperative   Head : normocephalic, without obvious abnormality and atraumatic  Eyes :  no pallor  Throat : oral mucosa moist  Neck: no JVD  Lungs :reduced breath sounds all over  Heart : regular rhythm & normal rate, normal S1, S2, no murmur, no gillian, no rub   Abdomen :  normal bowel sounds, soft non-tender  Extremities : 1+  Edema  Pulses :  palpable and equal bilaterally  Skin  RLE venous ulcers, venous stasis both legs  Neurologic : orientated to person, place, time, grossly no focal deficitis    Laboratory Data :      WBC WBC   Date Value Ref Range Status   07/12/2018 8.68 4.50 - 12.50 10*3/mm3 Final   07/11/2018 9.42 4.50 - 12.50 10*3/mm3 Final   07/10/2018 7.39 4.50 - 12.50 10*3/mm3 Final      HGB Hemoglobin   Date Value Ref Range Status   07/12/2018 16.3 14.0 - 18.0 g/dL Final   07/11/2018 15.9 14.0 - 18.0 g/dL Final   07/10/2018 15.9 14.0 - 18.0 g/dL Final      HCT Hematocrit   Date Value Ref Range Status   07/12/2018 49.2 42.0 - 52.0 % Final   07/11/2018 48.8 42.0 - 52.0 % Final   07/10/2018 48.3 42.0 - 52.0 % Final      Platlets No results found for: LABPLAT   MCV MCV   Date Value Ref Range Status   07/12/2018 93.2 80.0 - 94.0 fL Final   07/11/2018 93.3 80.0 - 94.0 fL Final   07/10/2018 94.0 80.0 - 94.0 fL Final          Sodium Sodium   Date Value Ref Range Status   07/12/2018 131 (L) 135 - 153 mmol/L Final   07/11/2018 130 (L) 135 - 153 mmol/L Final   07/11/2018 130 (L) 135 - 153 mmol/L Final   07/10/2018 127 (L) 135 - 153 mmol/L  Final      Potassium Potassium   Date Value Ref Range Status   07/12/2018 5.1 3.5 - 5.3 mmol/L Final   07/11/2018 5.3 3.5 - 5.3 mmol/L Final   07/11/2018 5.2 3.5 - 5.3 mmol/L Final   07/10/2018 5.0 3.5 - 5.3 mmol/L Final   07/10/2018 5.8 (C) 3.5 - 5.3 mmol/L Final      Chloride Chloride   Date Value Ref Range Status   07/12/2018 92 (L) 99 - 112 mmol/L Final   07/11/2018 91 (L) 99 - 112 mmol/L Final   07/11/2018 92 (L) 99 - 112 mmol/L Final   07/10/2018 90 (L) 99 - 112 mmol/L Final      CO2 CO2   Date Value Ref Range Status   07/12/2018 34.6 (H) 24.3 - 31.9 mmol/L Final   07/11/2018 35.1 (H) 24.3 - 31.9 mmol/L Final   07/11/2018 34.8 (H) 24.3 - 31.9 mmol/L Final   07/10/2018 36.4 (H) 24.3 - 31.9 mmol/L Final      BUN BUN   Date Value Ref Range Status   07/12/2018 27 (H) 7 - 21 mg/dL Final   07/11/2018 29 (H) 7 - 21 mg/dL Final   07/11/2018 26 (H) 7 - 21 mg/dL Final   07/10/2018 21 7 - 21 mg/dL Final      Creatinine Creatinine   Date Value Ref Range Status   07/12/2018 0.88 0.43 - 1.29 mg/dL Final   07/11/2018 1.00 0.43 - 1.29 mg/dL Final   07/11/2018 0.96 0.43 - 1.29 mg/dL Final   07/10/2018 1.16 0.43 - 1.29 mg/dL Final      Calcium Calcium   Date Value Ref Range Status   07/12/2018 8.6 7.7 - 10.0 mg/dL Final   07/11/2018 8.5 7.7 - 10.0 mg/dL Final   07/11/2018 8.5 7.7 - 10.0 mg/dL Final   07/10/2018 8.6 7.7 - 10.0 mg/dL Final      PO4 No results found for: CAPO4   Albumin Albumin   Date Value Ref Range Status   07/11/2018 3.10 (L) 3.40 - 4.80 g/dL Final      Magnesium Magnesium   Date Value Ref Range Status   07/11/2018 2.3 1.7 - 2.6 mg/dL Final   07/10/2018 2.2 1.7 - 2.6 mg/dL Final      Uric Acid No results found for: URICACID     Radiology results :   Imaging Results (last 24 hours)     ** No results found for the last 24 hours. **        Imaging Results (last 24 hours)     ** No results found for the last 24 hours. **                Medications:        amLODIPine 10 mg Oral Q24H   aspirin 325 mg Oral Daily    atorvastatin 40 mg Oral Daily   bacitracin  Topical Q12H   budesonide-formoterol 2 puff Inhalation BID - RT   cefdinir 300 mg Oral Q12H   cetirizine 10 mg Oral Daily   DOBUTamine 10 mcg/kg/min Intravenous Once in imaging   docusate sodium 100 mg Oral BID   donepezil 10 mg Oral Nightly   doxycycline 100 mg Oral Q12H   fluticasone 1 spray Each Nare BID   gabapentin 600 mg Oral Q8H   heparin (porcine) 5,000 Units Subcutaneous Q12H   Hydrochlorothiazide Oral 12.5 mg Oral Daily   insulin aspart 0-9 Units Subcutaneous 4x Daily AC & at Bedtime   insulin aspart prot-insulin aspart 25 Units Subcutaneous BID With Meals   ipratropium-albuterol 3 mL Nebulization 4x Daily - RT   lactobacillus acidophilus 1 capsule Oral Daily   Liraglutide 1.8 mg Subcutaneous Daily   lisinopril 40 mg Oral Daily   nicotine 1 patch Transdermal Q24H   [START ON 7/13/2018] predniSONE 40 mg Oral Daily With Breakfast   sodium chloride 3 mL Intravenous Q8H   tamsulosin 0.4 mg Oral Q PM       Pharmacy Consult        Assessment/Plan     Principal Problem:    Venous stasis dermatitis of both lower extremities  Active Problems:    Acute respiratory failure with hypoxia and hypercapnia (CMS/HCC)      1.  Nephrotic range proteinuria :  urine Protein/creatinine is 3.8 gm/gm  He has diabetes mellitus for 20 years so likely diabetic nephropathy . Complements are normal, Hep C is + and HCV RNA pending. Rest serologies pending. Continue lisinopril    2. Edema : likely secondary to proteinuria and pulmonary HTN    3.  Hypertension : uncontrolled. Will increase norvasc.noted addition of HCTZ    4. Dilutional hyponatremia : Na is better at 131 today, continue fluid restriction. Will watch with HCTZ    5. Elevated troponin : stress test today    6.  cellulitis:  on antibiotics    7 hypercarbic and hypoxic respiratory failure : improved    I discussed the patient's findings and my recommendations with patient and nursing staff    Anuj Padilla MD  07/12/18  11:39  AM

## 2018-07-12 NOTE — PROGRESS NOTES
"  I have personally seen and examined the patient today and discussed overnight interval progress and pertinent issues with nursing staff.    Subjective:    Patient sitting up in chair legs elevated on pillows.  No fever or diarrhea reported.  Legs continue to be red but showing improvement.  CRP normal.  WBC normal.  Chest x-ray 7/11/18 shows trace pleural effusions.      History taken from: patient chart RN      Vital Signs    BP (!) 203/102   Pulse 93   Temp 97.9 °F (36.6 °C) (Oral)   Resp 22   Ht 182.9 cm (72\")   Wt 115 kg (253 lb 3.2 oz)   SpO2 94%   BMI 34.34 kg/m²     Temp:  [97.9 °F (36.6 °C)-98.4 °F (36.9 °C)] 97.9 °F (36.6 °C)      Intake/Output Summary (Last 24 hours) at 07/12/18 1247  Last data filed at 07/12/18 0500   Gross per 24 hour   Intake              800 ml   Output             2850 ml   Net            -2050 ml     Intake & Output (last 3 days)       07/09 0701 - 07/10 0700 07/10 0701 - 07/11 0700 07/11 0701 - 07/12 0700 07/12 0701 - 07/13 0700    P.O.      I.V. (mL/kg) 600 (5.2)       IV Piggyback 600 500      Total Intake(mL/kg) 1400 (12.2) 1040 (9) 1050 (9.1)     Urine (mL/kg/hr) 1450 1600 (0.6) 3150 (1.1)     Stool   0 (0)     Total Output 1450 1600 3150      Net -50 -560 -2100              Unmeasured Urine Occurrence   2 x     Unmeasured Stool Occurrence   2 x           Physical Exam:                 General Appearance:    Alert, cooperative, in no acute distress, poor hygiene    Head:    Normocephalic, without obvious abnormality, atraumatic   Eyes:            Lids and lashes normal, conjunctivae and sclerae normal, no icterus, no pallor, corneas clear, PERRLA   Ears:    Ears appear intact with no abnormalities noted   Throat:   No oral lesions, no thrush, oral mucosa moist   Neck:   No adenopathy, supple, trachea midline, no thyromegaly, no   carotid bruit, no JVD   Back:     No tenderness to percussion or palpation, range of motion   normal   Lungs:     Decreased at the " bases,respirations regular, even and unlabored. No wheezing, no ronchi and no crackles.    Heart:    Regular rhythm and normal rate, normal S1 and S2, no            murmur, no gallop, no rub, no click   Chest Wall:    No abnormalities observed   Abdomen:     Normal bowel sounds, no masses, no organomegaly, soft   non-tender, non-distended, no guarding, no rebound tenderness   Rectal:     Deferred   Extremities:  Bilateral lower extremity venous stasis dermatitis with foul odor and drainage, associated redness and warmth.  Right worse than left.    Pulses:   Pulses palpable and equal bilaterally   Skin:   Bilateral lower extremity venous stasis dermatitis with foul odor and drainage, associated redness and warmth.  Right worse than left.   Lymph nodes:   No palpable adenopathy   Neurologic:   Awake, alert and oriented x 3. Following commands.       Results:      Results from last 7 days  Lab Units 07/12/18  0109 07/11/18  0106 07/10/18  0545 07/09/18  0953   WBC 10*3/mm3 8.68 9.42 7.39 8.19     Lab Results   Component Value Date    NEUTROABS 7.68 (H) 07/12/2018         Results from last 7 days  Lab Units 07/12/18  0109   CREATININE mg/dL 0.88         Results from last 7 days  Lab Units 07/12/18  0109 07/11/18  0106 07/09/18  0953   CRP mg/dL <0.50 1.04* 1.72*       Imaging Results (last 24 hours)     ** No results found for the last 24 hours. **            Results Review:    I have personally reviewed laboratory data, culture results, radiology studies and antimicrobial therapy.    Hospital Medications (active)       Dose Frequency Start End    albuterol (PROVENTIL) nebulizer solution 0.083% 2.5 mg/3mL 2.5 mg Every 6 Hours PRN 7/10/2018     Sig - Route: Take 2.5 mg by nebulization Every 6 (Six) Hours As Needed for Wheezing or Shortness of Air. - Nebulization    amLODIPine (NORVASC) tablet 5 mg 5 mg Every 24 Hours Scheduled 7/11/2018     Sig - Route: Take 1 tablet by mouth Daily. - Oral    aspirin tablet 325 mg 325 mg  Daily 7/10/2018     Sig - Route: Take 1 tablet by mouth Daily. - Oral    atorvastatin (LIPITOR) tablet 40 mg 40 mg Daily 7/11/2018     Sig - Route: Take 1 tablet by mouth Daily. - Oral    budesonide-formoterol (SYMBICORT) 160-4.5 MCG/ACT inhaler 2 puff 2 puff 2 Times Daily - RT 7/10/2018     Sig - Route: Inhale 2 puffs 2 (Two) Times a Day. - Inhalation    cefdinir (OMNICEF) capsule 300 mg 300 mg Every 12 Hours Scheduled 7/10/2018 7/24/2018    Sig - Route: Take 1 capsule by mouth Every 12 (Twelve) Hours. - Oral    cetirizine (zyrTEC) tablet 10 mg 10 mg Daily 7/10/2018     Sig - Route: Take 1 tablet by mouth Daily. - Oral    dextrose (D50W) solution 25 g 25 g Every 15 Minutes PRN 7/9/2018     Sig - Route: Infuse 50 mL into a venous catheter Every 15 (Fifteen) Minutes As Needed for Low Blood Sugar (Blood Sugar Less Than 70). - Intravenous    Cosign for Ordering: Accepted by Julian Stern MD on 7/9/2018  5:29 PM    dextrose (D50W) solution 25 g 25 g Every 15 Minutes PRN 7/9/2018     Sig - Route: Infuse 50 mL into a venous catheter Every 15 (Fifteen) Minutes As Needed for Low Blood Sugar (Blood Sugar Less Than 70). - Intravenous    dextrose (GLUTOSE) oral gel 15 g 15 g Every 15 Minutes PRN 7/9/2018     Sig - Route: Take 15 g by mouth Every 15 (Fifteen) Minutes As Needed for Low Blood Sugar (Blood sugar less than 70). - Oral    Cosign for Ordering: Accepted by Julian Stern MD on 7/9/2018  5:29 PM    dextrose (GLUTOSE) oral gel 15 g 15 g Every 15 Minutes PRN 7/9/2018     Sig - Route: Take 15 g by mouth Every 15 (Fifteen) Minutes As Needed for Low Blood Sugar (Blood sugar less than 70). - Oral    donepezil (ARICEPT) tablet 10 mg 10 mg Nightly 7/10/2018     Sig - Route: Take 2 tablets by mouth Every Night. - Oral    doxycycline (MONODOX) capsule 100 mg 100 mg Every 12 Hours Scheduled 7/10/2018 7/24/2018    Sig - Route: Take 1 capsule by mouth Every 12 (Twelve) Hours. - Oral    fluticasone (FLONASE) 50 MCG/ACT nasal spray  1 spray 1 spray 2 Times Daily 7/10/2018     Sig - Route: 1 spray by Each Nare route 2 (Two) Times a Day. - Each Nare    furosemide (LASIX) injection 20 mg 20 mg Once 7/11/2018 7/11/2018    Sig - Route: Infuse 2 mL into a venous catheter 1 (One) Time. - Intravenous    gabapentin (NEURONTIN) capsule 600 mg 600 mg Every 8 Hours Scheduled 7/10/2018     Sig - Route: Take 2 capsules by mouth Every 8 (Eight) Hours. - Oral    glucagon (human recombinant) (GLUCAGEN DIAGNOSTIC) injection 1 mg 1 mg As Needed 7/9/2018     Sig - Route: Inject 1 mg under the skin As Needed (Blood Glucose Less Than 70). - Subcutaneous    Cosign for Ordering: Accepted by Julian Stern MD on 7/9/2018  5:29 PM    glucagon (human recombinant) (GLUCAGEN DIAGNOSTIC) injection 1 mg 1 mg As Needed 7/9/2018     Sig - Route: Inject 1 mg under the skin As Needed (Blood Glucose Less Than 70). - Subcutaneous    heparin (porcine) 5000 UNIT/ML injection 5,000 Units 5,000 Units Every 12 Hours Scheduled 7/9/2018     Sig - Route: Inject 1 mL under the skin Every 12 (Twelve) Hours. - Subcutaneous    Cosign for Ordering: Accepted by Julian Stern MD on 7/9/2018  5:29 PM    insulin aspart (novoLOG) injection 0-9 Units 0-9 Units 4 Times Daily Before Meals & Nightly 7/9/2018     Sig - Route: Inject 0-9 Units under the skin 4 (Four) Times a Day Before Meals & at Bedtime. - Subcutaneous    insulin aspart prot-insulin aspart (novoLOG 70/30) injection 20 Units 20 Units 2 Times Daily With Meals 7/11/2018     Sig - Route: Inject 20 Units under the skin 2 (Two) Times a Day With Meals. - Subcutaneous    ipratropium-albuterol (DUO-NEB) nebulizer solution 3 mL 3 mL 4 Times Daily - RT 7/9/2018     Sig - Route: Take 3 mL by nebulization 4 (Four) Times a Day. - Nebulization    Cosign for Ordering: Accepted by Julian Stern MD on 7/9/2018  5:29 PM    lactobacillus acidophilus (RISAQUAD) capsule 1 capsule 1 capsule Daily 7/10/2018     Sig - Route: Take 1 capsule by mouth Daily. -  "Oral    Liraglutide (VICTOZA) injection 1.8 mg 1.8 mg Daily 7/10/2018     Sig - Route: Inject 1.8 mg under the skin Daily. - Subcutaneous    Non-formulary Exception Code: Patient supplied medication    lisinopril (PRINIVIL,ZESTRIL) tablet 40 mg 40 mg Daily 7/10/2018     Sig - Route: Take 4 tablets by mouth Daily. - Oral    methylPREDNISolone sodium succinate (SOLU-Medrol) injection 20 mg 20 mg Every 12 Hours 7/11/2018     Sig - Route: Infuse 0.5 mL into a venous catheter Every 12 (Twelve) Hours. - Intravenous    morphine injection 1 mg 1 mg Every 4 Hours PRN 7/9/2018 7/19/2018    Sig - Route: Infuse 0.5 mL into a venous catheter Every 4 (Four) Hours As Needed for Severe Pain  (hold for oversedation, SBP<100). - Intravenous    nicotine (NICODERM CQ) 21 MG/24HR patch 1 patch 1 patch Every 24 Hours 7/10/2018     Sig - Route: Place 1 patch on the skin Daily. - Transdermal    ondansetron (ZOFRAN) tablet 8 mg 8 mg Every 12 Hours PRN 7/10/2018     Sig - Route: Take 2 tablets by mouth Every 12 (Twelve) Hours As Needed for Nausea or Vomiting. - Oral    Pharmacy Consult  Continuous PRN 7/10/2018     Sig - Route: Continuous As Needed for Consult. - Does not apply    sodium chloride 0.9 % bolus 500 mL 500 mL Once 7/10/2018 7/10/2018    Sig - Route: Infuse 500 mL into a venous catheter 1 (One) Time. - Intravenous    sodium chloride 0.9 % flush 1-10 mL 1-10 mL As Needed 7/9/2018     Sig - Route: Infuse 1-10 mL into a venous catheter As Needed for Line Care. - Intravenous    Cosign for Ordering: Accepted by Julian Stern MD on 7/9/2018  5:29 PM    sodium chloride 0.9 % flush 10 mL 10 mL As Needed 7/9/2018     Sig - Route: Infuse 10 mL into a venous catheter As Needed for Line Care. - Intravenous    Linked Group 1:  \"And\" Linked Group Details        sodium chloride 0.9 % flush 3 mL 3 mL Every 8 Hours 7/10/2018     Sig - Route: Infuse 3 mL into a venous catheter Every 8 (Eight) Hours. - Intravenous    sodium chloride 0.9 % flush 5 " mL 5 mL As Needed 7/10/2018     Sig - Route: Infuse 5 mL into a venous catheter As Needed for Line Care (After Medication Administration or Blood Draw). - Intravenous    tamsulosin (FLOMAX) 24 hr capsule 0.4 mg 0.4 mg Every Evening 7/10/2018     Sig - Route: Take 1 capsule by mouth Every Evening. - Oral    atorvastatin (LIPITOR) tablet 20 mg (Discontinued) 20 mg Daily 7/10/2018 7/10/2018    Sig - Route: Take 1 tablet by mouth Daily. - Oral    insulin aspart prot-insulin aspart (novoLOG 70/30) injection 30 Units (Discontinued) 30 Units 2 Times Daily With Meals 7/10/2018 7/11/2018    Sig - Route: Inject 30 Units under the skin 2 (Two) Times a Day With Meals. - Subcutaneous    ipratropium-albuterol (DUO-NEB) nebulizer solution 3 mL (Discontinued) 3 mL Every 6 Hours PRN 7/9/2018 7/10/2018    Sig - Route: Take 3 mL by nebulization Every 6 (Six) Hours As Needed for Shortness of Air. - Nebulization    Cosign for Ordering: Accepted by Julian Stern MD on 7/9/2018  5:29 PM    methylPREDNISolone sodium succinate (SOLU-Medrol) injection 40 mg (Discontinued) 40 mg Every 12 Hours 7/9/2018 7/11/2018    Sig - Route: Infuse 1 mL into a venous catheter Every 12 (Twelve) Hours. - Intravenous    Cosign for Ordering: Accepted by Julian Stern MD on 7/9/2018  5:29 PM            Cultures:    Blood Culture   Date Value Ref Range Status   07/09/2018 No growth at 24 hours  Preliminary   07/09/2018 No growth at 24 hours  Preliminary           Assessment/Plan     ASSESSMENT:    1.  Venous stasis dermatitis   2.  Pneumonia versus COPD exacerbation       PLAN:    Patient sitting up in chair legs elevated on pillows.  No fever or diarrhea reported.  Legs continue to be red but showing improvement.  CRP normal.  WBC normal.  Chest x-ray 7/11/18 shows trace pleural effusions.    Mycoplasma pneumoniae negative.  Urinary Legionella negative.  Chest x-ray reveals bibasilar airspace disease/atelectasis.  Blood cultures show no growth at this  time.     Unfortunately patient's poor hygiene will jeopardize and impair patient's healing process and outcomes.      Plans to continue current  antibiotic therapy of  Doxycycline 100 mg by mouth twice a day and Omnicef 300 mg by mouth twice a day both to continue through 7/23/18.   patient stable from infectious disease standpoint.  Okay to discharge home from infectious disease standpoint.    Current Antimicrobials:  Doxycycline 100mg PO BID through 7/23/18  Omnicef 300mg PO BID through 7/23/18      Patient's findings and recommendations were discussed with patient, nursing staff, primary care team and consulting provider    Code Status:   Code Status and Medical Interventions:   Ordered at: 07/09/18 1509     Code Status:    CPR     Medical Interventions (Level of Support Prior to Arrest):    Full       PANCHO Mata  07/12/18  12:47 PM

## 2018-07-12 NOTE — PLAN OF CARE
Problem: Patient Care Overview  Goal: Plan of Care Review  Outcome: Ongoing (interventions implemented as appropriate)   07/11/18 2008   Coping/Psychosocial   Plan of Care Reviewed With patient   Plan of Care Review   Progress improving     Goal: Individualization and Mutuality  Outcome: Ongoing (interventions implemented as appropriate)    Goal: Discharge Needs Assessment  Outcome: Ongoing (interventions implemented as appropriate)   07/11/18 2008   Discharge Needs Assessment   Readmission Within the Last 30 Days no previous admission in last 30 days   Concerns to be Addressed no discharge needs identified   Patient/Family Anticipates Transition to home   Patient/Family Anticipated Services at Transition none   Transportation Concerns car, none   Transportation Anticipated family or friend will provide   Anticipated Changes Related to Illness inability to care for self   Equipment Needed After Discharge none   Outpatient/Agency/Support Group Needs other (see comments)  (Pt utilizes [facility])   Discharge Facility/Level of Care Needs home with home health   Current Discharge Risk lives alone   Disability   Equipment Currently Used at Home oxygen;nebulizer;wheelchair, motorized     Goal: Interprofessional Rounds/Family Conf  Outcome: Ongoing (interventions implemented as appropriate)   07/11/18 2008   Interdisciplinary Rounds/Family Conf   Participants nursing;patient

## 2018-07-13 LAB
ALBUMIN SERPL-MCNC: 3.1 G/DL (ref 3.4–4.8)
ALBUMIN/GLOB SERPL: 1.1 G/DL (ref 1.5–2.5)
ALP SERPL-CCNC: 77 U/L (ref 40–129)
ALT SERPL W P-5'-P-CCNC: 21 U/L (ref 10–44)
ANION GAP SERPL CALCULATED.3IONS-SCNC: 3.2 MMOL/L (ref 3.6–11.2)
AST SERPL-CCNC: 24 U/L (ref 10–34)
BASOPHILS # BLD AUTO: 0.01 10*3/MM3 (ref 0–0.3)
BASOPHILS NFR BLD AUTO: 0.1 % (ref 0–2)
BILIRUB SERPL-MCNC: 0.5 MG/DL (ref 0.2–1.8)
BUN BLD-MCNC: 21 MG/DL (ref 7–21)
BUN/CREAT SERPL: 25.9 (ref 7–25)
C-ANCA TITR SER IF: NORMAL TITER
CALCIUM SPEC-SCNC: 8.8 MG/DL (ref 7.7–10)
CHLORIDE SERPL-SCNC: 95 MMOL/L (ref 99–112)
CO2 SERPL-SCNC: 35.8 MMOL/L (ref 24.3–31.9)
CORTIS SERPL-MCNC: 17.9 MCG/DL
CORTIS SERPL-MCNC: 28.6 MCG/DL
CORTIS SERPL-MCNC: 33.4 MCG/DL
CREAT BLD-MCNC: 0.81 MG/DL (ref 0.43–1.29)
CRP SERPL-MCNC: <0.5 MG/DL (ref 0–0.99)
DEPRECATED RDW RBC AUTO: 45.7 FL (ref 37–54)
EOSINOPHIL # BLD AUTO: 0.01 10*3/MM3 (ref 0–0.7)
EOSINOPHIL NFR BLD AUTO: 0.1 % (ref 0–7)
ERYTHROCYTE [DISTWIDTH] IN BLOOD BY AUTOMATED COUNT: 13.8 % (ref 11.5–14.5)
GBM IGG SER-ACNC: 2 UNITS (ref 0–20)
GFR SERPL CREATININE-BSD FRML MDRD: 94 ML/MIN/1.73
GLOBULIN UR ELPH-MCNC: 2.8 GM/DL
GLUCOSE BLD-MCNC: 41 MG/DL (ref 70–110)
GLUCOSE BLDC GLUCOMTR-MCNC: 142 MG/DL (ref 70–130)
GLUCOSE BLDC GLUCOMTR-MCNC: 166 MG/DL (ref 70–130)
GLUCOSE BLDC GLUCOMTR-MCNC: 221 MG/DL (ref 70–130)
GLUCOSE BLDC GLUCOMTR-MCNC: 75 MG/DL (ref 70–130)
GLUCOSE BLDC GLUCOMTR-MCNC: 76 MG/DL (ref 70–130)
HCT VFR BLD AUTO: 49.6 % (ref 42–52)
HGB BLD-MCNC: 16.5 G/DL (ref 14–18)
IMM GRANULOCYTES # BLD: 0.02 10*3/MM3 (ref 0–0.03)
IMM GRANULOCYTES NFR BLD: 0.2 % (ref 0–0.5)
LYMPHOCYTES # BLD AUTO: 1.47 10*3/MM3 (ref 1–3)
LYMPHOCYTES NFR BLD AUTO: 13.4 % (ref 16–46)
MCH RBC QN AUTO: 31 PG (ref 27–33)
MCHC RBC AUTO-ENTMCNC: 33.3 G/DL (ref 33–37)
MCV RBC AUTO: 93.1 FL (ref 80–94)
MONOCYTES # BLD AUTO: 1.03 10*3/MM3 (ref 0.1–0.9)
MONOCYTES NFR BLD AUTO: 9.4 % (ref 0–12)
MYELOPEROXIDASE AB SER-ACNC: <9 U/ML (ref 0–9)
NEUTROPHILS # BLD AUTO: 8.42 10*3/MM3 (ref 1.4–6.5)
NEUTROPHILS NFR BLD AUTO: 76.8 % (ref 40–75)
OSMOLALITY SERPL CALC.SUM OF ELEC: 268 MOSM/KG (ref 273–305)
P-ANCA ATYPICAL TITR SER IF: NORMAL TITER
P-ANCA TITR SER IF: NORMAL TITER
PLATELET # BLD AUTO: 235 10*3/MM3 (ref 130–400)
PMV BLD AUTO: 10.8 FL (ref 6–10)
POTASSIUM BLD-SCNC: 4.1 MMOL/L (ref 3.5–5.3)
PROT SERPL-MCNC: 5.9 G/DL (ref 6–8)
PROTEINASE3 AB SER IA-ACNC: <3.5 U/ML (ref 0–3.5)
RBC # BLD AUTO: 5.33 10*6/MM3 (ref 4.7–6.1)
SODIUM BLD-SCNC: 134 MMOL/L (ref 135–153)
WBC NRBC COR # BLD: 10.96 10*3/MM3 (ref 4.5–12.5)

## 2018-07-13 PROCEDURE — 82533 TOTAL CORTISOL: CPT | Performed by: INTERNAL MEDICINE

## 2018-07-13 PROCEDURE — 94799 UNLISTED PULMONARY SVC/PX: CPT

## 2018-07-13 PROCEDURE — 25010000002 MORPHINE PER 10 MG: Performed by: INTERNAL MEDICINE

## 2018-07-13 PROCEDURE — 63710000001 INSULIN ASPART PER 5 UNITS: Performed by: INTERNAL MEDICINE

## 2018-07-13 PROCEDURE — 99232 SBSQ HOSP IP/OBS MODERATE 35: CPT | Performed by: INTERNAL MEDICINE

## 2018-07-13 PROCEDURE — 63710000001 PREDNISONE PER 1 MG: Performed by: INTERNAL MEDICINE

## 2018-07-13 PROCEDURE — 82962 GLUCOSE BLOOD TEST: CPT

## 2018-07-13 PROCEDURE — 99231 SBSQ HOSP IP/OBS SF/LOW 25: CPT | Performed by: INTERNAL MEDICINE

## 2018-07-13 PROCEDURE — 99233 SBSQ HOSP IP/OBS HIGH 50: CPT | Performed by: INTERNAL MEDICINE

## 2018-07-13 PROCEDURE — 85025 COMPLETE CBC W/AUTO DIFF WBC: CPT | Performed by: PHYSICIAN ASSISTANT

## 2018-07-13 PROCEDURE — 86140 C-REACTIVE PROTEIN: CPT | Performed by: NURSE PRACTITIONER

## 2018-07-13 PROCEDURE — 25010000002 HEPARIN (PORCINE) PER 1000 UNITS: Performed by: PHYSICIAN ASSISTANT

## 2018-07-13 PROCEDURE — 25010000002 COSYNTROPIN PER 0.25 MG: Performed by: INTERNAL MEDICINE

## 2018-07-13 PROCEDURE — 99222 1ST HOSP IP/OBS MODERATE 55: CPT | Performed by: INTERNAL MEDICINE

## 2018-07-13 PROCEDURE — 80053 COMPREHEN METABOLIC PANEL: CPT | Performed by: INTERNAL MEDICINE

## 2018-07-13 RX ORDER — HYDROCHLOROTHIAZIDE 25 MG/1
25 TABLET ORAL DAILY
Status: DISCONTINUED | OUTPATIENT
Start: 2018-07-14 | End: 2018-07-15 | Stop reason: HOSPADM

## 2018-07-13 RX ORDER — PREDNISONE 20 MG/1
20 TABLET ORAL
Status: DISCONTINUED | OUTPATIENT
Start: 2018-07-14 | End: 2018-07-15 | Stop reason: HOSPADM

## 2018-07-13 RX ORDER — INSULIN ASPART 100 [IU]/ML
20 INJECTION, SUSPENSION SUBCUTANEOUS 2 TIMES DAILY WITH MEALS
Status: DISCONTINUED | OUTPATIENT
Start: 2018-07-13 | End: 2018-07-15 | Stop reason: HOSPADM

## 2018-07-13 RX ORDER — COSYNTROPIN 0.25 MG/ML
0.25 INJECTION, POWDER, FOR SOLUTION INTRAMUSCULAR; INTRAVENOUS ONCE
Status: COMPLETED | OUTPATIENT
Start: 2018-07-13 | End: 2018-07-13

## 2018-07-13 RX ADMIN — DOCUSATE SODIUM 100 MG: 100 CAPSULE, LIQUID FILLED ORAL at 21:04

## 2018-07-13 RX ADMIN — DOCUSATE SODIUM 100 MG: 100 CAPSULE, LIQUID FILLED ORAL at 08:35

## 2018-07-13 RX ADMIN — MORPHINE SULFATE 2 MG: 2 INJECTION, SOLUTION INTRAMUSCULAR; INTRAVENOUS at 10:08

## 2018-07-13 RX ADMIN — IPRATROPIUM BROMIDE AND ALBUTEROL SULFATE 3 ML: .5; 3 SOLUTION RESPIRATORY (INHALATION) at 07:09

## 2018-07-13 RX ADMIN — GABAPENTIN 600 MG: 300 CAPSULE ORAL at 21:04

## 2018-07-13 RX ADMIN — IPRATROPIUM BROMIDE AND ALBUTEROL SULFATE 3 ML: .5; 3 SOLUTION RESPIRATORY (INHALATION) at 01:20

## 2018-07-13 RX ADMIN — HYDROCHLOROTHIAZIDE 12.5 MG: 12.5 CAPSULE, GELATIN COATED ORAL at 08:35

## 2018-07-13 RX ADMIN — LISINOPRIL 40 MG: 10 TABLET ORAL at 08:35

## 2018-07-13 RX ADMIN — Medication 3 ML: at 08:43

## 2018-07-13 RX ADMIN — GABAPENTIN 600 MG: 300 CAPSULE ORAL at 13:11

## 2018-07-13 RX ADMIN — HEPARIN SODIUM 5000 UNITS: 5000 INJECTION, SOLUTION INTRAVENOUS; SUBCUTANEOUS at 21:04

## 2018-07-13 RX ADMIN — MORPHINE SULFATE 2 MG: 2 INJECTION, SOLUTION INTRAMUSCULAR; INTRAVENOUS at 22:58

## 2018-07-13 RX ADMIN — CEFDINIR 300 MG: 300 CAPSULE ORAL at 21:04

## 2018-07-13 RX ADMIN — FLUTICASONE PROPIONATE 1 SPRAY: 50 SPRAY, METERED NASAL at 21:05

## 2018-07-13 RX ADMIN — DONEPEZIL HYDROCHLORIDE 10 MG: 5 TABLET, FILM COATED ORAL at 21:04

## 2018-07-13 RX ADMIN — ASPIRIN 325 MG: 325 TABLET ORAL at 08:36

## 2018-07-13 RX ADMIN — MORPHINE SULFATE 2 MG: 2 INJECTION, SOLUTION INTRAMUSCULAR; INTRAVENOUS at 01:04

## 2018-07-13 RX ADMIN — MORPHINE SULFATE 2 MG: 2 INJECTION, SOLUTION INTRAMUSCULAR; INTRAVENOUS at 04:09

## 2018-07-13 RX ADMIN — BUDESONIDE AND FORMOTEROL FUMARATE DIHYDRATE 2 PUFF: 160; 4.5 AEROSOL RESPIRATORY (INHALATION) at 07:09

## 2018-07-13 RX ADMIN — PREDNISONE 40 MG: 20 TABLET ORAL at 08:36

## 2018-07-13 RX ADMIN — MORPHINE SULFATE 2 MG: 2 INJECTION, SOLUTION INTRAMUSCULAR; INTRAVENOUS at 06:56

## 2018-07-13 RX ADMIN — MORPHINE SULFATE 2 MG: 2 INJECTION, SOLUTION INTRAMUSCULAR; INTRAVENOUS at 16:10

## 2018-07-13 RX ADMIN — MORPHINE SULFATE 2 MG: 2 INJECTION, SOLUTION INTRAMUSCULAR; INTRAVENOUS at 19:38

## 2018-07-13 RX ADMIN — Medication 1 CAPSULE: at 08:35

## 2018-07-13 RX ADMIN — Medication 3 ML: at 13:03

## 2018-07-13 RX ADMIN — ATORVASTATIN CALCIUM 40 MG: 40 TABLET, FILM COATED ORAL at 08:36

## 2018-07-13 RX ADMIN — IPRATROPIUM BROMIDE AND ALBUTEROL SULFATE 3 ML: .5; 3 SOLUTION RESPIRATORY (INHALATION) at 13:07

## 2018-07-13 RX ADMIN — AMLODIPINE BESYLATE 10 MG: 10 TABLET ORAL at 08:36

## 2018-07-13 RX ADMIN — COSYNTROPIN 0.25 MG: 0.25 INJECTION, POWDER, LYOPHILIZED, FOR SOLUTION INTRAMUSCULAR; INTRAVENOUS at 13:03

## 2018-07-13 RX ADMIN — HEPARIN SODIUM 5000 UNITS: 5000 INJECTION, SOLUTION INTRAVENOUS; SUBCUTANEOUS at 08:36

## 2018-07-13 RX ADMIN — CEFDINIR 300 MG: 300 CAPSULE ORAL at 08:35

## 2018-07-13 RX ADMIN — INSULIN ASPART 25 UNITS: 100 INJECTION, SUSPENSION SUBCUTANEOUS at 08:38

## 2018-07-13 RX ADMIN — IPRATROPIUM BROMIDE AND ALBUTEROL SULFATE 3 ML: .5; 3 SOLUTION RESPIRATORY (INHALATION) at 19:35

## 2018-07-13 RX ADMIN — BACITRACIN ZINC: 500 OINTMENT TOPICAL at 21:04

## 2018-07-13 RX ADMIN — GABAPENTIN 600 MG: 300 CAPSULE ORAL at 06:56

## 2018-07-13 RX ADMIN — DOXYCYCLINE 100 MG: 100 CAPSULE ORAL at 21:04

## 2018-07-13 RX ADMIN — TAMSULOSIN HYDROCHLORIDE 0.4 MG: 0.4 CAPSULE ORAL at 16:10

## 2018-07-13 RX ADMIN — INSULIN ASPART 4 UNITS: 100 INJECTION, SOLUTION INTRAVENOUS; SUBCUTANEOUS at 17:27

## 2018-07-13 RX ADMIN — Medication 3 ML: at 21:04

## 2018-07-13 RX ADMIN — BUDESONIDE AND FORMOTEROL FUMARATE DIHYDRATE 2 PUFF: 160; 4.5 AEROSOL RESPIRATORY (INHALATION) at 19:35

## 2018-07-13 RX ADMIN — BACITRACIN ZINC: 500 OINTMENT TOPICAL at 10:08

## 2018-07-13 RX ADMIN — INSULIN ASPART 20 UNITS: 100 INJECTION, SUSPENSION SUBCUTANEOUS at 17:27

## 2018-07-13 RX ADMIN — DOXYCYCLINE 100 MG: 100 CAPSULE ORAL at 08:35

## 2018-07-13 RX ADMIN — MORPHINE SULFATE 2 MG: 2 INJECTION, SOLUTION INTRAMUSCULAR; INTRAVENOUS at 13:11

## 2018-07-13 RX ADMIN — CETIRIZINE HCL 10 MG: 10 TABLET ORAL at 08:36

## 2018-07-13 NOTE — PROGRESS NOTES
Assisted By:  Milka RAO    CC: Follow-up on shortness of breath    Interview History/HPI: Patient states he is breathing better he was able to ambulate out into the halls with therapy yesterday and thinks he did well.  He does have home oxygen.  He denies any chest pain, he did have a stress test yesterday.  He did tolerate his diet denies any abdominal pain.  His legs are only hurting on the right side laterally on his right leg.  He cannot really characterize this, he's had no associated fever.  This has been a long-standing pain.  The morphine is helping although he was requesting something higher.  This is more of a constant pain.    ROS: His bowels have moved here no trouble with urination    Vitals:    07/13/18 0835   BP: 155/73   Pulse: 81   Resp:    Temp:    SpO2:        Intake/Output Summary (Last 24 hours) at 07/13/18 0947  Last data filed at 07/13/18 0500   Gross per 24 hour   Intake             1250 ml   Output             4000 ml   Net            -2750 ml       EXAM: He appears to feel better but chronically ill-appearing no acute distress lungs bilateral breath sounds and although diminished throughout no definite rhonchi rales or wheezing heard.  Heart regular rate and rhythm without murmur gallop.  Abdomen is soft, he has his legs hanging down, there is significant dependent rubor but he does have fairly good palpable dorsalis pedis pulses less so when posterior tibialis.  Superficial ulcerations on his right leg are healing.    Tele: Sinus, images reviewed    LABS:   Lab Results (last 48 hours)     Procedure Component Value Units Date/Time    POC Glucose Once [[id number]]  (Abnormal) Collected:  07/13/18 0635    Specimen:  Blood Updated:  07/13/18 0654     Glucose 142 (H) mg/dL     Narrative:       Meter: [id number] : [id number] Nahun Galaviza    POC Glucose Once [[id number]]  (Normal) Collected:  07/13/18 0313    Specimen:  Blood Updated:  07/13/18 0338     Glucose 76 mg/dL     Narrative:        Meter: [id number] : [id number] Senait Dupont    Osmolality, Calculated [[id number]]  (Abnormal) Collected:  07/13/18 0116    Specimen:  Blood Updated:  07/13/18 0316     Osmolality Calc 268.0 (L) mOsm/kg     Comprehensive Metabolic Panel [[id number]]  (Abnormal) Collected:  07/13/18 0116    Specimen:  Blood Updated:  07/13/18 0316     Glucose 41 (C) mg/dL      Comment: Verified by Repeat Analysis         BUN 21 mg/dL      Creatinine 0.81 mg/dL      Sodium 134 (L) mmol/L      Potassium 4.1 mmol/L      Chloride 95 (L) mmol/L      CO2 35.8 (H) mmol/L      Calcium 8.8 mg/dL      Total Protein 5.9 (L) g/dL      Albumin 3.10 (L) g/dL      ALT (SGPT) 21 U/L      AST (SGOT) 24 U/L      Alkaline Phosphatase 77 U/L      Comment: Note New Reference Ranges        Total Bilirubin 0.5 mg/dL      eGFR Non African Amer 94 mL/min/1.73      Globulin 2.8 gm/dL      A/G Ratio 1.1 (L) g/dL      BUN/Creatinine Ratio 25.9 (H)     Anion Gap 3.2 (L) mmol/L     C-reactive Protein [[id number]]  (Normal) Collected:  07/13/18 0116    Specimen:  Blood Updated:  07/13/18 0248     C-Reactive Protein <0.50 mg/dL     CBC & Differential [[id number]] Collected:  07/13/18 0116    Specimen:  Blood Updated:  07/13/18 0224    Narrative:       The following orders were created for panel order CBC & Differential.  Procedure                               Abnormality         Status                     ---------                               -----------         ------                     CBC Auto Differential[[id number]]        Abnormal            Final result                 Please view results for these tests on the individual orders.    CBC Auto Differential [[id number]]  (Abnormal) Collected:  07/13/18 0116    Specimen:  Blood Updated:  07/13/18 0224     WBC 10.96 10*3/mm3      RBC 5.33 10*6/mm3      Hemoglobin 16.5 g/dL      Hematocrit 49.6 %      MCV 93.1 fL      MCH 31.0 pg      MCHC 33.3 g/dL      RDW 13.8 %      RDW-SD 45.7 fl      MPV 10.8 (H) fL       Platelets 235 10*3/mm3      Neutrophil % 76.8 (H) %      Lymphocyte % 13.4 (L) %      Monocyte % 9.4 %      Eosinophil % 0.1 %      Basophil % 0.1 %      Immature Grans % 0.2 %      Neutrophils, Absolute 8.42 (H) 10*3/mm3      Lymphocytes, Absolute 1.47 10*3/mm3      Monocytes, Absolute 1.03 (H) 10*3/mm3      Eosinophils, Absolute 0.01 10*3/mm3      Basophils, Absolute 0.01 10*3/mm3      Immature Grans, Absolute 0.02 10*3/mm3     POC Glucose Once [[id number]]  (Abnormal) Collected:  07/12/18 1934    Specimen:  Blood Updated:  07/12/18 2034     Glucose 159 (H) mg/dL     Narrative:       Meter: [id number] : [id number] Lei Cosby    POC Glucose Once [[id number]]  (Abnormal) Collected:  07/12/18 1748    Specimen:  Blood Updated:  07/12/18 1813     Glucose 201 (H) mg/dL     Narrative:       Meter: [id number] : [id number] augustus mcclendon    KENYATTA + PE [[id number]]  (Abnormal) Collected:  07/11/18 0106    Specimen:  Blood Updated:  07/12/18 1620     IgG 747 mg/dL      IgA 359 mg/dL      IgM 129 mg/dL      Total Protein 5.5 (L) g/dL      Albumin 2.5 (L) g/dL      Alpha-1-Globulin 0.2 g/dL      Alpha-2-Globulin 0.9 g/dL      Beta Globulin 1.0 g/dL      Gamma Globulin 0.9 g/dL      M-Luis Comment: g/dL      Comment: Due to the small quantity of monoclonal proteins, unable to  quantitate the M-spikes.        Globulin 3.0 g/dL      A/G Ratio 0.9     Immunofixation Reflex, Serum Comment     Comment: Immunofixation shows IgM monoclonal protein with kappa light chain  specificity.  Immunofixation shows IgG monoclonal protein with kappa light chain  specificity.        Please note Comment     Comment: Protein electrophoresis scan will follow via computer, mail, or   delivery.       Narrative:       Performed at:  [address]  [id number]  : Tato Dye PhD, Phone:  [phone]    Immunoglobulin Free LT Chains Blood [[id number]]  (Abnormal) Collected:  07/11/18 0106     Specimen:  Blood Updated:  07/12/18 1312     Free Light Chain, Kappa 38.7 (H) mg/L      Free Lambda Light Chains 40.8 (H) mg/L      Kappa/Lambda Ratio 0.95    Narrative:       Performed at:  [address] [place] [address]  [id number]  : Tato Dye PhD, Phone:  [phone]    POC Glucose Once [[id number]]  (Abnormal) Collected:  07/12/18 1244    Specimen:  Blood Updated:  07/12/18 1259     Glucose 319 (H) mg/dL     Narrative:       Meter: [id number] : [id number] augustus mcclendon    Anti-DNA Antibody, Double-stranded [[id number]] Collected:  07/11/18 0106    Specimen:  Blood Updated:  07/12/18 1217     Anti-DNA (DS) Ab Qn 2 IU/mL      Comment:                                    Negative      <5                                     Equivocal  5 - 9                                     Positive      >9       Narrative:       Performed at:  [address] [place] [address]  [id number]  : Tato Dye PhD, Phone:  [phone]    Antinuclear Antibodies Direct [[id number]] Collected:  07/11/18 0106    Specimen:  Blood Updated:  07/12/18 1217     LOLIS Direct Negative    Narrative:       Performed at:  [address] [place] [address]  [id number]  : Tato Dye PhD, Phone:  [phone]    Antihistone Antibodies [[id number]]  (Abnormal) Collected:  07/10/18 1425    Specimen:  Blood Updated:  07/12/18 1117     Histone Ab 1.7 (H) Units      Comment:                          Negative                <1.0                           Weak Positive      1.0 - 1.5                           Moderate Positive  1.6 - 2.5                           Strong Positive         >2.5       Narrative:       Performed at:  [address] [place] [address]  [id number]  : Jose Ramon Gutierrez MD, Phone:  [phone]    Blood Culture - Blood, [[id number]]  (Normal) Collected:  07/09/18 0945    Specimen:  Blood from Arm, Right  Updated:  07/12/18 1015     Blood Culture No growth at 3 days    Blood Culture - Blood, [[id number]]  (Normal) Collected:  07/09/18 0953    Specimen:  Blood from Arm, Left Updated:  07/12/18 1015     Blood Culture No growth at 3 days    POC Glucose Once [[id number]]  (Abnormal) Collected:  07/12/18 0554    Specimen:  Blood Updated:  07/12/18 0602     Glucose 184 (H) mg/dL     Narrative:       Meter: [id number] : [id number] debby canseco    C-reactive Protein [[id number]]  (Normal) Collected:  07/12/18 0109    Specimen:  Blood Updated:  07/12/18 0228     C-Reactive Protein <0.50 mg/dL     Basic Metabolic Panel [[id number]]  (Abnormal) Collected:  07/12/18 0109    Specimen:  Blood Updated:  07/12/18 0219     Glucose 209 (H) mg/dL      BUN 27 (H) mg/dL      Creatinine 0.88 mg/dL      Sodium 131 (L) mmol/L      Potassium 5.1 mmol/L      Chloride 92 (L) mmol/L      CO2 34.6 (H) mmol/L      Calcium 8.6 mg/dL      eGFR Non African Amer 86 mL/min/1.73      BUN/Creatinine Ratio 30.7 (H)     Anion Gap 4.4 mmol/L     Narrative:       GFR Normal >60  Chronic Kidney Disease <60  Kidney Failure <15    Osmolality, Calculated [[id number]]  (Normal) Collected:  07/12/18 0109    Specimen:  Blood Updated:  07/12/18 0219     Osmolality Calc 273.9 mOsm/kg     CBC & Differential [[id number]] Collected:  07/12/18 0109    Specimen:  Blood Updated:  07/12/18 0153    Narrative:       The following orders were created for panel order CBC & Differential.  Procedure                               Abnormality         Status                     ---------                               -----------         ------                     CBC Auto Differential[[id number]]        Abnormal            Final result                 Please view results for these tests on the individual orders.    CBC Auto Differential [[id number]]  (Abnormal) Collected:  07/12/18 0109    Specimen:  Blood Updated:  07/12/18 0153     WBC 8.68 10*3/mm3      RBC 5.28 10*6/mm3       Hemoglobin 16.3 g/dL      Hematocrit 49.2 %      MCV 93.2 fL      MCH 30.9 pg      MCHC 33.1 g/dL      RDW 13.9 %      RDW-SD 45.7 fl      MPV 10.5 (H) fL      Platelets 194 10*3/mm3      Neutrophil % 88.5 (H) %      Lymphocyte % 5.2 (L) %      Monocyte % 6.1 %      Eosinophil % 0.0 %      Basophil % 0.0 %      Immature Grans % 0.2 %      Neutrophils, Absolute 7.68 (H) 10*3/mm3      Lymphocytes, Absolute 0.45 (L) 10*3/mm3      Monocytes, Absolute 0.53 10*3/mm3      Eosinophils, Absolute 0.00 10*3/mm3      Basophils, Absolute 0.00 10*3/mm3      Immature Grans, Absolute 0.02 10*3/mm3     POC Glucose Once [[id number]]  (Abnormal) Collected:  07/11/18 2112    Specimen:  Blood Updated:  07/11/18 2118     Glucose 270 (H) mg/dL     Narrative:       Meter: [id number] : [id number] debby canseco    [place] / Lambda Light Chains, Urine, 24 Hour - Urine, Clean Catch [[id number]] Collected:  07/11/18 1604    Specimen:  24 Hour Urine from Urine, Clean Catch Updated:  07/11/18 2025    POC Glucose Once [[id number]]  (Abnormal) Collected:  07/11/18 1733    Specimen:  Blood Updated:  07/11/18 1747     Glucose 221 (H) mg/dL     Narrative:       Meter: [id number] : [id number] augustus mcclendon    Hepatitis C RNA, Quantitative, PCR (graph) [[id number]] Collected:  07/11/18 1620    Specimen:  Blood Updated:  07/11/18 1641    Eosinophil Smear - Urine, Urine, Clean Catch [[id number]] Collected:  07/10/18 1351    Specimen:  Urine from Urine, Clean Catch Updated:  07/11/18 1621     Eosinophil, Urine No Eosinophils Seen %      Comment:                                   <5% few or none seen       Narrative:       Performed at:  [address] [place] [address]  [id number]  : Tato Dye PhD, Phone:  [phone]    POC Glucose Once [[id number]]  (Abnormal) Collected:  07/11/18 1120    Specimen:  Blood Updated:  07/11/18 1128     Glucose 223 (H) mg/dL     Narrative:       Meter: [id number] : [id number]  augustus mcclendon    Protein / Creatinine Ratio, Urine - Urine, Clean Catch [[id number]]  (Abnormal) Collected:  07/11/18 0935    Specimen:  Urine from Urine, Clean Catch Updated:  07/11/18 1116     Protein/Creatinine Ratio, Urine 3,863.0 (H) mg/G Crea      Creatinine, Urine 36.5 mg/dL      Total Protein, Urine 141.0 mg/dL     Microalbumin / Creatinine Urine Ratio - Urine, Clean Catch [[id number]] Collected:  07/11/18 0935    Specimen:  Urine from Urine, Clean Catch Updated:  07/11/18 1116     Microalbumin/Creatinine Ratio 2,799.5 mg/g      Creatinine, Urine 36.5 mg/dL      Microalbumin, Urine 1,021.8 mg/L           Radiology:  Imaging Results (last 72 hours)     Procedure Component Value Units Date/Time    XR Chest 1 View [[id number]] Collected:  07/11/18 1119     Updated:  07/11/18 1121    Narrative:       XR CHEST 1 VW-     CLINICAL INDICATION: sob; J96.01-Acute respiratory failure with hypoxia;  J96.02-Acute respiratory failure with hypercapnia; I21.4-Non-ST  elevation (NSTEMI) myocardial infarction; L03.115-Cellulitis of right  lower limb          COMPARISON: 07/09/2018      TECHNIQUE: Single frontal view of the chest.     FINDINGS:     Trace bibasilar effusions and bibasilar airspace disease, probably  atelectasis, but cannot exclude pneumonia  The cardiac silhouette is normal. The pulmonary vasculature is  unremarkable.  There is no evidence of an acute osseous abnormality.   There are no suspicious-appearing parenchymal soft tissue nodules.            Impression:       Trace bibasilar effusions and bibasilar consolidation         This report was finalized on 7/11/2018 11:19 AM by Dr. Jose Luis Romero MD.       US Arterial Doppler Lower Extremity Left [[id number]] Collected:  07/10/18 1339     Updated:  07/10/18 1351    Narrative:       US ARTERIAL DOPPLER LOWER EXTREMITY LEFT-     REASON FOR EXAM:  PVD; J96.01-Acute respiratory failure with hypoxia;  J96.02-Acute respiratory failure with hypercapnia;  I21.4-Non-ST  elevation (NSTEMI) myocardial infarction; L03.115-Cellulitis of right  lower limb. Decreased pulses on physical exam in the lower extremities.     TECHNIQUE:  Multiple real-time color doppler images were obtained.  M-mode Doppler was used for velocity determination and spectral pattern.  Stenosis was evaluated using the NASCET or similar measurement  technique.     FINDINGS:   Left leg: The external iliac artery was normal in caliber. The Doppler  wave pattern was monophasic with peak systolic velocity of 140 cm/s.  There was monophasic flow in the common femoral artery with a velocity  of 319 cm/s. There is some calcific plaque in the common femoral artery.  Color Doppler flow is seen in the profunda femoral and superficial  femoral arteries in the leg. The Doppler wave pattern is monophasic in  the thigh. No occluded segments were demonstrated. The popliteal artery  has a monophasic flow pattern and a velocity of 48 cm/s. There was  monophasic flow in the posterior tibial artery, the velocity measured 37  cm/s.       Impression:       Atherosclerotic plaquing is noted predominantly at the level  of the common femoral artery where there is velocity elevation. The  Doppler wave pattern throughout the leg was monophasic suggesting  possibility of more proximal disease above the external iliac artery. In  the runoff vessels no areas of occlusion were demonstrated.             This report was finalized on 7/10/2018 1:48 PM by Dr. Jose Ramon Hatch II, MD.               Results for orders placed during the hospital encounter of 07/09/18   Adult Transthoracic Echo Complete W/ Cont if Necessary Per Protocol    Narrative · Normal left ventricular cavity size and wall thickness noted. All left   ventricular wall segments contract normally.  · Estimated EF appears to be in the range of 61 - 65%.  · The aortic valve is structurally normal. No aortic valve regurgitation   is present. No aortic valve stenosis is  present.  · The mitral valve is normal in structure. No mitral valve regurgitation   is present. No significant mitral valve stenosis is present.  · The tricuspid valve is normal. No tricuspid valve stenosis is present.   Mild tricuspid valve regurgitation is present. Estimated right ventricular   systolic pressure from tricuspid regurgitation is mildly elevated (35-45   mmHg).  · Mild pulmonary hypertension is present.  · There is no evidence of pericardial effusion.        Discussed with Terry Michaels,Sugey    Assessment/Plan:     COPD with acute exacerbation with hypercarbic and hypoxic respiratory failure.  Patient continues to improve, continue antibiotics as recommended by infectious disease, I'm continuing steroid taper.  Omnicef/doxycycline    Diabetes, he had some hypoglycemia through the night, I've changed to sliding scale and made this only 3 times a day as opposed to before meals and at bedtime, I've decreased his 70/30 and will follow.  He is also on Victoza    Indeterminant troponin, stress test only with a small defect, discussed with cardiology, medical management, patient is asymptomatic    Proteinuria, hematology is going to suggest only that the immunofixation gets repeated as an outpatient the biphasic spike probably is related to inflammatory process    Hyponatremia, responding to fluid restriction    Hypertension, Microzide added to his other medications yesterday, follow    Peripheral vascular disease, will need outpatient evaluation, he has distal pulses.  Encouraged quit smoking.    Hyperkalemia, improved    Cortisol 13, since he did have some hypoglycemia although probably medication related, will check a cosyntropin stimulation test.    Possibly transfer to floor later today.

## 2018-07-13 NOTE — PROGRESS NOTES
Nephrology Note    Subjective       Denies nausea, vomiting or diarrhea . No chest pain or SOB    Objective     Vital Signs  Temp:  [97.9 °F (36.6 °C)-98.8 °F (37.1 °C)] 98.2 °F (36.8 °C)  Heart Rate:  [59-86] 77  Resp:  [15-26] 20  BP: (127-180)/() 155/78    I/O this shift:  In: -   Out: 1200 [Urine:1200]  I/O last 3 completed shifts:  In: 1570 [P.O.:1570]  Out: 5400 [Urine:5400]    Physical Examination:    General Appearance : alert, appears stated age, cooperative   Head : normocephalic, without obvious abnormality and atraumatic  Eyes :  no pallor  Throat : oral mucosa moist  Neck: no JVD  Lungs :reduced breath sounds all over  Heart : regular rhythm & normal rate, normal S1, S2, no murmur, no gillian, no rub   Abdomen :  normal bowel sounds, soft non-tender  Extremities : 1+  Edema  Pulses :  palpable and equal bilaterally  Skin  RLE venous ulcers, venous stasis both legs  Neurologic : orientated to person, place, time, grossly no focal deficitis    Laboratory Data :      WBC WBC   Date Value Ref Range Status   07/13/2018 10.96 4.50 - 12.50 10*3/mm3 Final   07/12/2018 8.68 4.50 - 12.50 10*3/mm3 Final   07/11/2018 9.42 4.50 - 12.50 10*3/mm3 Final      HGB Hemoglobin   Date Value Ref Range Status   07/13/2018 16.5 14.0 - 18.0 g/dL Final   07/12/2018 16.3 14.0 - 18.0 g/dL Final   07/11/2018 15.9 14.0 - 18.0 g/dL Final      HCT Hematocrit   Date Value Ref Range Status   07/13/2018 49.6 42.0 - 52.0 % Final   07/12/2018 49.2 42.0 - 52.0 % Final   07/11/2018 48.8 42.0 - 52.0 % Final      Platlets No results found for: LABPLAT   MCV MCV   Date Value Ref Range Status   07/13/2018 93.1 80.0 - 94.0 fL Final   07/12/2018 93.2 80.0 - 94.0 fL Final   07/11/2018 93.3 80.0 - 94.0 fL Final          Sodium Sodium   Date Value Ref Range Status   07/13/2018 134 (L) 135 - 153 mmol/L Final   07/12/2018 131 (L) 135 - 153 mmol/L Final   07/11/2018 130 (L) 135 - 153 mmol/L Final   07/11/2018 130 (L) 135 - 153 mmol/L Final       Potassium Potassium   Date Value Ref Range Status   07/13/2018 4.1 3.5 - 5.3 mmol/L Final   07/12/2018 5.1 3.5 - 5.3 mmol/L Final   07/11/2018 5.3 3.5 - 5.3 mmol/L Final   07/11/2018 5.2 3.5 - 5.3 mmol/L Final      Chloride Chloride   Date Value Ref Range Status   07/13/2018 95 (L) 99 - 112 mmol/L Final   07/12/2018 92 (L) 99 - 112 mmol/L Final   07/11/2018 91 (L) 99 - 112 mmol/L Final   07/11/2018 92 (L) 99 - 112 mmol/L Final      CO2 CO2   Date Value Ref Range Status   07/13/2018 35.8 (H) 24.3 - 31.9 mmol/L Final   07/12/2018 34.6 (H) 24.3 - 31.9 mmol/L Final   07/11/2018 35.1 (H) 24.3 - 31.9 mmol/L Final   07/11/2018 34.8 (H) 24.3 - 31.9 mmol/L Final      BUN BUN   Date Value Ref Range Status   07/13/2018 21 7 - 21 mg/dL Final   07/12/2018 27 (H) 7 - 21 mg/dL Final   07/11/2018 29 (H) 7 - 21 mg/dL Final   07/11/2018 26 (H) 7 - 21 mg/dL Final      Creatinine Creatinine   Date Value Ref Range Status   07/13/2018 0.81 0.43 - 1.29 mg/dL Final   07/12/2018 0.88 0.43 - 1.29 mg/dL Final   07/11/2018 1.00 0.43 - 1.29 mg/dL Final   07/11/2018 0.96 0.43 - 1.29 mg/dL Final      Calcium Calcium   Date Value Ref Range Status   07/13/2018 8.8 7.7 - 10.0 mg/dL Final   07/12/2018 8.6 7.7 - 10.0 mg/dL Final   07/11/2018 8.5 7.7 - 10.0 mg/dL Final   07/11/2018 8.5 7.7 - 10.0 mg/dL Final      PO4 No results found for: CAPO4   Albumin Albumin   Date Value Ref Range Status   07/13/2018 3.10 (L) 3.40 - 4.80 g/dL Final   07/11/2018 3.10 (L) 3.40 - 4.80 g/dL Final   07/11/2018 2.5 (L) 2.9 - 4.4 g/dL Final      Magnesium Magnesium   Date Value Ref Range Status   07/11/2018 2.3 1.7 - 2.6 mg/dL Final      Uric Acid No results found for: URICACID     Radiology results :   Imaging Results (last 24 hours)     ** No results found for the last 24 hours. **        Imaging Results (last 24 hours)     ** No results found for the last 24 hours. **                Medications:        amLODIPine 10 mg Oral Q24H   aspirin 325 mg Oral Daily    atorvastatin 40 mg Oral Daily   bacitracin  Topical Q12H   budesonide-formoterol 2 puff Inhalation BID - RT   cefdinir 300 mg Oral Q12H   cetirizine 10 mg Oral Daily   DOBUTamine 10 mcg/kg/min Intravenous Once in imaging   docusate sodium 100 mg Oral BID   donepezil 10 mg Oral Nightly   doxycycline 100 mg Oral Q12H   fluticasone 1 spray Each Nare BID   gabapentin 600 mg Oral Q8H   heparin (porcine) 5,000 Units Subcutaneous Q12H   Hydrochlorothiazide Oral 12.5 mg Oral Daily   insulin aspart 0-9 Units Subcutaneous TID With Meals   insulin aspart prot-insulin aspart 20 Units Subcutaneous BID With Meals   insulin aspart prot-insulin aspart 25 Units Subcutaneous Once   ipratropium-albuterol 3 mL Nebulization 4x Daily - RT   lactobacillus acidophilus 1 capsule Oral Daily   Liraglutide 1.8 mg Subcutaneous Daily   lisinopril 40 mg Oral Daily   nicotine 1 patch Transdermal Q24H   [START ON 7/14/2018] predniSONE 20 mg Oral Daily With Breakfast   sodium chloride 3 mL Intravenous Q8H   tamsulosin 0.4 mg Oral Q PM       Pharmacy Consult        Assessment/Plan     Principal Problem:    Venous stasis dermatitis of both lower extremities  Active Problems:    Acute respiratory failure with hypoxia and hypercapnia (CMS/HCC)      1.  Nephrotic range proteinuria :  urine Protein/creatinine is 3.8 gm/gm  He has diabetes mellitus for 20 years so likely diabetic nephropathy . Complements are normal, Hep C is + and HCV RNA pending. ANCA and antiGBM pending. LOLIS and anti dsDNA negative. Continue lisinopril    2. Biclonal gammopathy : consulted hematology. Appreciate recs. Needs repeat SIFE in one month    3.  Hypertension : better but suboptimal will increase HCTZ    4. Dilutional hyponatremia : Na is better at 134 today, continue fluid restriction    5. Elevated troponin : stress test negative    6.  cellulitis:  on antibiotics    7 hypercarbic and hypoxic respiratory failure : improved    Will sign off. Fu in 2 weeks as outpatient  I  discussed the patient's findings and my recommendations with patient and nursing staff, Dr Jarred Padilla MD  07/13/18  2:42 PM

## 2018-07-13 NOTE — PROGRESS NOTES
"  I have personally seen and examined the patient today and discussed overnight interval progress and pertinent issues with nursing staff.    Subjective:    Patient has no issues or complaints this morning.  Leg showing signs of improvement.  WBC normal.  CRP normal.  No fever or diarrhea reported.    History taken from: patient chart RN      Vital Signs    /91   Pulse 78   Temp 98.2 °F (36.8 °C)   Resp 22   Ht 182.9 cm (72\")   Wt 112 kg (247 lb)   SpO2 91%   BMI 33.50 kg/m²     Temp:  [97.9 °F (36.6 °C)-98.8 °F (37.1 °C)] 98.2 °F (36.8 °C)      Intake/Output Summary (Last 24 hours) at 07/13/18 1239  Last data filed at 07/13/18 0900   Gross per 24 hour   Intake             1250 ml   Output             4550 ml   Net            -3300 ml     Intake & Output (last 3 days)       07/10 0701 - 07/11 0700 07/11 0701 - 07/12 0700 07/12 0701 - 07/13 0700 07/13 0701 - 07/14 0700    P.O. 540 1050 1250     I.V. (mL/kg)        IV Piggyback 500       Total Intake(mL/kg) 1040 (9) 1050 (9.1) 1250 (11.2)     Urine (mL/kg/hr) 1600 (0.6) 3150 (1.1) 4000 (1.5) 550 (0.9)    Stool  0 (0)      Total Output 1600 3150 4000 550    Net -560 -2100 -2750 -550            Unmeasured Urine Occurrence  2 x      Unmeasured Stool Occurrence  2 x            Physical Exam:                 General Appearance:    Alert, cooperative, in no acute distress, poor hygiene    Head:    Normocephalic, without obvious abnormality, atraumatic   Eyes:            Lids and lashes normal, conjunctivae and sclerae normal, no icterus, no pallor, corneas clear, PERRLA   Ears:    Ears appear intact with no abnormalities noted   Throat:   No oral lesions, no thrush, oral mucosa moist   Neck:   No adenopathy, supple, trachea midline, no thyromegaly, no   carotid bruit, no JVD   Back:     No tenderness to percussion or palpation, range of motion   normal   Lungs:     Decreased at the bases,respirations regular, even and unlabored. No wheezing, no ronchi and no " crackles.    Heart:    Regular rhythm and normal rate, normal S1 and S2, no            murmur, no gallop, no rub, no click   Chest Wall:    No abnormalities observed   Abdomen:     Normal bowel sounds, no masses, no organomegaly, soft   non-tender, non-distended, no guarding, no rebound tenderness   Rectal:     Deferred   Extremities:  Bilateral lower extremity venous stasis dermatitis with foul odor and drainage, associated redness and warmth.  Right worse than left.    Pulses:   Pulses palpable and equal bilaterally   Skin:   Bilateral lower extremity venous stasis dermatitis with foul odor and drainage, associated redness and warmth.  Right worse than left.   Lymph nodes:   No palpable adenopathy   Neurologic:   Awake, alert and oriented x 3. Following commands.       Results:      Results from last 7 days  Lab Units 07/13/18  0116 07/12/18  0109 07/11/18  0106 07/10/18  0545 07/09/18  0953   WBC 10*3/mm3 10.96 8.68 9.42 7.39 8.19     Lab Results   Component Value Date    NEUTROABS 8.42 (H) 07/13/2018         Results from last 7 days  Lab Units 07/13/18  0116   CREATININE mg/dL 0.81         Results from last 7 days  Lab Units 07/13/18  0116 07/12/18  0109 07/11/18  0106   CRP mg/dL <0.50 <0.50 1.04*       Imaging Results (last 24 hours)     ** No results found for the last 24 hours. **            Results Review:    I have personally reviewed laboratory data, culture results, radiology studies and antimicrobial therapy.    Hospital Medications (active)       Dose Frequency Start End    albuterol (PROVENTIL) nebulizer solution 0.083% 2.5 mg/3mL 2.5 mg Every 6 Hours PRN 7/10/2018     Sig - Route: Take 2.5 mg by nebulization Every 6 (Six) Hours As Needed for Wheezing or Shortness of Air. - Nebulization    amLODIPine (NORVASC) tablet 5 mg 5 mg Every 24 Hours Scheduled 7/11/2018     Sig - Route: Take 1 tablet by mouth Daily. - Oral    aspirin tablet 325 mg 325 mg Daily 7/10/2018     Sig - Route: Take 1 tablet by mouth  Daily. - Oral    atorvastatin (LIPITOR) tablet 40 mg 40 mg Daily 7/11/2018     Sig - Route: Take 1 tablet by mouth Daily. - Oral    budesonide-formoterol (SYMBICORT) 160-4.5 MCG/ACT inhaler 2 puff 2 puff 2 Times Daily - RT 7/10/2018     Sig - Route: Inhale 2 puffs 2 (Two) Times a Day. - Inhalation    cefdinir (OMNICEF) capsule 300 mg 300 mg Every 12 Hours Scheduled 7/10/2018 7/24/2018    Sig - Route: Take 1 capsule by mouth Every 12 (Twelve) Hours. - Oral    cetirizine (zyrTEC) tablet 10 mg 10 mg Daily 7/10/2018     Sig - Route: Take 1 tablet by mouth Daily. - Oral    dextrose (D50W) solution 25 g 25 g Every 15 Minutes PRN 7/9/2018     Sig - Route: Infuse 50 mL into a venous catheter Every 15 (Fifteen) Minutes As Needed for Low Blood Sugar (Blood Sugar Less Than 70). - Intravenous    Cosign for Ordering: Accepted by Julian Stern MD on 7/9/2018  5:29 PM    dextrose (D50W) solution 25 g 25 g Every 15 Minutes PRN 7/9/2018     Sig - Route: Infuse 50 mL into a venous catheter Every 15 (Fifteen) Minutes As Needed for Low Blood Sugar (Blood Sugar Less Than 70). - Intravenous    dextrose (GLUTOSE) oral gel 15 g 15 g Every 15 Minutes PRN 7/9/2018     Sig - Route: Take 15 g by mouth Every 15 (Fifteen) Minutes As Needed for Low Blood Sugar (Blood sugar less than 70). - Oral    Cosign for Ordering: Accepted by Julian Stern MD on 7/9/2018  5:29 PM    dextrose (GLUTOSE) oral gel 15 g 15 g Every 15 Minutes PRN 7/9/2018     Sig - Route: Take 15 g by mouth Every 15 (Fifteen) Minutes As Needed for Low Blood Sugar (Blood sugar less than 70). - Oral    donepezil (ARICEPT) tablet 10 mg 10 mg Nightly 7/10/2018     Sig - Route: Take 2 tablets by mouth Every Night. - Oral    doxycycline (MONODOX) capsule 100 mg 100 mg Every 12 Hours Scheduled 7/10/2018 7/24/2018    Sig - Route: Take 1 capsule by mouth Every 12 (Twelve) Hours. - Oral    fluticasone (FLONASE) 50 MCG/ACT nasal spray 1 spray 1 spray 2 Times Daily 7/10/2018     Sig - Route:  1 spray by Each Nare route 2 (Two) Times a Day. - Each Nare    furosemide (LASIX) injection 20 mg 20 mg Once 7/11/2018 7/11/2018    Sig - Route: Infuse 2 mL into a venous catheter 1 (One) Time. - Intravenous    gabapentin (NEURONTIN) capsule 600 mg 600 mg Every 8 Hours Scheduled 7/10/2018     Sig - Route: Take 2 capsules by mouth Every 8 (Eight) Hours. - Oral    glucagon (human recombinant) (GLUCAGEN DIAGNOSTIC) injection 1 mg 1 mg As Needed 7/9/2018     Sig - Route: Inject 1 mg under the skin As Needed (Blood Glucose Less Than 70). - Subcutaneous    Cosign for Ordering: Accepted by Julian Stern MD on 7/9/2018  5:29 PM    glucagon (human recombinant) (GLUCAGEN DIAGNOSTIC) injection 1 mg 1 mg As Needed 7/9/2018     Sig - Route: Inject 1 mg under the skin As Needed (Blood Glucose Less Than 70). - Subcutaneous    heparin (porcine) 5000 UNIT/ML injection 5,000 Units 5,000 Units Every 12 Hours Scheduled 7/9/2018     Sig - Route: Inject 1 mL under the skin Every 12 (Twelve) Hours. - Subcutaneous    Cosign for Ordering: Accepted by Julian Stern MD on 7/9/2018  5:29 PM    insulin aspart (novoLOG) injection 0-9 Units 0-9 Units 4 Times Daily Before Meals & Nightly 7/9/2018     Sig - Route: Inject 0-9 Units under the skin 4 (Four) Times a Day Before Meals & at Bedtime. - Subcutaneous    insulin aspart prot-insulin aspart (novoLOG 70/30) injection 20 Units 20 Units 2 Times Daily With Meals 7/11/2018     Sig - Route: Inject 20 Units under the skin 2 (Two) Times a Day With Meals. - Subcutaneous    ipratropium-albuterol (DUO-NEB) nebulizer solution 3 mL 3 mL 4 Times Daily - RT 7/9/2018     Sig - Route: Take 3 mL by nebulization 4 (Four) Times a Day. - Nebulization    Cosign for Ordering: Accepted by Julian Stern MD on 7/9/2018  5:29 PM    lactobacillus acidophilus (RISAQUAD) capsule 1 capsule 1 capsule Daily 7/10/2018     Sig - Route: Take 1 capsule by mouth Daily. - Oral    Liraglutide (VICTOZA) injection 1.8 mg 1.8 mg  "Daily 7/10/2018     Sig - Route: Inject 1.8 mg under the skin Daily. - Subcutaneous    Non-formulary Exception Code: Patient supplied medication    lisinopril (PRINIVIL,ZESTRIL) tablet 40 mg 40 mg Daily 7/10/2018     Sig - Route: Take 4 tablets by mouth Daily. - Oral    methylPREDNISolone sodium succinate (SOLU-Medrol) injection 20 mg 20 mg Every 12 Hours 7/11/2018     Sig - Route: Infuse 0.5 mL into a venous catheter Every 12 (Twelve) Hours. - Intravenous    morphine injection 1 mg 1 mg Every 4 Hours PRN 7/9/2018 7/19/2018    Sig - Route: Infuse 0.5 mL into a venous catheter Every 4 (Four) Hours As Needed for Severe Pain  (hold for oversedation, SBP<100). - Intravenous    nicotine (NICODERM CQ) 21 MG/24HR patch 1 patch 1 patch Every 24 Hours 7/10/2018     Sig - Route: Place 1 patch on the skin Daily. - Transdermal    ondansetron (ZOFRAN) tablet 8 mg 8 mg Every 12 Hours PRN 7/10/2018     Sig - Route: Take 2 tablets by mouth Every 12 (Twelve) Hours As Needed for Nausea or Vomiting. - Oral    Pharmacy Consult  Continuous PRN 7/10/2018     Sig - Route: Continuous As Needed for Consult. - Does not apply    sodium chloride 0.9 % bolus 500 mL 500 mL Once 7/10/2018 7/10/2018    Sig - Route: Infuse 500 mL into a venous catheter 1 (One) Time. - Intravenous    sodium chloride 0.9 % flush 1-10 mL 1-10 mL As Needed 7/9/2018     Sig - Route: Infuse 1-10 mL into a venous catheter As Needed for Line Care. - Intravenous    Cosign for Ordering: Accepted by Julian Stern MD on 7/9/2018  5:29 PM    sodium chloride 0.9 % flush 10 mL 10 mL As Needed 7/9/2018     Sig - Route: Infuse 10 mL into a venous catheter As Needed for Line Care. - Intravenous    Linked Group 1:  \"And\" Linked Group Details        sodium chloride 0.9 % flush 3 mL 3 mL Every 8 Hours 7/10/2018     Sig - Route: Infuse 3 mL into a venous catheter Every 8 (Eight) Hours. - Intravenous    sodium chloride 0.9 % flush 5 mL 5 mL As Needed 7/10/2018     Sig - Route: Infuse 5 " mL into a venous catheter As Needed for Line Care (After Medication Administration or Blood Draw). - Intravenous    tamsulosin (FLOMAX) 24 hr capsule 0.4 mg 0.4 mg Every Evening 7/10/2018     Sig - Route: Take 1 capsule by mouth Every Evening. - Oral    atorvastatin (LIPITOR) tablet 20 mg (Discontinued) 20 mg Daily 7/10/2018 7/10/2018    Sig - Route: Take 1 tablet by mouth Daily. - Oral    insulin aspart prot-insulin aspart (novoLOG 70/30) injection 30 Units (Discontinued) 30 Units 2 Times Daily With Meals 7/10/2018 7/11/2018    Sig - Route: Inject 30 Units under the skin 2 (Two) Times a Day With Meals. - Subcutaneous    ipratropium-albuterol (DUO-NEB) nebulizer solution 3 mL (Discontinued) 3 mL Every 6 Hours PRN 7/9/2018 7/10/2018    Sig - Route: Take 3 mL by nebulization Every 6 (Six) Hours As Needed for Shortness of Air. - Nebulization    Cosign for Ordering: Accepted by Julian Stern MD on 7/9/2018  5:29 PM    methylPREDNISolone sodium succinate (SOLU-Medrol) injection 40 mg (Discontinued) 40 mg Every 12 Hours 7/9/2018 7/11/2018    Sig - Route: Infuse 1 mL into a venous catheter Every 12 (Twelve) Hours. - Intravenous    Cosign for Ordering: Accepted by Julian Stern MD on 7/9/2018  5:29 PM            Cultures:    Blood Culture   Date Value Ref Range Status   07/09/2018 No growth at 24 hours  Preliminary   07/09/2018 No growth at 24 hours  Preliminary           Assessment/Plan     ASSESSMENT:    1.  Venous stasis dermatitis   2.  Pneumonia versus COPD exacerbation       PLAN:    Patient has no issues or complaints this morning.  Leg showing signs of improvement.  WBC normal.  CRP normal.  No fever or diarrhea reported.    Chest x-ray 7/11/18 shows trace pleural effusions.    Mycoplasma pneumoniae negative.  Urinary Legionella negative.       Unfortunately patient's poor hygiene will jeopardize and impair patient's healing process and outcomes.      Plans to continue current  antibiotic therapy of  Doxycycline  100 mg by mouth twice a day and Omnicef 300 mg by mouth twice a day both to continue through 7/23/18.  Patient stable from infectious disease standpoint.  Okay to discharge home from infectious disease standpoint.    Current Antimicrobials:  Doxycycline 100mg PO BID through 7/23/18  Omnicef 300mg PO BID through 7/23/18      Patient's findings and recommendations were discussed with patient, nursing staff, primary care team and consulting provider    Code Status:   Code Status and Medical Interventions:   Ordered at: 07/09/18 1509     Code Status:    CPR     Medical Interventions (Level of Support Prior to Arrest):    Full     Scribed for Dr. Beatriz Mehta.    Diana Chao, PANCHO  07/13/18  12:39 PM     Physician Attestation:    The documentation recorded by the scribe accurately reflects the service I personally performed and the decisions made by me.    Beatriz Mehta MD  Infectious Diseases  07/14/18  7:45 AM

## 2018-07-13 NOTE — PROGRESS NOTES
Discharge Planning Assessment   José Miguel     Patient Name: Heath Bajwa  MRN: [id number]  Today's Date: 7/13/2018    Admit Date: 7/9/2018      Discharge Plan     Row Name 07/13/18 1544       Plan    Plan Pt lives alone at [facility] and will return at discharge. Pt utilizes [facility]. Pt has home oxygen, a nebulizer, and an electric wheelchair via unknown provider. SS will continue to follow and assist as needed with discharge planning.     Patient/Family in Agreement with Plan yes                Expected Discharge Date and Time     Expected Discharge Date Expected Discharge Time    Jul 14, 2018         Deanna Chen

## 2018-07-13 NOTE — PROGRESS NOTES
LOS: 4 days     Chief Complaint:  Pulmonology is following for respiratory distress    Subjective     Interval History:   Patient is resting in chair comfortably.  Not in any distress.  Off high flow nasal cannula oxygen.  Out of ICU.  In PCU.  Made good urine overall 3.3 L negative.  History taken from: patient chart RN    Review of Systems:     Positive for leg swelling, cellulitis and I'll shortness of breath otherwise all other review of systems reviewed and are negative.                  Objective     Vital Signs  Temp:  [97.9 °F (36.6 °C)-98.8 °F (37.1 °C)] 98.2 °F (36.8 °C)  Heart Rate:  [59-96] 78  Resp:  [15-26] 22  BP: (127-183)/() 150/91  Body mass index is 33.5 kg/m².    Intake/Output Summary (Last 24 hours) at 07/13/18 1302  Last data filed at 07/13/18 0900   Gross per 24 hour   Intake             1250 ml   Output             4550 ml   Net            -3300 ml     I/O this shift:  In: -   Out: 550 [Urine:550]    Physical Exam:  General- normal in appearance, not in any acute distress    HEENT- pupils equally reactive to light, normal in size, no scleral icterus    Neck-supple    Respiratory-respirations normal-on auscultation no wheezing no crackles,     Cardiovascular-  Normal S1 and S2. No S3, S4 or murmurs. No JVD, no carotid bruit and no edema, pulses normal bilaterally     GI-nontender nondistended bowel sounds positive    CNS-nonfocal    Musculoskeletal -no edema    Psychiatric-mood good, good eye contact, alert awake oriented                    Results Review:                I reviewed the patient's new clinical results.  I reviewed the patient's new imaging results and agree with the interpretation.    Results from last 7 days  Lab Units 07/13/18  0116 07/12/18  0109 07/11/18  0106   WBC 10*3/mm3 10.96 8.68 9.42   HEMOGLOBIN g/dL 16.5 16.3 15.9   PLATELETS 10*3/mm3 235 194 214       Results from last 7 days  Lab Units 07/13/18  0116 07/12/18  0109 07/11/18  0715 07/11/18  0106   07/10/18  0545   SODIUM mmol/L 134* 131* 130* 130*  --  127*   POTASSIUM mmol/L 4.1 5.1 5.3 5.2  < > 5.8*   CHLORIDE mmol/L 95* 92* 91* 92*  --  90*   CO2 mmol/L 35.8* 34.6* 35.1* 34.8*  --  36.4*   BUN mg/dL 21 27* 29* 26*  --  21   CREATININE mg/dL 0.81 0.88 1.00 0.96  --  1.16   CALCIUM mg/dL 8.8 8.6 8.5 8.5  --  8.6   GLUCOSE mg/dL 41* 209* 193* 139*  --  321*   MAGNESIUM mg/dL  --   --   --  2.3  --  2.2   < > = values in this interval not displayed.  Lab Results   Component Value Date    INR 1.03 07/09/2018    PROTIME 13.7 07/09/2018       Results from last 7 days  Lab Units 07/13/18  0116 07/11/18  0106 07/09/18  0953   ALK PHOS U/L 77 80 96   BILIRUBIN mg/dL 0.5 0.4 0.3   ALT (SGPT) U/L 21 18 22   AST (SGOT) U/L 24 22 21       Results from last 7 days  Lab Units 07/11/18  0845   PH, ARTERIAL pH units 7.412   PO2 ART mm Hg 62.4*   PCO2, ARTERIAL mm Hg 54.9*   HCO3 ART mmol/L 34.2*     Imaging Results (last 24 hours)     ** No results found for the last 24 hours. **             Medication Review:   Scheduled Medications:    amLODIPine 10 mg Oral Q24H   aspirin 325 mg Oral Daily   atorvastatin 40 mg Oral Daily   bacitracin  Topical Q12H   budesonide-formoterol 2 puff Inhalation BID - RT   cefdinir 300 mg Oral Q12H   cetirizine 10 mg Oral Daily   cosyntropin 0.25 mg Intravenous Once   DOBUTamine 10 mcg/kg/min Intravenous Once in imaging   docusate sodium 100 mg Oral BID   donepezil 10 mg Oral Nightly   doxycycline 100 mg Oral Q12H   fluticasone 1 spray Each Nare BID   gabapentin 600 mg Oral Q8H   heparin (porcine) 5,000 Units Subcutaneous Q12H   Hydrochlorothiazide Oral 12.5 mg Oral Daily   insulin aspart 0-9 Units Subcutaneous TID With Meals   insulin aspart prot-insulin aspart 20 Units Subcutaneous BID With Meals   insulin aspart prot-insulin aspart 25 Units Subcutaneous Once   ipratropium-albuterol 3 mL Nebulization 4x Daily - RT   lactobacillus acidophilus 1 capsule Oral Daily   Liraglutide 1.8 mg  Subcutaneous Daily   lisinopril 40 mg Oral Daily   nicotine 1 patch Transdermal Q24H   [START ON 7/14/2018] predniSONE 20 mg Oral Daily With Breakfast   sodium chloride 3 mL Intravenous Q8H   tamsulosin 0.4 mg Oral Q PM     Continuous infusions:    Pharmacy Consult        Assessment/Plan      Neuro: Stable.     Respiratory failure:  Likely related to underlying lung disease-COPD.   on regular nasal cannula oxygen.  We'll decrease steroids.  Continue diuretics.  Overall 3.3 L negative.    Continue diuretics to improve lung compliance.  Consider PTOT.        Patient Active Problem List   Diagnosis Code   • BPH without urinary obstruction N40.0   • Elevated prostate specific antigen (PSA) R97.20   • Acute respiratory failure with hypoxia and hypercapnia (CMS/[facility]) J96.01, J96.02   • Venous stasis dermatitis of both lower extremities I87.2             Carlos Sahni MD  07/13/18  1:02 PM

## 2018-07-13 NOTE — PROGRESS NOTES
"   LOS: 4 days     Name: Heath Bajwa  Age/Sex: 68 y.o. male  :  1950        PCP: No Known Provider    Principal Problem:    Venous stasis dermatitis of both lower extremities  Active Problems:    Acute respiratory failure with hypoxia and hypercapnia (CMS/[facility])      Admission Information: Mr. Bajwa is a pleasant 68-year-old  male with no history of known coronary artery disease, has long history of smoking of one to 2 packs a day for about 25 years with underlying significant COPD, was admitted with complaints of increasing shortness of breath for the last 2-3 days and was noted to be having acute exacerbation of COPD with acute hypoxic/hypercarbic respiratory failure.  Since admission his troponins have been elevated in the indeterminate range and have been trending down since.  It peaked at 0.303 at admission and has been trending down.  On further questioning Mr. Bajwa denies any compressive chest pain or discomfort in the recent or remote past.  He denies any history of known coronary artery disease.  His main complaint is having shortness of breath.  He does complain of occasional \"gas-like pains\".  He has been on aspirin 325 mg daily and atorvastatin 20 mg daily.  His EKG revealed normal sinus rhythm with poor R wave progression across leads V1 through V4.  His echo Doppler study which I reviewed at the bedside while it was being done revealed normal LV wall motion and systolic function with an estimated ejection fraction of about 60-65%.  There was mild mitral regurgitation with evidence of mild to moderate pulmonary hypertension.    Following for: indeterminate range troponin elevation    Telemetry Monitoring: sinus rhythm in the 70's    Interval history: The patient is resting in bed this morning. He denies chest pain. He does have shortness of breath which he states has improved throughout his stay. He denies dizziness and lightheadedness. A dobutamine stress test yesterday revealed a " small sized, mild degree of ischemia. This was felt to be a low risk study.    Subjective     ROS    Vital Signs  Vitals:    07/13/18 0702 07/13/18 0709 07/13/18 0802 07/13/18 0835   BP: 180/91  165/81 155/73   BP Location:       Patient Position:       Pulse: 77 75 73 81   Resp:  24     Temp:       TempSrc:       SpO2: 93% 92% (!) 88%    Weight:       Height:         Body mass index is 33.5 kg/m².      Intake/Output Summary (Last 24 hours) at 07/13/18 0925  Last data filed at 07/13/18 0500   Gross per 24 hour   Intake             1250 ml   Output             4000 ml   Net            -2750 ml       Physical Exam   Constitutional: He is oriented to person, place, and time. He appears well-developed and well-nourished.   HENT:   Head: Normocephalic and atraumatic.   Cardiovascular: Normal rate, regular rhythm and normal heart sounds.  Exam reveals no S3 and no S4.    No murmur heard.  Decreased pedal pulses bilaterally   Pulmonary/Chest: Effort normal and breath sounds normal.   Abdominal: Soft. Bowel sounds are normal.   Musculoskeletal: Normal range of motion.   Neurological: He is alert and oriented to person, place, and time.   Skin: Skin is warm and dry.   Psychiatric: He has a normal mood and affect. His behavior is normal.       Results Review:       Results from last 7 days  Lab Units 07/13/18  0116 07/12/18  0109 07/11/18  0106 07/10/18  0545 07/09/18  0953   WBC 10*3/mm3 10.96 8.68 9.42 7.39 8.19   HEMOGLOBIN g/dL 16.5 16.3 15.9 15.9 17.2   PLATELETS 10*3/mm3 235 194 214 224 197       Results from last 7 days  Lab Units 07/13/18  0116 07/12/18  0109 07/11/18  0715 07/11/18  0106 07/10/18  1151 07/10/18  0545 07/09/18  0953   SODIUM mmol/L 134* 131* 130* 130*  --  127* 130*   POTASSIUM mmol/L 4.1 5.1 5.3 5.2 5.0 5.8* 4.9   CHLORIDE mmol/L 95* 92* 91* 92*  --  90* 89*   CO2 mmol/L 35.8* 34.6* 35.1* 34.8*  --  36.4* 38.2*   BUN mg/dL 21 27* 29* 26*  --  21 16   CREATININE mg/dL 0.81 0.88 1.00 0.96  --  1.16  1.00   CALCIUM mg/dL 8.8 8.6 8.5 8.5  --  8.6 9.1   GLUCOSE mg/dL 41* 209* 193* 139*  --  321* 149*       Results from last 7 days  Lab Units 07/10/18  0545 07/10/18  0026 07/09/18  1737 07/09/18  1153 07/09/18  0953   CK TOTAL U/L 165  165 153 130  130  --  139   TROPONIN I ng/mL 0.144* 0.126* 0.184* 0.275* 0.303*   CKMB ng/mL 4.67 5.92* 4.89  --  6.13*         Results from last 7 days  Lab Units 07/09/18  0953   INR  1.03       I reviewed the patient's new clinical results.  I reviewed the patient's new imaging results and agree with the interpretation.  I personally viewed and interpreted the patient's EKG/Telemetry data      Medication Review:     amLODIPine 10 mg Oral Q24H   aspirin 325 mg Oral Daily   atorvastatin 40 mg Oral Daily   bacitracin  Topical Q12H   budesonide-formoterol 2 puff Inhalation BID - RT   cefdinir 300 mg Oral Q12H   cetirizine 10 mg Oral Daily   cosyntropin 0.25 mg Intravenous Once   DOBUTamine 10 mcg/kg/min Intravenous Once in imaging   docusate sodium 100 mg Oral BID   donepezil 10 mg Oral Nightly   doxycycline 100 mg Oral Q12H   fluticasone 1 spray Each Nare BID   gabapentin 600 mg Oral Q8H   heparin (porcine) 5,000 Units Subcutaneous Q12H   Hydrochlorothiazide Oral 12.5 mg Oral Daily   insulin aspart 0-9 Units Subcutaneous 4x Daily AC & at Bedtime   insulin aspart prot-insulin aspart 20 Units Subcutaneous BID With Meals   insulin aspart prot-insulin aspart 25 Units Subcutaneous Once   ipratropium-albuterol 3 mL Nebulization 4x Daily - RT   lactobacillus acidophilus 1 capsule Oral Daily   Liraglutide 1.8 mg Subcutaneous Daily   lisinopril 40 mg Oral Daily   nicotine 1 patch Transdermal Q24H   predniSONE 40 mg Oral Daily With Breakfast   sodium chloride 3 mL Intravenous Q8H   tamsulosin 0.4 mg Oral Q PM       Pharmacy Consult        Assessment:  1. Indeterminate range troponin elevation,patient is clinically stable.  2. Uncontrolled hypertension.  3. Acute hypoxic/hypercapnic  respiratory failure due to acute exacerbation of COPD, being managed by the hospitalist service.  4. Cellulitis of the lower extremities, being managed by the hospitalist service.  5. Tobacco abuse.  6. Peripheral arterial disease of both lower extremities with ulcers on the right leg.      Recommendations:  1. I have discussed with  about the results of his nuclear stress test and since his dobutamine stress revealed only a small area of mild ischemia, would continue to treat him medically. Continue aspirin and atorvastatin at current doses.  2. Increase HCTZ to 25 mg daily for uncontrolled hypertension.  Consider changing lisinopril to an ARB if blood pressure continues to remain high.  3. He appears to be stable from a cardiac standpoint for discharge. Follow up in our office in 2-3 weeks.    I discussed the patients findings and my recommendations with patient and family      PANCHO Benjamin, acting as scribe for Dr. Jose Stewart MD, [facility].  07/13/18  9:25 AM    Addendum: I have seen, evaluated and examined Mr. Bajwa and discussed with Bridget DELEON who has scribed this note for me.    Jose Stewart MD, FACC  07/13/18  9:25 AM

## 2018-07-13 NOTE — PLAN OF CARE
Problem: Fall Risk (Adult)  Goal: Identify Related Risk Factors and Signs and Symptoms  Outcome: Ongoing (interventions implemented as appropriate)    Goal: Absence of Fall  Outcome: Ongoing (interventions implemented as appropriate)      Problem: Patient Care Overview  Goal: Plan of Care Review  Outcome: Ongoing (interventions implemented as appropriate)    Goal: Discharge Needs Assessment  Outcome: Ongoing (interventions implemented as appropriate)    Goal: Interprofessional Rounds/Family Conf  Outcome: Ongoing (interventions implemented as appropriate)      Problem: Skin Injury Risk (Adult)  Goal: Identify Related Risk Factors and Signs and Symptoms  Outcome: Ongoing (interventions implemented as appropriate)    Goal: Skin Health and Integrity  Outcome: Ongoing (interventions implemented as appropriate)      Problem: Breathing Pattern Ineffective (Adult)  Goal: Identify Related Risk Factors and Signs and Symptoms  Outcome: Ongoing (interventions implemented as appropriate)    Goal: Effective Oxygenation/Ventilation  Outcome: Ongoing (interventions implemented as appropriate)   07/12/18 1801   Breathing Pattern Ineffective (Adult)   Effective Oxygenation/Ventilation making progress toward outcome     Goal: Anxiety/Fear Reduction  Outcome: Ongoing (interventions implemented as appropriate)

## 2018-07-13 NOTE — CONSULTS
Heath Bajwa  [id number]  1950  7/13/2018    Referring Provider:   Julian Stern MD      Reason for Consultation:   Biclonal Gammopathy    Patient Care Team:  No Known Provider as PCP - General  No Known Provider as PCP - Family Medicine    Chief Complaint:  Dyspnea improving      History of Present Illness:    Heath Bajwa is a 68 y.o. year-old male seen today at the request of Dr. 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    Mr. Bajwa has a PMH significant for COPD and presented to the ER with complaints of dyspnea. He was initially admitted to CCU due to hypoxia and hypercarbic respiratory failure requiring BiPAP. On admission he was noted to have bilateral lower extremity cellulitis. On laboratory evaluation to further evaluate dyspnea troponins were in the indeterminate range and this was felt to be due to demand ischemia from acute hypoxic respiratory failure and is being followed by cardiology. Patient was also found to have proteinuria which is being followed by nephrology and felt likely to be due to diabetic nephropathy at that time serum free light chains as well as immunofixation was drawn. Work up was also significant for positive Hepatitis C, and complement levels were normal, however patient reports that [facility]        Past Medical History:  Past Medical History:   Diagnosis Date   • COPD (chronic obstructive pulmonary disease) (CMS/HCC)    • Diabetes mellitus (CMS/HCC)    • Hypertension        Past Surgical History:  History reviewed. No pertinent surgical history.    Family History:  Family History   Problem Relation Age of Onset   • No Known Problems Mother    • Heart failure Father        Social History:  Social History   Substance Use Topics   • Smoking status: Current Every Day Smoker     Packs/day: 1.00     Years: 25.00     Types: Cigarettes   • Smokeless tobacco:  Never Used   • Alcohol use No       Allergies:    Patient has no known allergies.    Outpatient Medications:  Prescriptions Prior to Admission   Medication Sig Dispense Refill Last Dose   • albuterol (PROVENTIL HFA;VENTOLIN HFA) 108 (90 Base) MCG/ACT inhaler Inhale 2 puffs Every 6 (Six) Hours As Needed for Wheezing.   7/8/2018 at Unknown time   • aspirin 325 MG tablet Take 325 mg by mouth Daily.   7/8/2018 at Unknown time   • budesonide-formoterol (SYMBICORT) 160-4.5 MCG/ACT inhaler Inhale 2 puffs 2 (two) times a day.   7/8/2018 at Unknown time   • donepezil (ARICEPT) 10 MG tablet Take 10 mg by mouth Every Night.   7/8/2018 at Unknown time   • gabapentin (NEURONTIN) 800 MG tablet Take 800 mg by mouth 3 (Three) Times a Day.   7/8/2018 at Unknown time   • hydrochlorothiazide (HYDRODIURIL) 25 MG tablet Take 25 mg by mouth daily.   7/8/2018 at Unknown time   • insulin NPH-insulin regular (Novolin 70/30) (70-30) 100 UNIT/ML injection Inject 30 Units under the skin 2 (Two) Times a Day With Meals.   7/8/2018 at Unknown time   • levocetirizine (XYZAL) 5 MG tablet Take 5 mg by mouth Daily As Needed for Allergies.   7/8/2018 at Unknown time   • Liraglutide (VICTOZA) 18 MG/3ML solution pen-injector injection Inject 1.8 mg under the skin Daily.   7/8/2018 at Unknown time   • lisinopril (PRINIVIL,ZESTRIL) 40 MG tablet Take 40 mg by mouth daily.   7/8/2018 at Unknown time   • ondansetron (ZOFRAN) 8 MG tablet Take 8 mg by mouth Every 12 (Twelve) Hours As Needed for Nausea or Vomiting.   7/8/2018 at Unknown time   • simvastatin (ZOCOR) 80 MG tablet Take 40 mg by mouth every night.   7/8/2018 at Unknown time   • tamsulosin (FLOMAX) 0.4 MG capsule 24 hr capsule Take 1 capsule by mouth Every Evening.   Unknown at Unknown time       Inpatient Medications:  Scheduled Meds:    amLODIPine 10 mg Oral Q24H   aspirin 325 mg Oral Daily   atorvastatin 40 mg Oral Daily   bacitracin  Topical Q12H   budesonide-formoterol 2 puff Inhalation BID -  RT   cefdinir 300 mg Oral Q12H   cetirizine 10 mg Oral Daily   DOBUTamine 10 mcg/kg/min Intravenous Once in imaging   docusate sodium 100 mg Oral BID   donepezil 10 mg Oral Nightly   doxycycline 100 mg Oral Q12H   fluticasone 1 spray Each Nare BID   gabapentin 600 mg Oral Q8H   heparin (porcine) 5,000 Units Subcutaneous Q12H   Hydrochlorothiazide Oral 12.5 mg Oral Daily   insulin aspart 0-9 Units Subcutaneous 4x Daily AC & at Bedtime   insulin aspart prot-insulin aspart 25 Units Subcutaneous BID With Meals   insulin aspart prot-insulin aspart 25 Units Subcutaneous Once   ipratropium-albuterol 3 mL Nebulization 4x Daily - RT   lactobacillus acidophilus 1 capsule Oral Daily   Liraglutide 1.8 mg Subcutaneous Daily   lisinopril 40 mg Oral Daily   nicotine 1 patch Transdermal Q24H   predniSONE 40 mg Oral Daily With Breakfast   sodium chloride 3 mL Intravenous Q8H   tamsulosin 0.4 mg Oral Q PM       Continuous Infusions:    Pharmacy Consult        PRN Meds:  •  albuterol  •  CloNIDine  •  dextrose  •  dextrose  •  dextrose  •  dextrose  •  glucagon (human recombinant)  •  glucagon (human recombinant)  •  Morphine  •  ondansetron  •  Pharmacy Consult  •  sodium chloride  •  Insert peripheral IV **AND** sodium chloride  •  sodium chloride      Review of Systems:  12 point review of systems was conducted with patient and positive as per HPI otherwise negative. +chronic joint pain no new pain      Physical Exam:  Vital Signs: These were reviewed and listed as per patient’s electronic medical chart  Vitals:    07/13/18 0709   BP:    Pulse: 75   Resp: 24   Temp:    SpO2: 92%     General: Awake, alert and oriented, in no distress, sitting up at bedside  HEENT: Head is atraumatic, normocephalic, extraocular movements full, oropharynx clear, no scleral icterus, pink moist mucous membranes  Neck: supple, no jvd, lymphadenopathy or masses  Cardiovascular: regular rate and rhythm without murmurs, rubs or gallops  Pulmonary:  non-labored, clear to auscultation bilaterally, decreased at the bases  Abdomen: soft, non-tender, non-distended, normal active bowel sounds present  Extremities: No clubbing, cyanosis, +slight edema in right lower extremity  Neurologic: Mental status as above, alert, awake and oriented, grossly non-focal exam  Skin: bilateral erythema and venous stasis changes right worse then left  : No trevizo      Labs / Studies:  Lab Results (last 72 hours)     Procedure Component Value Units Date/Time    POC Glucose Once [[id number]]  (Abnormal) Collected:  07/13/18 0635    Specimen:  Blood Updated:  07/13/18 0654     Glucose 142 (H) mg/dL     Narrative:       Meter: [id number] : [id number] Nahun Beck    POC Glucose Once [[id number]]  (Normal) Collected:  07/13/18 0313    Specimen:  Blood Updated:  07/13/18 0338     Glucose 76 mg/dL     Narrative:       Meter: [id number] : [id number] Senait Dupont    Osmolality, Calculated [[id number]]  (Abnormal) Collected:  07/13/18 0116    Specimen:  Blood Updated:  07/13/18 0316     Osmolality Calc 268.0 (L) mOsm/kg     Comprehensive Metabolic Panel [[id number]]  (Abnormal) Collected:  07/13/18 0116    Specimen:  Blood Updated:  07/13/18 0316     Glucose 41 (C) mg/dL      Comment: Verified by Repeat Analysis         BUN 21 mg/dL      Creatinine 0.81 mg/dL      Sodium 134 (L) mmol/L      Potassium 4.1 mmol/L      Chloride 95 (L) mmol/L      CO2 35.8 (H) mmol/L      Calcium 8.8 mg/dL      Total Protein 5.9 (L) g/dL      Albumin 3.10 (L) g/dL      ALT (SGPT) 21 U/L      AST (SGOT) 24 U/L      Alkaline Phosphatase 77 U/L      Comment: Note New Reference Ranges        Total Bilirubin 0.5 mg/dL      eGFR Non African Amer 94 mL/min/1.73      Globulin 2.8 gm/dL      A/G Ratio 1.1 (L) g/dL      BUN/Creatinine Ratio 25.9 (H)     Anion Gap 3.2 (L) mmol/L     C-reactive Protein [[id number]]  (Normal) Collected:  07/13/18 0116    Specimen:  Blood Updated:  07/13/18 0248     C-Reactive Protein  <0.50 mg/dL     CBC & Differential [[id number]] Collected:  07/13/18 0116    Specimen:  Blood Updated:  07/13/18 0224    Narrative:       The following orders were created for panel order CBC & Differential.  Procedure                               Abnormality         Status                     ---------                               -----------         ------                     CBC Auto Differential[[id number]]        Abnormal            Final result                 Please view results for these tests on the individual orders.    CBC Auto Differential [[id number]]  (Abnormal) Collected:  07/13/18 0116    Specimen:  Blood Updated:  07/13/18 0224     WBC 10.96 10*3/mm3      RBC 5.33 10*6/mm3      Hemoglobin 16.5 g/dL      Hematocrit 49.6 %      MCV 93.1 fL      MCH 31.0 pg      MCHC 33.3 g/dL      RDW 13.8 %      RDW-SD 45.7 fl      MPV 10.8 (H) fL      Platelets 235 10*3/mm3      Neutrophil % 76.8 (H) %      Lymphocyte % 13.4 (L) %      Monocyte % 9.4 %      Eosinophil % 0.1 %      Basophil % 0.1 %      Immature Grans % 0.2 %      Neutrophils, Absolute 8.42 (H) 10*3/mm3      Lymphocytes, Absolute 1.47 10*3/mm3      Monocytes, Absolute 1.03 (H) 10*3/mm3      Eosinophils, Absolute 0.01 10*3/mm3      Basophils, Absolute 0.01 10*3/mm3      Immature Grans, Absolute 0.02 10*3/mm3     POC Glucose Once [[id number]]  (Abnormal) Collected:  07/12/18 1934    Specimen:  Blood Updated:  07/12/18 2034     Glucose 159 (H) mg/dL     Narrative:       Meter: [id number] : [id number] Lei Cosby    POC Glucose Once [[id number]]  (Abnormal) Collected:  07/12/18 1748    Specimen:  Blood Updated:  07/12/18 1813     Glucose 201 (H) mg/dL     Narrative:       Meter: [id number] : [id number] augustus mcclendon    KENYATTA + PE [[id number]]  (Abnormal) Collected:  07/11/18 0106    Specimen:  Blood Updated:  07/12/18 1620     IgG 747 mg/dL      IgA 359 mg/dL      IgM 129 mg/dL      Total Protein 5.5 (L) g/dL      Albumin 2.5 (L) g/dL       Alpha-1-Globulin 0.2 g/dL      Alpha-2-Globulin 0.9 g/dL      Beta Globulin 1.0 g/dL      Gamma Globulin 0.9 g/dL      M-Luis Comment: g/dL      Comment: Due to the small quantity of monoclonal proteins, unable to  quantitate the M-spikes.        Globulin 3.0 g/dL      A/G Ratio 0.9     Immunofixation Reflex, Serum Comment     Comment: Immunofixation shows IgM monoclonal protein with kappa light chain  specificity.  Immunofixation shows IgG monoclonal protein with kappa light chain  specificity.        Please note Comment     Comment: Protein electrophoresis scan will follow via computer, mail, or   delivery.       Narrative:       Performed at:  [address] [place] [address]  [id number]  : Tato Dye PhD, Phone:  [phone]    Immunoglobulin Free LT Chains Blood [[id number]]  (Abnormal) Collected:  07/11/18 0106    Specimen:  Blood Updated:  07/12/18 1312     Free Light Chain, Kappa 38.7 (H) mg/L      Free Lambda Light Chains 40.8 (H) mg/L      Kappa/Lambda Ratio 0.95    Narrative:       Performed at:  [address] [place] [address]  [id number]  : Tato Dye PhD, Phone:  [phone]    POC Glucose Once [[id number]]  (Abnormal) Collected:  07/12/18 1244    Specimen:  Blood Updated:  07/12/18 1259     Glucose 319 (H) mg/dL     Narrative:       Meter: [id number] : [id number] augustus mcclendon    Anti-DNA Antibody, Double-stranded [[id number]] Collected:  07/11/18 0106    Specimen:  Blood Updated:  07/12/18 1217     Anti-DNA (DS) Ab Qn 2 IU/mL      Comment:                                    Negative      <5                                     Equivocal  5 - 9                                     Positive      >9       Narrative:       Performed at:  [address] [place] [address]  [id number]  : Tato Dye PhD, Phone:  [phone]    Antinuclear Antibodies Direct [[id number]] Collected:  07/11/18 0106     Specimen:  Blood Updated:  07/12/18 1217     LOLIS Direct Negative    Narrative:       Performed at:  01 - [facility] [address]  [id number]  : Tato Dye PhD, Phone:  [phone]    Antihistone Antibodies [[id number]]  (Abnormal) Collected:  07/10/18 1425    Specimen:  Blood Updated:  07/12/18 1117     Histone Ab 1.7 (H) Units      Comment:                          Negative                <1.0                           Weak Positive      1.0 - 1.5                           Moderate Positive  1.6 - 2.5                           Strong Positive         >2.5       Narrative:       Performed at:  01 - [facility] [address]  [id number]  : Jose Ramon Gutierrez MD, Phone:  [phone]    Blood Culture - Blood, [[id number]]  (Normal) Collected:  07/09/18 0945    Specimen:  Blood from Arm, Right Updated:  07/12/18 1015     Blood Culture No growth at 3 days    Blood Culture - Blood, [[id number]]  (Normal) Collected:  07/09/18 0953    Specimen:  Blood from Arm, Left Updated:  07/12/18 1015     Blood Culture No growth at 3 days    POC Glucose Once [[id number]]  (Abnormal) Collected:  07/12/18 0554    Specimen:  Blood Updated:  07/12/18 0602     Glucose 184 (H) mg/dL     Narrative:       Meter: [id number] : [id number] debby canseco    C-reactive Protein [[id number]]  (Normal) Collected:  07/12/18 0109    Specimen:  Blood Updated:  07/12/18 0228     C-Reactive Protein <0.50 mg/dL     Basic Metabolic Panel [[id number]]  (Abnormal) Collected:  07/12/18 0109    Specimen:  Blood Updated:  07/12/18 0219     Glucose 209 (H) mg/dL      BUN 27 (H) mg/dL      Creatinine 0.88 mg/dL      Sodium 131 (L) mmol/L      Potassium 5.1 mmol/L      Chloride 92 (L) mmol/L      CO2 34.6 (H) mmol/L      Calcium 8.6 mg/dL      eGFR Non African Amer 86 mL/min/1.73      BUN/Creatinine Ratio 30.7 (H)     Anion Gap 4.4 mmol/L     Narrative:       GFR Normal >60  Chronic Kidney  Disease <60  Kidney Failure <15    Osmolality, Calculated [[id number]]  (Normal) Collected:  07/12/18 0109    Specimen:  Blood Updated:  07/12/18 0219     Osmolality Calc 273.9 mOsm/kg     CBC & Differential [[id number]] Collected:  07/12/18 0109    Specimen:  Blood Updated:  07/12/18 0153    Narrative:       The following orders were created for panel order CBC & Differential.  Procedure                               Abnormality         Status                     ---------                               -----------         ------                     CBC Auto Differential[[id number]]        Abnormal            Final result                 Please view results for these tests on the individual orders.    CBC Auto Differential [[id number]]  (Abnormal) Collected:  07/12/18 0109    Specimen:  Blood Updated:  07/12/18 0153     WBC 8.68 10*3/mm3      RBC 5.28 10*6/mm3      Hemoglobin 16.3 g/dL      Hematocrit 49.2 %      MCV 93.2 fL      MCH 30.9 pg      MCHC 33.1 g/dL      RDW 13.9 %      RDW-SD 45.7 fl      MPV 10.5 (H) fL      Platelets 194 10*3/mm3      Neutrophil % 88.5 (H) %      Lymphocyte % 5.2 (L) %      Monocyte % 6.1 %      Eosinophil % 0.0 %      Basophil % 0.0 %      Immature Grans % 0.2 %      Neutrophils, Absolute 7.68 (H) 10*3/mm3      Lymphocytes, Absolute 0.45 (L) 10*3/mm3      Monocytes, Absolute 0.53 10*3/mm3      Eosinophils, Absolute 0.00 10*3/mm3      Basophils, Absolute 0.00 10*3/mm3      Immature Grans, Absolute 0.02 10*3/mm3     POC Glucose Once [[id number]]  (Abnormal) Collected:  07/11/18 2112    Specimen:  Blood Updated:  07/11/18 2118     Glucose 270 (H) mg/dL     Narrative:       Meter: [id number] : [id number] debby canseco    [place] / Lambda Light Chains, Urine, 24 Hour - Urine, Clean Catch [[id number]] Collected:  07/11/18 1604    Specimen:  24 Hour Urine from Urine, Clean Catch Updated:  07/11/18 2025    POC Glucose Once [[id number]]  (Abnormal) Collected:  07/11/18 1733    Specimen:   Blood Updated:  07/11/18 1747     Glucose 221 (H) mg/dL     Narrative:       Meter: [id number] : [id number] augustus mcclendon    Hepatitis C RNA, Quantitative, PCR (graph) [[id number]] Collected:  07/11/18 1620    Specimen:  Blood Updated:  07/11/18 1641    Eosinophil Smear - Urine, Urine, Clean Catch [[id number]] Collected:  07/10/18 1351    Specimen:  Urine from Urine, Clean Catch Updated:  07/11/18 1621     Eosinophil, Urine No Eosinophils Seen %      Comment:                                   <5% few or none seen       Narrative:       Performed at:  [address] [place] [address]  [id number]  : Tato Dye PhD, Phone:  [phone]    POC Glucose Once [[id number]]  (Abnormal) Collected:  07/11/18 1120    Specimen:  Blood Updated:  07/11/18 1128     Glucose 223 (H) mg/dL     Narrative:       Meter: [id number] : [id number] augustus mcclendon    Protein / Creatinine Ratio, Urine - Urine, Clean Catch [[id number]]  (Abnormal) Collected:  07/11/18 0935    Specimen:  Urine from Urine, Clean Catch Updated:  07/11/18 1116     Protein/Creatinine Ratio, Urine 3,863.0 (H) mg/G Crea      Creatinine, Urine 36.5 mg/dL      Total Protein, Urine 141.0 mg/dL     Microalbumin / Creatinine Urine Ratio - Urine, Clean Catch [[id number]] Collected:  07/11/18 0935    Specimen:  Urine from Urine, Clean Catch Updated:  07/11/18 1116     Microalbumin/Creatinine Ratio 2,799.5 mg/g      Creatinine, Urine 36.5 mg/dL      Microalbumin, Urine 1,021.8 mg/L     POC Glucose Once [[id number]]  (Abnormal) Collected:  07/11/18 0927    Specimen:  Blood Updated:  07/11/18 0934     Glucose 283 (H) mg/dL     Narrative:       Meter: [id number] : [id number] augustus mcclendon    Blood Gas, Arterial With Co-Ox [[id number]]  (Abnormal) Collected:  07/11/18 0845    Specimen:  Arterial Blood Updated:  07/11/18 0907     Site Arterial: right radial     Chandan's Test Positive     pH, Arterial 7.412 pH units      pCO2, Arterial 54.9 (C) mm  Hg      pO2, Arterial 62.4 (L) mm Hg      HCO3, Arterial 34.2 (C) mmol/L      Base Excess, Arterial 7.4 mmol/L      O2 Saturation, Arterial 91.3 %      Hemoglobin, Blood Gas 17.2 (H) g/dL      Hematocrit, Blood Gas 51.0 %      Oxyhemoglobin 89.7 %      Methemoglobin 0.30 %      Carboxyhemoglobin 1.5 %      A-a Gradiant 98.6 mmHg      Temperature 98.6 C      Barometric Pressure for Blood Gas 729 mmHg      Modality HFNC     FIO2 33 %      pH, Temp Corrected -- pH Units      pCO2, Temperature Corrected -- mm Hg      pO2, Temperature Corrected -- mm Hg     Cortisol - AM [[id number]]  (Normal) Collected:  07/11/18 0715    Specimen:  Blood Updated:  07/11/18 0805     Cortisol - AM 13.00 mcg/dL     Basic Metabolic Panel [[id number]]  (Abnormal) Collected:  07/11/18 0715    Specimen:  Blood Updated:  07/11/18 0755     Glucose 193 (H) mg/dL      BUN 29 (H) mg/dL      Creatinine 1.00 mg/dL      Sodium 130 (L) mmol/L      Potassium 5.3 mmol/L      Chloride 91 (L) mmol/L      CO2 35.1 (H) mmol/L      Calcium 8.5 mg/dL      eGFR Non African Amer 74 mL/min/1.73      BUN/Creatinine Ratio 29.0 (H)     Anion Gap 3.9 mmol/L     Narrative:       GFR Normal >60  Chronic Kidney Disease <60  Kidney Failure <15    Osmolality, Calculated [[id number]]  (Abnormal) Collected:  07/11/18 0715    Specimen:  Blood Updated:  07/11/18 0755     Osmolality Calc 271.9 (L) mOsm/kg     Hepatitis Panel, Acute [[id number]]  (Abnormal) Collected:  07/11/18 0106    Specimen:  Blood Updated:  07/11/18 0247     Hepatitis B Surface Ag Non-Reactive     Hep A IgM Non-Reactive     Hep B C IgM Non-Reactive     Hepatitis C Ab Reactive (A)    TSH [[id number]]  (Normal) Collected:  07/11/18 0106    Specimen:  Blood Updated:  07/11/18 0220     TSH 2.186 mIU/mL     C-reactive Protein [[id number]]  (Abnormal) Collected:  07/11/18 0106    Specimen:  Blood Updated:  07/11/18 0213     C-Reactive Protein 1.04 (H) mg/dL     Osmolality, Calculated [[id number]]  (Abnormal) Collected:   07/11/18 0106    Specimen:  Blood Updated:  07/11/18 0213     Osmolality Calc 267.8 (L) mOsm/kg     Comprehensive Metabolic Panel [[id number]]  (Abnormal) Collected:  07/11/18 0106    Specimen:  Blood Updated:  07/11/18 0213     Glucose 139 (H) mg/dL      BUN 26 (H) mg/dL      Creatinine 0.96 mg/dL      Sodium 130 (L) mmol/L      Potassium 5.2 mmol/L      Chloride 92 (L) mmol/L      CO2 34.8 (H) mmol/L      Calcium 8.5 mg/dL      Total Protein 6.0 g/dL      Albumin 3.10 (L) g/dL      ALT (SGPT) 18 U/L      AST (SGOT) 22 U/L      Alkaline Phosphatase 80 U/L      Comment: Note New Reference Ranges        Total Bilirubin 0.4 mg/dL      eGFR Non African Amer 77 mL/min/1.73      Globulin 2.9 gm/dL      A/G Ratio 1.1 (L) g/dL      BUN/Creatinine Ratio 27.1 (H)     Anion Gap 3.2 (L) mmol/L     C3 Complement [[id number]]  (Normal) Collected:  07/11/18 0106    Specimen:  Blood Updated:  07/11/18 0212     C3 Complement 123.0 mg/dl     C4 Complement [[id number]]  (Normal) Collected:  07/11/18 0106    Specimen:  Blood Updated:  07/11/18 0212     C4 Complement 30.1 mg/dl     Magnesium [[id number]]  (Normal) Collected:  07/11/18 0106    Specimen:  Blood Updated:  07/11/18 0211     Magnesium 2.3 mg/dL     Lipid Panel [[id number]]  (Abnormal) Collected:  07/11/18 0106    Specimen:  Blood Updated:  07/11/18 0211     Total Cholesterol 152 mg/dL      Triglycerides 69 mg/dL      HDL Cholesterol 50 (L) mg/dL      LDL Cholesterol  88 mg/dL      VLDL Cholesterol 13.8 mg/dL      LDL/HDL Ratio 1.76    Narrative:       Cholesterol Reference Ranges  (U.S. Department of Health and Human Services ATP III Classifications)    Desirable          <200 mg/dL  Borderline High    200-239 mg/dL  High Risk          >240 mg/dL      Triglyceride Reference Ranges  (U.S. Department of Health and Human Services ATP III Classifications)    Normal           <150 mg/dL  Borderline High  150-199 mg/dL  High             200-499 mg/dL  Very High        >500  mg/dL    HDL Reference Ranges  (U.S. Department of Health and Human Services ATP III Classifcations)    Low     <40 mg/dl (major risk factor for CHD)  High    >60 mg/dl ('negative' risk factor for CHD)        LDL Reference Ranges  (U.S. Department of Health and Human Services ATP III Classifcations)    Optimal          <100 mg/dL  Near Optimal     100-129 mg/dL  Borderline High  130-159 mg/dL  High             160-189 mg/dL  Very High        >189 mg/dL    Hemoglobin A1c [[id number]]  (Abnormal) Collected:  07/11/18 0106    Specimen:  Blood Updated:  07/11/18 0159     Hemoglobin A1C 6.90 (H) %     CBC & Differential [[id number]] Collected:  07/11/18 0106    Specimen:  Blood Updated:  07/11/18 0140    Narrative:       The following orders were created for panel order CBC & Differential.  Procedure                               Abnormality         Status                     ---------                               -----------         ------                     CBC Auto Differential[[id number]]        Abnormal            Final result                 Please view results for these tests on the individual orders.    CBC Auto Differential [[id number]]  (Abnormal) Collected:  07/11/18 0106    Specimen:  Blood Updated:  07/11/18 0140     WBC 9.42 10*3/mm3      RBC 5.23 10*6/mm3      Hemoglobin 15.9 g/dL      Hematocrit 48.8 %      MCV 93.3 fL      MCH 30.4 pg      MCHC 32.6 (L) g/dL      RDW 14.0 %      RDW-SD 45.7 fl      MPV 10.7 (H) fL      Platelets 214 10*3/mm3      Neutrophil % 92.6 (H) %      Lymphocyte % 4.7 (L) %      Monocyte % 2.4 %      Eosinophil % 0.0 %      Basophil % 0.1 %      Immature Grans % 0.2 %      Neutrophils, Absolute 8.72 (H) 10*3/mm3      Lymphocytes, Absolute 0.44 (L) 10*3/mm3      Monocytes, Absolute 0.23 10*3/mm3      Eosinophils, Absolute 0.00 10*3/mm3      Basophils, Absolute 0.01 10*3/mm3      Immature Grans, Absolute 0.02 10*3/mm3     ANCA Panel [[id number]] Collected:  07/11/18 0106    Specimen:   Blood Updated:  07/11/18 0134    Glomerular Basement Membrane Antibodies [[id number]] Collected:  07/11/18 0106    Specimen:  Blood Updated:  07/11/18 0134    POC Glucose Once [[id number]]  (Normal) Collected:  07/10/18 2122    Specimen:  Blood Updated:  07/10/18 2137     Glucose 108 mg/dL     Narrative:       Meter: [id number] : [id number] Lei Cosby    POC Glucose Once [[id number]]  (Normal) Collected:  07/10/18 1713    Specimen:  Blood Updated:  07/10/18 1738     Glucose 93 mg/dL     Narrative:       Meter: [id number] : [id number] Medardo Grimm    Urinalysis With Microscopic - Urine, Clean Catch [[id number]] Collected:  07/10/18 1351    Specimen:  Urine from Urine, Clean Catch Updated:  07/10/18 1437    Narrative:       The following orders were created for panel order Urinalysis With Microscopic - Urine, Clean Catch.  Procedure                               Abnormality         Status                     ---------                               -----------         ------                     Urinalysis without micro...[[id number]]  Abnormal            Final result               Urinalysis, Microscopic ...[[id number]]  Abnormal            Final result                 Please view results for these tests on the individual orders.    Urinalysis, Microscopic Only - Urine, Clean Catch [[id number]]  (Abnormal) Collected:  07/10/18 1351    Specimen:  Urine from Urine, Clean Catch Updated:  07/10/18 1437     RBC, UA 6-12 (A) /HPF      WBC, UA 3-5 (A) /HPF      Bacteria, UA None Seen /HPF      Squamous Epithelial Cells, UA 0-2 /HPF      Hyaline Casts, UA 3-6 /LPF      Methodology Automated Microscopy    Urinalysis without microscopic (no culture) - Urine, Clean Catch [[id number]]  (Abnormal) Collected:  07/10/18 1351    Specimen:  Urine from Urine, Clean Catch Updated:  07/10/18 1437     Color, UA Yellow     Appearance, UA Clear     pH, UA 6.0     Specific Gravity, UA 1.024     Glucose,  mg/dL (Trace) (A)      Ketones, UA Negative     Bilirubin, UA Negative     Blood, UA Trace (A)     Protein, UA >=300 mg/dL (3+) (A)     Leuk Esterase, UA Negative     Nitrite, UA Negative     Urobilinogen, UA 0.2 E.U./dL    Sodium, Urine, Random - Urine, Clean Catch [[id number]] Collected:  07/10/18 1351    Specimen:  Urine from Urine, Clean Catch Updated:  07/10/18 1434     Sodium, Urine 43 mmol/L     Chloride, Urine, Random - Urine, Clean Catch [[id number]] Collected:  07/10/18 1351    Specimen:  Urine from Urine, Clean Catch Updated:  07/10/18 1434     Chloride, Urine 50 mmol/L     Potassium, Urine, Random - Urine, Clean Catch [[id number]] Collected:  07/10/18 1351    Specimen:  Urine from Urine, Clean Catch Updated:  07/10/18 1434     Potassium, Urine 75.5 mmol/L     Potassium [[id number]]  (Normal) Collected:  07/10/18 1151    Specimen:  Blood Updated:  07/10/18 1236     Potassium 5.0 mmol/L     POC Glucose Once [[id number]]  (Abnormal) Collected:  07/10/18 1159    Specimen:  Blood Updated:  07/10/18 1224     Glucose 176 (H) mg/dL     Narrative:       Meter: [id number] : [id number] Medardo Grimm    POC Glucose Once [[id number]]  (Abnormal) Collected:  07/10/18 0758    Specimen:  Blood Updated:  07/10/18 0814     Glucose 251 (H) mg/dL     Narrative:       Meter: [id number] : [id number] Medardo Grimm            Imaging Results (last 72 hours)     Procedure Component Value Units Date/Time    XR Chest 1 View [[id number]] Collected:  07/11/18 1119     Updated:  07/11/18 1121    Narrative:       XR CHEST 1 VW-     CLINICAL INDICATION: sob; J96.01-Acute respiratory failure with hypoxia;  J96.02-Acute respiratory failure with hypercapnia; I21.4-Non-ST  elevation (NSTEMI) myocardial infarction; L03.115-Cellulitis of right  lower limb          COMPARISON: 07/09/2018      TECHNIQUE: Single frontal view of the chest.     FINDINGS:     Trace bibasilar effusions and bibasilar airspace disease, probably  atelectasis, but cannot  exclude pneumonia  The cardiac silhouette is normal. The pulmonary vasculature is  unremarkable.  There is no evidence of an acute osseous abnormality.   There are no suspicious-appearing parenchymal soft tissue nodules.            Impression:       Trace bibasilar effusions and bibasilar consolidation         This report was finalized on 7/11/2018 11:19 AM by Dr. Jose Luis Romero MD.       US Arterial Doppler Lower Extremity Left [[id number]] Collected:  07/10/18 1339     Updated:  07/10/18 1351    Narrative:       US ARTERIAL DOPPLER LOWER EXTREMITY LEFT-     REASON FOR EXAM:  PVD; J96.01-Acute respiratory failure with hypoxia;  J96.02-Acute respiratory failure with hypercapnia; I21.4-Non-ST  elevation (NSTEMI) myocardial infarction; L03.115-Cellulitis of right  lower limb. Decreased pulses on physical exam in the lower extremities.     TECHNIQUE:  Multiple real-time color doppler images were obtained.  M-mode Doppler was used for velocity determination and spectral pattern.  Stenosis was evaluated using the NASCET or similar measurement  technique.     FINDINGS:   Left leg: The external iliac artery was normal in caliber. The Doppler  wave pattern was monophasic with peak systolic velocity of 140 cm/s.  There was monophasic flow in the common femoral artery with a velocity  of 319 cm/s. There is some calcific plaque in the common femoral artery.  Color Doppler flow is seen in the profunda femoral and superficial  femoral arteries in the leg. The Doppler wave pattern is monophasic in  the thigh. No occluded segments were demonstrated. The popliteal artery  has a monophasic flow pattern and a velocity of 48 cm/s. There was  monophasic flow in the posterior tibial artery, the velocity measured 37  cm/s.       Impression:       Atherosclerotic plaquing is noted predominantly at the level  of the common femoral artery where there is velocity elevation. The  Doppler wave pattern throughout the leg was monophasic  suggesting  possibility of more proximal disease above the external iliac artery. In  the runoff vessels no areas of occlusion were demonstrated.             This report was finalized on 7/10/2018 1:48 PM by Dr. Jose Ramon Hatch II, MD.                   Assessment & Plan:  Heath Bajwa is a 68 y.o. year-old male seen today at the request of Dr. 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      Biclonal gammopathy  - Studies obtained as part of work up for nephrotic range proteinuria. Creatinine normal, no evidence of anemia of hypercalcemia. Patient denies of any new bone pain and only complains of chronic back/arthritic pain that has been occurring for years and has been unchanged.  - Serum free light chains show elevation in both light chains however the ratio is within normal limits and can be seen with underlying inflammation. Urine light chain studies are pending  - Serum immunofixation was significant for an IgM and IgG monoclonal protein with kappa light chain specificity however there was only a small quantity of monoclonal protein and therefore was unable to quantitate M-spike.   - Unsure of significance as biclonal gammopathy in multiple myeloma would be rare however can occur. Possible that the abnormalities seen could be due to underlying inflammation versus biclonal MGUS given that Mspike couldn't be quantified  - Would recommend repeating studies in one month as an outpatient to further assess.    Nephrotic proteinuria   - Possibly due to diabetic renal disease  - Being followed by Nephrology    Cellulitis  - Currently on antibiotics as per infectious disease        Kell Mayes MD  07/13/18  8:13 AM

## 2018-07-14 LAB
ANION GAP SERPL CALCULATED.3IONS-SCNC: 0.7 MMOL/L (ref 3.6–11.2)
BACTERIA SPEC AEROBE CULT: NORMAL
BACTERIA SPEC AEROBE CULT: NORMAL
BASOPHILS # BLD AUTO: 0.01 10*3/MM3 (ref 0–0.3)
BASOPHILS NFR BLD AUTO: 0.1 % (ref 0–2)
BUN BLD-MCNC: 16 MG/DL (ref 7–21)
BUN/CREAT SERPL: 18.8 (ref 7–25)
CALCIUM SPEC-SCNC: 8.8 MG/DL (ref 7.7–10)
CHLORIDE SERPL-SCNC: 94 MMOL/L (ref 99–112)
CO2 SERPL-SCNC: 36.3 MMOL/L (ref 24.3–31.9)
CREAT BLD-MCNC: 0.85 MG/DL (ref 0.43–1.29)
DEPRECATED RDW RBC AUTO: 45 FL (ref 37–54)
EOSINOPHIL # BLD AUTO: 0.01 10*3/MM3 (ref 0–0.7)
EOSINOPHIL NFR BLD AUTO: 0.1 % (ref 0–7)
ERYTHROCYTE [DISTWIDTH] IN BLOOD BY AUTOMATED COUNT: 13.6 % (ref 11.5–14.5)
GFR SERPL CREATININE-BSD FRML MDRD: 89 ML/MIN/1.73
GLUCOSE BLD-MCNC: 188 MG/DL (ref 70–110)
GLUCOSE BLDC GLUCOMTR-MCNC: 103 MG/DL (ref 70–130)
GLUCOSE BLDC GLUCOMTR-MCNC: 136 MG/DL (ref 70–130)
GLUCOSE BLDC GLUCOMTR-MCNC: 149 MG/DL (ref 70–130)
GLUCOSE BLDC GLUCOMTR-MCNC: 194 MG/DL (ref 70–130)
HCT VFR BLD AUTO: 47.3 % (ref 42–52)
HCV RNA SERPL NAA+PROBE-ACNC: NORMAL IU/ML
HGB BLD-MCNC: 15.7 G/DL (ref 14–18)
IMM GRANULOCYTES # BLD: 0.02 10*3/MM3 (ref 0–0.03)
IMM GRANULOCYTES NFR BLD: 0.2 % (ref 0–0.5)
LYMPHOCYTES # BLD AUTO: 0.98 10*3/MM3 (ref 1–3)
LYMPHOCYTES NFR BLD AUTO: 9.8 % (ref 16–46)
MCH RBC QN AUTO: 31.1 PG (ref 27–33)
MCHC RBC AUTO-ENTMCNC: 33.2 G/DL (ref 33–37)
MCV RBC AUTO: 93.7 FL (ref 80–94)
MONOCYTES # BLD AUTO: 0.84 10*3/MM3 (ref 0.1–0.9)
MONOCYTES NFR BLD AUTO: 8.4 % (ref 0–12)
NEUTROPHILS # BLD AUTO: 8.15 10*3/MM3 (ref 1.4–6.5)
NEUTROPHILS NFR BLD AUTO: 81.4 % (ref 40–75)
OSMOLALITY SERPL CALC.SUM OF ELEC: 268.8 MOSM/KG (ref 273–305)
PLATELET # BLD AUTO: 193 10*3/MM3 (ref 130–400)
PMV BLD AUTO: 10.4 FL (ref 6–10)
POTASSIUM BLD-SCNC: 4.1 MMOL/L (ref 3.5–5.3)
RBC # BLD AUTO: 5.05 10*6/MM3 (ref 4.7–6.1)
SODIUM BLD-SCNC: 131 MMOL/L (ref 135–153)
TEST INFORMATION: NORMAL
WBC NRBC COR # BLD: 10.01 10*3/MM3 (ref 4.5–12.5)

## 2018-07-14 PROCEDURE — 99232 SBSQ HOSP IP/OBS MODERATE 35: CPT | Performed by: INTERNAL MEDICINE

## 2018-07-14 PROCEDURE — 85025 COMPLETE CBC W/AUTO DIFF WBC: CPT | Performed by: PHYSICIAN ASSISTANT

## 2018-07-14 PROCEDURE — 94799 UNLISTED PULMONARY SVC/PX: CPT

## 2018-07-14 PROCEDURE — 63710000001 PREDNISONE PER 1 MG: Performed by: INTERNAL MEDICINE

## 2018-07-14 PROCEDURE — 63710000001 INSULIN ASPART PER 5 UNITS: Performed by: INTERNAL MEDICINE

## 2018-07-14 PROCEDURE — 80048 BASIC METABOLIC PNL TOTAL CA: CPT | Performed by: PHYSICIAN ASSISTANT

## 2018-07-14 PROCEDURE — 25010000002 HEPARIN (PORCINE) PER 1000 UNITS: Performed by: PHYSICIAN ASSISTANT

## 2018-07-14 PROCEDURE — 82962 GLUCOSE BLOOD TEST: CPT

## 2018-07-14 PROCEDURE — 25010000002 MORPHINE PER 10 MG: Performed by: INTERNAL MEDICINE

## 2018-07-14 RX ORDER — HYDROCODONE BITARTRATE AND ACETAMINOPHEN 10; 325 MG/1; MG/1
1 TABLET ORAL EVERY 8 HOURS PRN
Status: DISCONTINUED | OUTPATIENT
Start: 2018-07-14 | End: 2018-07-15 | Stop reason: HOSPADM

## 2018-07-14 RX ADMIN — MORPHINE SULFATE 2 MG: 2 INJECTION, SOLUTION INTRAMUSCULAR; INTRAVENOUS at 05:43

## 2018-07-14 RX ADMIN — INSULIN ASPART 20 UNITS: 100 INJECTION, SUSPENSION SUBCUTANEOUS at 16:50

## 2018-07-14 RX ADMIN — Medication 1 CAPSULE: at 08:07

## 2018-07-14 RX ADMIN — BACITRACIN ZINC 1 APPLICATION: 500 OINTMENT TOPICAL at 08:07

## 2018-07-14 RX ADMIN — Medication 3 ML: at 20:12

## 2018-07-14 RX ADMIN — DOCUSATE SODIUM 100 MG: 100 CAPSULE, LIQUID FILLED ORAL at 20:12

## 2018-07-14 RX ADMIN — IPRATROPIUM BROMIDE AND ALBUTEROL SULFATE 3 ML: .5; 3 SOLUTION RESPIRATORY (INHALATION) at 12:04

## 2018-07-14 RX ADMIN — AMLODIPINE BESYLATE 10 MG: 10 TABLET ORAL at 08:07

## 2018-07-14 RX ADMIN — DOCUSATE SODIUM 100 MG: 100 CAPSULE, LIQUID FILLED ORAL at 08:07

## 2018-07-14 RX ADMIN — Medication 3 ML: at 05:44

## 2018-07-14 RX ADMIN — MORPHINE SULFATE 2 MG: 2 INJECTION, SOLUTION INTRAMUSCULAR; INTRAVENOUS at 02:02

## 2018-07-14 RX ADMIN — LISINOPRIL 40 MG: 10 TABLET ORAL at 08:07

## 2018-07-14 RX ADMIN — MORPHINE SULFATE 2 MG: 2 INJECTION, SOLUTION INTRAMUSCULAR; INTRAVENOUS at 08:57

## 2018-07-14 RX ADMIN — INSULIN ASPART 2 UNITS: 100 INJECTION, SOLUTION INTRAVENOUS; SUBCUTANEOUS at 16:57

## 2018-07-14 RX ADMIN — HYDROCODONE BITARTRATE AND ACETAMINOPHEN 1 TABLET: 10; 325 TABLET ORAL at 11:54

## 2018-07-14 RX ADMIN — PREDNISONE 20 MG: 20 TABLET ORAL at 08:07

## 2018-07-14 RX ADMIN — HYDROCODONE BITARTRATE AND ACETAMINOPHEN 1 TABLET: 10; 325 TABLET ORAL at 20:12

## 2018-07-14 RX ADMIN — DOXYCYCLINE 100 MG: 100 CAPSULE ORAL at 08:07

## 2018-07-14 RX ADMIN — ATORVASTATIN CALCIUM 40 MG: 40 TABLET, FILM COATED ORAL at 08:07

## 2018-07-14 RX ADMIN — IPRATROPIUM BROMIDE AND ALBUTEROL SULFATE 3 ML: .5; 3 SOLUTION RESPIRATORY (INHALATION) at 01:28

## 2018-07-14 RX ADMIN — CEFDINIR 300 MG: 300 CAPSULE ORAL at 20:13

## 2018-07-14 RX ADMIN — INSULIN ASPART 20 UNITS: 100 INJECTION, SUSPENSION SUBCUTANEOUS at 08:06

## 2018-07-14 RX ADMIN — IPRATROPIUM BROMIDE AND ALBUTEROL SULFATE 3 ML: .5; 3 SOLUTION RESPIRATORY (INHALATION) at 19:04

## 2018-07-14 RX ADMIN — NICOTINE 1 PATCH: 21 PATCH TRANSDERMAL at 02:02

## 2018-07-14 RX ADMIN — BUDESONIDE AND FORMOTEROL FUMARATE DIHYDRATE 2 PUFF: 160; 4.5 AEROSOL RESPIRATORY (INHALATION) at 07:17

## 2018-07-14 RX ADMIN — DONEPEZIL HYDROCHLORIDE 10 MG: 5 TABLET, FILM COATED ORAL at 20:12

## 2018-07-14 RX ADMIN — GABAPENTIN 600 MG: 300 CAPSULE ORAL at 05:43

## 2018-07-14 RX ADMIN — TAMSULOSIN HYDROCHLORIDE 0.4 MG: 0.4 CAPSULE ORAL at 16:50

## 2018-07-14 RX ADMIN — NICOTINE 1 PATCH: 21 PATCH TRANSDERMAL at 23:35

## 2018-07-14 RX ADMIN — Medication 3 ML: at 11:55

## 2018-07-14 RX ADMIN — GABAPENTIN 600 MG: 300 CAPSULE ORAL at 13:13

## 2018-07-14 RX ADMIN — HEPARIN SODIUM 5000 UNITS: 5000 INJECTION, SOLUTION INTRAVENOUS; SUBCUTANEOUS at 20:13

## 2018-07-14 RX ADMIN — DOXYCYCLINE 100 MG: 100 CAPSULE ORAL at 20:12

## 2018-07-14 RX ADMIN — IPRATROPIUM BROMIDE AND ALBUTEROL SULFATE 3 ML: .5; 3 SOLUTION RESPIRATORY (INHALATION) at 07:17

## 2018-07-14 RX ADMIN — BACITRACIN ZINC: 500 OINTMENT TOPICAL at 20:12

## 2018-07-14 RX ADMIN — HYDROCHLOROTHIAZIDE 25 MG: 25 TABLET ORAL at 08:07

## 2018-07-14 RX ADMIN — CEFDINIR 300 MG: 300 CAPSULE ORAL at 08:07

## 2018-07-14 RX ADMIN — BUDESONIDE AND FORMOTEROL FUMARATE DIHYDRATE 2 PUFF: 160; 4.5 AEROSOL RESPIRATORY (INHALATION) at 19:04

## 2018-07-14 RX ADMIN — HEPARIN SODIUM 5000 UNITS: 5000 INJECTION, SOLUTION INTRAVENOUS; SUBCUTANEOUS at 08:07

## 2018-07-14 RX ADMIN — GABAPENTIN 600 MG: 300 CAPSULE ORAL at 21:20

## 2018-07-14 RX ADMIN — ASPIRIN 325 MG: 325 TABLET ORAL at 08:07

## 2018-07-14 RX ADMIN — CETIRIZINE HCL 10 MG: 10 TABLET ORAL at 08:06

## 2018-07-14 NOTE — PROGRESS NOTES
"Patient Identification:  Name:  Heath Bajwa  Age:  68 y.o.  Sex:  male  :  1950  MRN:  [id number]  Visit Number:  [id number]    Chief Complaint:   Troponin elevation    Subjective:    Mr. Bajwa denies any compressive chest pain or discomfort in the recent or remote past.  He denies any history of known coronary artery disease.  His main complaint is having shortness of breath which is improved.  He does complain of occasional \"gas-like pains\".  Echo has revealed normal LV wall motion and systolic function with an estimated ejection fraction of about 60-65%.  There was mild mitral regurgitation with evidence of mild to moderate pulmonary hypertension.    He has chronic discoloration of both legs with ulcers on the right leg just below the knee which is draining with necrotic base.    ----------------------------------------------------------------------------------------------------------------------  Current Hospital Meds:    amLODIPine 10 mg Oral Q24H   aspirin 325 mg Oral Daily   atorvastatin 40 mg Oral Daily   bacitracin  Topical Q12H   budesonide-formoterol 2 puff Inhalation BID - RT   cefdinir 300 mg Oral Q12H   cetirizine 10 mg Oral Daily   docusate sodium 100 mg Oral BID   donepezil 10 mg Oral Nightly   doxycycline 100 mg Oral Q12H   fluticasone 1 spray Each Nare BID   gabapentin 600 mg Oral Q8H   heparin (porcine) 5,000 Units Subcutaneous Q12H   Hydrochlorothiazide Oral 25 mg Oral Daily   insulin aspart 0-9 Units Subcutaneous TID With Meals   insulin aspart prot-insulin aspart 20 Units Subcutaneous BID With Meals   ipratropium-albuterol 3 mL Nebulization 4x Daily - RT   lactobacillus acidophilus 1 capsule Oral Daily   Liraglutide 1.8 mg Subcutaneous Daily   lisinopril 40 mg Oral Daily   nicotine 1 patch Transdermal Q24H   predniSONE 20 mg Oral Daily With Breakfast   sodium chloride 3 mL Intravenous Q8H   tamsulosin 0.4 mg Oral Q PM      "   ----------------------------------------------------------------------------------------------------------------------  Vital Signs:  Temp:  [97.9 °F (36.6 °C)-98.6 °F (37 °C)] 98.6 °F (37 °C)  Heart Rate:  [53-96] 94  Resp:  [12-22] 22  BP: (111-177)/(58-95) 134/71  1    07/12/18  0500 07/13/18  0500 07/14/18  0405   Weight: (pt refused to get back in bed to be weighed) 112 kg (247 lb) 110 kg (243 lb 8 oz)     Body mass index is 33.02 kg/m².    Intake/Output Summary (Last 24 hours) at 07/14/18 1236  Last data filed at 07/14/18 1000   Gross per 24 hour   Intake              500 ml   Output             2950 ml   Net            -2450 ml     Diet Regular; Daily Fluid Restriction, Low Potassium, Renal, Consistent Carbohydrate, Cardiac; 1000 mL Fluid  ----------------------------------------------------------------------------------------------------------------------  Physical exam:  Constitutional:  Well-developed and well-nourished.  No respiratory distress.      HENT:  Head:  Normocephalic and atraumatic.  Mouth:  Moist mucous membranes.    Eyes:  Conjunctivae and EOM are normal.  Pupils are equal, round, and reactive to light.  No scleral icterus.    Neck:  Neck supple.  No JVD present.    Cardiovascular:  Normal rate, regular rhythm and normal heart sounds with no murmur.  Pulmonary/Chest:  No respiratory distress, no wheezes, no crackles, with normal breath sounds and good air movement.  Abdominal:  Soft.  Bowel sounds are normal.  No distension and no tenderness.   Musculoskeletal:  No edema, no tenderness, and no deformity.  No red or swollen joints anywhere.    Neurological:  Alert and oriented to person, place, and time.  No cranial nerve deficit.  No tongue deviation.  No facial droop.  No slurred speech.   Skin:  Skin is warm and dry. No rash noted. No pallor.   Peripheral vascular:  Strong pulses in all 4 extremities with no clubbing, no cyanosis, no  edema.  ----------------------------------------------------------------------------------------------------------------------  Tele:  Sinus rhythm  ----------------------------------------------------------------------------------------------------------------------    Results from last 7 days  Lab Units 07/10/18  0545 07/10/18  0026 07/09/18  1737   CK TOTAL U/L 165  165 153 130  130   CKMB ng/mL 4.67 5.92* 4.89   CK MB INDEX % 2.8 3.9* 3.8*   TROPONIN I ng/mL 0.144* 0.126* 0.184*       Results from last 7 days  Lab Units 07/14/18  0136 07/13/18  0116 07/12/18  0109 07/11/18  0106  07/09/18  0953   CRP mg/dL  --  <0.50 <0.50 1.04*  --  1.72*   LACTATE mmol/L  --   --   --   --   --  1.5   WBC 10*3/mm3 10.01 10.96 8.68 9.42  < > 8.19   HEMOGLOBIN g/dL 15.7 16.5 16.3 15.9  < > 17.2   HEMATOCRIT % 47.3 49.6 49.2 48.8  < > 51.0   MCV fL 93.7 93.1 93.2 93.3  < > 93.8   MCHC g/dL 33.2 33.3 33.1 32.6*  < > 33.7   PLATELETS 10*3/mm3 193 235 194 214  < > 197   INR   --   --   --   --   --  1.03   < > = values in this interval not displayed.    Results from last 7 days  Lab Units 07/11/18  0845   PH, ARTERIAL pH units 7.412   PO2 ART mm Hg 62.4*   PCO2, ARTERIAL mm Hg 54.9*   HCO3 ART mmol/L 34.2*       Results from last 7 days  Lab Units 07/14/18  0136 07/13/18  0116 07/12/18  0109  07/11/18  0106  07/10/18  0545 07/09/18  0953   SODIUM mmol/L 131* 134* 131*  < > 130*  --  127* 130*   POTASSIUM mmol/L 4.1 4.1 5.1  < > 5.2  < > 5.8* 4.9   MAGNESIUM mg/dL  --   --   --   --  2.3  --  2.2  --    CHLORIDE mmol/L 94* 95* 92*  < > 92*  --  90* 89*   CO2 mmol/L 36.3* 35.8* 34.6*  < > 34.8*  --  36.4* 38.2*   BUN mg/dL 16 21 27*  < > 26*  --  21 16   CREATININE mg/dL 0.85 0.81 0.88  < > 0.96  --  1.16 1.00   EGFR IF NONAFRICN AM mL/min/1.73 89 94 86  < > 77  --  62 74   CALCIUM mg/dL 8.8 8.8 8.6  < > 8.5  --  8.6 9.1   GLUCOSE mg/dL 188* 41* 209*  < > 139*  --  321* 149*   ALBUMIN g/dL  --  3.10*  --   --  3.10*  2.5*  --   --   3.60   BILIRUBIN mg/dL  --  0.5  --   --  0.4  --   --  0.3   ALK PHOS U/L  --  77  --   --  80  --   --  96   AST (SGOT) U/L  --  24  --   --  22  --   --  21   ALT (SGPT) U/L  --  21  --   --  18  --   --  22   < > = values in this interval not displayed.Estimated Creatinine Clearance: 103.6 mL/min (by C-G formula based on SCr of 0.85 mg/dL).    No results found for: AMMONIA    Results from last 7 days  Lab Units 07/11/18  0106   CHOLESTEROL mg/dL 152   TRIGLYCERIDES mg/dL 69   HDL CHOL mg/dL 50*   LDL CHOL mg/dL 88     Blood Culture   Date Value Ref Range Status   07/09/2018 No growth at 5 days  Final   07/09/2018 No growth at 5 days  Final                I have personally looked at the labs and they are summarized above.  ----------------------------------------------------------------------------------------------------------------------  Imaging Results (last 24 hours)     ** No results found for the last 24 hours. **        ----------------------------------------------------------------------------------------------------------------------    Assessment:  1. Indeterminate range troponin elevation,patient is clinically stable CAD  2. Uncontrolled hypertension.  3. Acute hypoxic/hypercapnic respiratory failure due to acute exacerbation of COPD, being managed by the hospitalist service.  4. Cellulitis of the lower extremities, being managed by the hospitalist service.  5. Tobacco abuse.  6. Peripheral arterial disease of both lower extremities with ulcers on the right leg  7. Arterial dopplers showing severe inflow disease with critical limb ischemia right worse than left    Plan:  Pt needs Arteriogram with runoff which will be scheduled in next 2 weeks  Stable CAD to be managed medically with small area of ischemia.      Han Sparks MD  07/14/18  12:36 PM

## 2018-07-14 NOTE — PLAN OF CARE
Problem: Fall Risk (Adult)  Goal: Absence of Fall  Outcome: Ongoing (interventions implemented as appropriate)      Problem: Skin Injury Risk (Adult)  Goal: Identify Related Risk Factors and Signs and Symptoms  Outcome: Ongoing (interventions implemented as appropriate)    Goal: Skin Health and Integrity  Outcome: Ongoing (interventions implemented as appropriate)      Problem: Breathing Pattern Ineffective (Adult)  Goal: Effective Oxygenation/Ventilation  Outcome: Ongoing (interventions implemented as appropriate)    Goal: Anxiety/Fear Reduction  Outcome: Ongoing (interventions implemented as appropriate)

## 2018-07-14 NOTE — PROGRESS NOTES
"  I have personally seen and examined the patient today and discussed overnight interval progress and pertinent issues with nursing staff.    Subjective:    Normal white count.  No fever or diarrhea reported.  Sitting at edge of his bed and seems to be very comfortable with no complaints.    History taken from: patient chart RN      Vital Signs    /71 (BP Location: Right arm, Patient Position: Lying)   Pulse 94   Temp 98.6 °F (37 °C) (Oral)   Resp 22   Ht 182.9 cm (72\")   Wt 110 kg (243 lb 8 oz)   SpO2 (!) 88%   BMI 33.02 kg/m²     Temp:  [97.9 °F (36.6 °C)-98.6 °F (37 °C)] 98.6 °F (37 °C)      Intake/Output Summary (Last 24 hours) at 07/14/18 1241  Last data filed at 07/14/18 1000   Gross per 24 hour   Intake              500 ml   Output             2950 ml   Net            -2450 ml     Intake & Output (last 3 days)       07/11 0701 - 07/12 0700 07/12 0701 - 07/13 0700 07/13 0701 - 07/14 0700 07/14 0701 - 07/15 0700    P.O. 1050 1250 500     IV Piggyback        Total Intake(mL/kg) 1050 (9.1) 1250 (11.2) 500 (4.5)     Urine (mL/kg/hr) 3150 (1.1) 4000 (1.5) 2500 (0.9) 1000 (1.6)    Stool 0 (0)       Total Output 3150 4000 2500 1000    Net -2100 -2750 -2000 -1000            Unmeasured Urine Occurrence 2 x       Unmeasured Stool Occurrence 2 x             Physical Exam:                 General Appearance:    Alert, cooperative, in no acute distress, poor hygiene    Head:    Normocephalic, without obvious abnormality, atraumatic   Eyes:            Lids and lashes normal, conjunctivae and sclerae normal, no icterus, no pallor, corneas clear, PERRLA   Ears:    Ears appear intact with no abnormalities noted   Throat:   No oral lesions, no thrush, oral mucosa moist   Neck:   No adenopathy, supple, trachea midline, no thyromegaly, no   carotid bruit, no JVD   Back:     No tenderness to percussion or palpation, range of motion   normal   Lungs:     Clear to auscultation,respirations regular, even and unlabored. " No wheezing, no ronchi and no crackles.    Heart:    Regular rhythm and normal rate, normal S1 and S2, no            murmur, no gallop, no rub, no click   Chest Wall:    No abnormalities observed   Abdomen:     Normal bowel sounds, no masses, no organomegaly, soft   non-tender, non-distended, no guarding, no rebound tenderness   Rectal:     Deferred   Extremities:  Bilateral lower extremity venous stasis dermatitis with foul odor and drainage, associated redness and warmth.  Right worse than left.    Pulses:   Pulses palpable and equal bilaterally   Skin:   Bilateral lower extremity venous stasis dermatitis with foul odor and drainage, associated redness and warmth.  Right worse than left.   Lymph nodes:   No palpable adenopathy   Neurologic:   Awake, alert and oriented x 3. Following commands.       Results:      Results from last 7 days  Lab Units 07/14/18  0136 07/13/18  0116 07/12/18  0109 07/11/18  0106 07/10/18  0545 07/09/18  0953   WBC 10*3/mm3 10.01 10.96 8.68 9.42 7.39 8.19     Lab Results   Component Value Date    NEUTROABS 8.15 (H) 07/14/2018         Results from last 7 days  Lab Units 07/14/18  0136   CREATININE mg/dL 0.85         Results from last 7 days  Lab Units 07/13/18  0116 07/12/18  0109 07/11/18  0106   CRP mg/dL <0.50 <0.50 1.04*     Results Review:    I have personally reviewed laboratory data, culture results, radiology studies and antimicrobial therapy.    Hospital Medications (active)       Dose Frequency Start End    albuterol (PROVENTIL) nebulizer solution 0.083% 2.5 mg/3mL 2.5 mg Every 6 Hours PRN 7/10/2018     Sig - Route: Take 2.5 mg by nebulization Every 6 (Six) Hours As Needed for Wheezing or Shortness of Air. - Nebulization    amLODIPine (NORVASC) tablet 5 mg 5 mg Every 24 Hours Scheduled 7/11/2018     Sig - Route: Take 1 tablet by mouth Daily. - Oral    aspirin tablet 325 mg 325 mg Daily 7/10/2018     Sig - Route: Take 1 tablet by mouth Daily. - Oral    atorvastatin (LIPITOR)  tablet 40 mg 40 mg Daily 7/11/2018     Sig - Route: Take 1 tablet by mouth Daily. - Oral    budesonide-formoterol (SYMBICORT) 160-4.5 MCG/ACT inhaler 2 puff 2 puff 2 Times Daily - RT 7/10/2018     Sig - Route: Inhale 2 puffs 2 (Two) Times a Day. - Inhalation    cefdinir (OMNICEF) capsule 300 mg 300 mg Every 12 Hours Scheduled 7/10/2018 7/24/2018    Sig - Route: Take 1 capsule by mouth Every 12 (Twelve) Hours. - Oral    cetirizine (zyrTEC) tablet 10 mg 10 mg Daily 7/10/2018     Sig - Route: Take 1 tablet by mouth Daily. - Oral    dextrose (D50W) solution 25 g 25 g Every 15 Minutes PRN 7/9/2018     Sig - Route: Infuse 50 mL into a venous catheter Every 15 (Fifteen) Minutes As Needed for Low Blood Sugar (Blood Sugar Less Than 70). - Intravenous    Cosign for Ordering: Accepted by Julian Stern MD on 7/9/2018  5:29 PM    dextrose (D50W) solution 25 g 25 g Every 15 Minutes PRN 7/9/2018     Sig - Route: Infuse 50 mL into a venous catheter Every 15 (Fifteen) Minutes As Needed for Low Blood Sugar (Blood Sugar Less Than 70). - Intravenous    dextrose (GLUTOSE) oral gel 15 g 15 g Every 15 Minutes PRN 7/9/2018     Sig - Route: Take 15 g by mouth Every 15 (Fifteen) Minutes As Needed for Low Blood Sugar (Blood sugar less than 70). - Oral    Cosign for Ordering: Accepted by Julian Stern MD on 7/9/2018  5:29 PM    dextrose (GLUTOSE) oral gel 15 g 15 g Every 15 Minutes PRN 7/9/2018     Sig - Route: Take 15 g by mouth Every 15 (Fifteen) Minutes As Needed for Low Blood Sugar (Blood sugar less than 70). - Oral    donepezil (ARICEPT) tablet 10 mg 10 mg Nightly 7/10/2018     Sig - Route: Take 2 tablets by mouth Every Night. - Oral    doxycycline (MONODOX) capsule 100 mg 100 mg Every 12 Hours Scheduled 7/10/2018 7/24/2018    Sig - Route: Take 1 capsule by mouth Every 12 (Twelve) Hours. - Oral    fluticasone (FLONASE) 50 MCG/ACT nasal spray 1 spray 1 spray 2 Times Daily 7/10/2018     Sig - Route: 1 spray by Each Nare route 2 (Two)  Times a Day. - Each Nare    furosemide (LASIX) injection 20 mg 20 mg Once 7/11/2018 7/11/2018    Sig - Route: Infuse 2 mL into a venous catheter 1 (One) Time. - Intravenous    gabapentin (NEURONTIN) capsule 600 mg 600 mg Every 8 Hours Scheduled 7/10/2018     Sig - Route: Take 2 capsules by mouth Every 8 (Eight) Hours. - Oral    glucagon (human recombinant) (GLUCAGEN DIAGNOSTIC) injection 1 mg 1 mg As Needed 7/9/2018     Sig - Route: Inject 1 mg under the skin As Needed (Blood Glucose Less Than 70). - Subcutaneous    Cosign for Ordering: Accepted by Julian Stern MD on 7/9/2018  5:29 PM    glucagon (human recombinant) (GLUCAGEN DIAGNOSTIC) injection 1 mg 1 mg As Needed 7/9/2018     Sig - Route: Inject 1 mg under the skin As Needed (Blood Glucose Less Than 70). - Subcutaneous    heparin (porcine) 5000 UNIT/ML injection 5,000 Units 5,000 Units Every 12 Hours Scheduled 7/9/2018     Sig - Route: Inject 1 mL under the skin Every 12 (Twelve) Hours. - Subcutaneous    Cosign for Ordering: Accepted by Julian Stern MD on 7/9/2018  5:29 PM    insulin aspart (novoLOG) injection 0-9 Units 0-9 Units 4 Times Daily Before Meals & Nightly 7/9/2018     Sig - Route: Inject 0-9 Units under the skin 4 (Four) Times a Day Before Meals & at Bedtime. - Subcutaneous    insulin aspart prot-insulin aspart (novoLOG 70/30) injection 20 Units 20 Units 2 Times Daily With Meals 7/11/2018     Sig - Route: Inject 20 Units under the skin 2 (Two) Times a Day With Meals. - Subcutaneous    ipratropium-albuterol (DUO-NEB) nebulizer solution 3 mL 3 mL 4 Times Daily - RT 7/9/2018     Sig - Route: Take 3 mL by nebulization 4 (Four) Times a Day. - Nebulization    Cosign for Ordering: Accepted by Julian Stern MD on 7/9/2018  5:29 PM    lactobacillus acidophilus (RISAQUAD) capsule 1 capsule 1 capsule Daily 7/10/2018     Sig - Route: Take 1 capsule by mouth Daily. - Oral    Liraglutide (VICTOZA) injection 1.8 mg 1.8 mg Daily 7/10/2018     Sig - Route: Inject  "1.8 mg under the skin Daily. - Subcutaneous    Non-formulary Exception Code: Patient supplied medication    lisinopril (PRINIVIL,ZESTRIL) tablet 40 mg 40 mg Daily 7/10/2018     Sig - Route: Take 4 tablets by mouth Daily. - Oral    methylPREDNISolone sodium succinate (SOLU-Medrol) injection 20 mg 20 mg Every 12 Hours 7/11/2018     Sig - Route: Infuse 0.5 mL into a venous catheter Every 12 (Twelve) Hours. - Intravenous    morphine injection 1 mg 1 mg Every 4 Hours PRN 7/9/2018 7/19/2018    Sig - Route: Infuse 0.5 mL into a venous catheter Every 4 (Four) Hours As Needed for Severe Pain  (hold for oversedation, SBP<100). - Intravenous    nicotine (NICODERM CQ) 21 MG/24HR patch 1 patch 1 patch Every 24 Hours 7/10/2018     Sig - Route: Place 1 patch on the skin Daily. - Transdermal    ondansetron (ZOFRAN) tablet 8 mg 8 mg Every 12 Hours PRN 7/10/2018     Sig - Route: Take 2 tablets by mouth Every 12 (Twelve) Hours As Needed for Nausea or Vomiting. - Oral    Pharmacy Consult  Continuous PRN 7/10/2018     Sig - Route: Continuous As Needed for Consult. - Does not apply    sodium chloride 0.9 % bolus 500 mL 500 mL Once 7/10/2018 7/10/2018    Sig - Route: Infuse 500 mL into a venous catheter 1 (One) Time. - Intravenous    sodium chloride 0.9 % flush 1-10 mL 1-10 mL As Needed 7/9/2018     Sig - Route: Infuse 1-10 mL into a venous catheter As Needed for Line Care. - Intravenous    Cosign for Ordering: Accepted by Julian Stern MD on 7/9/2018  5:29 PM    sodium chloride 0.9 % flush 10 mL 10 mL As Needed 7/9/2018     Sig - Route: Infuse 10 mL into a venous catheter As Needed for Line Care. - Intravenous    Linked Group 1:  \"And\" Linked Group Details        sodium chloride 0.9 % flush 3 mL 3 mL Every 8 Hours 7/10/2018     Sig - Route: Infuse 3 mL into a venous catheter Every 8 (Eight) Hours. - Intravenous    sodium chloride 0.9 % flush 5 mL 5 mL As Needed 7/10/2018     Sig - Route: Infuse 5 mL into a venous catheter As Needed for " Line Care (After Medication Administration or Blood Draw). - Intravenous    tamsulosin (FLOMAX) 24 hr capsule 0.4 mg 0.4 mg Every Evening 7/10/2018     Sig - Route: Take 1 capsule by mouth Every Evening. - Oral    atorvastatin (LIPITOR) tablet 20 mg (Discontinued) 20 mg Daily 7/10/2018 7/10/2018    Sig - Route: Take 1 tablet by mouth Daily. - Oral    insulin aspart prot-insulin aspart (novoLOG 70/30) injection 30 Units (Discontinued) 30 Units 2 Times Daily With Meals 7/10/2018 7/11/2018    Sig - Route: Inject 30 Units under the skin 2 (Two) Times a Day With Meals. - Subcutaneous    ipratropium-albuterol (DUO-NEB) nebulizer solution 3 mL (Discontinued) 3 mL Every 6 Hours PRN 7/9/2018 7/10/2018    Sig - Route: Take 3 mL by nebulization Every 6 (Six) Hours As Needed for Shortness of Air. - Nebulization    Cosign for Ordering: Accepted by Julian Stern MD on 7/9/2018  5:29 PM    methylPREDNISolone sodium succinate (SOLU-Medrol) injection 40 mg (Discontinued) 40 mg Every 12 Hours 7/9/2018 7/11/2018    Sig - Route: Infuse 1 mL into a venous catheter Every 12 (Twelve) Hours. - Intravenous    Cosign for Ordering: Accepted by Julian Stern MD on 7/9/2018  5:29 PM            Cultures:    Blood Culture   Date Value Ref Range Status   07/09/2018 No growth at 24 hours  Preliminary   07/09/2018 No growth at 24 hours  Preliminary           Assessment/Plan     ASSESSMENT:    1.  Venous stasis dermatitis   2.  Pneumonia versus COPD exacerbation       PLAN:    Normal white count.  No fever or diarrhea reported.  Sitting at edge of his bed and seems to be very comfortable with no complaints.    Leg showing signs of improvement.      Chest x-ray 7/11/18 shows trace pleural effusions.    Mycoplasma pneumoniae negative.  Urinary Legionella negative.       Unfortunately patient's poor hygiene will jeopardize and impair patient's healing process and outcomes.      Plans to continue current  antibiotic therapy of  Doxycycline 100 mg by  mouth twice a day and Omnicef 300 mg by mouth twice a day both to continue through 7/23/18.  Patient stable from infectious disease standpoint.  Okay to discharge home from infectious disease standpoint.    Current Antimicrobials:  Doxycycline 100mg PO BID through 7/23/18  Omnicef 300mg PO BID through 7/23/18      Patient's findings and recommendations were discussed with patient and nursing staff    Code Status:   Code Status and Medical Interventions:   Ordered at: 07/09/18 1509     Code Status:    CPR     Medical Interventions (Level of Support Prior to Arrest):    Full       Malia Ohara PA-C  07/14/18  12:41 PM

## 2018-07-14 NOTE — PROGRESS NOTES
"  Assisted By: Leonard RAO    CC: Leg pain    Interview History/HPI: Patient states his leg pain is about the same, he is requesting possibly some Norco on discharge.  He has chronic pain.  He takes gabapentin.  He denies chest pain and states he is eating, he was able to get to the shower and \"cleanup\".  He states his breathing is doing much better than admission.  He states he only wears his oxygen at night at home.      Vitals:    07/14/18 0806   BP: 157/79   Pulse: 78   Resp:    Temp:    SpO2:        Intake/Output Summary (Last 24 hours) at 07/14/18 0942  Last data filed at 07/14/18 0535   Gross per 24 hour   Intake              500 ml   Output             1950 ml   Net            -1450 ml       EXAM: Pleasant no distress, respiratory rate 20 temperature 98.2  Lungs bilateral breath sounds actually have good air movement today without rhonchi rales or wheezing.  Heart regular rate and rhythm without murmur rub or gallop abdomen is soft extremity edema has minimally still has significant erythema consistent with dependent rubor from peripheral vascular disease.  He has adequate pulses however.  Posterior tibialis are less than dorsalis pedis.    Tele: Sinus, reviewed    LABS:   Lab Results (last 48 hours)     Procedure Component Value Units Date/Time    POC Glucose Once [[id number]]  (Normal) Collected:  07/14/18 0612    Specimen:  Blood Updated:  07/14/18 0625     Glucose 103 mg/dL     Narrative:       Meter: [id number] : [id number] Nahun Beck    Osmolality, Calculated [[id number]]  (Abnormal) Collected:  07/14/18 0136    Specimen:  Blood Updated:  07/14/18 0231     Osmolality Calc 268.8 (L) mOsm/kg     Basic Metabolic Panel [[id number]]  (Abnormal) Collected:  07/14/18 0136    Specimen:  Blood Updated:  07/14/18 0231     Glucose 188 (H) mg/dL      BUN 16 mg/dL      Creatinine 0.85 mg/dL      Sodium 131 (L) mmol/L      Potassium 4.1 mmol/L      Comment: 1+ Hemolysis         Chloride 94 (L) mmol/L      CO2 36.3 (H) " mmol/L      Calcium 8.8 mg/dL      eGFR Non African Amer 89 mL/min/1.73      BUN/Creatinine Ratio 18.8     Anion Gap 0.7 (L) mmol/L     Narrative:       GFR Normal >60  Chronic Kidney Disease <60  Kidney Failure <15    CBC & Differential [[id number]] Collected:  07/14/18 0136    Specimen:  Blood Updated:  07/14/18 0152    Narrative:       The following orders were created for panel order CBC & Differential.  Procedure                               Abnormality         Status                     ---------                               -----------         ------                     CBC Auto Differential[[id number]]        Abnormal            Final result                 Please view results for these tests on the individual orders.    CBC Auto Differential [[id number]]  (Abnormal) Collected:  07/14/18 0136    Specimen:  Blood Updated:  07/14/18 0152     WBC 10.01 10*3/mm3      RBC 5.05 10*6/mm3      Hemoglobin 15.7 g/dL      Hematocrit 47.3 %      MCV 93.7 fL      MCH 31.1 pg      MCHC 33.2 g/dL      RDW 13.6 %      RDW-SD 45.0 fl      MPV 10.4 (H) fL      Platelets 193 10*3/mm3      Neutrophil % 81.4 (H) %      Lymphocyte % 9.8 (L) %      Monocyte % 8.4 %      Eosinophil % 0.1 %      Basophil % 0.1 %      Immature Grans % 0.2 %      Neutrophils, Absolute 8.15 (H) 10*3/mm3      Lymphocytes, Absolute 0.98 (L) 10*3/mm3      Monocytes, Absolute 0.84 10*3/mm3      Eosinophils, Absolute 0.01 10*3/mm3      Basophils, Absolute 0.01 10*3/mm3      Immature Grans, Absolute 0.02 10*3/mm3     POC Glucose Once [[id number]]  (Abnormal) Collected:  07/13/18 2059    Specimen:  Blood Updated:  07/13/18 2113     Glucose 166 (H) mg/dL     Narrative:       Meter: [id number] : [id number] Stef Sorenson    POC Glucose Once [[id number]]  (Abnormal) Collected:  07/13/18 1656    Specimen:  Blood Updated:  07/13/18 1704     Glucose 221 (H) mg/dL     Narrative:       Meter: [id number] : [id number] Nahun Beck    ANCA Panel [[id number]]  Collected:  07/11/18 0106    Specimen:  Blood Updated:  07/13/18 1520     Myeloperoxidase Ab <9.0 U/mL      Antiproteinase 3 (NV-3) <3.5 U/mL      C-ANCA <1:20 titer      P-ANCA <1:20 titer      Comment: The presence of positive fluorescence exhibiting P-ANCA or C-ANCA  patterns alone is not specific for the diagnosis of Wegener's  Granulomatosis (WG) or microscopic polyangiitis. Decisions about  treatment should not be based solely on ANCA IFA results.  The  International ANCA Group Consensus recommends follow up testing of  positive sera with both NV-3 and MPO-ANCA enzyme immunoassays. As  many as 5% serum samples are positive only by EIA.  Ref. AM J Clin Pathol 1999;111:507-513.        Atypical pANCA <1:20 titer      Comment: The atypical pANCA pattern has been observed in a significant  percentage of patients with ulcerative colitis, primary sclerosing  cholangitis and autoimmune hepatitis.       Narrative:       Performed at:  01  [facility]  [id number]  : Jose Ramon Gutierrez MD, Phone:  [phone]  Performed at:  02 - [facility]  [id number]  : Tato Dye PhD, Phone:  [phone]    Cortisol [[id number]] Collected:  07/13/18 1405    Specimen:  Blood Updated:  07/13/18 1445     Cortisol 33.40 mcg/dL     Cortisol [[id number]] Collected:  07/13/18 1330    Specimen:  Blood Updated:  07/13/18 1425     Cortisol 28.60 mcg/dL     Cortisol [[id number]] Collected:  07/13/18 1253    Specimen:  Blood Updated:  07/13/18 1338     Cortisol 17.90 mcg/dL     POC Glucose Once [[id number]]  (Normal) Collected:  07/13/18 1138    Specimen:  Blood Updated:  07/13/18 1147     Glucose 75 mg/dL     Narrative:       Meter: [id number] : [id number] Nahun Beck    Glomerular Basement Membrane Antibodies [[id number]] Collected:  07/11/18 0106    Specimen:  Blood Updated:  07/13/18 1016     GBM Ab 2 units      Comment:                     Negative                   0 - 20                     Weak Positive             21 - 30                     Moderate to Strong Positive   >30       Narrative:       Performed at:  [facility]  [id number]  : Jose Ramon Gutierrez MD, Phone:  [phone]    Blood Culture - Blood, [[id number]]  (Normal) Collected:  07/09/18 0953    Specimen:  Blood from Arm, Left Updated:  07/13/18 1015     Blood Culture No growth at 4 days    Blood Culture - Blood, [[id number]]  (Normal) Collected:  07/09/18 0945    Specimen:  Blood from Arm, Right Updated:  07/13/18 1015     Blood Culture No growth at 4 days    POC Glucose Once [[id number]]  (Abnormal) Collected:  07/13/18 0635    Specimen:  Blood Updated:  07/13/18 0654     Glucose 142 (H) mg/dL     Narrative:       Meter: [id number] : [id number] Nahun Beck    POC Glucose Once [[id number]]  (Normal) Collected:  07/13/18 0313    Specimen:  Blood Updated:  07/13/18 0338     Glucose 76 mg/dL     Narrative:       Meter: [id number] : [id number] Senait Dupont    Osmolality, Calculated [[id number]]  (Abnormal) Collected:  07/13/18 0116    Specimen:  Blood Updated:  07/13/18 0316     Osmolality Calc 268.0 (L) mOsm/kg     Comprehensive Metabolic Panel [[id number]]  (Abnormal) Collected:  07/13/18 0116    Specimen:  Blood Updated:  07/13/18 0316     Glucose 41 (C) mg/dL      Comment: Verified by Repeat Analysis         BUN 21 mg/dL      Creatinine 0.81 mg/dL      Sodium 134 (L) mmol/L      Potassium 4.1 mmol/L      Chloride 95 (L) mmol/L      CO2 35.8 (H) mmol/L      Calcium 8.8 mg/dL      Total Protein 5.9 (L) g/dL      Albumin 3.10 (L) g/dL      ALT (SGPT) 21 U/L      AST (SGOT) 24 U/L      Alkaline Phosphatase 77 U/L      Comment: Note New Reference Ranges        Total Bilirubin 0.5 mg/dL      eGFR Non African Amer 94 mL/min/1.73      Globulin 2.8 gm/dL      A/G Ratio 1.1 (L) g/dL      BUN/Creatinine Ratio 25.9 (H)     Anion Gap 3.2  (L) mmol/L     C-reactive Protein [[id number]]  (Normal) Collected:  07/13/18 0116    Specimen:  Blood Updated:  07/13/18 0248     C-Reactive Protein <0.50 mg/dL     CBC & Differential [[id number]] Collected:  07/13/18 0116    Specimen:  Blood Updated:  07/13/18 0224    Narrative:       The following orders were created for panel order CBC & Differential.  Procedure                               Abnormality         Status                     ---------                               -----------         ------                     CBC Auto Differential[[id number]]        Abnormal            Final result                 Please view results for these tests on the individual orders.    CBC Auto Differential [[id number]]  (Abnormal) Collected:  07/13/18 0116    Specimen:  Blood Updated:  07/13/18 0224     WBC 10.96 10*3/mm3      RBC 5.33 10*6/mm3      Hemoglobin 16.5 g/dL      Hematocrit 49.6 %      MCV 93.1 fL      MCH 31.0 pg      MCHC 33.3 g/dL      RDW 13.8 %      RDW-SD 45.7 fl      MPV 10.8 (H) fL      Platelets 235 10*3/mm3      Neutrophil % 76.8 (H) %      Lymphocyte % 13.4 (L) %      Monocyte % 9.4 %      Eosinophil % 0.1 %      Basophil % 0.1 %      Immature Grans % 0.2 %      Neutrophils, Absolute 8.42 (H) 10*3/mm3      Lymphocytes, Absolute 1.47 10*3/mm3      Monocytes, Absolute 1.03 (H) 10*3/mm3      Eosinophils, Absolute 0.01 10*3/mm3      Basophils, Absolute 0.01 10*3/mm3      Immature Grans, Absolute 0.02 10*3/mm3     POC Glucose Once [[id number]]  (Abnormal) Collected:  07/12/18 1934    Specimen:  Blood Updated:  07/12/18 2034     Glucose 159 (H) mg/dL     Narrative:       Meter: [id number] : [id number] Lei Cosby    POC Glucose Once [[id number]]  (Abnormal) Collected:  07/12/18 1748    Specimen:  Blood Updated:  07/12/18 1813     Glucose 201 (H) mg/dL     Narrative:       Meter: [id number] : [id number] augustus mcclendon    KENYATTA + PE [[id number]]  (Abnormal) Collected:  07/11/18 0106    Specimen:  Blood  Updated:  07/12/18 1620     IgG 747 mg/dL      IgA 359 mg/dL      IgM 129 mg/dL      Total Protein 5.5 (L) g/dL      Albumin 2.5 (L) g/dL      Alpha-1-Globulin 0.2 g/dL      Alpha-2-Globulin 0.9 g/dL      Beta Globulin 1.0 g/dL      Gamma Globulin 0.9 g/dL      M-Luis Comment: g/dL      Comment: Due to the small quantity of monoclonal proteins, unable to  quantitate the M-spikes.        Globulin 3.0 g/dL      A/G Ratio 0.9     Immunofixation Reflex, Serum Comment     Comment: Immunofixation shows IgM monoclonal protein with kappa light chain  specificity.  Immunofixation shows IgG monoclonal protein with kappa light chain  specificity.        Please note Comment     Comment: Protein electrophoresis scan will follow via computer, mail, or   delivery.       Narrative:       Performed at:  [address] [place] [address]  [id number]  : Tato Dye PhD, Phone:  [phone]    Immunoglobulin Free LT Chains Blood [[id number]]  (Abnormal) Collected:  07/11/18 0106    Specimen:  Blood Updated:  07/12/18 1312     Free Light Chain, Kappa 38.7 (H) mg/L      Free Lambda Light Chains 40.8 (H) mg/L      Kappa/Lambda Ratio 0.95    Narrative:       Performed at:  [address] [place] [address]  [id number]  : Tato Dye PhD, Phone:  [phone]    POC Glucose Once [[id number]]  (Abnormal) Collected:  07/12/18 1244    Specimen:  Blood Updated:  07/12/18 1259     Glucose 319 (H) mg/dL     Narrative:       Meter: [id number] : [id number] coffman danelle    Anti-DNA Antibody, Double-stranded [[id number]] Collected:  07/11/18 0106    Specimen:  Blood Updated:  07/12/18 1217     Anti-DNA (DS) Ab Qn 2 IU/mL      Comment:                                    Negative      <5                                     Equivocal  5 - 9                                     Positive      >9       Narrative:       Performed at:  [address] [place] [address]   [id number]  : Tato Dye PhD, Phone:  [phone]    Antinuclear Antibodies Direct [[id number]] Collected:  07/11/18 0106    Specimen:  Blood Updated:  07/12/18 1217     LOLIS Direct Negative    Narrative:       Performed at:  01 - [facility]  [id number]  : Tato Dye PhD, Phone:  [phone]    Antihistone Antibodies [[id number]]  (Abnormal) Collected:  07/10/18 1425    Specimen:  Blood Updated:  07/12/18 1117     Histone Ab 1.7 (H) Units      Comment:                          Negative                <1.0                           Weak Positive      1.0 - 1.5                           Moderate Positive  1.6 - 2.5                           Strong Positive         >2.5       Narrative:       Performed at:  01 - [facility]  [id number]  : Jose Ramon Gutierrez MD, Phone:  [phone]          Radiology:  Imaging Results (last 72 hours)     Procedure Component Value Units Date/Time    XR Chest 1 View [[id number]] Collected:  07/11/18 1119     Updated:  07/11/18 1121    Narrative:       XR CHEST 1 VW-     CLINICAL INDICATION: sob; J96.01-Acute respiratory failure with hypoxia;  J96.02-Acute respiratory failure with hypercapnia; I21.4-Non-ST  elevation (NSTEMI) myocardial infarction; L03.115-Cellulitis of right  lower limb          COMPARISON: 07/09/2018      TECHNIQUE: Single frontal view of the chest.     FINDINGS:     Trace bibasilar effusions and bibasilar airspace disease, probably  atelectasis, but cannot exclude pneumonia  The cardiac silhouette is normal. The pulmonary vasculature is  unremarkable.  There is no evidence of an acute osseous abnormality.   There are no suspicious-appearing parenchymal soft tissue nodules.            Impression:       Trace bibasilar effusions and bibasilar consolidation         This report was finalized on 7/11/2018 11:19 AM by Dr. Jose Luis Romero MD.               Results for  "orders placed during the hospital encounter of 07/09/18   Adult Transthoracic Echo Complete W/ Cont if Necessary Per Protocol    Narrative · Normal left ventricular cavity size and wall thickness noted. All left   ventricular wall segments contract normally.  · Estimated EF appears to be in the range of 61 - 65%.  · The aortic valve is structurally normal. No aortic valve regurgitation   is present. No aortic valve stenosis is present.  · The mitral valve is normal in structure. No mitral valve regurgitation   is present. No significant mitral valve stenosis is present.  · The tricuspid valve is normal. No tricuspid valve stenosis is present.   Mild tricuspid valve regurgitation is present. Estimated right ventricular   systolic pressure from tricuspid regurgitation is mildly elevated (35-45   mmHg).  · Mild pulmonary hypertension is present.  · There is no evidence of pericardial effusion.            Assessment/Plan:   COPD with acute exacerbation with hypercarbic and hypoxic respiratory failure.  Oxygen was turned down to 2 L, patient states at home he was actually not wearing his oxygen to the daytime but only at night.  He would wait at night until he had a \"panic attack\" and turn this up to 4 or 5 L.  His lungs are clear today with fairly good air movement.  I have asked nursing to begin weaning this off if possible.  Will transfer to the floor.  Continue steroid wean.  He's finishing an antibiotic course.  Overall improved     Diabetes, better controlled, follow, he was not adrenally insufficient.     Indeterminant troponin, stress test only with a small defect, discussed with cardiology, medical management, patient is asymptomatic     Proteinuria, hematology is going to suggest only that the immunofixation gets repeated as an outpatient the biphasic spike probably is related to inflammatory process     Hyponatremia, continue fluid restriction, slight decrease, follow-up tomorrow.     Hypertension, improving " control     Peripheral vascular disease, will need outpatient evaluation, he has distal pulses.  Encouraged quit smoking.    DVT prophylaxis, subcutaneous heparin

## 2018-07-14 NOTE — PLAN OF CARE
Problem: Fall Risk (Adult)  Goal: Identify Related Risk Factors and Signs and Symptoms  Outcome: Ongoing (interventions implemented as appropriate)    Goal: Absence of Fall  Outcome: Ongoing (interventions implemented as appropriate)      Problem: Patient Care Overview  Goal: Plan of Care Review  Outcome: Ongoing (interventions implemented as appropriate)    Goal: Individualization and Mutuality  Outcome: Ongoing (interventions implemented as appropriate)    Goal: Discharge Needs Assessment  Outcome: Ongoing (interventions implemented as appropriate)      Problem: Skin Injury Risk (Adult)  Goal: Identify Related Risk Factors and Signs and Symptoms  Outcome: Ongoing (interventions implemented as appropriate)    Goal: Skin Health and Integrity  Outcome: Ongoing (interventions implemented as appropriate)      Problem: Breathing Pattern Ineffective (Adult)  Goal: Identify Related Risk Factors and Signs and Symptoms  Outcome: Ongoing (interventions implemented as appropriate)    Goal: Effective Oxygenation/Ventilation  Outcome: Ongoing (interventions implemented as appropriate)    Goal: Anxiety/Fear Reduction  Outcome: Ongoing (interventions implemented as appropriate)

## 2018-07-15 VITALS
TEMPERATURE: 97.7 F | HEIGHT: 72 IN | RESPIRATION RATE: 20 BRPM | SYSTOLIC BLOOD PRESSURE: 156 MMHG | HEART RATE: 74 BPM | DIASTOLIC BLOOD PRESSURE: 76 MMHG | OXYGEN SATURATION: 86 % | WEIGHT: 244.2 LBS | BODY MASS INDEX: 33.08 KG/M2

## 2018-07-15 LAB
ANION GAP SERPL CALCULATED.3IONS-SCNC: 5.3 MMOL/L (ref 3.6–11.2)
BASOPHILS # BLD AUTO: 0.01 10*3/MM3 (ref 0–0.3)
BASOPHILS NFR BLD AUTO: 0.1 % (ref 0–2)
BUN BLD-MCNC: 12 MG/DL (ref 7–21)
BUN/CREAT SERPL: 15.8 (ref 7–25)
CALCIUM SPEC-SCNC: 8.8 MG/DL (ref 7.7–10)
CHLORIDE SERPL-SCNC: 95 MMOL/L (ref 99–112)
CO2 SERPL-SCNC: 32.7 MMOL/L (ref 24.3–31.9)
CREAT BLD-MCNC: 0.76 MG/DL (ref 0.43–1.29)
DEPRECATED RDW RBC AUTO: 44.6 FL (ref 37–54)
EOSINOPHIL # BLD AUTO: 0.04 10*3/MM3 (ref 0–0.7)
EOSINOPHIL NFR BLD AUTO: 0.4 % (ref 0–7)
ERYTHROCYTE [DISTWIDTH] IN BLOOD BY AUTOMATED COUNT: 13.5 % (ref 11.5–14.5)
GFR SERPL CREATININE-BSD FRML MDRD: 101 ML/MIN/1.73
GLUCOSE BLD-MCNC: 94 MG/DL (ref 70–110)
GLUCOSE BLDC GLUCOMTR-MCNC: 100 MG/DL (ref 70–130)
GLUCOSE BLDC GLUCOMTR-MCNC: 186 MG/DL (ref 70–130)
HCT VFR BLD AUTO: 50 % (ref 42–52)
HGB BLD-MCNC: 16.7 G/DL (ref 14–18)
IMM GRANULOCYTES # BLD: 0.02 10*3/MM3 (ref 0–0.03)
IMM GRANULOCYTES NFR BLD: 0.2 % (ref 0–0.5)
LYMPHOCYTES # BLD AUTO: 1.44 10*3/MM3 (ref 1–3)
LYMPHOCYTES NFR BLD AUTO: 16.1 % (ref 16–46)
MCH RBC QN AUTO: 30.9 PG (ref 27–33)
MCHC RBC AUTO-ENTMCNC: 33.4 G/DL (ref 33–37)
MCV RBC AUTO: 92.4 FL (ref 80–94)
MONOCYTES # BLD AUTO: 0.66 10*3/MM3 (ref 0.1–0.9)
MONOCYTES NFR BLD AUTO: 7.4 % (ref 0–12)
NEUTROPHILS # BLD AUTO: 6.75 10*3/MM3 (ref 1.4–6.5)
NEUTROPHILS NFR BLD AUTO: 75.8 % (ref 40–75)
OSMOLALITY SERPL CALC.SUM OF ELEC: 265.9 MOSM/KG (ref 273–305)
PLATELET # BLD AUTO: 187 10*3/MM3 (ref 130–400)
PMV BLD AUTO: 10.6 FL (ref 6–10)
POTASSIUM BLD-SCNC: 3.7 MMOL/L (ref 3.5–5.3)
RBC # BLD AUTO: 5.41 10*6/MM3 (ref 4.7–6.1)
SODIUM BLD-SCNC: 133 MMOL/L (ref 135–153)
WBC NRBC COR # BLD: 8.92 10*3/MM3 (ref 4.5–12.5)

## 2018-07-15 PROCEDURE — 85025 COMPLETE CBC W/AUTO DIFF WBC: CPT | Performed by: PHYSICIAN ASSISTANT

## 2018-07-15 PROCEDURE — 63710000001 INSULIN ASPART PER 5 UNITS: Performed by: INTERNAL MEDICINE

## 2018-07-15 PROCEDURE — 94799 UNLISTED PULMONARY SVC/PX: CPT

## 2018-07-15 PROCEDURE — 99239 HOSP IP/OBS DSCHRG MGMT >30: CPT | Performed by: INTERNAL MEDICINE

## 2018-07-15 PROCEDURE — 25010000002 HEPARIN (PORCINE) PER 1000 UNITS: Performed by: PHYSICIAN ASSISTANT

## 2018-07-15 PROCEDURE — 82962 GLUCOSE BLOOD TEST: CPT

## 2018-07-15 PROCEDURE — 63710000001 PREDNISONE PER 1 MG: Performed by: INTERNAL MEDICINE

## 2018-07-15 PROCEDURE — 80048 BASIC METABOLIC PNL TOTAL CA: CPT | Performed by: PHYSICIAN ASSISTANT

## 2018-07-15 RX ORDER — AMLODIPINE BESYLATE 10 MG/1
10 TABLET ORAL
Qty: 30 TABLET | Refills: 0 | Status: SHIPPED | OUTPATIENT
Start: 2018-07-16

## 2018-07-15 RX ORDER — PREDNISONE 20 MG/1
TABLET ORAL
Qty: 6 TABLET | Refills: 0 | Status: SHIPPED | OUTPATIENT
Start: 2018-07-15 | End: 2018-08-13 | Stop reason: SDUPTHER

## 2018-07-15 RX ORDER — HYDROCODONE BITARTRATE AND ACETAMINOPHEN 10; 325 MG/1; MG/1
1 TABLET ORAL EVERY 8 HOURS PRN
Qty: 12 TABLET | Refills: 0 | Status: SHIPPED | OUTPATIENT
Start: 2018-07-15 | End: 2018-07-24

## 2018-07-15 RX ORDER — ATORVASTATIN CALCIUM 40 MG/1
40 TABLET, FILM COATED ORAL DAILY
Qty: 30 TABLET | Refills: 0 | Status: SHIPPED | OUTPATIENT
Start: 2018-07-16

## 2018-07-15 RX ORDER — CEFDINIR 300 MG/1
300 CAPSULE ORAL EVERY 12 HOURS SCHEDULED
Qty: 17 CAPSULE | Refills: 0 | Status: SHIPPED | OUTPATIENT
Start: 2018-07-15 | End: 2018-07-24

## 2018-07-15 RX ORDER — DOXYCYCLINE 100 MG/1
100 CAPSULE ORAL EVERY 12 HOURS SCHEDULED
Qty: 17 CAPSULE | Refills: 0 | Status: SHIPPED | OUTPATIENT
Start: 2018-07-15 | End: 2018-07-24

## 2018-07-15 RX ADMIN — IPRATROPIUM BROMIDE AND ALBUTEROL SULFATE 3 ML: .5; 3 SOLUTION RESPIRATORY (INHALATION) at 13:03

## 2018-07-15 RX ADMIN — BACITRACIN ZINC: 500 OINTMENT TOPICAL at 08:06

## 2018-07-15 RX ADMIN — INSULIN ASPART 2 UNITS: 100 INJECTION, SOLUTION INTRAVENOUS; SUBCUTANEOUS at 11:58

## 2018-07-15 RX ADMIN — IPRATROPIUM BROMIDE AND ALBUTEROL SULFATE 3 ML: .5; 3 SOLUTION RESPIRATORY (INHALATION) at 06:51

## 2018-07-15 RX ADMIN — INSULIN ASPART 20 UNITS: 100 INJECTION, SUSPENSION SUBCUTANEOUS at 08:08

## 2018-07-15 RX ADMIN — ATORVASTATIN CALCIUM 40 MG: 40 TABLET, FILM COATED ORAL at 08:05

## 2018-07-15 RX ADMIN — HYDROCHLOROTHIAZIDE 25 MG: 25 TABLET ORAL at 08:05

## 2018-07-15 RX ADMIN — DOXYCYCLINE 100 MG: 100 CAPSULE ORAL at 08:05

## 2018-07-15 RX ADMIN — GABAPENTIN 600 MG: 300 CAPSULE ORAL at 14:31

## 2018-07-15 RX ADMIN — AMLODIPINE BESYLATE 10 MG: 10 TABLET ORAL at 08:05

## 2018-07-15 RX ADMIN — GABAPENTIN 600 MG: 300 CAPSULE ORAL at 05:06

## 2018-07-15 RX ADMIN — LISINOPRIL 40 MG: 10 TABLET ORAL at 08:05

## 2018-07-15 RX ADMIN — HEPARIN SODIUM 5000 UNITS: 5000 INJECTION, SOLUTION INTRAVENOUS; SUBCUTANEOUS at 08:05

## 2018-07-15 RX ADMIN — Medication 3 ML: at 05:06

## 2018-07-15 RX ADMIN — ASPIRIN 325 MG: 325 TABLET ORAL at 08:05

## 2018-07-15 RX ADMIN — CETIRIZINE HCL 10 MG: 10 TABLET ORAL at 08:05

## 2018-07-15 RX ADMIN — DOCUSATE SODIUM 100 MG: 100 CAPSULE, LIQUID FILLED ORAL at 08:05

## 2018-07-15 RX ADMIN — CEFDINIR 300 MG: 300 CAPSULE ORAL at 08:05

## 2018-07-15 RX ADMIN — Medication 1 CAPSULE: at 08:05

## 2018-07-15 RX ADMIN — HYDROCODONE BITARTRATE AND ACETAMINOPHEN 1 TABLET: 10; 325 TABLET ORAL at 08:06

## 2018-07-15 RX ADMIN — IPRATROPIUM BROMIDE AND ALBUTEROL SULFATE 3 ML: .5; 3 SOLUTION RESPIRATORY (INHALATION) at 00:52

## 2018-07-15 RX ADMIN — PREDNISONE 20 MG: 20 TABLET ORAL at 08:05

## 2018-07-15 RX ADMIN — BUDESONIDE AND FORMOTEROL FUMARATE DIHYDRATE 2 PUFF: 160; 4.5 AEROSOL RESPIRATORY (INHALATION) at 06:51

## 2018-07-15 NOTE — PROGRESS NOTES
"  I have personally seen and examined the patient today and discussed overnight interval progress and pertinent issues with nursing staff.    Subjective:    Patient was transferred to the floor from PCU on 7/14/2018.  Doing well this morning with no complaints.  No fever and normal white count.  Nurse reports no issues overnight.    History taken from: patient chart RN      Vital Signs    /76 (BP Location: Right arm, Patient Position: Lying)   Pulse 77   Temp 97.7 °F (36.5 °C) (Oral)   Resp 20   Ht 182.9 cm (72\")   Wt 111 kg (244 lb 3.2 oz)   SpO2 91%   BMI 33.12 kg/m²     Temp:  [97.7 °F (36.5 °C)-98.6 °F (37 °C)] 97.7 °F (36.5 °C)      Intake/Output Summary (Last 24 hours) at 07/15/18 1109  Last data filed at 07/15/18 1102   Gross per 24 hour   Intake             1100 ml   Output             1800 ml   Net             -700 ml     Intake & Output (last 3 days)       07/12 0701 - 07/13 0700 07/13 0701 - 07/14 0700 07/14 0701 - 07/15 0700 07/15 0701 - 07/16 0700    P.O. 5943 270 0983     Total Intake(mL/kg) 1250 (11.2) 500 (4.5) 1100 (9.9)     Urine (mL/kg/hr) 4000 (1.5) 2500 (0.9) 2000 (0.8) 800 (1.7)    Stool        Total Output 4000 2500 2000 800    Net -2750 -2000 -900 -800            Unmeasured Urine Occurrence   4 x           Physical Exam:                 General Appearance:    Alert, cooperative, in no acute distress, poor hygiene    Head:    Normocephalic, without obvious abnormality, atraumatic   Eyes:            Lids and lashes normal, conjunctivae and sclerae normal, no icterus, no pallor, corneas clear, PERRLA   Ears:    Ears appear intact with no abnormalities noted   Throat:   No oral lesions, no thrush, oral mucosa moist   Neck:   No adenopathy, supple, trachea midline, no thyromegaly, no   carotid bruit, no JVD   Back:     No tenderness to percussion or palpation, range of motion   normal   Lungs:     Clear to auscultation,respirations regular, even and unlabored. No wheezing, no ronchi " and no crackles.    Heart:    Regular rhythm and normal rate, normal S1 and S2, no            murmur, no gallop, no rub, no click   Chest Wall:    No abnormalities observed   Abdomen:     Normal bowel sounds, no masses, no organomegaly, soft   non-tender, non-distended, no guarding, no rebound tenderness   Rectal:     Deferred   Extremities:  Bilateral lower extremity venous stasis dermatitis with foul odor and drainage, associated redness and warmth.  Right worse than left.    Pulses:   Pulses palpable and equal bilaterally   Skin:   Bilateral lower extremity venous stasis dermatitis with foul odor and drainage, associated redness and warmth.  Right worse than left.   Lymph nodes:   No palpable adenopathy   Neurologic:   Awake, alert and oriented x 3. Following commands.       Results:      Results from last 7 days  Lab Units 07/15/18  0302 07/14/18  0136 07/13/18  0116 07/12/18  0109 07/11/18  0106 07/10/18  0545 07/09/18  0953   WBC 10*3/mm3 8.92 10.01 10.96 8.68 9.42 7.39 8.19     Lab Results   Component Value Date    NEUTROABS 6.75 (H) 07/15/2018         Results from last 7 days  Lab Units 07/15/18  0302   CREATININE mg/dL 0.76         Results from last 7 days  Lab Units 07/13/18  0116 07/12/18  0109 07/11/18  0106   CRP mg/dL <0.50 <0.50 1.04*     Results Review:    I have personally reviewed laboratory data, culture results, radiology studies and antimicrobial therapy.    Hospital Medications (active)       Dose Frequency Start End    albuterol (PROVENTIL) nebulizer solution 0.083% 2.5 mg/3mL 2.5 mg Every 6 Hours PRN 7/10/2018     Sig - Route: Take 2.5 mg by nebulization Every 6 (Six) Hours As Needed for Wheezing or Shortness of Air. - Nebulization    amLODIPine (NORVASC) tablet 5 mg 5 mg Every 24 Hours Scheduled 7/11/2018     Sig - Route: Take 1 tablet by mouth Daily. - Oral    aspirin tablet 325 mg 325 mg Daily 7/10/2018     Sig - Route: Take 1 tablet by mouth Daily. - Oral    atorvastatin (LIPITOR) tablet  40 mg 40 mg Daily 7/11/2018     Sig - Route: Take 1 tablet by mouth Daily. - Oral    budesonide-formoterol (SYMBICORT) 160-4.5 MCG/ACT inhaler 2 puff 2 puff 2 Times Daily - RT 7/10/2018     Sig - Route: Inhale 2 puffs 2 (Two) Times a Day. - Inhalation    cefdinir (OMNICEF) capsule 300 mg 300 mg Every 12 Hours Scheduled 7/10/2018 7/24/2018    Sig - Route: Take 1 capsule by mouth Every 12 (Twelve) Hours. - Oral    cetirizine (zyrTEC) tablet 10 mg 10 mg Daily 7/10/2018     Sig - Route: Take 1 tablet by mouth Daily. - Oral    dextrose (D50W) solution 25 g 25 g Every 15 Minutes PRN 7/9/2018     Sig - Route: Infuse 50 mL into a venous catheter Every 15 (Fifteen) Minutes As Needed for Low Blood Sugar (Blood Sugar Less Than 70). - Intravenous    Cosign for Ordering: Accepted by Julian Stern MD on 7/9/2018  5:29 PM    dextrose (D50W) solution 25 g 25 g Every 15 Minutes PRN 7/9/2018     Sig - Route: Infuse 50 mL into a venous catheter Every 15 (Fifteen) Minutes As Needed for Low Blood Sugar (Blood Sugar Less Than 70). - Intravenous    dextrose (GLUTOSE) oral gel 15 g 15 g Every 15 Minutes PRN 7/9/2018     Sig - Route: Take 15 g by mouth Every 15 (Fifteen) Minutes As Needed for Low Blood Sugar (Blood sugar less than 70). - Oral    Cosign for Ordering: Accepted by Julian Stern MD on 7/9/2018  5:29 PM    dextrose (GLUTOSE) oral gel 15 g 15 g Every 15 Minutes PRN 7/9/2018     Sig - Route: Take 15 g by mouth Every 15 (Fifteen) Minutes As Needed for Low Blood Sugar (Blood sugar less than 70). - Oral    donepezil (ARICEPT) tablet 10 mg 10 mg Nightly 7/10/2018     Sig - Route: Take 2 tablets by mouth Every Night. - Oral    doxycycline (MONODOX) capsule 100 mg 100 mg Every 12 Hours Scheduled 7/10/2018 7/24/2018    Sig - Route: Take 1 capsule by mouth Every 12 (Twelve) Hours. - Oral    fluticasone (FLONASE) 50 MCG/ACT nasal spray 1 spray 1 spray 2 Times Daily 7/10/2018     Sig - Route: 1 spray by Each Nare route 2 (Two) Times a  Day. - Each Nare    furosemide (LASIX) injection 20 mg 20 mg Once 7/11/2018 7/11/2018    Sig - Route: Infuse 2 mL into a venous catheter 1 (One) Time. - Intravenous    gabapentin (NEURONTIN) capsule 600 mg 600 mg Every 8 Hours Scheduled 7/10/2018     Sig - Route: Take 2 capsules by mouth Every 8 (Eight) Hours. - Oral    glucagon (human recombinant) (GLUCAGEN DIAGNOSTIC) injection 1 mg 1 mg As Needed 7/9/2018     Sig - Route: Inject 1 mg under the skin As Needed (Blood Glucose Less Than 70). - Subcutaneous    Cosign for Ordering: Accepted by Julian Stern MD on 7/9/2018  5:29 PM    glucagon (human recombinant) (GLUCAGEN DIAGNOSTIC) injection 1 mg 1 mg As Needed 7/9/2018     Sig - Route: Inject 1 mg under the skin As Needed (Blood Glucose Less Than 70). - Subcutaneous    heparin (porcine) 5000 UNIT/ML injection 5,000 Units 5,000 Units Every 12 Hours Scheduled 7/9/2018     Sig - Route: Inject 1 mL under the skin Every 12 (Twelve) Hours. - Subcutaneous    Cosign for Ordering: Accepted by Julian Stern MD on 7/9/2018  5:29 PM    insulin aspart (novoLOG) injection 0-9 Units 0-9 Units 4 Times Daily Before Meals & Nightly 7/9/2018     Sig - Route: Inject 0-9 Units under the skin 4 (Four) Times a Day Before Meals & at Bedtime. - Subcutaneous    insulin aspart prot-insulin aspart (novoLOG 70/30) injection 20 Units 20 Units 2 Times Daily With Meals 7/11/2018     Sig - Route: Inject 20 Units under the skin 2 (Two) Times a Day With Meals. - Subcutaneous    ipratropium-albuterol (DUO-NEB) nebulizer solution 3 mL 3 mL 4 Times Daily - RT 7/9/2018     Sig - Route: Take 3 mL by nebulization 4 (Four) Times a Day. - Nebulization    Cosign for Ordering: Accepted by Julian Stern MD on 7/9/2018  5:29 PM    lactobacillus acidophilus (RISAQUAD) capsule 1 capsule 1 capsule Daily 7/10/2018     Sig - Route: Take 1 capsule by mouth Daily. - Oral    Liraglutide (VICTOZA) injection 1.8 mg 1.8 mg Daily 7/10/2018     Sig - Route: Inject 1.8 mg  "under the skin Daily. - Subcutaneous    Non-formulary Exception Code: Patient supplied medication    lisinopril (PRINIVIL,ZESTRIL) tablet 40 mg 40 mg Daily 7/10/2018     Sig - Route: Take 4 tablets by mouth Daily. - Oral    methylPREDNISolone sodium succinate (SOLU-Medrol) injection 20 mg 20 mg Every 12 Hours 7/11/2018     Sig - Route: Infuse 0.5 mL into a venous catheter Every 12 (Twelve) Hours. - Intravenous    morphine injection 1 mg 1 mg Every 4 Hours PRN 7/9/2018 7/19/2018    Sig - Route: Infuse 0.5 mL into a venous catheter Every 4 (Four) Hours As Needed for Severe Pain  (hold for oversedation, SBP<100). - Intravenous    nicotine (NICODERM CQ) 21 MG/24HR patch 1 patch 1 patch Every 24 Hours 7/10/2018     Sig - Route: Place 1 patch on the skin Daily. - Transdermal    ondansetron (ZOFRAN) tablet 8 mg 8 mg Every 12 Hours PRN 7/10/2018     Sig - Route: Take 2 tablets by mouth Every 12 (Twelve) Hours As Needed for Nausea or Vomiting. - Oral    Pharmacy Consult  Continuous PRN 7/10/2018     Sig - Route: Continuous As Needed for Consult. - Does not apply    sodium chloride 0.9 % bolus 500 mL 500 mL Once 7/10/2018 7/10/2018    Sig - Route: Infuse 500 mL into a venous catheter 1 (One) Time. - Intravenous    sodium chloride 0.9 % flush 1-10 mL 1-10 mL As Needed 7/9/2018     Sig - Route: Infuse 1-10 mL into a venous catheter As Needed for Line Care. - Intravenous    Cosign for Ordering: Accepted by Julian Stern MD on 7/9/2018  5:29 PM    sodium chloride 0.9 % flush 10 mL 10 mL As Needed 7/9/2018     Sig - Route: Infuse 10 mL into a venous catheter As Needed for Line Care. - Intravenous    Linked Group 1:  \"And\" Linked Group Details        sodium chloride 0.9 % flush 3 mL 3 mL Every 8 Hours 7/10/2018     Sig - Route: Infuse 3 mL into a venous catheter Every 8 (Eight) Hours. - Intravenous    sodium chloride 0.9 % flush 5 mL 5 mL As Needed 7/10/2018     Sig - Route: Infuse 5 mL into a venous catheter As Needed for Line " Care (After Medication Administration or Blood Draw). - Intravenous    tamsulosin (FLOMAX) 24 hr capsule 0.4 mg 0.4 mg Every Evening 7/10/2018     Sig - Route: Take 1 capsule by mouth Every Evening. - Oral    atorvastatin (LIPITOR) tablet 20 mg (Discontinued) 20 mg Daily 7/10/2018 7/10/2018    Sig - Route: Take 1 tablet by mouth Daily. - Oral    insulin aspart prot-insulin aspart (novoLOG 70/30) injection 30 Units (Discontinued) 30 Units 2 Times Daily With Meals 7/10/2018 7/11/2018    Sig - Route: Inject 30 Units under the skin 2 (Two) Times a Day With Meals. - Subcutaneous    ipratropium-albuterol (DUO-NEB) nebulizer solution 3 mL (Discontinued) 3 mL Every 6 Hours PRN 7/9/2018 7/10/2018    Sig - Route: Take 3 mL by nebulization Every 6 (Six) Hours As Needed for Shortness of Air. - Nebulization    Cosign for Ordering: Accepted by Julian Stern MD on 7/9/2018  5:29 PM    methylPREDNISolone sodium succinate (SOLU-Medrol) injection 40 mg (Discontinued) 40 mg Every 12 Hours 7/9/2018 7/11/2018    Sig - Route: Infuse 1 mL into a venous catheter Every 12 (Twelve) Hours. - Intravenous    Cosign for Ordering: Accepted by Julian Stern MD on 7/9/2018  5:29 PM            Cultures:    Blood Culture   Date Value Ref Range Status   07/09/2018 No growth at 24 hours  Preliminary   07/09/2018 No growth at 24 hours  Preliminary           Assessment/Plan     ASSESSMENT:    1.  Venous stasis dermatitis   2.  Pneumonia versus COPD exacerbation       PLAN:    Patient was transferred to the floor from PCU on 7/14/2018.  Doing well this morning with no complaints.  No fever and normal white count.  Nurse reports no issues overnight.    Leg showing signs of improvement.      Chest x-ray 7/11/18 shows trace pleural effusions.    Mycoplasma IgM negative.   Legionella urinary antigen negative.     Unfortunately patient's poor hygiene will jeopardize and impair patient's healing process and outcomes.      Recommend to continue current   antibiotic therapy of  Doxycycline 100 mg by mouth twice a day and Omnicef 300 mg by mouth twice a day both to continue through 7/23/18.  Patient stable from infectious disease standpoint.  Okay to discharge home from infectious disease standpoint.    Current Antimicrobials:  Doxycycline 100mg PO BID through 7/23/18  Omnicef 300mg PO BID through 7/23/18      Patient's findings and recommendations were discussed with patient and nursing staff    Code Status:   Code Status and Medical Interventions:   Ordered at: 07/09/18 1509     Code Status:    CPR     Medical Interventions (Level of Support Prior to Arrest):    Full       Malia Ohara PA-C  07/15/18  11:09 AM

## 2018-07-15 NOTE — DISCHARGE SUMMARY
Date of admission: 7/9/18  Date of discharge: 7/15/18    Principal diagnosis: Acute hypoxic and hypercarbic respiratory failure secondary to chronic lung disease and COPD exacerbation.  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 Mayes hematology  Dr. Padilla/Cecilia nephrology  Dr. Sahni pulmonology  Dr. Stewart cardiology  Dr Mehta ID    Procedures:  Arterial Doppler  Stress test  Echocardiogram    Admission diagnosis: Hypercarbic and hypoxic respiratory failure    Exam: Assisted by Emilee RAO  Patient states he feels back to baseline, his saturation room air however was 86%, I placed him on 2 L initially he stated about 89% output him up to 3 L and he was around 90% saturated.  He however comes out of the halls and ambulates in his saturation goes down to 85%.  I stressed that he needs to wears oxygen continuously at home.  He already has oxygen he wears 4-5 L at night at home.  He denies any chest pain currently.  No other pain, is tolerating his diet.  He is agreed to at least try to quit smoking.  Lungs have bilateral breath sounds diminished bases but overall good air excursion without rhonchi rales or wheezing, heart regular rate and rhythm without murmur or gallop.  Extremities have trace edema he has some erythema and some skin breakdown from his peripheral vascular disease, he is prone to dependent rubor although his legs have been elevated so the erythema is less today.  Monitor has been showing a sinus rhythm  Vital signs: 156/76,  20, 74, 97.7    Hospital course: This has been a [facility] hospital course, please see chart for full details.  Patient was admitted with hypercarbic and hypoxic respiratory failure to the ICU and placed on BiPAP.  He was treated with antibiotics steroids and inhalants and he has improved overall back to baseline.  His chest x-ray really did not show infiltrate but more atelectasis.  He was noted have erythema of his legs with some superficial ulcerations, he has arterial insufficiency with significant arterial disease.  Dr. Cordon was consulted but was unable to see the patient while here however patient is being referred on to Dr. Cordon for further evaluation of this.  He was encouraged quit smoking.  He is on a statin as well as aspirin.  Patient had hyponatremia, he has been fluid restricted and sodium has responded.  He will continue 1000 cc fluid restriction per day at home.  I have asked home health check a BMP in one week and call to Dr. Padilla.  His discharge sodium 133.  Patient had over 3 g of urine in his urine protein to creatinine ratio.  Patient did have gray zone troponins although he was asymptomatic cardiology to evaluate him, his EF was normal and he underwent a stress test and it was decided that he should be medically managed.  He is pain-free at this time.  He is stable and improved unfortunately his long-term prognosis with significance of his disease is guarded.  Please see chart for full details.  He is going to be on continuously at 3 L nasal cannula at home.  I've encouraged him to comply with this although I'm guarded that he will.  He does live at [facility].  His brother is going to bring in one of his oxygen tanks to take him home.  Patient has chronic pain syndrome, he is on gabapentin, with his legs he was treated with morphine while here and changed to Norco.  I will give him 3 days of Norco when necessary on discharge.  His Ronald report as been reviewed.    Follow-up:  Primary  one week  Dr. Padilla 2 weeks  Dr. Cordon to evaluate PVD  [facility]    Diet: Cardiac constant carbohydrate with 1000 cc per day fluid restriction    Activity as tolerated with his oxygen in place    Medication:  Albuterol when necessary, he states he does have nebulizers well.  Norvasc 10 mg a day  Aspirin 325 a day  Atorvastatin 40 mg a day  Symbicort 162 puffs twice a day  Omnicef 300 twice a day  Aricept 10 mg a day  Doxycycline 100 twice a day  Gabapentin 800 3 times a day  HCTZ 25 mg daily  Hydrocodone 10 every 8 hours as needed for pain  Insulin 70/3020 units twice a day  XYZAL 5 mg daily as needed for allergies  Prednisone 20 day for 3 days then 10 a day for 3 days then stopped  Flomax 0.4 mg a day  Victoza 1.8 mg daily subcutaneous    Discharge 1:42PM - 2:33PM

## 2018-07-15 NOTE — NURSING NOTE
"MD requested patient be educated on medications prior to discharge. Patient upset and walking out, advised that pharmacy was on her way and he declined to wait. Patient stated \"that the meds are not new and he didn't need educated\".  "

## 2018-07-15 NOTE — PLAN OF CARE
Problem: Fall Risk (Adult)  Goal: Identify Related Risk Factors and Signs and Symptoms  Outcome: Ongoing (interventions implemented as appropriate)    Goal: Absence of Fall  Outcome: Ongoing (interventions implemented as appropriate)      Problem: Patient Care Overview  Goal: Plan of Care Review  Outcome: Ongoing (interventions implemented as appropriate)    Goal: Discharge Needs Assessment  Outcome: Ongoing (interventions implemented as appropriate)    Goal: Interprofessional Rounds/Family Conf  Outcome: Ongoing (interventions implemented as appropriate)      Problem: Skin Injury Risk (Adult)  Goal: Identify Related Risk Factors and Signs and Symptoms  Outcome: Ongoing (interventions implemented as appropriate)    Goal: Skin Health and Integrity  Outcome: Ongoing (interventions implemented as appropriate)      Problem: Breathing Pattern Ineffective (Adult)  Goal: Identify Related Risk Factors and Signs and Symptoms  Outcome: Ongoing (interventions implemented as appropriate)    Goal: Effective Oxygenation/Ventilation  Outcome: Ongoing (interventions implemented as appropriate)    Goal: Anxiety/Fear Reduction  Outcome: Ongoing (interventions implemented as appropriate)

## 2018-07-16 ENCOUNTER — TELEPHONE (OUTPATIENT)
Dept: TELEMETRY | Facility: HOSPITAL | Age: 68
End: 2018-07-16

## 2018-07-16 NOTE — PROGRESS NOTES
Discharge Planning Assessment  JACKY Valdez     Patient Name: Heath Bajwa  MRN: [id number]  Today's Date: 7/16/2018    Admit Date: 7/9/2018       Discharge Plan     Row Name 07/16/18 1150       Plan    Final Discharge Disposition Code 06 - home with home health care    Final Note SS followed up with pt on this date due to pt being a weekend discharge.  Pt has [facility].  [facility] reported [facility] but after contacting, [facility] does not have the pt. SS contacted [facility] and spoke with Shelby and she was not aware the pt was discharged. SS faxed orders and AVS to [facility] at [phone].  Shelby is aware the pt will need a BNP in 5 days and results will need to be faxed to Dr. Padilla.  No other needs at this time.              Expected Discharge Date and Time     Expected Discharge Date Expected Discharge Time    Jul 15, 2018               Deanna Chen

## 2018-07-16 NOTE — DISCHARGE PLACEMENT REQUEST
"Heath Mckenna (68 y.o. Male)     Date of Birth Social Security Number Address Home Phone MRN    1950  [id number]    [place] [id number] [phone] [id number]    [employer] Marital Status          Religious        Admission Date Admission Type Admitting Provider Attending Provider Department, Room/Bed    7/9/18 Emergency Julian Stern MD  [facility] 3 SOUTH, 3311/1S    Discharge Date Discharge Disposition Discharge Destination        7/15/2018 Home or Self Care              Attending Provider:  (none)   Allergies:  No Known Allergies    Isolation:  None   Infection:  None   Code Status:  Prior    Ht:  182.9 cm (72\")   Wt:  111 kg (244 lb 3.2 oz)    Admission Cmt:  None   Principal Problem:  Venous stasis dermatitis of both lower extremities [I87.2]                 Active Insurance as of 7/9/2018     Primary Coverage     Payor Plan Insurance Group Employer/Plan Group    MEDICARE MEDICARE A & B      Payor Plan Address Payor Plan Phone Number Effective From Effective To    [address] [phone] 4/1/2006     [facility] [id number]       Subscriber Name Subscriber Birth Date Member ID       HEATH MCKENNA 1950 [id number]           Secondary Coverage     Payor Plan Insurance Group Employer/Plan Group    [place]      Payor Plan Address Payor Plan Phone Number Effective From Effective To    [address] [phone] 7/13/2016     [facility] [address]       Subscriber Name Subscriber Birth Date Member ID       HEATH MCKENNA 1950 [id number]                 Emergency Contacts      (Rel.) Home Phone Work Phone Mobile Phone    Jose Ramon Mckenna (Other) [phone] -- [phone]            Emergency Contact Information     Name Relation Home Work Mobile    GarettJose Ramon Other [phone]          Insurance Information                MEDICARE/MEDICARE A & B Phone: [phone]    Subscriber: Heath Mckenna Subscriber#: " [id number]    Group#:  Precert#:         [place] Phone: [phone]    Subscriber: Heath Bajwa Subscriber#: [id number]    Group#:  Precert#:              History & Physical      Julian Stern MD at 2018  3:22 PM              [facility]  History & Physical    Patient Identification:  Name:  Heath Bajwa  Age:  68 y.o.  Sex:  male  :  1950  MRN:  [id number]   Visit Number:  [id number]  Primary Care Physician:  No Known Provider    I have seen the patient in conjunction with Ivon Hernandez PA-C and I agree with the following statements:    Subjective        Chief complaint:  Shortness of breath       History of presenting illness:      Heath Bajwa is a 68 y.o. male Heath Bajwa presented to the ED via EMS secondary to shortness of breath. He has known COPD and reports worsened shortness of breath over the course of the last 2 weeks but significant worsening over the past 48 hours making daily activities of living difficult to complete. He reports cough productive of yellow/green sputum.  He denies chest pain.  His initial ABG revealed pH 7.408, pCO2 of 55.7, pO2 of 48.1, and bicarbonate of 34.4.   When placed on 2 liters via NC, Mr. Bajwa began retaining CO2 with increase of PCO2 to 78.1 with an accompanying lower pH of 7.307.   He was placed on BiPAP with only slight improvement of pCO2 with improvement of pO2.   CXR revealed some bibasilar atelectasis but without mention of acute infiltrate.   Given acute hypoxic and hypercarbic respiratory failure, Mr. Bajwa has been admitted to the CCU for further evaluation and treatment.  He denies chest pain or known history of heart disease, but was found to have indeterminate troponin.  Much of his ROS and HPI were obtained from the patient, though very difficult to understand secondary to BiPAP treatment, though this could obviously not be removed at this time.      In addition,   "Garett has noted cellulitis of the bilateral lower extremities with ulcerations about the calf with the appearance of possible Unna boots or dressings, though it is currently difficulty for Mr. Bajwa to relay to me what he has been putting on his legs given BiPAP mask.  He does tell me his RLE has increased redness, but that his LLE (also with cellulitis) looks around baseline.   He does also tell me that he is supposed to wear 2 liters of oxygen when he sleeps at night.  He is able to tell me that his legs were more swollen but this has since went down.     Heinss (she does state cellulitis feels like a sharp pain in his legs.  This is of moderate intensity at least and worsens when he tries to walk.  He states the erythema worsened has been there for about a week.  There is also some ulcerations especially on his right leg he thinks has been there about a week.  He has had no associated fever.  He states he has had no increase cough but has been bringing up more phlegm but this is been white.  No nausea no vomiting, he states his abdomen feels bloated at times however he states he does not feel like he is voiding like he should, his bowels have been moving.  No emesis.  Patient states she wears oxygen at home \"when he needs it\" he continues to smoke and is well aware the need to quit, patient ambulates at home with a cane)  ---------------------------------------------------------------------------------------------------------------------     Review of Systems   Constitutional: Negative for activity change and chills.   HENT: Negative for congestion and drooling.    Eyes: Negative for pain and discharge.   Respiratory: Positive for cough, shortness of breath and wheezing.    Cardiovascular: Positive for leg swelling. Negative for chest pain.   Gastrointestinal: Negative for abdominal distention, diarrhea and nausea.   Endocrine: Negative for cold intolerance and heat intolerance.   Genitourinary: Negative for " difficulty urinating and dysuria.   Musculoskeletal: Negative for arthralgias and back pain.   Skin: Positive for wound. Negative for color change.   Allergic/Immunologic: Negative for environmental allergies and food allergies.   Neurological: Negative for dizziness and headaches.   Psychiatric/Behavioral: Negative for agitation and confusion.      ---------------------------------------------------------------------------------------------------------------------   Past Medical History:   Diagnosis Date   • COPD (chronic obstructive pulmonary disease) (CMS/HCC)    • Diabetes mellitus (CMS/HCC)    • Hypertension      History reviewed. No pertinent surgical history.  Family History   Problem Relation Age of Onset   • No Known Problems Mother    • Heart failure Father      Social History     Social History   • Marital status:      Social History Main Topics   • Smoking status: Current Every Day Smoker     Packs/day: 1.00     Years: 25.00     Types: Cigarettes   • Smokeless tobacco: Never Used   • Alcohol use No   • Drug use: No   • Sexual activity: Defer     Other Topics Concern   • Not on file     ---------------------------------------------------------------------------------------------------------------------   Allergies:  Patient has no known allergies.  ---------------------------------------------------------------------------------------------------------------------     Home medications:    Medications below are reported home medications pulling from within the system; at this time, these medications have not been reconciled unless otherwise specified and are in the verification process in order to verify them as current home medications.    (Not in a hospital admission)      Hospital Scheduled Meds:    piperacillin-tazobactam 4.5 g Intravenous Once          Current listed hospital scheduled medications may not yet reflect those currently placed in orders that are signed and held awaiting patient's  arrival to floor.   ---------------------------------------------------------------------------------------------------------------------     Objective     Vital Signs:  Temp:  [98.2 °F (36.8 °C)] 98.2 °F (36.8 °C)  Heart Rate:  [65-84] 76  Resp:  [20-25] 23  BP: (110-151)/(60-98) 116/60  FiO2 (%):  [30 %] 30 %  1    07/09/18  0934   Weight: 109 kg (240 lb)     Body mass index is 32.55 kg/m².  ---------------------------------------------------------------------------------------------------------------------       Physical Exam    Physical Exam:    Constitutional: Awake, alert, well-nourished, well-developed, nontoxic, appears chronically ill however.  BiPAP in place.  HEENT: Normocephalic, atraumatic. PERRLA, EOMI, sclerae anicteric, conjunctivae without injection, mucous membranes moist, no oropharyngeal erythema appreciated.    Neck: Supple. No JVD appreciated.  Pulmonary: Diminished breath sounds bilaterally. Bilateral wheezing appreciated throughout.  No significant rhonchi or rales appreciated.  Wheezing was mostly in the upper lobes.  Mildly increased respiratory effort.   CV: Normal rate, regular rhythm. Normal s1/s2 with no murmur appreciated.   Abdominal: Soft, No distension or tenderness appreciated. Bowel sounds appreciated in all four quadrants, no guarding or rebound  Musculoskeletal: No erythema or swelling in joints of upper and lower extremities   Extremities: No clubbing, cyanosis,1+edema  Vascular: Decreased but palpable DP/PT pulses bilaterally, warm extremities Semsation intact in ext  Skin: RLE with cellulitis about the foot extending proximally into the calf and stopping had a fairly well demarcated line below the knee with some vesicles noted proximal tibial area the erythema appears to be fairly circumferential..   LLE with cellulitis about the calf that is less impressive than the RLE.  RLE with multiple open wounds/ulcerations about the skin   Neuro: Alert and oriented to person, place,  and time. Strength symmetric in all extremities, Cranial Nerves grossly intact to confrontation, speech clear, sensation intact to fine touch..  No slurred speech.  No facial droop.    Psych: Appropriate mood and affect.  Judgement and though content appropriate.       ---------------------------------------------------------------------------------------------------------------------  EKG:        Telemetry:   SR in the 60s-80s during bedside evaluation     I have personally looked at both the EKG and the telemetry strips.  EKG fairly benign  ---------------------------------------------------------------------------------------------------------------------     Results from last 7 days  Lab Units 07/09/18  0953   CRP mg/dL 1.72*   LACTATE mmol/L 1.5   WBC 10*3/mm3 8.19   HEMOGLOBIN g/dL 17.2   HEMATOCRIT % 51.0   MCV fL 93.8   MCHC g/dL 33.7   PLATELETS 10*3/mm3 197   INR  1.03       Results from last 7 days  Lab Units 07/09/18  1336   PH, ARTERIAL pH units 7.330*   PO2 ART mm Hg 76.8*   PCO2, ARTERIAL mm Hg 74.7*   HCO3 ART mmol/L 38.5*           Results from last 7 days  Lab Units 07/09/18  0953   SODIUM mmol/L 130*   POTASSIUM mmol/L 4.9   CHLORIDE mmol/L 89*   CO2 mmol/L 38.2*   BUN mg/dL 16   CREATININE mg/dL 1.00   EGFR IF NONAFRICN AM mL/min/1.73 74   CALCIUM mg/dL 9.1   GLUCOSE mg/dL 149*   ALBUMIN g/dL 3.60   BILIRUBIN mg/dL 0.3   ALK PHOS U/L 96   AST (SGOT) U/L 21   ALT (SGPT) U/L 22   Estimated Creatinine Clearance: 87.7 mL/min (by C-G formula based on SCr of 1 mg/dL).  No results found for: AMMONIA    Results from last 7 days  Lab Units 07/09/18  1153 07/09/18  0953   CK TOTAL U/L  --  139   TROPONIN I ng/mL 0.275* 0.303*   CK MB INDEX %  --  4.4*         Lab Results   Component Value Date    HGBA1C 7.10 (H) 08/05/2016     No results found for: TSH, FREET4  No results found for: PREGTESTUR, PREGSERUM, HCG, HCGQUANT  Pain Management Panel     There is no flowsheet data to display.                         ---------------------------------------------------------------------------------------------------------------------  Imaging Results (last 7 days)     Procedure Component Value Units Date/Time    XR Abdomen 2 View With Chest 1 View [[id number]] Collected:  07/09/18 1552     Updated:  07/09/18 1556    Narrative:          XR ABDOMEN 2 VW W CHEST 1 VW-     CLINICAL INDICATION: abd distention; J96.01-Acute respiratory failure  with hypoxia; J96.02-Acute respiratory failure with hypercapnia;  I21.4-Non-ST elevation (NSTEMI) myocardial infarction          COMPARISON: 07/09/2018      FINDINGS:  Chest:  Bibasilar airspace disease, either atelectasis or pneumonia     Abdomen:  Two views of the abdomen show no free air. I do not see abnormal bowel  distention to suggest complete obstruction. The bony structures are  intact. No abnormal soft tissue masses are seen. No suspicious appearing  calcifications are identified on today's exam.       Impression:       1. 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 Bibasilar airspace disease.  3. No evidence of bowel obstruction.  4. No free air.     5.            This report was finalized on 7/9/2018 3:54 PM by Dr. Jose Luis Romero MD.       US Arterial Doppler Lower Extremity Right [[id number]] Collected:  07/09/18 1339     Updated:  07/09/18 1407    Narrative:       US ARTERIAL DOPPLER LOWER EXTREMITY RIGHT-     REASON FOR EXAM:  Color change/ decreased pulses.     TECHNIQUE:  Multiple real-time color doppler images were obtained.  M-mode Doppler was used for velocity determination and spectral pattern.  Stenosis was evaluated using the NASCET or similar measurement  technique.     FINDINGS:   Right leg: The external iliac artery had extensive calcific plaque in  the lumen. It is narrowed. The Doppler wave pattern was monophasic. The  velocity is significantly elevated measuring 416 cm/s. There is calcific  plaque at the common femoral artery level. The Doppler wave pattern was  monophasic. The  peak systolic velocity was 213 cm/s. Color Doppler flow  was seen in the profunda femoral and superficial femoral artery. There  were no areas of occlusion in the superficial femoral artery. The  Doppler wave pattern was monophasic. The popliteal artery has a  monophasic flow pattern velocity of 87 cm/s. There was monophasic flow  in the posterior tibial artery. At the ankle the velocity measured 26  cm/s.       Impression:       Heavy atherosclerotic plaquing and stenosis is noted at the  level of the external iliac and common femoral arteries in the proximal  leg. The velocities were elevated to over 400 cm/s. The runoff arteries      showed no focal areas of stenosis or occlusion in the right leg.     This report was finalized on 7/9/2018 2:05 PM by Dr. Jose Ramon Hatch II, MD.       US Venous Doppler Lower Extremity Right (duplex) [[id number]] Collected:  07/09/18 1311     Updated:  07/09/18 1313    Narrative:       US VENOUS DOPPLER LOWER EXTREMITY RIGHT (DUPLEX)-     REASON FOR EXAM:  redness and pain     Multiple real-time images were obtained. The deep veins were well  demonstrated sonographically. There is good color doppler signal seen  filling the deep veins. They were completely compressed by the  ultrasound transducer. There was good spontaneous venous flow and  augmentation. There are no echoes seen along the deep veins to suggest  clot.          Impression:       No sonographic findings of DVT in the right lower extremity     This report was finalized on 7/9/2018 1:11 PM by Dr. Jose Ramon Hatch II, MD.       XR Chest 1 View [[id number]] Collected:  07/09/18 1006     Updated:  07/09/18 1017    Narrative:       XR CHEST 1 VW-     CLINICAL INDICATION: soa          COMPARISON: 01/16/2016      TECHNIQUE: Single frontal view of the chest.     FINDINGS:     Bibasilar atelectasis  The cardiac silhouette is normal. The pulmonary vasculature is  unremarkable.  There is no evidence of an acute osseous abnormality.    There are no suspicious-appearing parenchymal soft tissue nodules.            Impression:       Bibasilar atelectasis         This report was finalized on 7/9/2018 10:06 AM by Dr. Jose Luis Romero MD.         Chest x-ray appears to have bilateral atelectasis, no obstructive pattern on acute abdominal series, this is to my viewing    Cultures:         I have personally reviewed the radiology images and read the final radiology report.  ---------------------------------------------------------------------------------------------------------------------  Assessment / Plan       Assessment and Plan:    -Acute hypoxic and hypercarbic respiratory failure superimposed on chronic lung disease, likely 2/2 to COPD exacerbation:  Mr. Bajwa has been admitted to the CCU for further evaluation and treatment.  BiPAP settings were increased while in ED with orders to continue in CCU with ABG following arrival. Discussed important of BiPAP compliance with patient at bedside, as he did express not being pleased with wearing it.  IV Rocephin and doxycycline initially ordered given COPD exacerbation; however, Rocephin was escalated to Zosyn following evaluation of bilateral lower extremity cellulitis.  Doxycycline.  Considering there is no obvious pneumonia just atelectasis and need vancomycin for the cellulitis.  Pulmonlogy consultation has been placed.  IV solumedrol 40 mg BID has been ordered in addition to duoneb treatments. Testing for atypicals ordered     -Bilateral cellulitis, Right>Left with multiple diabetic ulcers of legs:  IV Zosyn has been ordered for further coverage.  IV vancomycin ordered as well.   Will consult ID for further evaluation and treatment.  Will consult wound care.  WBC and lactic acid unremarkable.  C-RP elevated mildly.   HR is less than 90.  Mr. Bajwa has been afebrile. Venous dopplers in ED unremarkable.  Arterial doppler with heavy plaquing and stenosis at the level fo the externial iliac and common  femoral arteries in the proximal leg with arterial doppler report available within document.      Abdominal bloating, KUB negative, no free air, he feels like he is having some urinary hesitancy, will check post void residual    -Indeterminate troponin possibly 2/2 to strain from respiratory issues as previously outlined: Serial cardiac isoenzymes have been ordered.  Echocardiogram and cardiology consultation ordered.  Will add daily baby aspirin.     -Proteinuria: Urine protein/creatinine ratio has been added.     -Hypoosmolar Hyponatremia: Will review home medications.  Previously on thiazide diuretic, unclear at present if he is still taking this at home. Urine sodium is pending.  Denies alcohol use.  Will repeat a basic profile on arrival to the CCU.    -Diabetes mellitus type II, ID: [id number].  Hemoglobin a1c has been ordered.      -DVT Prophylaxis: SQ Heparin     INPATIENT status due to the need for care which can only be reasonably provided in an hospital setting such as aggressive/expedited ancillary services and/or consultation services, the necessity for IV medications, close physician monitoring and/or the possible need for procedures.  In such, I feel patient’s risk for adverse outcomes and need for care warrant INPATIENT evaluation and predict the patient’s care encounter to likely last beyond 2 midnights.    Considered high risk 2/2 to acute hypoxic and hypercarbic respiratory failure with underlying exacerbation of COPD. (severe exacerbation of chronic illness)    Ivon Hernandez PA-C  07/09/18  4:06 PM  ---------------------------------------------------------------------------------------------------------------------           Electronically signed by Julian Stern MD at 7/9/2018  5:28 PM             ICU Vital Signs     Row Name 07/15/18 1323 07/15/18 1311 07/15/18 1303             Vitals    Pulse  --  -- 74      Resp  --  -- 20         Oxygen Therapy    SpO2 (!)  86 % 94 % 94 %      Device  (Oxygen Therapy) room air nasal cannula nasal cannula      Flow (L/min)  -- 4 4          Hospital Medications (active)       Dose Frequency Start End    albuterol (PROVENTIL) nebulizer solution 0.083% 2.5 mg/3mL (Discontinued) 2.5 mg Every 6 Hours PRN 7/10/2018 7/15/2018    Sig - Route: Take 2.5 mg by nebulization Every 6 (Six) Hours As Needed for Wheezing or Shortness of Air. - Nebulization    Reason for Discontinue: Patient Discharge    amLODIPine (NORVASC) tablet 10 mg (Discontinued) 10 mg Every 24 Hours Scheduled 7/12/2018 7/15/2018    Sig - Route: Take 1 tablet by mouth Daily. - Oral    Reason for Discontinue: Patient Discharge    aspirin tablet 325 mg (Discontinued) 325 mg Daily 7/10/2018 7/15/2018    Sig - Route: Take 1 tablet by mouth Daily. - Oral    Reason for Discontinue: Patient Discharge    atorvastatin (LIPITOR) tablet 40 mg (Discontinued) 40 mg Daily 7/11/2018 7/15/2018    Sig - Route: Take 1 tablet by mouth Daily. - Oral    Reason for Discontinue: Patient Discharge    bacitracin ointment (Discontinued)  Every 12 Hours Scheduled 7/11/2018 7/15/2018    Sig - Route: Apply  topically Every 12 (Twelve) Hours. - Topical    Reason for Discontinue: Patient Discharge    budesonide-formoterol (SYMBICORT) 160-4.5 MCG/ACT inhaler 2 puff (Discontinued) 2 puff 2 Times Daily - RT 7/10/2018 7/15/2018    Sig - Route: Inhale 2 puffs 2 (Two) Times a Day. - Inhalation    Reason for Discontinue: Patient Discharge    cefdinir (OMNICEF) capsule 300 mg (Discontinued) 300 mg Every 12 Hours Scheduled 7/10/2018 7/15/2018    Sig - Route: Take 1 capsule by mouth Every 12 (Twelve) Hours. - Oral    Reason for Discontinue: Patient Discharge    cetirizine (zyrTEC) tablet 10 mg (Discontinued) 10 mg Daily 7/10/2018 7/15/2018    Sig - Route: Take 1 tablet by mouth Daily. - Oral    Reason for Discontinue: Patient Discharge    CloNIDine (CATAPRES) tablet 0.1 mg (Discontinued) 0.1 mg Every 8 Hours PRN 7/11/2018 7/15/2018    Sig - Route:  Take 1 tablet by mouth Every 8 (Eight) Hours As Needed for High Blood Pressure (SBP greater than 160). - Oral    Reason for Discontinue: Patient Discharge    dextrose (D50W) solution 25 g (Discontinued) 25 g Every 15 Minutes PRN 7/9/2018 7/15/2018    Sig - Route: Infuse 50 mL into a venous catheter Every 15 (Fifteen) Minutes As Needed for Low Blood Sugar (Blood Sugar Less Than 70). - Intravenous    Reason for Discontinue: Patient Discharge    Cosign for Ordering: Accepted by Julian Stern MD on 7/9/2018  5:29 PM    dextrose (GLUTOSE) oral gel 15 g (Discontinued) 15 g Every 15 Minutes PRN 7/9/2018 7/15/2018    Sig - Route: Take 15 g by mouth Every 15 (Fifteen) Minutes As Needed for Low Blood Sugar (Blood sugar less than 70). - Oral    Reason for Discontinue: Patient Discharge    Cosign for Ordering: Accepted by Julian Stern MD on 7/9/2018  5:29 PM    docusate sodium (COLACE) capsule 100 mg (Discontinued) 100 mg 2 Times Daily 7/11/2018 7/15/2018    Sig - Route: Take 1 capsule by mouth 2 (Two) Times a Day. - Oral    Reason for Discontinue: Patient Discharge    donepezil (ARICEPT) tablet 10 mg (Discontinued) 10 mg Nightly 7/10/2018 7/15/2018    Sig - Route: Take 2 tablets by mouth Every Night. - Oral    Reason for Discontinue: Patient Discharge    doxycycline (MONODOX) capsule 100 mg (Discontinued) 100 mg Every 12 Hours Scheduled 7/10/2018 7/15/2018    Sig - Route: Take 1 capsule by mouth Every 12 (Twelve) Hours. - Oral    Reason for Discontinue: Patient Discharge    fluticasone (FLONASE) 50 MCG/ACT nasal spray 1 spray (Discontinued) 1 spray 2 Times Daily 7/10/2018 7/15/2018    Sig - Route: 1 spray by Each Nare route 2 (Two) Times a Day. - Each Nare    Reason for Discontinue: Patient Discharge    gabapentin (NEURONTIN) capsule 600 mg (Discontinued) 600 mg Every 8 Hours Scheduled 7/10/2018 7/15/2018    Sig - Route: Take 2 capsules by mouth Every 8 (Eight) Hours. - Oral    Reason for Discontinue: Patient Discharge     glucagon (human recombinant) (GLUCAGEN DIAGNOSTIC) injection 1 mg (Discontinued) 1 mg As Needed 7/9/2018 7/15/2018    Sig - Route: Inject 1 mg under the skin As Needed (Blood Glucose Less Than 70). - Subcutaneous    Reason for Discontinue: Patient Discharge    Cosign for Ordering: Accepted by Julian Stern MD on 7/9/2018  5:29 PM    heparin (porcine) 5000 UNIT/ML injection 5,000 Units (Discontinued) 5,000 Units Every 12 Hours Scheduled 7/9/2018 7/15/2018    Sig - Route: Inject 1 mL under the skin Every 12 (Twelve) Hours. - Subcutaneous    Reason for Discontinue: Patient Discharge    Cosign for Ordering: Accepted by Julian Stern MD on 7/9/2018  5:29 PM    hydrochlorothiazide (HYDRODIURIL) tablet 25 mg (Discontinued) 25 mg Daily 7/14/2018 7/15/2018    Sig - Route: Take 1 tablet by mouth Daily. - Oral    Reason for Discontinue: Patient Discharge    HYDROcodone-acetaminophen (NORCO)  MG per tablet 1 tablet (Discontinued) 1 tablet Every 8 Hours PRN 7/14/2018 7/15/2018    Sig - Route: Take 1 tablet by mouth Every 8 (Eight) Hours As Needed for Moderate Pain  or Severe Pain . - Oral    Reason for Discontinue: Patient Discharge    insulin aspart (novoLOG) injection 0-9 Units (Discontinued) 0-9 Units 3 Times Daily With Meals 7/13/2018 7/15/2018    Sig - Route: Inject 0-9 Units under the skin 3 (Three) Times a Day With Meals. - Subcutaneous    Reason for Discontinue: Patient Discharge    insulin aspart prot-insulin aspart (novoLOG 70/30) injection 20 Units (Discontinued) 20 Units 2 Times Daily With Meals 7/13/2018 7/15/2018    Sig - Route: Inject 20 Units under the skin 2 (Two) Times a Day With Meals. - Subcutaneous    Reason for Discontinue: Patient Discharge    ipratropium-albuterol (DUO-NEB) nebulizer solution 3 mL (Discontinued) 3 mL 4 Times Daily - RT 7/9/2018 7/15/2018    Sig - Route: Take 3 mL by nebulization 4 (Four) Times a Day. - Nebulization    Reason for Discontinue: Patient Discharge    Cosign for  "Ordering: Accepted by Julian Stern MD on 7/9/2018  5:29 PM    lactobacillus acidophilus (RISAQUAD) capsule 1 capsule (Discontinued) 1 capsule Daily 7/10/2018 7/15/2018    Sig - Route: Take 1 capsule by mouth Daily. - Oral    Reason for Discontinue: Patient Discharge    Liraglutide (VICTOZA) injection 1.8 mg (Discontinued) 1.8 mg Daily 7/10/2018 7/15/2018    Sig - Route: Inject 1.8 mg under the skin Daily. - Subcutaneous    Reason for Discontinue: Patient Discharge    Non-formulary Exception Code: Patient supplied medication    lisinopril (PRINIVIL,ZESTRIL) tablet 40 mg (Discontinued) 40 mg Daily 7/10/2018 7/15/2018    Sig - Route: Take 4 tablets by mouth Daily. - Oral    Reason for Discontinue: Patient Discharge    nicotine (NICODERM CQ) 21 MG/24HR patch 1 patch (Discontinued) 1 patch Every 24 Hours 7/10/2018 7/15/2018    Sig - Route: Place 1 patch on the skin Daily. - Transdermal    Reason for Discontinue: Patient Discharge    predniSONE (DELTASONE) tablet 20 mg (Discontinued) 20 mg Daily With Breakfast 7/14/2018 7/15/2018    Sig - Route: Take 1 tablet by mouth Daily With Breakfast. - Oral    Reason for Discontinue: Patient Discharge    sodium chloride 0.9 % flush 1-10 mL (Discontinued) 1-10 mL As Needed 7/9/2018 7/15/2018    Sig - Route: Infuse 1-10 mL into a venous catheter As Needed for Line Care. - Intravenous    Reason for Discontinue: Patient Discharge    Cosign for Ordering: Accepted by Julian Stern MD on 7/9/2018  5:29 PM    sodium chloride 0.9 % flush 10 mL (Discontinued) 10 mL As Needed 7/9/2018 7/15/2018    Sig - Route: Infuse 10 mL into a venous catheter As Needed for Line Care. - Intravenous    Reason for Discontinue: Patient Discharge    Linked Group 1:  \"And\" Linked Group Details        sodium chloride 0.9 % flush 3 mL (Discontinued) 3 mL Every 8 Hours 7/10/2018 7/15/2018    Sig - Route: Infuse 3 mL into a venous catheter Every 8 (Eight) Hours. - Intravenous    Reason for Discontinue: Patient " Discharge    sodium chloride 0.9 % flush 5 mL (Discontinued) 5 mL As Needed 7/10/2018 7/15/2018    Sig - Route: Infuse 5 mL into a venous catheter As Needed for Line Care (After Medication Administration or Blood Draw). - Intravenous    Reason for Discontinue: Patient Discharge    tamsulosin (FLOMAX) 24 hr capsule 0.4 mg (Discontinued) 0.4 mg Every Evening 7/10/2018 7/15/2018    Sig - Route: Take 1 capsule by mouth Every Evening. - Oral    Reason for Discontinue: Patient Discharge            Lab Results (last 24 hours)     Procedure Component Value Units Date/Time    POC Glucose Once [[id number]]  (Abnormal) Collected:  07/15/18 1109    Specimen:  Blood Updated:  07/15/18 1135     Glucose 186 (H) mg/dL     Narrative:       Meter: [id number] : [id number] [employer]    POC Glucose Once [[id number]]  (Normal) Collected:  07/15/18 0657    Specimen:  Blood Updated:  07/15/18 0721     Glucose 100 mg/dL     Narrative:       Meter: [id number] : [id number] [employer]    Osmolality, Calculated [[id number]]  (Abnormal) Collected:  07/15/18 0302    Specimen:  Blood Updated:  07/15/18 0405     Osmolality Calc 265.9 (L) mOsm/kg     Basic Metabolic Panel [[id number]]  (Abnormal) Collected:  07/15/18 0302    Specimen:  Blood Updated:  07/15/18 0405     Glucose 94 mg/dL      BUN 12 mg/dL      Creatinine 0.76 mg/dL      Sodium 133 (L) mmol/L      Potassium 3.7 mmol/L      Chloride 95 (L) mmol/L      CO2 32.7 (H) mmol/L      Calcium 8.8 mg/dL      eGFR Non African Amer 101 mL/min/1.73      BUN/Creatinine Ratio 15.8     Anion Gap 5.3 mmol/L     Narrative:       GFR Normal >60  Chronic Kidney Disease <60  Kidney Failure <15    CBC & Differential [[id number]] Collected:  07/15/18 0302    Specimen:  Blood Updated:  07/15/18 0339    Narrative:       The following orders were created for panel order CBC & Differential.  Procedure                               Abnormality         Status                     ---------                                -----------         ------                     CBC Auto Differential[[id number]]        Abnormal            Final result                 Please view results for these tests on the individual orders.    CBC Auto Differential [[id number]]  (Abnormal) Collected:  07/15/18 0302    Specimen:  Blood Updated:  07/15/18 0339     WBC 8.92 10*3/mm3      RBC 5.41 10*6/mm3      Hemoglobin 16.7 g/dL      Hematocrit 50.0 %      MCV 92.4 fL      MCH 30.9 pg      MCHC 33.4 g/dL      RDW 13.5 %      RDW-SD 44.6 fl      MPV 10.6 (H) fL      Platelets 187 10*3/mm3      Neutrophil % 75.8 (H) %      Lymphocyte % 16.1 %      Monocyte % 7.4 %      Eosinophil % 0.4 %      Basophil % 0.1 %      Immature Grans % 0.2 %      Neutrophils, Absolute 6.75 (H) 10*3/mm3      Lymphocytes, Absolute 1.44 10*3/mm3      Monocytes, Absolute 0.66 10*3/mm3      Eosinophils, Absolute 0.04 10*3/mm3      Basophils, Absolute 0.01 10*3/mm3      Immature Grans, Absolute 0.02 10*3/mm3     POC Glucose Once [[id number]]  (Abnormal) Collected:  07/14/18 1930    Specimen:  Blood Updated:  07/14/18 1942     Glucose 136 (H) mg/dL     Narrative:       Meter: [id number] : [id number] [employer]    POC Glucose Once [[id number]]  (Abnormal) Collected:  07/14/18 1655    Specimen:  Blood Updated:  07/14/18 1702     Glucose 194 (H) mg/dL     Narrative:       Meter: [id number] : [id number] [employer]EL [employer]        Imaging Results (last 24 hours)     ** No results found for the last 24 hours. **        Operative/Procedure Notes (last 24 hours) (Notes from 7/15/2018 11:49 AM through 7/16/2018 11:49 AM)     No notes of this type exist for this encounter.           Physician Progress Notes (most recent note)      Malia Ohara PA-C at 7/15/2018 11:09 AM     Attestation signed by Beatriz Mehta MD at 7/15/2018  4:58 PM    I have reviewed the documentation above and agree.                      I have personally seen and examined the patient  "today and discussed overnight interval progress and pertinent issues with nursing staff.    Subjective:    Patient was transferred to the floor from PCU on 7/14/2018.  Doing well this morning with no complaints.  No fever and normal white count.  Nurse reports no issues overnight.    History taken from: patient chart RN      Vital Signs    /76 (BP Location: Right arm, Patient Position: Lying)   Pulse 77   Temp 97.7 °F (36.5 °C) (Oral)   Resp 20   Ht 182.9 cm (72\")   Wt 111 kg (244 lb 3.2 oz)   SpO2 91%   BMI 33.12 kg/m²      Temp:  [97.7 °F (36.5 °C)-98.6 °F (37 °C)] 97.7 °F (36.5 °C)      Intake/Output Summary (Last 24 hours) at 07/15/18 1109  Last data filed at 07/15/18 1102   Gross per 24 hour   Intake             1100 ml   Output             1800 ml   Net             -700 ml     Intake & Output (last 3 days)       07/12 0701 - 07/13 0700 07/13 0701 - 07/14 0700 07/14 0701 - 07/15 0700 07/15 0701 - 07/16 0700    P.O. 1270 [id number] [id number]     Total Intake(mL/kg) 1250 (11.2) 500 (4.5) 1100 (9.9)     Urine (mL/kg/hr) 4000 (1.5) 2500 (0.9) 2000 (0.8) 800 (1.7)    Stool        Total Output 4000 2500 2000 800    Net -2750 -2000 -900 -800            Unmeasured Urine Occurrence   4 x           Physical Exam:                 General Appearance:    Alert, cooperative, in no acute distress, poor hygiene    Head:    Normocephalic, without obvious abnormality, atraumatic   Eyes:            Lids and lashes normal, conjunctivae and sclerae normal, no icterus, no pallor, corneas clear, PERRLA   Ears:    Ears appear intact with no abnormalities noted   Throat:   No oral lesions, no thrush, oral mucosa moist   Neck:   No adenopathy, supple, trachea midline, no thyromegaly, no   carotid bruit, no JVD   Back:     No tenderness to percussion or palpation, range of motion   normal   Lungs:     Clear to auscultation,respirations regular, even and unlabored. No wheezing, no ronchi and no crackles.    Heart:    Regular rhythm and " normal rate, normal S1 and S2, no            murmur, no gallop, no rub, no click   Chest Wall:    No abnormalities observed   Abdomen:     Normal bowel sounds, no masses, no organomegaly, soft   non-tender, non-distended, no guarding, no rebound tenderness   Rectal:     Deferred   Extremities:  Bilateral lower extremity venous stasis dermatitis with foul odor and drainage, associated redness and warmth.  Right worse than left.    Pulses:   Pulses palpable and equal bilaterally   Skin:   Bilateral lower extremity venous stasis dermatitis with foul odor and drainage, associated redness and warmth.  Right worse than left.   Lymph nodes:   No palpable adenopathy   Neurologic:   Awake, alert and oriented x 3. Following commands.       Results:      Results from last 7 days  Lab Units 07/15/18  0302 07/14/18  0136 07/13/18  0116 07/12/18  0109 07/11/18  0106 07/10/18  0545 07/09/18  0953   WBC 10*3/mm3 8.92 10.01 10.96 8.68 9.42 7.39 8.19     Lab Results   Component Value Date    NEUTROABS 6.75 (H) 07/15/2018         Results from last 7 days  Lab Units 07/15/18  0302   CREATININE mg/dL 0.76         Results from last 7 days  Lab Units 07/13/18  0116 07/12/18  0109 07/11/18  0106   CRP mg/dL <0.50 <0.50 1.04*     Results Review:    I have personally reviewed laboratory data, culture results, radiology studies and antimicrobial therapy.    Hospital Medications (active)       Dose Frequency Start End    albuterol (PROVENTIL) nebulizer solution 0.083% 2.5 mg/3mL 2.5 mg Every 6 Hours PRN 7/10/2018     Sig - Route: Take 2.5 mg by nebulization Every 6 (Six) Hours As Needed for Wheezing or Shortness of Air. - Nebulization    amLODIPine (NORVASC) tablet 5 mg 5 mg Every 24 Hours Scheduled 7/11/2018     Sig - Route: Take 1 tablet by mouth Daily. - Oral    aspirin tablet 325 mg 325 mg Daily 7/10/2018     Sig - Route: Take 1 tablet by mouth Daily. - Oral    atorvastatin (LIPITOR) tablet 40 mg 40 mg Daily 7/11/2018     Sig - Route: Take  1 tablet by mouth Daily. - Oral    budesonide-formoterol (SYMBICORT) 160-4.5 MCG/ACT inhaler 2 puff 2 puff 2 Times Daily - RT 7/10/2018     Sig - Route: Inhale 2 puffs 2 (Two) Times a Day. - Inhalation    cefdinir (OMNICEF) capsule 300 mg 300 mg Every 12 Hours Scheduled 7/10/2018 7/24/2018    Sig - Route: Take 1 capsule by mouth Every 12 (Twelve) Hours. - Oral    cetirizine (zyrTEC) tablet 10 mg 10 mg Daily 7/10/2018     Sig - Route: Take 1 tablet by mouth Daily. - Oral    dextrose (D50W) solution 25 g 25 g Every 15 Minutes PRN 7/9/2018     Sig - Route: Infuse 50 mL into a venous catheter Every 15 (Fifteen) Minutes As Needed for Low Blood Sugar (Blood Sugar Less Than 70). - Intravenous    Cosign for Ordering: Accepted by Julian Stern MD on 7/9/2018  5:29 PM    dextrose (D50W) solution 25 g 25 g Every 15 Minutes PRN 7/9/2018     Sig - Route: Infuse 50 mL into a venous catheter Every 15 (Fifteen) Minutes As Needed for Low Blood Sugar (Blood Sugar Less Than 70). - Intravenous    dextrose (GLUTOSE) oral gel 15 g 15 g Every 15 Minutes PRN 7/9/2018     Sig - Route: Take 15 g by mouth Every 15 (Fifteen) Minutes As Needed for Low Blood Sugar (Blood sugar less than 70). - Oral    Cosign for Ordering: Accepted by Julian Stern MD on 7/9/2018  5:29 PM    dextrose (GLUTOSE) oral gel 15 g 15 g Every 15 Minutes PRN 7/9/2018     Sig - Route: Take 15 g by mouth Every 15 (Fifteen) Minutes As Needed for Low Blood Sugar (Blood sugar less than 70). - Oral    donepezil (ARICEPT) tablet 10 mg 10 mg Nightly 7/10/2018     Sig - Route: Take 2 tablets by mouth Every Night. - Oral    doxycycline (MONODOX) capsule 100 mg 100 mg Every 12 Hours Scheduled 7/10/2018 7/24/2018    Sig - Route: Take 1 capsule by mouth Every 12 (Twelve) Hours. - Oral    fluticasone (FLONASE) 50 MCG/ACT nasal spray 1 spray 1 spray 2 Times Daily 7/10/2018     Sig - Route: 1 spray by Each Nare route 2 (Two) Times a Day. - Each Nare    furosemide (LASIX) injection 20  mg 20 mg Once 7/11/2018 7/11/2018    Sig - Route: Infuse 2 mL into a venous catheter 1 (One) Time. - Intravenous    gabapentin (NEURONTIN) capsule 600 mg 600 mg Every 8 Hours Scheduled 7/10/2018     Sig - Route: Take 2 capsules by mouth Every 8 (Eight) Hours. - Oral    glucagon (human recombinant) (GLUCAGEN DIAGNOSTIC) injection 1 mg 1 mg As Needed 7/9/2018     Sig - Route: Inject 1 mg under the skin As Needed (Blood Glucose Less Than 70). - Subcutaneous    Cosign for Ordering: Accepted by Julian Stern MD on 7/9/2018  5:29 PM    glucagon (human recombinant) (GLUCAGEN DIAGNOSTIC) injection 1 mg 1 mg As Needed 7/9/2018     Sig - Route: Inject 1 mg under the skin As Needed (Blood Glucose Less Than 70). - Subcutaneous    heparin (porcine) 5000 UNIT/ML injection 5,000 Units 5,000 Units Every 12 Hours Scheduled 7/9/2018     Sig - Route: Inject 1 mL under the skin Every 12 (Twelve) Hours. - Subcutaneous    Cosign for Ordering: Accepted by Julian Stern MD on 7/9/2018  5:29 PM    insulin aspart (novoLOG) injection 0-9 Units 0-9 Units 4 Times Daily Before Meals & Nightly 7/9/2018     Sig - Route: Inject 0-9 Units under the skin 4 (Four) Times a Day Before Meals & at Bedtime. - Subcutaneous    insulin aspart prot-insulin aspart (novoLOG 70/30) injection 20 Units 20 Units 2 Times Daily With Meals 7/11/2018     Sig - Route: Inject 20 Units under the skin 2 (Two) Times a Day With Meals. - Subcutaneous    ipratropium-albuterol (DUO-NEB) nebulizer solution 3 mL 3 mL 4 Times Daily - RT 7/9/2018     Sig - Route: Take 3 mL by nebulization 4 (Four) Times a Day. - Nebulization    Cosign for Ordering: Accepted by Julian Stern MD on 7/9/2018  5:29 PM    lactobacillus acidophilus (RISAQUAD) capsule 1 capsule 1 capsule Daily 7/10/2018     Sig - Route: Take 1 capsule by mouth Daily. - Oral    Liraglutide (VICTOZA) injection 1.8 mg 1.8 mg Daily 7/10/2018     Sig - Route: Inject 1.8 mg under the skin Daily. - Subcutaneous     "Non-formulary Exception Code: Patient supplied medication    lisinopril (PRINIVIL,ZESTRIL) tablet 40 mg 40 mg Daily 7/10/2018     Sig - Route: Take 4 tablets by mouth Daily. - Oral    methylPREDNISolone sodium succinate (SOLU-Medrol) injection 20 mg 20 mg Every 12 Hours 7/11/2018     Sig - Route: Infuse 0.5 mL into a venous catheter Every 12 (Twelve) Hours. - Intravenous    morphine injection 1 mg 1 mg Every 4 Hours PRN 7/9/2018 7/19/2018    Sig - Route: Infuse 0.5 mL into a venous catheter Every 4 (Four) Hours As Needed for Severe Pain  (hold for oversedation, SBP<100). - Intravenous    nicotine (NICODERM CQ) 21 MG/24HR patch 1 patch 1 patch Every 24 Hours 7/10/2018     Sig - Route: Place 1 patch on the skin Daily. - Transdermal    ondansetron (ZOFRAN) tablet 8 mg 8 mg Every 12 Hours PRN 7/10/2018     Sig - Route: Take 2 tablets by mouth Every 12 (Twelve) Hours As Needed for Nausea or Vomiting. - Oral    Pharmacy Consult  Continuous PRN 7/10/2018     Sig - Route: Continuous As Needed for Consult. - Does not apply    sodium chloride 0.9 % bolus 500 mL 500 mL Once 7/10/2018 7/10/2018    Sig - Route: Infuse 500 mL into a venous catheter 1 (One) Time. - Intravenous    sodium chloride 0.9 % flush 1-10 mL 1-10 mL As Needed 7/9/2018     Sig - Route: Infuse 1-10 mL into a venous catheter As Needed for Line Care. - Intravenous    Cosign for Ordering: Accepted by Julian Stern MD on 7/9/2018  5:29 PM    sodium chloride 0.9 % flush 10 mL 10 mL As Needed 7/9/2018     Sig - Route: Infuse 10 mL into a venous catheter As Needed for Line Care. - Intravenous    Linked Group 1:  \"And\" Linked Group Details        sodium chloride 0.9 % flush 3 mL 3 mL Every 8 Hours 7/10/2018     Sig - Route: Infuse 3 mL into a venous catheter Every 8 (Eight) Hours. - Intravenous    sodium chloride 0.9 % flush 5 mL 5 mL As Needed 7/10/2018     Sig - Route: Infuse 5 mL into a venous catheter As Needed for Line Care (After Medication Administration or " Blood Draw). - Intravenous    tamsulosin (FLOMAX) 24 hr capsule 0.4 mg 0.4 mg Every Evening 7/10/2018     Sig - Route: Take 1 capsule by mouth Every Evening. - Oral    atorvastatin (LIPITOR) tablet 20 mg (Discontinued) 20 mg Daily 7/10/2018 7/10/2018    Sig - Route: Take 1 tablet by mouth Daily. - Oral    insulin aspart prot-insulin aspart (novoLOG 70/30) injection 30 Units (Discontinued) 30 Units 2 Times Daily With Meals 7/10/2018 7/11/2018    Sig - Route: Inject 30 Units under the skin 2 (Two) Times a Day With Meals. - Subcutaneous    ipratropium-albuterol (DUO-NEB) nebulizer solution 3 mL (Discontinued) 3 mL Every 6 Hours PRN 7/9/2018 7/10/2018    Sig - Route: Take 3 mL by nebulization Every 6 (Six) Hours As Needed for Shortness of Air. - Nebulization    Cosign for Ordering: Accepted by Julian Stern MD on 7/9/2018  5:29 PM    methylPREDNISolone sodium succinate (SOLU-Medrol) injection 40 mg (Discontinued) 40 mg Every 12 Hours 7/9/2018 7/11/2018    Sig - Route: Infuse 1 mL into a venous catheter Every 12 (Twelve) Hours. - Intravenous    Cosign for Ordering: Accepted by Julian Stern MD on 7/9/2018  5:29 PM            Cultures:    Blood Culture   Date Value Ref Range Status   07/09/2018 No growth at 24 hours  Preliminary   07/09/2018 No growth at 24 hours  Preliminary           Assessment/Plan     ASSESSMENT:    1.  Venous stasis dermatitis   2.  Pneumonia versus COPD exacerbation       PLAN:    Patient was transferred to the floor from PCU on 7/14/2018.  Doing well this morning with no complaints.  No fever and normal white count.  Nurse reports no issues overnight.    Leg showing signs of improvement.      Chest x-ray 7/11/18 shows trace pleural effusions.    Mycoplasma IgM negative.    Legionella urinary antigen negative.     Unfortunately patient's poor hygiene will jeopardize and impair patient's healing process and outcomes.      Recommend to continue current  antibiotic therapy of  Doxycycline 100 mg by  mouth twice a day and Omnicef 300 mg by mouth twice a day both to continue through 7/23/18.  Patient stable from infectious disease standpoint.  Okay to discharge home from infectious disease standpoint.    Current Antimicrobials:  Doxycycline 100mg PO BID through 7/23/18  Omnicef 300mg PO BID through 7/23/18      Patient's findings and recommendations were discussed with patient and nursing staff    Code Status:   Code Status and Medical Interventions:   Ordered at: 07/09/18 1509     Code Status:    CPR     Medical Interventions (Level of Support Prior to Arrest):    Full       Malia Ohara PA-C  07/15/18  11:09 AM       Electronically signed by Beatriz Mehta MD at 7/15/2018  4:58 PM          Consult Notes (most recent note)      Kell Mayes MD at 7/13/2018  8:13 AM      Consult Orders:    1. Hematology & Oncology Inpatient Consult [[id number]] ordered by Anuj Padilla MD at 07/13/18 0703                Heath Bajwa  [id number]  1950  7/13/2018    Referring Provider:   Julian Stern MD      Reason for Consultation:   Biclonal Gammopathy    Patient Care Team:  No Known Provider as PCP - General  No Known Provider as PCP - Family Medicine    Chief Complaint:  Dyspnea improving      History of Present Illness:    Heath Bajwa is a 68 y.o. year-old male seen today at the request of Dr. 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    Mr. Bajwa has a PMH significant for COPD and presented to the ER with complaints of dyspnea. He was initially admitted to CCU due to hypoxia and hypercarbic respiratory failure requiring BiPAP. On admission he was noted to have bilateral lower extremity cellulitis. On laboratory evaluation to further evaluate dyspnea troponins were in the indeterminate range and this was felt to be due to demand ischemia from acute hypoxic respiratory failure and is being followed by cardiology.  Patient was also found to have proteinuria which is being followed by nephrology and felt likely to be due to diabetic nephropathy at that time serum free light chains as well as immunofixation was drawn. Work up was also significant for positive Hepatitis C, and complement levels were normal, however patient reports that [facility]        Past Medical History:  Past Medical History:   Diagnosis Date   • COPD (chronic obstructive pulmonary disease) (CMS/HCC)    • Diabetes mellitus (CMS/HCC)    • Hypertension        Past Surgical History:  History reviewed. No pertinent surgical history.    Family History:  Family History   Problem Relation Age of Onset   • No Known Problems Mother    • Heart failure Father        Social History:  Social History   Substance Use Topics   • Smoking status: Current Every Day Smoker     Packs/day: 1.00     Years: 25.00     Types: Cigarettes   • Smokeless tobacco: Never Used   • Alcohol use No       Allergies:    Patient has no known allergies.    Outpatient Medications:  Prescriptions Prior to Admission   Medication Sig Dispense Refill Last Dose   • albuterol (PROVENTIL HFA;VENTOLIN HFA) 108 (90 Base) MCG/ACT inhaler Inhale 2 puffs Every 6 (Six) Hours As Needed for Wheezing.   7/8/2018 at Unknown time   • aspirin 325 MG tablet Take 325 mg by mouth Daily.   7/8/2018 at Unknown time   • budesonide-formoterol (SYMBICORT) 160-4.5 MCG/ACT inhaler Inhale 2 puffs 2 (two) times a day.   7/8/2018 at Unknown time   • donepezil (ARICEPT) 10 MG tablet Take 10 mg by mouth Every Night.   7/8/2018 at Unknown time   • gabapentin (NEURONTIN) 800 MG tablet Take 800 mg by mouth 3 (Three) Times a Day.   7/8/2018 at Unknown time   • hydrochlorothiazide (HYDRODIURIL) 25 MG tablet Take 25 mg by mouth daily.   7/8/2018 at Unknown time   • insulin NPH-insulin regular (Novolin 70/30) (70-30) 100 UNIT/ML injection Inject 30 Units under the skin 2 (Two) Times a Day With Meals.    7/8/2018 at Unknown time   • levocetirizine (XYZAL) 5 MG tablet Take 5 mg by mouth Daily As Needed for Allergies.   7/8/2018 at Unknown time   • Liraglutide (VICTOZA) 18 MG/3ML solution pen-injector injection Inject 1.8 mg under the skin Daily.   7/8/2018 at Unknown time   • lisinopril (PRINIVIL,ZESTRIL) 40 MG tablet Take 40 mg by mouth daily.   7/8/2018 at Unknown time   • ondansetron (ZOFRAN) 8 MG tablet Take 8 mg by mouth Every 12 (Twelve) Hours As Needed for Nausea or Vomiting.   7/8/2018 at Unknown time   • simvastatin (ZOCOR) 80 MG tablet Take 40 mg by mouth every night.   7/8/2018 at Unknown time   • tamsulosin (FLOMAX) 0.4 MG capsule 24 hr capsule Take 1 capsule by mouth Every Evening.   Unknown at Unknown time       Inpatient Medications:  Scheduled Meds:    amLODIPine 10 mg Oral Q24H   aspirin 325 mg Oral Daily   atorvastatin 40 mg Oral Daily   bacitracin  Topical Q12H   budesonide-formoterol 2 puff Inhalation BID - RT   cefdinir 300 mg Oral Q12H   cetirizine 10 mg Oral Daily   DOBUTamine 10 mcg/kg/min Intravenous Once in imaging   docusate sodium 100 mg Oral BID   donepezil 10 mg Oral Nightly   doxycycline 100 mg Oral Q12H   fluticasone 1 spray Each Nare BID   gabapentin 600 mg Oral Q8H   heparin (porcine) 5,000 Units Subcutaneous Q12H   Hydrochlorothiazide Oral 12.5 mg Oral Daily   insulin aspart 0-9 Units Subcutaneous 4x Daily AC & at Bedtime   insulin aspart prot-insulin aspart 25 Units Subcutaneous BID With Meals   insulin aspart prot-insulin aspart 25 Units Subcutaneous Once   ipratropium-albuterol 3 mL Nebulization 4x Daily - RT   lactobacillus acidophilus 1 capsule Oral Daily   Liraglutide 1.8 mg Subcutaneous Daily   lisinopril 40 mg Oral Daily   nicotine 1 patch Transdermal Q24H   predniSONE 40 mg Oral Daily With Breakfast   sodium chloride 3 mL Intravenous Q8H   tamsulosin 0.4 mg Oral Q PM       Continuous Infusions:    Pharmacy Consult        PRN Meds:  •  albuterol  •  CloNIDine  •  dextrose  •   dextrose  •  dextrose  •  dextrose  •  glucagon (human recombinant)  •  glucagon (human recombinant)  •  Morphine  •  ondansetron  •  Pharmacy Consult  •  sodium chloride  •  Insert peripheral IV **AND** sodium chloride  •  sodium chloride      Review of Systems:  12 point review of systems was conducted with patient and positive as per HPI otherwise negative. +chronic joint pain no new pain      Physical Exam:  Vital Signs: These were reviewed and listed as per patient’s electronic medical chart  Vitals:    07/13/18 0709   BP:    Pulse: 75   Resp: 24   Temp:    SpO2: 92%     General: Awake, alert and oriented, in no distress, sitting up at bedside  HEENT: Head is atraumatic, normocephalic, extraocular movements full, oropharynx clear, no scleral icterus, pink moist mucous membranes  Neck: supple, no jvd, lymphadenopathy or masses  Cardiovascular: regular rate and rhythm without murmurs, rubs or gallops  Pulmonary: non-labored, clear to auscultation bilaterally, decreased at the bases  Abdomen: soft, non-tender, non-distended, normal active bowel sounds present  Extremities: No clubbing, cyanosis, +slight edema in right lower extremity  Neurologic: Mental status as above, alert, awake and oriented, grossly non-focal exam  Skin: bilateral erythema and venous stasis changes right worse then left  : No trevizo      Labs / Studies:  Lab Results (last 72 hours)     Procedure Component Value Units Date/Time    POC Glucose Once [[id number]]  (Abnormal) Collected:  07/13/18 0635    Specimen:  Blood Updated:  07/13/18 0654     Glucose 142 (H) mg/dL     Narrative:       Meter: [id number] : [id number] Nahun Beck    POC Glucose Once [[id number]]  (Normal) Collected:  07/13/18 0313    Specimen:  Blood Updated:  07/13/18 0338     Glucose 76 mg/dL     Narrative:       Meter: [id number] : [id number] Senait Dupont    Osmolality, Calculated [[id number]]  (Abnormal) Collected:  07/13/18 0116    Specimen:  Blood Updated:   07/13/18 0316     Osmolality Calc 268.0 (L) mOsm/kg     Comprehensive Metabolic Panel [[id number]]  (Abnormal) Collected:  07/13/18 0116    Specimen:  Blood Updated:  07/13/18 0316     Glucose 41 (C) mg/dL      Comment: Verified by Repeat Analysis         BUN 21 mg/dL      Creatinine 0.81 mg/dL      Sodium 134 (L) mmol/L      Potassium 4.1 mmol/L      Chloride 95 (L) mmol/L      CO2 35.8 (H) mmol/L      Calcium 8.8 mg/dL      Total Protein 5.9 (L) g/dL      Albumin 3.10 (L) g/dL      ALT (SGPT) 21 U/L      AST (SGOT) 24 U/L      Alkaline Phosphatase 77 U/L      Comment: Note New Reference Ranges        Total Bilirubin 0.5 mg/dL      eGFR Non African Amer 94 mL/min/1.73      Globulin 2.8 gm/dL      A/G Ratio 1.1 (L) g/dL      BUN/Creatinine Ratio 25.9 (H)     Anion Gap 3.2 (L) mmol/L     C-reactive Protein [[id number]]  (Normal) Collected:  07/13/18 0116    Specimen:  Blood Updated:  07/13/18 0248     C-Reactive Protein <0.50 mg/dL     CBC & Differential [[id number]] Collected:  07/13/18 0116    Specimen:  Blood Updated:  07/13/18 0224    Narrative:       The following orders were created for panel order CBC & Differential.  Procedure                               Abnormality         Status                     ---------                               -----------         ------                     CBC Auto Differential[[id number]]        Abnormal            Final result                 Please view results for these tests on the individual orders.    CBC Auto Differential [[id number]]  (Abnormal) Collected:  07/13/18 0116    Specimen:  Blood Updated:  07/13/18 0224     WBC 10.96 10*3/mm3      RBC 5.33 10*6/mm3      Hemoglobin 16.5 g/dL      Hematocrit 49.6 %      MCV 93.1 fL      MCH 31.0 pg      MCHC 33.3 g/dL      RDW 13.8 %      RDW-SD 45.7 fl      MPV 10.8 (H) fL      Platelets 235 10*3/mm3      Neutrophil % 76.8 (H) %      Lymphocyte % 13.4 (L) %      Monocyte % 9.4 %      Eosinophil % 0.1 %      Basophil % 0.1 %       Immature Grans % 0.2 %      Neutrophils, Absolute 8.42 (H) 10*3/mm3      Lymphocytes, Absolute 1.47 10*3/mm3      Monocytes, Absolute 1.03 (H) 10*3/mm3      Eosinophils, Absolute 0.01 10*3/mm3      Basophils, Absolute 0.01 10*3/mm3      Immature Grans, Absolute 0.02 10*3/mm3     POC Glucose Once [[id number]]  (Abnormal) Collected:  07/12/18 1934    Specimen:  Blood Updated:  07/12/18 2034     Glucose 159 (H) mg/dL     Narrative:       Meter: [id number] : [id number] Lei Cosby    POC Glucose Once [[id number]]  (Abnormal) Collected:  07/12/18 1748    Specimen:  Blood Updated:  07/12/18 1813     Glucose 201 (H) mg/dL     Narrative:       Meter: [id number] : [id number] augustus mcclendon    KENYATTA + PE [[id number]]  (Abnormal) Collected:  07/11/18 0106    Specimen:  Blood Updated:  07/12/18 1620     IgG 747 mg/dL      IgA 359 mg/dL      IgM 129 mg/dL      Total Protein 5.5 (L) g/dL      Albumin 2.5 (L) g/dL      Alpha-1-Globulin 0.2 g/dL      Alpha-2-Globulin 0.9 g/dL      Beta Globulin 1.0 g/dL      Gamma Globulin 0.9 g/dL      M-Luis Comment: g/dL      Comment: Due to the small quantity of monoclonal proteins, unable to  quantitate the M-spikes.        Globulin 3.0 g/dL      A/G Ratio 0.9     Immunofixation Reflex, Serum Comment     Comment: Immunofixation shows IgM monoclonal protein with kappa light chain  specificity.  Immunofixation shows IgG monoclonal protein with kappa light chain  specificity.        Please note Comment     Comment: Protein electrophoresis scan will follow via computer, mail, or   delivery.       Narrative:       Performed at:  [address] [place] [address]  [id number]  : Tato Dye PhD, Phone:  [phone]    Immunoglobulin Free LT Chains Blood [[id number]]  (Abnormal) Collected:  07/11/18 0106    Specimen:  Blood Updated:  07/12/18 1312     Free Light Chain, Kappa 38.7 (H) mg/L      Free Lambda Light Chains 40.8 (H) mg/L      Kappa/Lambda Ratio 0.95     Narrative:       Performed at:  [address] [place] [address]  [id number]  : Tato Dye PhD, Phone:  [phone]    POC Glucose Once [[id number]]  (Abnormal) Collected:  07/12/18 1244    Specimen:  Blood Updated:  07/12/18 1259     Glucose 319 (H) mg/dL     Narrative:       Meter: [id number] : [id number] augustus mcclendon    Anti-DNA Antibody, Double-stranded [[id number]] Collected:  07/11/18 0106    Specimen:  Blood Updated:  07/12/18 1217     Anti-DNA (DS) Ab Qn 2 IU/mL      Comment:                                    Negative      <5                                     Equivocal  5 - 9                                     Positive      >9       Narrative:       Performed at:  [address] [place] [address]  [id number]  : Tato Dye PhD, Phone:  [phone]    Antinuclear Antibodies Direct [[id number]] Collected:  07/11/18 0106    Specimen:  Blood Updated:  07/12/18 1217     LOLIS Direct Negative    Narrative:       Performed at:  [address] [place] [address]  [id number]  : Tato Dye PhD, Phone:  [phone]    Antihistone Antibodies [[id number]]  (Abnormal) Collected:  07/10/18 1425    Specimen:  Blood Updated:  07/12/18 1117     Histone Ab 1.7 (H) Units      Comment:                          Negative                <1.0                           Weak Positive      1.0 - 1.5                           Moderate Positive  1.6 - 2.5                           Strong Positive         >2.5       Narrative:       Performed at:  [address] [place] [address]  [id number]  : Jose Ramon Gutierrez MD, Phone:  [phone]    Blood Culture - Blood, [[id number]]  (Normal) Collected:  07/09/18 0945    Specimen:  Blood from Arm, Right Updated:  07/12/18 1015     Blood Culture No growth at 3 days    Blood Culture - Blood, [[id number]]  (Normal) Collected:  07/09/18 0953    Specimen:  Blood from  Arm, Left Updated:  07/12/18 1015     Blood Culture No growth at 3 days    POC Glucose Once [[id number]]  (Abnormal) Collected:  07/12/18 0554    Specimen:  Blood Updated:  07/12/18 0602     Glucose 184 (H) mg/dL     Narrative:       Meter: [id number] : [id number] debby canseco    C-reactive Protein [[id number]]  (Normal) Collected:  07/12/18 0109    Specimen:  Blood Updated:  07/12/18 0228     C-Reactive Protein <0.50 mg/dL     Basic Metabolic Panel [[id number]]  (Abnormal) Collected:  07/12/18 0109    Specimen:  Blood Updated:  07/12/18 0219     Glucose 209 (H) mg/dL      BUN 27 (H) mg/dL      Creatinine 0.88 mg/dL      Sodium 131 (L) mmol/L      Potassium 5.1 mmol/L      Chloride 92 (L) mmol/L      CO2 34.6 (H) mmol/L      Calcium 8.6 mg/dL      eGFR Non African Amer 86 mL/min/1.73      BUN/Creatinine Ratio 30.7 (H)     Anion Gap 4.4 mmol/L     Narrative:       GFR Normal >60  Chronic Kidney Disease <60  Kidney Failure <15    Osmolality, Calculated [[id number]]  (Normal) Collected:  07/12/18 0109    Specimen:  Blood Updated:  07/12/18 0219     Osmolality Calc 273.9 mOsm/kg     CBC & Differential [[id number]] Collected:  07/12/18 0109    Specimen:  Blood Updated:  07/12/18 0153    Narrative:       The following orders were created for panel order CBC & Differential.  Procedure                               Abnormality         Status                     ---------                               -----------         ------                     CBC Auto Differential[[id number]]        Abnormal            Final result                 Please view results for these tests on the individual orders.    CBC Auto Differential [[id number]]  (Abnormal) Collected:  07/12/18 0109    Specimen:  Blood Updated:  07/12/18 0153     WBC 8.68 10*3/mm3      RBC 5.28 10*6/mm3      Hemoglobin 16.3 g/dL      Hematocrit 49.2 %      MCV 93.2 fL      MCH 30.9 pg      MCHC 33.1 g/dL      RDW 13.9 %      RDW-SD 45.7 fl      MPV 10.5 (H) fL       Platelets 194 10*3/mm3      Neutrophil % 88.5 (H) %      Lymphocyte % 5.2 (L) %      Monocyte % 6.1 %      Eosinophil % 0.0 %      Basophil % 0.0 %      Immature Grans % 0.2 %      Neutrophils, Absolute 7.68 (H) 10*3/mm3      Lymphocytes, Absolute 0.45 (L) 10*3/mm3      Monocytes, Absolute 0.53 10*3/mm3      Eosinophils, Absolute 0.00 10*3/mm3      Basophils, Absolute 0.00 10*3/mm3      Immature Grans, Absolute 0.02 10*3/mm3     POC Glucose Once [[id number]]  (Abnormal) Collected:  07/11/18 2112    Specimen:  Blood Updated:  07/11/18 2118     Glucose 270 (H) mg/dL     Narrative:       Meter: [id number] : [id number] debby canseco    Gaylord / Lambda Light Chains, Urine, 24 Hour - Urine, Clean Catch [[id number]] Collected:  07/11/18 1604    Specimen:  24 Hour Urine from Urine, Clean Catch Updated:  07/11/18 2025    POC Glucose Once [[id number]]  (Abnormal) Collected:  07/11/18 1733    Specimen:  Blood Updated:  07/11/18 1747     Glucose 221 (H) mg/dL     Narrative:       Meter: [id number] : [id number] augustus mcclendon    Hepatitis C RNA, Quantitative, PCR (graph) [[id number]] Collected:  07/11/18 1620    Specimen:  Blood Updated:  07/11/18 1641    Eosinophil Smear - Urine, Urine, Clean Catch [[id number]] Collected:  07/10/18 1351    Specimen:  Urine from Urine, Clean Catch Updated:  07/11/18 1621     Eosinophil, Urine No Eosinophils Seen %      Comment:                                   <5% few or none seen       Narrative:       Performed at:  [address] [place] [address]  [id number]  : Tato Dye PhD, Phone:  [phone]    POC Glucose Once [[id number]]  (Abnormal) Collected:  07/11/18 1120    Specimen:  Blood Updated:  07/11/18 1128     Glucose 223 (H) mg/dL     Narrative:       Meter: [id number] : [id number] augustus mcclendon    Protein / Creatinine Ratio, Urine - Urine, Clean Catch [[id number]]  (Abnormal) Collected:  07/11/18 0975    Specimen:  Urine from Urine, Clean Catch  Updated:  07/11/18 1116     Protein/Creatinine Ratio, Urine 3,863.0 (H) mg/G Crea      Creatinine, Urine 36.5 mg/dL      Total Protein, Urine 141.0 mg/dL     Microalbumin / Creatinine Urine Ratio - Urine, Clean Catch [[id number]] Collected:  07/11/18 0935    Specimen:  Urine from Urine, Clean Catch Updated:  07/11/18 1116     Microalbumin/Creatinine Ratio 2,799.5 mg/g      Creatinine, Urine 36.5 mg/dL      Microalbumin, Urine 1,021.8 mg/L     POC Glucose Once [[id number]]  (Abnormal) Collected:  07/11/18 0927    Specimen:  Blood Updated:  07/11/18 0934     Glucose 283 (H) mg/dL     Narrative:       Meter: [id number] : [id number] augustus mcclendon    Blood Gas, Arterial With Co-Ox [[id number]]  (Abnormal) Collected:  07/11/18 0845    Specimen:  Arterial Blood Updated:  07/11/18 0907     Site Arterial: right radial     Chandan's Test Positive     pH, Arterial 7.412 pH units      pCO2, Arterial 54.9 (C) mm Hg      pO2, Arterial 62.4 (L) mm Hg      HCO3, Arterial 34.2 (C) mmol/L      Base Excess, Arterial 7.4 mmol/L      O2 Saturation, Arterial 91.3 %      Hemoglobin, Blood Gas 17.2 (H) g/dL      Hematocrit, Blood Gas 51.0 %      Oxyhemoglobin 89.7 %      Methemoglobin 0.30 %      Carboxyhemoglobin 1.5 %      A-a Gradiant 98.6 mmHg      Temperature 98.6 C      Barometric Pressure for Blood Gas 729 mmHg      Modality HFNC     FIO2 33 %      pH, Temp Corrected -- pH Units      pCO2, Temperature Corrected -- mm Hg      pO2, Temperature Corrected -- mm Hg     Cortisol - AM [[id number]]  (Normal) Collected:  07/11/18 0715    Specimen:  Blood Updated:  07/11/18 0805     Cortisol - AM 13.00 mcg/dL     Basic Metabolic Panel [[id number]]  (Abnormal) Collected:  07/11/18 0715    Specimen:  Blood Updated:  07/11/18 0755     Glucose 193 (H) mg/dL      BUN 29 (H) mg/dL      Creatinine 1.00 mg/dL      Sodium 130 (L) mmol/L      Potassium 5.3 mmol/L      Chloride 91 (L) mmol/L      CO2 35.1 (H) mmol/L      Calcium 8.5 mg/dL      eGFR Non   Amer 74 mL/min/1.73      BUN/Creatinine Ratio 29.0 (H)     Anion Gap 3.9 mmol/L     Narrative:       GFR Normal >60  Chronic Kidney Disease <60  Kidney Failure <15    Osmolality, Calculated [[id number]]  (Abnormal) Collected:  07/11/18 0715    Specimen:  Blood Updated:  07/11/18 0755     Osmolality Calc 271.9 (L) mOsm/kg     Hepatitis Panel, Acute [[id number]]  (Abnormal) Collected:  07/11/18 0106    Specimen:  Blood Updated:  07/11/18 0247     Hepatitis B Surface Ag Non-Reactive     Hep A IgM Non-Reactive     Hep B C IgM Non-Reactive     Hepatitis C Ab Reactive (A)    TSH [[id number]]  (Normal) Collected:  07/11/18 0106    Specimen:  Blood Updated:  07/11/18 0220     TSH 2.186 mIU/mL     C-reactive Protein [[id number]]  (Abnormal) Collected:  07/11/18 0106    Specimen:  Blood Updated:  07/11/18 0213     C-Reactive Protein 1.04 (H) mg/dL     Osmolality, Calculated [[id number]]  (Abnormal) Collected:  07/11/18 0106    Specimen:  Blood Updated:  07/11/18 0213     Osmolality Calc 267.8 (L) mOsm/kg     Comprehensive Metabolic Panel [[id number]]  (Abnormal) Collected:  07/11/18 0106    Specimen:  Blood Updated:  07/11/18 0213     Glucose 139 (H) mg/dL      BUN 26 (H) mg/dL      Creatinine 0.96 mg/dL      Sodium 130 (L) mmol/L      Potassium 5.2 mmol/L      Chloride 92 (L) mmol/L      CO2 34.8 (H) mmol/L      Calcium 8.5 mg/dL      Total Protein 6.0 g/dL      Albumin 3.10 (L) g/dL      ALT (SGPT) 18 U/L      AST (SGOT) 22 U/L      Alkaline Phosphatase 80 U/L      Comment: Note New Reference Ranges        Total Bilirubin 0.4 mg/dL      eGFR Non African Amer 77 mL/min/1.73      Globulin 2.9 gm/dL      A/G Ratio 1.1 (L) g/dL      BUN/Creatinine Ratio 27.1 (H)     Anion Gap 3.2 (L) mmol/L     C3 Complement [[id number]]  (Normal) Collected:  07/11/18 0106    Specimen:  Blood Updated:  07/11/18 0212     C3 Complement 123.0 mg/dl     C4 Complement [[id number]]  (Normal) Collected:  07/11/18 0106    Specimen:  Blood Updated:   07/11/18 0212     C4 Complement 30.1 mg/dl     Magnesium [[id number]]  (Normal) Collected:  07/11/18 0106    Specimen:  Blood Updated:  07/11/18 0211     Magnesium 2.3 mg/dL     Lipid Panel [[id number]]  (Abnormal) Collected:  07/11/18 0106    Specimen:  Blood Updated:  07/11/18 0211     Total Cholesterol 152 mg/dL      Triglycerides 69 mg/dL      HDL Cholesterol 50 (L) mg/dL      LDL Cholesterol  88 mg/dL      VLDL Cholesterol 13.8 mg/dL      LDL/HDL Ratio 1.76    Narrative:       Cholesterol Reference Ranges  (U.S. Department of Health and Human Services ATP III Classifications)    Desirable          <200 mg/dL  Borderline High    200-239 mg/dL  High Risk          >240 mg/dL      Triglyceride Reference Ranges  (U.S. Department of Health and Human Services ATP III Classifications)    Normal           <150 mg/dL  Borderline High  150-199 mg/dL  High             200-499 mg/dL  Very High        >500 mg/dL    HDL Reference Ranges  (U.S. Department of Health and Human Services ATP III Classifcations)    Low     <40 mg/dl (major risk factor for CHD)  High    >60 mg/dl ('negative' risk factor for CHD)        LDL Reference Ranges  (U.S. Department of Health and Human Services ATP III Classifcations)    Optimal          <100 mg/dL  Near Optimal     100-129 mg/dL  Borderline High  130-159 mg/dL  High             160-189 mg/dL  Very High        >189 mg/dL    Hemoglobin A1c [[id number]]  (Abnormal) Collected:  07/11/18 0106    Specimen:  Blood Updated:  07/11/18 0159     Hemoglobin A1C 6.90 (H) %     CBC & Differential [[id number]] Collected:  07/11/18 0106    Specimen:  Blood Updated:  07/11/18 0140    Narrative:       The following orders were created for panel order CBC & Differential.  Procedure                               Abnormality         Status                     ---------                               -----------         ------                     CBC Auto Differential[[id number]]        Abnormal            Final  result                 Please view results for these tests on the individual orders.    CBC Auto Differential [[id number]]  (Abnormal) Collected:  07/11/18 0106    Specimen:  Blood Updated:  07/11/18 0140     WBC 9.42 10*3/mm3      RBC 5.23 10*6/mm3      Hemoglobin 15.9 g/dL      Hematocrit 48.8 %      MCV 93.3 fL      MCH 30.4 pg      MCHC 32.6 (L) g/dL      RDW 14.0 %      RDW-SD 45.7 fl      MPV 10.7 (H) fL      Platelets 214 10*3/mm3      Neutrophil % 92.6 (H) %      Lymphocyte % 4.7 (L) %      Monocyte % 2.4 %      Eosinophil % 0.0 %      Basophil % 0.1 %      Immature Grans % 0.2 %      Neutrophils, Absolute 8.72 (H) 10*3/mm3      Lymphocytes, Absolute 0.44 (L) 10*3/mm3      Monocytes, Absolute 0.23 10*3/mm3      Eosinophils, Absolute 0.00 10*3/mm3      Basophils, Absolute 0.01 10*3/mm3      Immature Grans, Absolute 0.02 10*3/mm3     ANCA Panel [[id number]] Collected:  07/11/18 0106    Specimen:  Blood Updated:  07/11/18 0134    Glomerular Basement Membrane Antibodies [[id number]] Collected:  07/11/18 0106    Specimen:  Blood Updated:  07/11/18 0134    POC Glucose Once [[id number]]  (Normal) Collected:  07/10/18 2122    Specimen:  Blood Updated:  07/10/18 2137     Glucose 108 mg/dL     Narrative:       Meter: [id number] : [id number] Lei Cosby    POC Glucose Once [[id number]]  (Normal) Collected:  07/10/18 1713    Specimen:  Blood Updated:  07/10/18 1738     Glucose 93 mg/dL     Narrative:       Meter: [id number] : [id number] Medardo Grimm    Urinalysis With Microscopic - Urine, Clean Catch [[id number]] Collected:  07/10/18 1351    Specimen:  Urine from Urine, Clean Catch Updated:  07/10/18 1437    Narrative:       The following orders were created for panel order Urinalysis With Microscopic - Urine, Clean Catch.  Procedure                               Abnormality         Status                     ---------                               -----------         ------                     Urinalysis  without micro...[[id number]]  Abnormal            Final result               Urinalysis, Microscopic ...[[id number]]  Abnormal            Final result                 Please view results for these tests on the individual orders.    Urinalysis, Microscopic Only - Urine, Clean Catch [[id number]]  (Abnormal) Collected:  07/10/18 1351    Specimen:  Urine from Urine, Clean Catch Updated:  07/10/18 1437     RBC, UA 6-12 (A) /HPF      WBC, UA 3-5 (A) /HPF      Bacteria, UA None Seen /HPF      Squamous Epithelial Cells, UA 0-2 /HPF      Hyaline Casts, UA 3-6 /LPF      Methodology Automated Microscopy    Urinalysis without microscopic (no culture) - Urine, Clean Catch [[id number]]  (Abnormal) Collected:  07/10/18 1351    Specimen:  Urine from Urine, Clean Catch Updated:  07/10/18 1437     Color, UA Yellow     Appearance, UA Clear     pH, UA 6.0     Specific Gravity, UA 1.024     Glucose,  mg/dL (Trace) (A)     Ketones, UA Negative     Bilirubin, UA Negative     Blood, UA Trace (A)     Protein, UA >=300 mg/dL (3+) (A)     Leuk Esterase, UA Negative     Nitrite, UA Negative     Urobilinogen, UA 0.2 E.U./dL    Sodium, Urine, Random - Urine, Clean Catch [[id number]] Collected:  07/10/18 1351    Specimen:  Urine from Urine, Clean Catch Updated:  07/10/18 1434     Sodium, Urine 43 mmol/L     Chloride, Urine, Random - Urine, Clean Catch [[id number]] Collected:  07/10/18 1351    Specimen:  Urine from Urine, Clean Catch Updated:  07/10/18 1434     Chloride, Urine 50 mmol/L     Potassium, Urine, Random - Urine, Clean Catch [[id number]] Collected:  07/10/18 1351    Specimen:  Urine from Urine, Clean Catch Updated:  07/10/18 1434     Potassium, Urine 75.5 mmol/L     Potassium [[id number]]  (Normal) Collected:  07/10/18 1151    Specimen:  Blood Updated:  07/10/18 1236     Potassium 5.0 mmol/L     POC Glucose Once [[id number]]  (Abnormal) Collected:  07/10/18 1159    Specimen:  Blood Updated:  07/10/18 1224     Glucose 176 (H) mg/dL      Narrative:       Meter: [id number] : [id number] Medardo Grimm    POC Glucose Once [[id number]]  (Abnormal) Collected:  07/10/18 0758    Specimen:  Blood Updated:  07/10/18 0814     Glucose 251 (H) mg/dL     Narrative:       Meter: [id number] : [id number] Medardo Grimm            Imaging Results (last 72 hours)     Procedure Component Value Units Date/Time    XR Chest 1 View [[id number]] Collected:  07/11/18 1119     Updated:  07/11/18 1121    Narrative:       XR CHEST 1 VW-     CLINICAL INDICATION: sob; J96.01-Acute respiratory failure with hypoxia;  J96.02-Acute respiratory failure with hypercapnia; I21.4-Non-ST  elevation (NSTEMI) myocardial infarction; L03.115-Cellulitis of right  lower limb          COMPARISON: 07/09/2018      TECHNIQUE: Single frontal view of the chest.     FINDINGS:     Trace bibasilar effusions and bibasilar airspace disease, probably  atelectasis, but cannot exclude pneumonia  The cardiac silhouette is normal. The pulmonary vasculature is  unremarkable.  There is no evidence of an acute osseous abnormality.   There are no suspicious-appearing parenchymal soft tissue nodules.            Impression:       Trace bibasilar effusions and bibasilar consolidation         This report was finalized on 7/11/2018 11:19 AM by Dr. Jose Luis Romero MD.       US Arterial Doppler Lower Extremity Left [[id number]] Collected:  07/10/18 1339     Updated:  07/10/18 1351    Narrative:       US ARTERIAL DOPPLER LOWER EXTREMITY LEFT-     REASON FOR EXAM:  PVD; J96.01-Acute respiratory failure with hypoxia;  J96.02-Acute respiratory failure with hypercapnia; I21.4-Non-ST  elevation (NSTEMI) myocardial infarction; L03.115-Cellulitis of right  lower limb. Decreased pulses on physical exam in the lower extremities.     TECHNIQUE:  Multiple real-time color doppler images were obtained.  M-mode Doppler was used for velocity determination and spectral pattern.  Stenosis was evaluated using the NASCET  or similar measurement  technique.     FINDINGS:   Left leg: The external iliac artery was normal in caliber. The Doppler  wave pattern was monophasic with peak systolic velocity of 140 cm/s.  There was monophasic flow in the common femoral artery with a velocity  of 319 cm/s. There is some calcific plaque in the common femoral artery.  Color Doppler flow is seen in the profunda femoral and superficial  femoral arteries in the leg. The Doppler wave pattern is monophasic in  the thigh. No occluded segments were demonstrated. The popliteal artery  has a monophasic flow pattern and a velocity of 48 cm/s. There was  monophasic flow in the posterior tibial artery, the velocity measured 37  cm/s.       Impression:       Atherosclerotic plaquing is noted predominantly at the level  of the common femoral artery where there is velocity elevation. The  Doppler wave pattern throughout the leg was monophasic suggesting  possibility of more proximal disease above the external iliac artery. In  the runoff vessels no areas of occlusion were demonstrated.             This report was finalized on 7/10/2018 1:48 PM by Dr. Jose Ramon Hatch II, MD.                   Assessment & Plan:  Heath Bajwa is a 68 y.o. year-old male seen today at the request of Dr. 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      Biclonal gammopathy  - Studies obtained as part of work up for nephrotic range proteinuria. Creatinine normal, no evidence of anemia of hypercalcemia. Patient denies of any new bone pain and only complains of chronic back/arthritic pain that has been occurring for years and has been unchanged.  - Serum free light chains show elevation in both light chains however the ratio is within normal limits and can be seen with underlying inflammation. Urine light chain studies are pending  - Serum immunofixation was significant for an IgM and IgG monoclonal  protein with kappa light chain specificity however there was only a small quantity of monoclonal protein and therefore was unable to quantitate M-spike.   - Unsure of significance as biclonal gammopathy in multiple myeloma would be rare however can occur. Possible that the abnormalities seen could be due to underlying inflammation versus biclonal MGUS given that Mspike couldn't be quantified  - Would recommend repeating studies in one month as an outpatient to further assess.    Nephrotic proteinuria   - Possibly due to diabetic renal disease  - Being followed by Nephrology    Cellulitis  - Currently on antibiotics as per infectious disease        Kell Mayes MD  07/13/18  8:13 AM            Electronically signed by Kell Mayes MD at 7/13/2018 12:13 PM            Discharge Summary      Julian Stern MD at 7/15/2018  2:08 PM        Date of admission: 7/9/18  Date of discharge: 7/15/18    Principal diagnosis: Acute hypoxic and hypercarbic respiratory failure secondary to chronic lung disease and COPD exacerbation.  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 Mayes hematology  Dr. Padilla/Cecilia nephrology  Dr. Sahni pulmonology  Dr. Stewart cardiology  Dr Mehta ID    Procedures:  Arterial Doppler  Stress test  Echocardiogram    Admission diagnosis: Hypercarbic and hypoxic respiratory failure    Exam: Assisted by Emilee RAO  Patient states he feels back to baseline, his saturation room air however was 86%, I placed him on 2 L  initially he stated about 89% output him up to 3 L and he was around 90% saturated.  He however comes out of the halls and ambulates in his saturation goes down to 85%.  I stressed that he needs to wears oxygen continuously at home.  He already has oxygen he wears 4-5 L at night at home.  He denies any chest pain currently.  No other pain, is tolerating his diet.  He is agreed to at least try to quit smoking.  Lungs have bilateral breath sounds diminished bases but overall good air excursion without rhonchi rales or wheezing, heart regular rate and rhythm without murmur or gallop.  Extremities have trace edema he has some erythema and some skin breakdown from his peripheral vascular disease, he is prone to dependent rubor although his legs have been elevated so the erythema is less today.  Monitor has been showing a sinus rhythm  Vital signs: 156/76, 20, 74, 97.7    Hospital course: This has been a [facility] hospital course, please see chart for full details.  Patient was admitted with hypercarbic and hypoxic respiratory failure to the ICU and placed on BiPAP.  He was treated with antibiotics steroids and inhalants and he has improved overall back to baseline.  His chest x-ray really did not show infiltrate but more atelectasis.  He was noted have erythema of his legs with some superficial ulcerations, he has arterial insufficiency with significant arterial disease.  Dr. Cordon was consulted but was unable to see the patient while here however patient is being referred on to Dr. Cordon for further evaluation of this.  He was encouraged quit smoking.  He is on a statin as well as aspirin.  Patient had hyponatremia, he has been fluid restricted and sodium has responded.  He will continue 1000 cc fluid restriction per day at home.  I have asked home health check a BMP in one week and call to Dr. Padilla.  His discharge sodium 133.  Patient had over 3 g of urine in his urine protein to creatinine ratio.  Patient did have  gray zone troponins although he was asymptomatic cardiology to evaluate him, his EF was normal and he underwent a stress test and it was decided that he should be medically managed.  He is pain-free at this time.  He is stable and improved unfortunately his long-term prognosis with significance of his disease is guarded.  Please see chart for full details.  He is going to be on continuously at 3 L nasal cannula at home.  I've encouraged him to comply with this although I'm guarded that he will.  He does live at [facility].  His brother is going to bring in one of his oxygen tanks to take him home.  Patient has chronic pain syndrome, he is on gabapentin, with his legs he was treated with morphine while here and changed to Norco.  I will give him 3 days of Norco when necessary on discharge.  His Ronald report as been reviewed.    Follow-up:  Primary one week  Dr. Padilla 2 weeks  Dr. Cordon to evaluate PVD  Professional home health    Diet: Cardiac constant carbohydrate with 1000 cc per day fluid restriction    Activity as tolerated with his oxygen in place    Medication:  Albuterol when necessary, he states he does have nebulizers well.  Norvasc 10 mg a day  Aspirin 325 a day  Atorvastatin 40 mg a day  Symbicort 162 puffs twice a day  Omnicef 300 twice a day  Aricept 10 mg a day  Doxycycline 100 twice a day  Gabapentin 800 3 times a day  HCTZ 25 mg daily  Hydrocodone 10 every 8 hours as needed for pain  Insulin 70/3020 units twice a day  XYZAL 5 mg daily as needed for allergies  Prednisone 20 day for 3 days then 10 a day for 3 days then stopped  Flomax 0.4 mg a day  Victoza 1.8 mg daily subcutaneous    Discharge 1:42PM - 2:33PM            Electronically signed by Julian Stern MD at 7/15/2018  2:29 PM       Discharge Order     Start     Ordered    07/15/18 1401  Discharge patient  Once     Comments:  Patient's family to bring one of his home O2 tanks.  If they do not bring attained patient cannot leave, patient is  to be on 3 L nasal cannula continuously    Patient to resume professional home health, patient check a basic profile in 5 days and call to Dr. Padilla   Expected Discharge Date:  07/15/18    Discharge Disposition:  Home or Self Care    Physician of Record for Attribution - Please select from Treatment Team:  JOHN PAGE [[id number]]    Review needed by CMO to determine Physician of Record:  No       Question Answer Comment   Physician of Record for Attribution - Please select from Treatment Team JOHN PAGE    Review needed by CMO to determine Physician of Record No        07/15/18 1405          After Visit Summary      Heath Bajwa MRN: [id number]      Venous stasis dermatitis of both lower extremities    7/9/2018 - 7/15/2018    [facility]        After Visit Summary   Instructions   ·   Your medications have changed   START taking:   ¨ amLODIPine (NORVASC)   ¨ atorvastatin (LIPITOR)   This replaces simvastatin 80 MG tablet. See the full medication list for instructions.   ¨ cefdinir (OMNICEF)   ¨ doxycycline (MONODOX)   ¨ HYDROcodone-acetaminophen (NORCO)   ¨ predniSONE (DELTASONE)   CHANGE how you take:   ¨ insulin NPH-insulin regular (humuLIN 70/30,novoLIN 70/30)   STOP taking:   ¨ ondansetron 8 MG tablet (ZOFRAN)   ¨ simvastatin 80 MG tablet (ZOCOR)   Replaced by atorvastatin 40 MG tablet   Review your updated medication list below.   Your Next Steps   Ask   Do   Read   Go   Jul31 New Patient 2:15 PM   Pepe Cordon MD   [facility] GENERAL SURGERY   [address] [place] [id number] [phone]   Bring all previous medical records and films, along with current medications and insurance information.    If you have any questions about your recovery, please call the [facility] Nurse Call Center at [phone]. A registered nurse is available 24 hours a day 7 days a week to assist you.   ·   Activity instructions   AS TOLERATED   ·   Diet instructions    Diet: Regular, Consistent Carbohydrate, Cardiac    Discharge Diet:    Regular  Consistent Carbohydrate  Cardiac   Fluid Restriction per day:    1000 mL Fluid   ·   Other instructions   Discharge Follow-up with PCP    Currently Documented PCP:  No Known Provider  PCP Phone Number:  None   Discharge Follow-up with Specialty: Valerie; 2 Weeks    Discharge Follow-up with Specified Provider: Sugey; 1 Month    Discharge Follow-up with Specified Provider: brown; 2 Weeks    What's next   What's next          Follow up with No Known Provider   1 week  [facility] [address]           Follow up with No Known Provider   1 week  [facility] [id number] [phone]           Follow up with Anuj Padilla MD in 2 week(s)  [address] [facility] [address]   [facility] [id number] [phone]           Follow up with Pepe Cordon MD in 2 week(s)   EVAL PVD: PATIENT WILL BE NOTIFIED ABOUT APPT  [address] [place] [address]   [phone]           Follow up with Kell Mayes MD in 1 month(s)   PATIENT WILL BE NOTIFIED ABOUT APPT  [address] [facility] [address]   [phone]    Jul31 New Patient with Pepe Cordon MD   Tuesday Jul 31, 2018 2:15 PM   Bring all previous medical records and films, along with current medications and insurance information.  [facility] GENERAL SURGERY   [address] [place] [id number]   [phone]    Aug20 HOSITAL FOLLOW UP with Kell Mayes MD   Monday Aug 20, 2018 11:00 AM  [facility] HEMATOLOGY  AND ONCOLOGY   [address] [place] [address] Cancer Center  [facility] [address]   [phone]    Your Allergies   Date Reviewed: 7/12/2018   Your Allergies   No active allergies   Patient Belongings Returned   Document Return of Belongings Flowsheet   Were the patient bedside belongings sent home?   Yes   Belongings Retrieved from Security & Sent Home   N/A   Belongings Sent to Safe   --    Medications Retrieved from Pharmacy & Sent Home   N/A   MyWealth Signup   Our records indicate that your [facility] MyWealth account has been deactivated. If you would like to reactivate your account, please email [email or web address] or call [phone] to talk to our MyWealth staff.  Medication List   Medication List         Morning Afternoon Evening Bedtime As Needed    albuterol 108 (90 Base) MCG/ACT inhaler   Commonly known as: PROVENTIL HFA;VENTOLIN HFA   Inhale 2 puffs Every 6 (Six) Hours As Needed for Wheezing.               amLODIPine 10 MG tablet   Commonly known as: NORVASC   Take 1 tablet by mouth Daily.           aspirin 325 MG tablet   Take 325 mg by mouth Daily.           atorvastatin 40 MG tablet   Commonly known as: LIPITOR   Take 1 tablet by mouth Daily.   Replaces: simvastatin 80 MG tablet           budesonide-formoterol 160-4.5 MCG/ACT inhaler   Commonly known as: SYMBICORT   Inhale 2 puffs 2 (two) times a day.            cefdinir 300 MG capsule   Commonly known as: OMNICEF   Take 1 capsule by mouth Every 12 (Twelve) Hours for 17 doses.   For: Infection of the Skin and/or Related Soft Tissue, Urinary Tract Infection          donepezil 10 MG tablet   Commonly known as: ARICEPT   Take 10 mg by mouth Every Night.           doxycycline 100 MG capsule   Commonly known as: MONODOX   Take 1 capsule by mouth Every 12 (Twelve) Hours for 17 doses.   For: Infection of the Skin and/or Related Soft Tissue          gabapentin 800 MG tablet   Commonly known as: NEURONTIN   Take 800 mg by mouth 3 (Three) Times a Day.             hydrochlorothiazide 25 MG tablet   Commonly known as: HYDRODIURIL   Take 25 mg by mouth daily.           HYDROcodone-acetaminophen  MG per tablet   Commonly known as: NORCO   Take 1 tablet by mouth Every 8 (Eight) Hours As Needed for pain.          insulin NPH-insulin regular (70-30) 100 UNIT/ML injection   Commonly known as: humuLIN 70/30,novoLIN 70/30   Inject 20 Units  under the skin 2 (Two) Times a Day With Meals.   What changed: how much to take            levocetirizine 5 MG tablet   Commonly known as: XYZAL   Take 5 mg by mouth Daily As Needed for Allergies.            lisinopril 40 MG tablet   Commonly known as: PRINIVIL,ZESTRIL   Take 40 mg by mouth daily.           predniSONE 20 MG tablet   Commonly known as: DELTASONE   Take 1 tablet by mouth daily for 3 days, then decrease to ½ tablet daily for 3 days then stop.          tamsulosin 0.4 MG capsule 24 hr capsule   Commonly known as: FLOMAX   Take 1 capsule by mouth Every Evening.           VICTOZA 18 MG/3ML solution pen-injector injection   Inject 1.8 mg under the skin Daily.   Generic drug: Liraglutide          Where to  your medications   ·    these medications at [facility] amLODIPine • atorvastatin • cefdinir • doxycycline • HYDROcodone-acetaminophen • predniSONE       Address:  [place] [address]   Hours: 8AM-6PM Mon-Fri   Phone: [phone]   ·   Ask your doctor where to  these medications   ¨ • insulin NPH-insulin regular (70-30) 100 UNIT/ML injection    Your Medication List      Always carry an updated list of your medications with you. If there is an emergency, a responder can quickly see what medications you are taking. Take this paperwork with you the next time you see your health care provider.        Attached Information   Stasis Dermatitis (English)   Stasis Dermatitis  Stasis dermatitis is a long-term (chronic) skin condition that happens when veins can no longer pump blood back to the heart (poor circulation). This condition causes a red or brown scaly rash or sores (ulcers) from the pooling of blood (stasis). This condition usually affects the lower legs. It may affect one leg or both legs.  Without treatment, severe stasis dermatitis can lead to other skin conditions and infections.  What are the causes?  This condition is caused by poor  circulation.  What increases the risk?  This condition is more likely to develop in people who:  · Are not very active.  · Stand for long periods of time.  · Have veins that have become enlarged and twisted (varicose veins).  · Have leg veins that are not strong enough to send blood back to the heart (venous insufficiency).  · Have had a blood clot.  · Have been pregnant many times.  · Have had vein surgery.  · Are obese.  · Have heart or kidney failure.  · Are 50 years of age or older.     What are the signs or symptoms?  Common early symptoms of this condition include:  · Swelling in your ankle or leg. This might get better overnight but be worse again in the day.  · Skin that looks thin on your ankle and leg.  · Brown marks that develop slowly.  · Skin that is easily irritated or cracked.  · Red, swollen skin.  · An achy or heavy feeling after you walk or stand for long periods of time.  · Pain.     Later and more severe symptoms of this condition include:  · Skin that looks shiny.  · Small, open sores (ulcers). These are often red or purple.  · Dry, cracking skin.  · Skin that feels hard.  · Severe itching.  · A change in the shape or color of your lower legs.  · Severe pain.  · Difficulty walking.     How is this diagnosed?  Your health care provider may suspect this condition from your symptoms and medical history. Your health care provider will also do a physical exam. You may need to see a health care provider who specializes in skin diseases (dermatologist). You may also have tests to confirm the diagnosis, including:  · Blood tests.  · Imaging studies to check blood flow (Doppler ultrasound).  · Allergy tests.     How is this treated?  Treatment for this condition may include medicine, such as:  · Corticosteroid creams and ointments.  · Non-corticosteroid medicines applied to the skin (topical).  · Medicine to reduce swelling in the legs (diuretics).  · Antibiotics.  · Medicine to relieve itching  (antihistamines).       You may also have to wear:  · Compression stockings or an elastic wrap to improve circulation.  · A bandage (dressing).  · A wrap that contains zinc and gelatin (Unna boot).     Follow these instructions at home:  Skin Care  · Moisturize your skin as told by your health care provider. Do not use moisturizers with fragrance. This can irritate your skin.  · Apply cool compresses to the affected areas.  · Do not scratch your skin.  · Do not rub your skin dry after a bath or shower. Gently pat your skin dry.  · Do not use scented soaps, detergents, or perfumes.  Medicines  · Take or use over-the-counter and prescription medicines only as told by your health care provider.  · If you were prescribed an antibiotic medicine, take or use it as told by your health care provider. Do not stop taking or using the antibiotic even if your condition starts to improve.  Lifestyle  · Do not stand or sit in one position for long periods of time.  · Do not cross your legs when you sit.  · Raise (elevate) your legs above the level of your heart when you are sitting or lying down.  · Walk as told by your health care provider. Walking increases blood flow.  · Wear comfortable, loose-fitting clothing. Circulation in your legs will be worse if you wear tight pants, belts, and waistbands.  General instructions  · Change and remove any dressing as told by your health care provider, if this applies.  · Wear compression stockings as told by your health care provider, if this applies. These stockings help to prevent blood clots and reduce swelling in your legs.  · Wear the Unna boot as told by your health care provider, if this applies.  · Keep all follow-up visits as told by your health care provider. This is important.  Contact a health care provider if:  · Your condition does not improve with treatment.  · Your condition gets worse.  · You have signs of infection in the affected area. Watch  for:  ¨ Swelling.  ¨ Tenderness.  ¨ Redness.  ¨ Soreness.  ¨ Warmth.  · You have a fever.  Get help right away if:  · You notice red streaks coming from the affected area.  · Your bone or joint underneath the affected area becomes painful after the skin has healed.  · The affected area turns darker.  · You feel a deep pain in your leg or groin.  · You are short of breath.  This information is not intended to replace advice given to you by your health care provider. Make sure you discuss any questions you have with your health care provider.  Document Released: 03/29/2007 Document Revised: 08/15/2017 Document Reviewed: 05/04/2016  [employer] Interactive Patient Education © 2018 [employer]           Opioid Resource   If you or someone you know needs information on substance abuse, please visit   https://www.findhelpnLit Building Directoryky.org/ for listings of facilities and resources across [place].           SYMPTOMS OF A STROKE     Call 911 or have someone take you to the Emergency Department if you have any of the following:     · Sudden numbness or weakness of your face, arm or leg especially on one side of the body  · Sudden confusion, difficulty speaking or trouble understanding   · Changes in your vision or loss of sight in one eye  · Sudden severe headache with no known cause  · Sudden dizziness, trouble walking, loss of balance or coordination     It is important to seek emergency care right away if you have further stroke symptoms. If you get emergency help quickly, the powerful clot-dissolving medicines can reduce the disabilities caused by a stroke.      For more information:     American Stroke Association  2-920-9-STROKE  www.strokeassociation.org      IF YOU SMOKE OR USE TOBACCO PLEASE READ THE FOLLOWING:     Why is smoking bad for me?  Smoking increases the risk of heart disease, lung disease, vascular disease, stroke, and cancer.      If you smoke, STOP!     If you would like more information on quitting smoking,  please visit the [employer] website: www.Avaak/9tong.comate/healthier-together/smoke   This link will provide additional resources including the QUIT line and the Beat the Pack support groups.      For more information:     Quit Now [place]  1-800-QUIT-NOW  https://dionteThomas [facility] Hospitalronaldo.quitlogix.org/en-US/         SUICIDAL FEELINGS     If you feel like life is too tough and are thinking of suicide or injuring yourself, get help right away!  · Call 911  · Call a suicide hotline to speak to a counselor. 5-812-868-TALK or 5-985-FLYMAGP             YOU ARE THE MOST IMPORTANT FACTOR IN YOUR RECOVERY.      Follow all instructions carefully.      I have reviewed my discharge instructions with my nurse, including the following information, if applicable:                 Information about my illness and diagnosis              Follow up appointments (including lab draws)              Wound Care              Equipment Needs              Medications (new and continuing) along with side effects              Preventative information such as vaccines and smoking cessations              Diet              Pain              I know when to contact my Doctor's office or seek emergency care        I want my nurse to describe the side effects of my medications: YES NO   If the answer is no, I understand the side effects of my medications: YES NO   My nurse described the side effects of my medications in a way that I could understand: YES NO   I have taken my personal belongings and my own medications with me at discharge: YES NO       I have received this information and my questions have been answered. I have discussed any concerns I see with this plan with the nurse or physician. I understand these instructions.     Signature of Patient or Responsible Person: _____________________________________     Date: _________________  Time: __________________     Signature of Healthcare Provider:  _______________________________________  Date: _________________  Time: __________________

## 2018-07-19 LAB
FREE KAPPA LT CHAINS, 24HR: ABNORMAL MG/24 HR
FREE LAMBDA LT CHAINS, 24 HR: ABNORMAL MG/24 HR
KAPPA LC UR-MCNC: 35.7 MG/L (ref 1.35–24.19)
KAPPA LC/LAMBDA UR: 6.88 {RATIO} (ref 2.04–10.37)
LAMBDA LC UR-MCNC: 5.19 MG/L (ref 0.24–6.66)

## 2018-07-20 ENCOUNTER — LAB REQUISITION (OUTPATIENT)
Dept: LAB | Facility: HOSPITAL | Age: 68
End: 2018-07-20

## 2018-07-20 DIAGNOSIS — E11.9 TYPE 2 DIABETES MELLITUS WITHOUT COMPLICATIONS (HCC): ICD-10-CM

## 2018-07-20 DIAGNOSIS — R53.83 OTHER FATIGUE: ICD-10-CM

## 2018-07-20 LAB
ANION GAP SERPL CALCULATED.3IONS-SCNC: 3.1 MMOL/L (ref 3.6–11.2)
BUN BLD-MCNC: 15 MG/DL (ref 7–21)
BUN/CREAT SERPL: 22.1 (ref 7–25)
CALCIUM SPEC-SCNC: 8.6 MG/DL (ref 7.7–10)
CHLORIDE SERPL-SCNC: 95 MMOL/L (ref 99–112)
CO2 SERPL-SCNC: 33.9 MMOL/L (ref 24.3–31.9)
CREAT BLD-MCNC: 0.68 MG/DL (ref 0.43–1.29)
GFR SERPL CREATININE-BSD FRML MDRD: 115 ML/MIN/1.73
GLUCOSE BLD-MCNC: 159 MG/DL (ref 70–110)
HBA1C MFR BLD: 7.3 % (ref 4.5–5.7)
OSMOLALITY SERPL CALC.SUM OF ELEC: 268.7 MOSM/KG (ref 273–305)
POTASSIUM BLD-SCNC: 4.7 MMOL/L (ref 3.5–5.3)
SODIUM BLD-SCNC: 132 MMOL/L (ref 135–153)

## 2018-07-20 PROCEDURE — 83036 HEMOGLOBIN GLYCOSYLATED A1C: CPT | Performed by: PHYSICAL MEDICINE & REHABILITATION

## 2018-07-20 PROCEDURE — 80048 BASIC METABOLIC PNL TOTAL CA: CPT | Performed by: PHYSICAL MEDICINE & REHABILITATION

## 2018-08-07 ENCOUNTER — OFFICE VISIT (OUTPATIENT)
Dept: SURGERY | Facility: CLINIC | Age: 68
End: 2018-08-07

## 2018-08-07 VITALS
RESPIRATION RATE: 20 BRPM | HEIGHT: 72 IN | BODY MASS INDEX: 33.18 KG/M2 | DIASTOLIC BLOOD PRESSURE: 84 MMHG | WEIGHT: 245 LBS | OXYGEN SATURATION: 96 % | HEART RATE: 81 BPM | TEMPERATURE: 98 F | SYSTOLIC BLOOD PRESSURE: 170 MMHG

## 2018-08-07 DIAGNOSIS — E13.22 OTHER SPECIFIED DIABETES MELLITUS WITH DIABETIC CHRONIC KIDNEY DISEASE, UNSPECIFIED CKD STAGE, UNSPECIFIED LONG TERM INSULIN USE STATUS: ICD-10-CM

## 2018-08-07 DIAGNOSIS — J96.02 ACUTE RESPIRATORY FAILURE WITH HYPOXIA AND HYPERCAPNIA (HCC): ICD-10-CM

## 2018-08-07 DIAGNOSIS — I73.9 PVD (PERIPHERAL VASCULAR DISEASE) (HCC): Primary | ICD-10-CM

## 2018-08-07 DIAGNOSIS — J96.01 ACUTE RESPIRATORY FAILURE WITH HYPOXIA AND HYPERCAPNIA (HCC): ICD-10-CM

## 2018-08-07 PROCEDURE — 99214 OFFICE O/P EST MOD 30 MIN: CPT | Performed by: SURGERY

## 2018-08-07 NOTE — PROGRESS NOTES
8/7/2018    Patient Information  Heath Bajwa  [address]  [place] [address]  1950  [phone] (home)     Chief Complaint   Patient presents with   • Consult     Referred for Evaluation of PVD       HPI  Patient is a 68-year-old white male referred for evaluation of peripheral vascular disease.  Patient was recently in the hospital with respiratory failure secondary to chronic lung disease, COPD exacerbation.  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  He had a arterial Doppler on his left lower extremity which suggested probable occlusive D disease above the external iliac artery.  Runoff vessel showed no area of the occlusion.    Review of Systems   Constitutional: Positive for unexpected weight change.   HENT: Negative.    Eyes: Negative.    Respiratory: Positive for shortness of breath.    Cardiovascular: Positive for palpitations.   Gastrointestinal: Negative.    Endocrine: Negative.    Genitourinary: Negative.    Musculoskeletal: Positive for arthralgias, back pain, joint swelling and myalgias.   Skin: Positive for wound.   Allergic/Immunologic: Negative.    Neurological: Positive for numbness.   Hematological: Bruises/bleeds easily.   Psychiatric/Behavioral: The patient is nervous/anxious.      The Review of Systems has been reviewed and confirmed.    Patient Active Problem List   Diagnosis   • BPH without urinary obstruction   • Elevated prostate specific antigen (PSA)   • Acute respiratory failure with hypoxia and hypercapnia (CMS/HCC)   • Venous stasis dermatitis of both lower extremities         Past Medical History:   Diagnosis Date   • COPD (chronic  obstructive pulmonary disease) (CMS/[facility])    • Diabetes mellitus (CMS/[facility])    • Hypertension          History reviewed. No pertinent surgical history.      Family History   Problem Relation Age of Onset   • No Known Problems Mother    • Heart failure Father          Social History   Substance Use Topics   • Smoking status: Current Every Day Smoker     Packs/day: 1.00     Years: 25.00     Types: Cigarettes   • Smokeless tobacco: Never Used   • Alcohol use No       Current Outpatient Prescriptions   Medication Sig Dispense Refill   • albuterol (PROVENTIL HFA;VENTOLIN HFA) 108 (90 Base) MCG/ACT inhaler Inhale 2 puffs Every 6 (Six) Hours As Needed for Wheezing.     • amLODIPine (NORVASC) 10 MG tablet Take 1 tablet by mouth Daily. 30 tablet 0   • aspirin 325 MG tablet Take 325 mg by mouth Daily.     • atorvastatin (LIPITOR) 40 MG tablet Take 1 tablet by mouth Daily. 30 tablet 0   • budesonide-formoterol (SYMBICORT) 160-4.5 MCG/ACT inhaler Inhale 2 puffs 2 (two) times a day.     • donepezil (ARICEPT) 10 MG tablet Take 10 mg by mouth Every Night.     • gabapentin (NEURONTIN) 800 MG tablet Take 800 mg by mouth 3 (Three) Times a Day.     • hydrochlorothiazide (HYDRODIURIL) 25 MG tablet Take 25 mg by mouth daily.     • insulin NPH-insulin regular (humuLIN 70/30,novoLIN 70/30) (70-30) 100 UNIT/ML injection Inject 20 Units under the skin 2 (Two) Times a Day With Meals.  0   • levocetirizine (XYZAL) 5 MG tablet Take 5 mg by mouth Daily As Needed for Allergies.     • Liraglutide (VICTOZA) 18 MG/3ML solution pen-injector injection Inject 1.8 mg under the skin Daily.     • lisinopril (PRINIVIL,ZESTRIL) 40 MG tablet Take 40 mg by mouth daily.     • predniSONE (DELTASONE) 20 MG tablet Take 1 tablet by mouth daily for 3 days, then decrease to ½ tablet daily for 3 days then stop. 6 tablet 0   • tamsulosin (FLOMAX) 0.4 MG capsule 24 hr capsule Take 1 capsule by mouth Every Evening.       No current facility-administered medications for  "this visit.          Allergies  Patient has no known allergies.    /84   Pulse 81   Temp 98 °F (36.7 °C)   Resp 20   Ht 182.9 cm (72.01\")   Wt 111 kg (245 lb)   SpO2 96%   BMI 33.22 kg/m²      Physical Exam   Constitutional: He is oriented to person, place, and time. He appears well-developed and well-nourished. No distress.   Appears chronically ill   HENT:   Head: Normocephalic.   Right Ear: External ear normal.   Left Ear: External ear normal.   Nose: Nose normal.   Mouth/Throat: Oropharynx is clear and moist.   Eyes: Conjunctivae and EOM are normal. Right eye exhibits no discharge. Left eye exhibits no discharge.   Neck: Normal range of motion. No JVD present. No tracheal deviation present. No thyromegaly present.   Cardiovascular: Normal rate, regular rhythm, normal heart sounds and intact distal pulses.  Exam reveals no gallop and no friction rub.    No murmur heard.  Pulmonary/Chest: Effort normal and breath sounds normal. No stridor. No respiratory distress. He has no wheezes. He has no rales. He exhibits no tenderness.   Abdominal: Soft. Bowel sounds are normal. He exhibits no distension and no mass. There is no tenderness. There is no rebound and no guarding. No hernia.   Genitourinary: Rectal exam shows guaiac negative stool.   Musculoskeletal: Normal range of motion. He exhibits no edema, tenderness or deformity.   Lymphadenopathy:     He has no cervical adenopathy.   Neurological: He is alert and oriented to person, place, and time. He has normal reflexes. He displays normal reflexes. No cranial nerve deficit. He exhibits normal muscle tone. Coordination normal.   Skin: Skin is warm and dry. No rash noted. He is not diaphoretic. No erythema. No pallor.   Psychiatric: He has a normal mood and affect. His behavior is normal. Judgment and thought content normal.    no pulses in lower extremities          Assessment   Severe peripheral last disease with COPD, diabetes, O2 dependent.        Plan "     We'll get CTA  Patient's Body mass index is 33.22 kg/m². BMI is above normal parameters. Recommendations include: educational material.      Pepe Cordon MD

## 2018-08-09 ENCOUNTER — TELEPHONE (OUTPATIENT)
Dept: SURGERY | Facility: CLINIC | Age: 68
End: 2018-08-09

## 2018-08-13 ENCOUNTER — APPOINTMENT (OUTPATIENT)
Dept: GENERAL RADIOLOGY | Facility: HOSPITAL | Age: 68
End: 2018-08-13

## 2018-08-13 ENCOUNTER — APPOINTMENT (OUTPATIENT)
Dept: CT IMAGING | Facility: HOSPITAL | Age: 68
End: 2018-08-13

## 2018-08-13 ENCOUNTER — HOSPITAL ENCOUNTER (EMERGENCY)
Facility: HOSPITAL | Age: 68
Discharge: LEFT BEFORE DISCHARGE | End: 2018-08-13
Attending: FAMILY MEDICINE | Admitting: INTERNAL MEDICINE

## 2018-08-13 VITALS
RESPIRATION RATE: 23 BRPM | OXYGEN SATURATION: 95 % | DIASTOLIC BLOOD PRESSURE: 99 MMHG | HEIGHT: 72 IN | SYSTOLIC BLOOD PRESSURE: 155 MMHG | WEIGHT: 250 LBS | HEART RATE: 96 BPM | BODY MASS INDEX: 33.86 KG/M2 | TEMPERATURE: 98.9 F

## 2018-08-13 DIAGNOSIS — R77.8 ELEVATED TROPONIN: ICD-10-CM

## 2018-08-13 DIAGNOSIS — J44.1 COPD WITH ACUTE EXACERBATION (HCC): Primary | ICD-10-CM

## 2018-08-13 LAB
A-A DO2: 82.7 MMHG (ref 0–300)
A-A DO2: 95.7 MMHG (ref 0–300)
ALBUMIN SERPL-MCNC: 3.3 G/DL (ref 3.4–4.8)
ALBUMIN/GLOB SERPL: 1.2 G/DL (ref 1.5–2.5)
ALP SERPL-CCNC: 104 U/L (ref 40–129)
ALT SERPL W P-5'-P-CCNC: 15 U/L (ref 10–44)
ANION GAP SERPL CALCULATED.3IONS-SCNC: ABNORMAL MMOL/L (ref 3.6–11.2)
ARTERIAL PATENCY WRIST A: ABNORMAL
ARTERIAL PATENCY WRIST A: POSITIVE
AST SERPL-CCNC: 15 U/L (ref 10–34)
ATMOSPHERIC PRESS: 729 MMHG
ATMOSPHERIC PRESS: 729 MMHG
BASE EXCESS BLDA CALC-SCNC: 11.4 MMOL/L
BASE EXCESS BLDA CALC-SCNC: 12.6 MMOL/L
BASOPHILS # BLD AUTO: 0.03 10*3/MM3 (ref 0–0.3)
BASOPHILS NFR BLD AUTO: 0.3 % (ref 0–2)
BDY SITE: ABNORMAL
BDY SITE: ABNORMAL
BILIRUB SERPL-MCNC: 0.4 MG/DL (ref 0.2–1.8)
BNP SERPL-MCNC: 44.6 PG/ML (ref 0–100)
BODY TEMPERATURE: 98.6 C
BODY TEMPERATURE: 98.6 C
BUN BLD-MCNC: 11 MG/DL (ref 7–21)
BUN/CREAT SERPL: 13.3 (ref 7–25)
CALCIUM SPEC-SCNC: 9.1 MG/DL (ref 7.7–10)
CHLORIDE SERPL-SCNC: 97 MMOL/L (ref 99–112)
CO2 SERPL-SCNC: >40 MMOL/L (ref 24.3–31.9)
COHGB MFR BLD: 6.3 % (ref 0–5)
COHGB MFR BLD: 7.1 % (ref 0–5)
CREAT BLD-MCNC: 0.83 MG/DL (ref 0.43–1.29)
CRP SERPL-MCNC: 5.65 MG/DL (ref 0–0.99)
D-LACTATE SERPL-SCNC: 0.8 MMOL/L (ref 0.5–2)
DEPRECATED RDW RBC AUTO: 50.2 FL (ref 37–54)
EOSINOPHIL # BLD AUTO: 0.06 10*3/MM3 (ref 0–0.7)
EOSINOPHIL NFR BLD AUTO: 0.7 % (ref 0–7)
EPAP: 5
ERYTHROCYTE [DISTWIDTH] IN BLOOD BY AUTOMATED COUNT: 14.9 % (ref 11.5–14.5)
GFR SERPL CREATININE-BSD FRML MDRD: 92 ML/MIN/1.73
GLOBULIN UR ELPH-MCNC: 2.8 GM/DL
GLUCOSE BLD-MCNC: 203 MG/DL (ref 70–110)
HCO3 BLDA-SCNC: 39.2 MMOL/L (ref 22–26)
HCO3 BLDA-SCNC: 41.5 MMOL/L (ref 22–26)
HCT VFR BLD AUTO: 48.8 % (ref 42–52)
HCT VFR BLD CALC: 47 % (ref 42–52)
HCT VFR BLD CALC: 48 % (ref 42–52)
HGB BLD-MCNC: 15.5 G/DL (ref 14–18)
HGB BLDA-MCNC: 16.1 G/DL (ref 12–16)
HGB BLDA-MCNC: 16.4 G/DL (ref 12–16)
HOROWITZ INDEX BLD+IHG-RTO: 32 %
HOROWITZ INDEX BLD+IHG-RTO: 35 %
IMM GRANULOCYTES # BLD: 0.02 10*3/MM3 (ref 0–0.03)
IMM GRANULOCYTES NFR BLD: 0.2 % (ref 0–0.5)
IPAP: 18
LYMPHOCYTES # BLD AUTO: 1.1 10*3/MM3 (ref 1–3)
LYMPHOCYTES NFR BLD AUTO: 12.3 % (ref 16–46)
MCH RBC QN AUTO: 30.6 PG (ref 27–33)
MCHC RBC AUTO-ENTMCNC: 31.8 G/DL (ref 33–37)
MCV RBC AUTO: 96.4 FL (ref 80–94)
METHGB BLD QL: 0.3 % (ref 0–3)
METHGB BLD QL: 0.3 % (ref 0–3)
MODALITY: ABNORMAL
MODALITY: ABNORMAL
MONOCYTES # BLD AUTO: 0.65 10*3/MM3 (ref 0.1–0.9)
MONOCYTES NFR BLD AUTO: 7.3 % (ref 0–12)
NEUTROPHILS # BLD AUTO: 7.07 10*3/MM3 (ref 1.4–6.5)
NEUTROPHILS NFR BLD AUTO: 79.2 % (ref 40–75)
OSMOLALITY SERPL CALC.SUM OF ELEC: 284.6 MOSM/KG (ref 273–305)
OXYHGB MFR BLDV: 82.6 % (ref 85–100)
OXYHGB MFR BLDV: 88.1 % (ref 85–100)
PCO2 BLDA: 63.2 MM HG (ref 35–45)
PCO2 BLDA: 70.5 MM HG (ref 35–45)
PH BLDA: 7.39 PH UNITS (ref 7.35–7.45)
PH BLDA: 7.41 PH UNITS (ref 7.35–7.45)
PLATELET # BLD AUTO: 164 10*3/MM3 (ref 130–400)
PMV BLD AUTO: 10.5 FL (ref 6–10)
PO2 BLDA: 53.1 MM HG (ref 80–100)
PO2 BLDA: 69.5 MM HG (ref 80–100)
POTASSIUM BLD-SCNC: 3.9 MMOL/L (ref 3.5–5.3)
PROT SERPL-MCNC: 6.1 G/DL (ref 6–8)
RBC # BLD AUTO: 5.06 10*6/MM3 (ref 4.7–6.1)
SAO2 % BLDCOA: 89.2 % (ref 90–100)
SAO2 % BLDCOA: 94.3 % (ref 90–100)
SET MECH RESP RATE: 14
SODIUM BLD-SCNC: 140 MMOL/L (ref 135–153)
TROPONIN I SERPL-MCNC: 0.18 NG/ML
TROPONIN I SERPL-MCNC: 0.22 NG/ML
WBC NRBC COR # BLD: 8.93 10*3/MM3 (ref 4.5–12.5)

## 2018-08-13 PROCEDURE — 25010000002 VANCOMYCIN PER 500 MG

## 2018-08-13 PROCEDURE — 25010000002 METHYLPREDNISOLONE PER 125 MG: Performed by: FAMILY MEDICINE

## 2018-08-13 PROCEDURE — 99285 EMERGENCY DEPT VISIT HI MDM: CPT

## 2018-08-13 PROCEDURE — 80053 COMPREHEN METABOLIC PANEL: CPT | Performed by: FAMILY MEDICINE

## 2018-08-13 PROCEDURE — 36600 WITHDRAWAL OF ARTERIAL BLOOD: CPT | Performed by: FAMILY MEDICINE

## 2018-08-13 PROCEDURE — 83050 HGB METHEMOGLOBIN QUAN: CPT | Performed by: FAMILY MEDICINE

## 2018-08-13 PROCEDURE — 82805 BLOOD GASES W/O2 SATURATION: CPT | Performed by: FAMILY MEDICINE

## 2018-08-13 PROCEDURE — 25010000002 KETOROLAC TROMETHAMINE PER 15 MG: Performed by: FAMILY MEDICINE

## 2018-08-13 PROCEDURE — 94640 AIRWAY INHALATION TREATMENT: CPT

## 2018-08-13 PROCEDURE — G0378 HOSPITAL OBSERVATION PER HR: HCPCS

## 2018-08-13 PROCEDURE — 82375 ASSAY CARBOXYHB QUANT: CPT | Performed by: FAMILY MEDICINE

## 2018-08-13 PROCEDURE — 94799 UNLISTED PULMONARY SVC/PX: CPT

## 2018-08-13 PROCEDURE — 85025 COMPLETE CBC W/AUTO DIFF WBC: CPT | Performed by: FAMILY MEDICINE

## 2018-08-13 PROCEDURE — 96375 TX/PRO/DX INJ NEW DRUG ADDON: CPT

## 2018-08-13 PROCEDURE — 84484 ASSAY OF TROPONIN QUANT: CPT | Performed by: FAMILY MEDICINE

## 2018-08-13 PROCEDURE — 87040 BLOOD CULTURE FOR BACTERIA: CPT | Performed by: FAMILY MEDICINE

## 2018-08-13 PROCEDURE — 71045 X-RAY EXAM CHEST 1 VIEW: CPT

## 2018-08-13 PROCEDURE — 86140 C-REACTIVE PROTEIN: CPT | Performed by: FAMILY MEDICINE

## 2018-08-13 PROCEDURE — 71045 X-RAY EXAM CHEST 1 VIEW: CPT | Performed by: RADIOLOGY

## 2018-08-13 PROCEDURE — 96374 THER/PROPH/DIAG INJ IV PUSH: CPT

## 2018-08-13 PROCEDURE — 83605 ASSAY OF LACTIC ACID: CPT | Performed by: FAMILY MEDICINE

## 2018-08-13 PROCEDURE — 94660 CPAP INITIATION&MGMT: CPT

## 2018-08-13 PROCEDURE — 36415 COLL VENOUS BLD VENIPUNCTURE: CPT

## 2018-08-13 PROCEDURE — 93005 ELECTROCARDIOGRAM TRACING: CPT | Performed by: FAMILY MEDICINE

## 2018-08-13 PROCEDURE — 83880 ASSAY OF NATRIURETIC PEPTIDE: CPT | Performed by: FAMILY MEDICINE

## 2018-08-13 RX ORDER — DOXYCYCLINE 100 MG/1
100 CAPSULE ORAL 2 TIMES DAILY
Qty: 20 CAPSULE | Refills: 0 | Status: SHIPPED | OUTPATIENT
Start: 2018-08-13 | End: 2018-08-23

## 2018-08-13 RX ORDER — PREDNISONE 20 MG/1
20 TABLET ORAL 2 TIMES DAILY
Qty: 8 TABLET | Refills: 0 | Status: SHIPPED | OUTPATIENT
Start: 2018-08-14 | End: 2018-08-18

## 2018-08-13 RX ORDER — IPRATROPIUM BROMIDE AND ALBUTEROL SULFATE 2.5; .5 MG/3ML; MG/3ML
3 SOLUTION RESPIRATORY (INHALATION)
Status: COMPLETED | OUTPATIENT
Start: 2018-08-13 | End: 2018-08-13

## 2018-08-13 RX ORDER — OXYCODONE AND ACETAMINOPHEN 10; 325 MG/1; MG/1
1 TABLET ORAL ONCE
Status: COMPLETED | OUTPATIENT
Start: 2018-08-13 | End: 2018-08-13

## 2018-08-13 RX ORDER — IPRATROPIUM BROMIDE AND ALBUTEROL SULFATE 2.5; .5 MG/3ML; MG/3ML
3 SOLUTION RESPIRATORY (INHALATION) EVERY 4 HOURS PRN
Qty: 360 ML | Refills: 0 | Status: SHIPPED | OUTPATIENT
Start: 2018-08-13

## 2018-08-13 RX ORDER — KETOROLAC TROMETHAMINE 30 MG/ML
15 INJECTION, SOLUTION INTRAMUSCULAR; INTRAVENOUS ONCE
Status: COMPLETED | OUTPATIENT
Start: 2018-08-13 | End: 2018-08-13

## 2018-08-13 RX ORDER — ONDANSETRON 4 MG/1
8 TABLET, ORALLY DISINTEGRATING ORAL ONCE
Status: DISCONTINUED | OUTPATIENT
Start: 2018-08-13 | End: 2018-08-13

## 2018-08-13 RX ORDER — METHYLPREDNISOLONE SODIUM SUCCINATE 125 MG/2ML
125 INJECTION, POWDER, LYOPHILIZED, FOR SOLUTION INTRAMUSCULAR; INTRAVENOUS ONCE
Status: COMPLETED | OUTPATIENT
Start: 2018-08-13 | End: 2018-08-13

## 2018-08-13 RX ADMIN — METHYLPREDNISOLONE SODIUM SUCCINATE 125 MG: 125 INJECTION, POWDER, FOR SOLUTION INTRAMUSCULAR; INTRAVENOUS at 15:22

## 2018-08-13 RX ADMIN — KETOROLAC TROMETHAMINE 15 MG: 30 INJECTION, SOLUTION INTRAMUSCULAR; INTRAVENOUS at 16:14

## 2018-08-13 RX ADMIN — VANCOMYCIN HYDROCHLORIDE 2000 MG: 5 INJECTION, POWDER, LYOPHILIZED, FOR SOLUTION INTRAVENOUS at 18:59

## 2018-08-13 RX ADMIN — IPRATROPIUM BROMIDE AND ALBUTEROL SULFATE 3 ML: .5; 3 SOLUTION RESPIRATORY (INHALATION) at 15:10

## 2018-08-13 RX ADMIN — IPRATROPIUM BROMIDE AND ALBUTEROL SULFATE 3 ML: .5; 3 SOLUTION RESPIRATORY (INHALATION) at 14:30

## 2018-08-13 RX ADMIN — OXYCODONE HYDROCHLORIDE AND ACETAMINOPHEN 1 TABLET: 10; 325 TABLET ORAL at 16:49

## 2018-08-13 RX ADMIN — IPRATROPIUM BROMIDE AND ALBUTEROL SULFATE 3 ML: .5; 3 SOLUTION RESPIRATORY (INHALATION) at 14:55

## 2018-08-13 NOTE — ED NOTES
"Pt stated that he was not going upstairs at this time, he said that he is leaving and to \"get this stuff off of me\". Provider aware and at the bedside speaking to pt at this time.     Tabatha Sanchez RN  08/13/18 1915    "

## 2018-08-13 NOTE — DISCHARGE INSTRUCTIONS
Call one of the offices below to establish a primary care provider.  If you are unable to get an appointment and feel it is an emergency and need to be seen immediately please return to the Emergency Department.    Call one of the office below to set up a primary care provider.    Dr. Dean Duarte                                                                                                       [address] [facility] [address]  [phone]    Dr. Mari, Dr. LEONEL Porras, Dr. PATRICK Porras ([facility])  [address] [address]  [phone]    Dr. Barragan, Dr. Alfaro, Dr. Alejo ([facility])  [address] [facility] [address]  [phone]    Dr. Head  [address] [facility] [address]  [phone]    Dr. Rogers, Dr. Stevens, Dr. Cunningham, Dr. Monet ([facility])  [address] [place] [address] [address]  [facility] [address]  [phone]    Dr. Myranda Zepeda  [address] [facility] [place] [address]  [phone]    Dr. Verenice Ramos  [address]   [address]  [facility] [address]  [phone]    Dr. Duarte  [address] [facility] [address]  [phone]    Dr. Butt, Dr. Miller  [address] [facility] [place] [address]  [phone]    Dr. Tapia  [address]                                                              [address]  [facility] [address]  [phone]    Dr. Bucio  [address] [facility] [id number] [phone]    Dr. Hannah Moss  [address]  [address]  [facility] [address]  [phone]

## 2018-08-13 NOTE — ED PROVIDER NOTES
Subjective   History of Present Illness  67 y/o M here w/ cough, SOB, and CP of acute onset. No fever/chills. No N/V/D. Pt states cough continues to worsen. Pt is on O2 via NC at home. Pt states adherence to NC and nebulizer treatments. +h/o COPD.  Review of Systems   Constitutional: Negative for chills, fatigue and fever.   Eyes: Negative for photophobia and visual disturbance.   Respiratory: Positive for cough and shortness of breath. Negative for choking and wheezing.    Cardiovascular: Positive for chest pain. Negative for palpitations.   Gastrointestinal: Negative for abdominal distention, abdominal pain, constipation, diarrhea, nausea and vomiting.   Genitourinary: Negative for difficulty urinating and dysuria.   Musculoskeletal: Negative for back pain.   Skin: Negative for color change and pallor.   Neurological: Negative for headaches.   Hematological: Does not bruise/bleed easily.   All other systems reviewed and are negative.      Past Medical History:   Diagnosis Date   • COPD (chronic obstructive pulmonary disease) (CMS/[facility])    • Diabetes mellitus (CMS/[facility])    • Hypertension        No Known Allergies    No past surgical history on file.    Family History   Problem Relation Age of Onset   • No Known Problems Mother    • Heart failure Father        Social History     Social History   • Marital status:      Social History Main Topics   • Smoking status: Current Every Day Smoker     Packs/day: 1.00     Years: 25.00     Types: Cigarettes   • Smokeless tobacco: Never Used   • Alcohol use No   • Drug use: No   • Sexual activity: Defer     Other Topics Concern   • Not on file           Objective   Physical Exam   Constitutional: He is oriented to person, place, and time. He appears well-developed and well-nourished. He is active.   HENT:   Head: Normocephalic and atraumatic.   Right Ear: Hearing, external ear and ear canal normal.   Left Ear: Hearing, external ear and ear canal normal.   Nose: Nose  normal.   Mouth/Throat: Uvula is midline, oropharynx is clear and moist and mucous membranes are normal.   Eyes: Pupils are equal, round, and reactive to light. Conjunctivae, EOM and lids are normal.   Neck: Trachea normal, normal range of motion, full passive range of motion without pain and phonation normal. Neck supple.   Cardiovascular: Normal rate, regular rhythm and normal heart sounds.    Pulmonary/Chest: Effort normal. He has decreased breath sounds. He has wheezes.   Abdominal: Soft. Normal appearance.   Neurological: He is alert and oriented to person, place, and time. GCS eye subscore is 4. GCS verbal subscore is 5. GCS motor subscore is 6.   Skin: Skin is warm, dry and intact. Capillary refill takes less than 2 seconds.   Psychiatric: He has a normal mood and affect. His speech is normal and behavior is normal. Cognition and memory are normal.   Nursing note and vitals reviewed.      Procedures           ED Course      HEART score of 4, Pt improved on BiPAP. Pt admitted.   After prolonged conversation pt stated he was leaving despite risks of death and disability.          MDM  Number of Diagnoses or Management Options  COPD with acute exacerbation (CMS/HCC): new and requires workup  Elevated troponin: new and requires workup     Amount and/or Complexity of Data Reviewed  Clinical lab tests: reviewed and ordered  Tests in the radiology section of CPT®: reviewed and ordered  Tests in the medicine section of CPT®: reviewed and ordered  Decide to obtain previous medical records or to obtain history from someone other than the patient: yes  Independent visualization of images, tracings, or specimens: yes    Risk of Complications, Morbidity, and/or Mortality  Presenting problems: high  Diagnostic procedures: high  Management options: high    Patient Progress  Patient progress: stable        Final diagnoses:   COPD with acute exacerbation (CMS/HCC)   Elevated troponin            Rocky Shrama,  MD  08/13/18 1849       Rocky Sharma MD  08/13/18 9247

## 2018-08-13 NOTE — ED NOTES
I went to place the patient on the Zoll's monitor to take him to his room but he insists on taking everything off of him and wants to sign out against medical advice. Dr. Sharma notified.     Adriano Floyd  08/13/18 1924

## 2018-08-13 NOTE — ED NOTES
Dr. Sharma at bedside explaining risks of leaving, which includes death. Pt verbalized understanding at this time and still refuses admission. Pt remains A&Ox4.      Magan Reinoso RN  08/13/18 1926     [facility] Completed in 2-     Last appointment 8-  Next appointment 3-1-2018

## 2018-08-14 NOTE — PROGRESS NOTES
Heath Bajwa  [id number]  1950  8/20/2018      Referring Provider:   Julian Stern MD    Primary Provider:  PANCHO Lopez     Reason for Hospital Discharge Follow Up:   Biclonal Gammopathy     Chief Complaint:  Dyspnea       History of Present Illness   Heath Bajwa is a 68 y.o. year-old male seen today in hospital discharge follow up at the request of Dr. Julian Stern for further evaluation and management of biclonal gammopathy seen on laboratory evaluation for workup of nephrotic syndrome.    Mr. Bajwa originally presented to the emergency department with complaints of worsening dyspnea in July 2018. He was admitted to the CCU due to hypoxia and hypercarbic respiratory failure requiring BiPAP. On admission he was noted to have bilateral lower extremity cellulitis. On laboratory evaluation to further evaluate dyspnea troponins were in the indeterminate range and this was felt to be due to demand ischemia from acute hypoxic respiratory failure and was being followed by cardiology. Patient was also found to have proteinuria in which he currently follows with nephrology and that was felt to likely be due to diabetic nephropathy. To further evaluate however serum free light chains as well as immunofixation were drawn revealing some abnormalities. Work up was also significant for positive Hepatitis C, and complement levels were normal, however patient reports that [facility]    Interim History:   Since hospital discharge he felt that his edema began to worsen and was recently placed on generic Lasix with improvement in his symptoms. He reports that his dyspnea, edema, and weight have been improving. He continues to remain on supplemental oxygen.      The following portions of the patient's history were reviewed and updated as appropriate: allergies, current medications, past family history, past medical history, past social history, past surgical history and problem  list.    No Known Allergies    Past Medical History:   Diagnosis Date   • COPD (chronic obstructive pulmonary disease) (CMS/[facility])    • Diabetes mellitus (CMS/[facility])    • Hypertension        History reviewed. No pertinent surgical history.    Social History     Social History   • Marital status:      Spouse name: N/A   • Number of children: N/A   • Years of education: N/A     Occupational History   • Not on file.     Social History Main Topics   • Smoking status: Current Every Day Smoker     Packs/day: 1.00     Years: 25.00     Types: Cigarettes   • Smokeless tobacco: Never Used   • Alcohol use No   • Drug use: No   • Sexual activity: Defer     Other Topics Concern   • Not on file     Social History Narrative   • No narrative on file       Family History   Problem Relation Age of Onset   • No Known Problems Mother    • Heart failure Father          Current Outpatient Prescriptions:   •  albuterol (PROVENTIL HFA;VENTOLIN HFA) 108 (90 Base) MCG/ACT inhaler, Inhale 2 puffs Every 6 (Six) Hours As Needed for Wheezing., Disp: , Rfl:   •  amLODIPine (NORVASC) 10 MG tablet, Take 1 tablet by mouth Daily., Disp: 30 tablet, Rfl: 0  •  aspirin 325 MG tablet, Take 325 mg by mouth Daily., Disp: , Rfl:   •  atorvastatin (LIPITOR) 40 MG tablet, Take 1 tablet by mouth Daily., Disp: 30 tablet, Rfl: 0  •  budesonide-formoterol (SYMBICORT) 160-4.5 MCG/ACT inhaler, Inhale 2 puffs 2 (two) times a day., Disp: , Rfl:   •  donepezil (ARICEPT) 10 MG tablet, Take 10 mg by mouth Every Night., Disp: , Rfl:   •  doxycycline (MONODOX) 100 MG capsule, Take 1 capsule by mouth 2 (Two) Times a Day for 10 days., Disp: 20 capsule, Rfl: 0  •  gabapentin (NEURONTIN) 800 MG tablet, Take 800 mg by mouth 3 (Three) Times a Day., Disp: , Rfl:   •  hydrochlorothiazide (HYDRODIURIL) 25 MG tablet, Take 25 mg by mouth daily., Disp: , Rfl:   •  insulin NPH-insulin regular (humuLIN 70/30,novoLIN 70/30) (70-30) 100 UNIT/ML injection, Inject 20 Units under the  skin 2 (Two) Times a Day With Meals., Disp: , Rfl: 0  •  ipratropium-albuterol (DUO-NEB) 0.5-2.5 mg/3 ml nebulizer, Take 3 mL by nebulization Every 4 (Four) Hours As Needed for Shortness of Air., Disp: 360 mL, Rfl: 0  •  levocetirizine (XYZAL) 5 MG tablet, Take 5 mg by mouth Daily As Needed for Allergies., Disp: , Rfl:   •  Liraglutide (VICTOZA) 18 MG/3ML solution pen-injector injection, Inject 1.8 mg under the skin Daily., Disp: , Rfl:   •  lisinopril (PRINIVIL,ZESTRIL) 40 MG tablet, Take 40 mg by mouth daily., Disp: , Rfl:   •  sertraline (ZOLOFT) 25 MG tablet, Take 25 mg by mouth Daily., Disp: , Rfl:   •  tamsulosin (FLOMAX) 0.4 MG capsule 24 hr capsule, Take 1 capsule by mouth Every Evening., Disp: , Rfl:   Generic Lasix 20mg   Medications were reviewed during his visit.         Review of Systems  Pertinent positives are listed as per history of present of illness, all other systems reviewed and are negative.      Physical Exam  Vital Signs: These were reviewed and listed as per patient’s electronic medical chart  Vitals:    08/20/18 1122   BP: 144/65   Pulse: 73   Resp: 20   Temp: 98.3 °F (36.8 °C)   SpO2: (!) 88%     General: Awake, alert and oriented, in no distress  HEENT: Head is atraumatic, normocephalic, extraocular movements full, oropharynx clear, no scleral icterus, pink moist mucous membranes, NC in place   Neck: supple, no jvd, lymphadenopathy or masses  Cardiovascular: regular rate and rhythm without murmurs, rubs or gallops  Pulmonary: non-labored, coarse breath sounds at bases, no wheezing  Abdomen: soft, non-tender, non-distended, normal active bowel sounds present  Extremities: No clubbing, cyanosis, +lower extremity pitting edema  Lymph: No cervical, supraclavicular adenopathy  Neurologic: Mental status as above, alert, awake and oriented, grossly non-focal exam  Skin: warm, dry, intact, chronic skin lesions, chronic venous stasis of lower extremities        Labs / Studies:    Office Visit on  08/20/2018   Component Date Value   • Glucose 08/20/2018 84    • BUN 08/20/2018 8    • Creatinine 08/20/2018 0.80    • Sodium 08/20/2018 134*   • Potassium 08/20/2018 3.8    • Chloride 08/20/2018 94*   • CO2 08/20/2018 37.2*   • Calcium 08/20/2018 9.3    • Total Protein 08/20/2018 6.5    • Albumin 08/20/2018 3.60    • ALT (SGPT) 08/20/2018 17    • AST (SGOT) 08/20/2018 25    • Alkaline Phosphatase 08/20/2018 96    • Total Bilirubin 08/20/2018 0.7    • eGFR Non  Amer 08/20/2018 96    • Globulin 08/20/2018 2.9    • A/G Ratio 08/20/2018 1.2*   • BUN/Creatinine Ratio 08/20/2018 10.0    • Anion Gap 08/20/2018 2.8*   • WBC 08/20/2018 7.40    • RBC 08/20/2018 5.60    • Hemoglobin 08/20/2018 16.9    • Hematocrit 08/20/2018 50.9    • MCV 08/20/2018 90.9    • MCH 08/20/2018 30.2    • MCHC 08/20/2018 33.2    • RDW 08/20/2018 14.4    • RDW-SD 08/20/2018 47.9    • MPV 08/20/2018 11.0*   • Platelets 08/20/2018 200    • Neutrophil % 08/20/2018 79.0*   • Lymphocyte % 08/20/2018 13.6*   • Monocyte % 08/20/2018 6.1    • Eosinophil % 08/20/2018 0.9    • Basophil % 08/20/2018 0.3    • Immature Grans % 08/20/2018 0.1    • Neutrophils, Absolute 08/20/2018 5.84    • Lymphocytes, Absolute 08/20/2018 1.01    • Monocytes, Absolute 08/20/2018 0.45    • Eosinophils, Absolute 08/20/2018 0.07    • Basophils, Absolute 08/20/2018 0.02    • Immature Grans, Absolute 08/20/2018 0.01    • Osmolality Calc 08/20/2018 265.8*   Admission on 08/13/2018, Discharged on 08/13/2018   Component Date Value   • Glucose 08/13/2018 203*   • BUN 08/13/2018 11    • Creatinine 08/13/2018 0.83    • Sodium 08/13/2018 140    • Potassium 08/13/2018 3.9    • Chloride 08/13/2018 97*   • CO2 08/13/2018 >40.0*   • Calcium 08/13/2018 9.1    • Total Protein 08/13/2018 6.1    • Albumin 08/13/2018 3.30*   • ALT (SGPT) 08/13/2018 15    • AST (SGOT) 08/13/2018 15    • Alkaline Phosphatase 08/13/2018 104    • Total Bilirubin 08/13/2018 0.4    • eGFR Non  Amer  08/13/2018 92    • Globulin 08/13/2018 2.8    • A/G Ratio 08/13/2018 1.2*   • BUN/Creatinine Ratio 08/13/2018 13.3    • Anion Gap 08/13/2018     • BNP 08/13/2018 44.6    • Troponin I 08/13/2018 0.184*   • Blood Culture 08/13/2018 No growth at 5 days    • Blood Culture 08/13/2018 No growth at 5 days    • Lactate 08/13/2018 0.8    • C-Reactive Protein 08/13/2018 5.65*   • WBC 08/13/2018 8.93    • RBC 08/13/2018 5.06    • Hemoglobin 08/13/2018 15.5    • Hematocrit 08/13/2018 48.8    • MCV 08/13/2018 96.4*   • MCH 08/13/2018 30.6    • MCHC 08/13/2018 31.8*   • RDW 08/13/2018 14.9*   • RDW-SD 08/13/2018 50.2    • MPV 08/13/2018 10.5*   • Platelets 08/13/2018 164    • Neutrophil % 08/13/2018 79.2*   • Lymphocyte % 08/13/2018 12.3*   • Monocyte % 08/13/2018 7.3    • Eosinophil % 08/13/2018 0.7    • Basophil % 08/13/2018 0.3    • Immature Grans % 08/13/2018 0.2    • Neutrophils, Absolute 08/13/2018 7.07*   • Lymphocytes, Absolute 08/13/2018 1.10    • Monocytes, Absolute 08/13/2018 0.65    • Eosinophils, Absolute 08/13/2018 0.06    • Basophils, Absolute 08/13/2018 0.03    • Immature Grans, Absolute 08/13/2018 0.02    • Site 08/13/2018 Arterial: left brachial    • Chandan's Test 08/13/2018 N/A    • pH, Arterial 08/13/2018 7.388    • pCO2, Arterial 08/13/2018 70.5*   • pO2, Arterial 08/13/2018 53.1*   • HCO3, Arterial 08/13/2018 41.5*   • Base Excess, Arterial 08/13/2018 12.6    • O2 Saturation, Arterial 08/13/2018 89.2*   • Hemoglobin, Blood Gas 08/13/2018 16.4*   • Hematocrit, Blood Gas 08/13/2018 48.0    • Oxyhemoglobin 08/13/2018 82.6*   • Methemoglobin 08/13/2018 0.30    • Carboxyhemoglobin 08/13/2018 7.1*   • A-a Gradiant 08/13/2018 82.7    • Temperature 08/13/2018 98.6    • Barometric Pressure for * 08/13/2018 729    • Modality 08/13/2018 Nasal Cannula    • FIO2 08/13/2018 32    • Site 08/13/2018 Arterial: right radial    • Chandan's Test 08/13/2018 Positive    • pH, Arterial 08/13/2018 7.410    • pCO2, Arterial 08/13/2018  63.2*   • pO2, Arterial 08/13/2018 69.5*   • HCO3, Arterial 08/13/2018 39.2*   • Base Excess, Arterial 08/13/2018 11.4    • O2 Saturation, Arterial 08/13/2018 94.3    • Hemoglobin, Blood Gas 08/13/2018 16.1*   • Hematocrit, Blood Gas 08/13/2018 47.0    • Oxyhemoglobin 08/13/2018 88.1    • Methemoglobin 08/13/2018 0.30    • Carboxyhemoglobin 08/13/2018 6.3*   • A-a Gradiant 08/13/2018 95.7    • Temperature 08/13/2018 98.6    • Barometric Pressure for * 08/13/2018 729    • Modality 08/13/2018 BiPap    • FIO2 08/13/2018 35    • Set Mech Resp Rate 08/13/2018 14    • IPAP 08/13/2018 18    • EPAP 08/13/2018 5    • Osmolality Calc 08/13/2018 284.6    • Troponin I 08/13/2018 0.222*   Lab Requisition on 07/20/2018   Component Date Value   • Glucose 07/20/2018 159*   • BUN 07/20/2018 15    • Creatinine 07/20/2018 0.68    • Sodium 07/20/2018 132*   • Potassium 07/20/2018 4.7    • Chloride 07/20/2018 95*   • CO2 07/20/2018 33.9*   • Calcium 07/20/2018 8.6    • eGFR Non  Amer 07/20/2018 115    • BUN/Creatinine Ratio 07/20/2018 22.1    • Anion Gap 07/20/2018 3.1*   • Hemoglobin A1C 07/20/2018 7.30*   • Osmolality Calc 07/20/2018 268.7*   Admission on 07/09/2018, Discharged on 07/15/2018   No results displayed because visit has over 200 results.                 Assessment/Plan   Heath Bajwa is a 68 y.o. year-old male seen today in hospital discharge follow up at the request of Dr. Julian Stern for further evaluation and management of biclonal gammopathy seen on laboratory evaluation for workup of nephrotic syndrome.       Biclonal gammopathy  - Studies obtained as part of work up for nephrotic range proteinuria. Creatinine normal, no evidence of anemia of hypercalcemia. Patient denied of any new bone pain and only has complained of chronic back/arthritic pain that has been occurring for years and has been unchanged.  - Serum free light chains showed elevation in both light chains however the ratio is within normal limits  and can be seen with underlying inflammation.  - Serum immunofixation was significant for an IgM and IgG monoclonal protein with kappa light chain specificity however there was only a small quantity of monoclonal protein and therefore was unable to quantitate M-spike.   - Unsure of significance as biclonal gammopathy in multiple myeloma would be rare however can occur. Possible that the abnormalities seen could be due to underlying inflammation versus biclonal MGUS given that Mspike couldn't be quantified  - Given these abnormalities will repeat serum studies along with UPEP and 24 hour urine light chain studies. If continues to be abnormal will obtain a skeletal survey for further evaluation.     Nephrotic proteinuria   - Possibly due to diabetic renal disease  - Currently being followed by Nephrology    [facility] Quality measures  - Heath Bajwa does use tobacco products, he currently smokes 1/2 pack per day and has been trying to cut back and also has nicotine patches available to him.   - I advised Heath of the risks of continuing to use tobacco, and I provided him with tobacco cessation educational materials in the After Visit Summary.   During this visit, I spent <3 minutes counseling the patient regarding tobacco cessation.  - Patient's Body mass index is 32.28 kg/m². BMI is above normal parameters. Recommendations include: exercise counseling and nutrition counseling. He reports that his weight has been decreasing with improvement in edema.  - Current outpatient and discharge medications have been reconciled for the patient.  Reviewed by: Kell Mayes MD      I will have the patient return in follow up appointment to review test results in one month. He understands that should he have any questions or concerns prior to his appointment he should give us a call at any time and I would be happy to see him sooner. It was a pleasure to see this patient in clinic today, thank you for allowing me to participate in  the care of this patient.      Kell Mayes MD  08/20/2018  3:58 PM

## 2018-08-15 ENCOUNTER — HOSPITAL ENCOUNTER (OUTPATIENT)
Dept: ULTRASOUND IMAGING | Facility: HOSPITAL | Age: 68
Discharge: HOME | End: 2018-08-15
Attending: SURGERY

## 2018-08-15 ENCOUNTER — HOSPITAL ENCOUNTER (OUTPATIENT)
Dept: CT IMAGING | Facility: HOSPITAL | Age: 68
Discharge: HOME | End: 2018-08-15
Attending: SURGERY

## 2018-08-15 DIAGNOSIS — I73.9 PVD (PERIPHERAL VASCULAR DISEASE) (HCC): ICD-10-CM

## 2018-08-15 PROCEDURE — 75635 CT ANGIO ABDOMINAL ARTERIES: CPT

## 2018-08-15 PROCEDURE — 0 IOPAMIDOL PER 1 ML: Performed by: SURGERY

## 2018-08-15 PROCEDURE — 93922 UPR/L XTREMITY ART 2 LEVELS: CPT

## 2018-08-15 PROCEDURE — 75635 CT ANGIO ABDOMINAL ARTERIES: CPT | Performed by: RADIOLOGY

## 2018-08-15 PROCEDURE — 93922 UPR/L XTREMITY ART 2 LEVELS: CPT | Performed by: RADIOLOGY

## 2018-08-15 RX ORDER — AMLODIPINE BESYLATE 10 MG/1
10 TABLET ORAL
Qty: 30 TABLET | Refills: 0 | OUTPATIENT
Start: 2018-08-15

## 2018-08-15 RX ORDER — ATORVASTATIN CALCIUM 40 MG/1
40 TABLET, FILM COATED ORAL DAILY
Qty: 30 TABLET | Refills: 0 | OUTPATIENT
Start: 2018-08-15

## 2018-08-15 RX ADMIN — IOPAMIDOL 100 ML: 755 INJECTION, SOLUTION INTRAVENOUS at 10:50

## 2018-08-18 LAB
BACTERIA SPEC AEROBE CULT: NORMAL
BACTERIA SPEC AEROBE CULT: NORMAL

## 2018-08-20 ENCOUNTER — OFFICE VISIT (OUTPATIENT)
Dept: ONCOLOGY | Facility: CLINIC | Age: 68
End: 2018-08-20

## 2018-08-20 VITALS
HEART RATE: 73 BPM | SYSTOLIC BLOOD PRESSURE: 144 MMHG | BODY MASS INDEX: 32.23 KG/M2 | TEMPERATURE: 98.3 F | OXYGEN SATURATION: 88 % | DIASTOLIC BLOOD PRESSURE: 65 MMHG | RESPIRATION RATE: 20 BRPM | HEIGHT: 72 IN | WEIGHT: 238 LBS

## 2018-08-20 DIAGNOSIS — Z71.6 TOBACCO ABUSE COUNSELING: ICD-10-CM

## 2018-08-20 DIAGNOSIS — D47.2 MGUS (MONOCLONAL GAMMOPATHY OF UNKNOWN SIGNIFICANCE): Primary | ICD-10-CM

## 2018-08-20 DIAGNOSIS — R80.9 NEPHROTIC RANGE PROTEINURIA: ICD-10-CM

## 2018-08-20 LAB
ALBUMIN SERPL-MCNC: 3.6 G/DL (ref 3.4–4.8)
ALBUMIN/GLOB SERPL: 1.2 G/DL (ref 1.5–2.5)
ALP SERPL-CCNC: 96 U/L (ref 40–129)
ALT SERPL W P-5'-P-CCNC: 17 U/L (ref 10–44)
ANION GAP SERPL CALCULATED.3IONS-SCNC: 2.8 MMOL/L (ref 3.6–11.2)
AST SERPL-CCNC: 25 U/L (ref 10–34)
BASOPHILS # BLD AUTO: 0.02 10*3/MM3 (ref 0–0.3)
BASOPHILS NFR BLD AUTO: 0.3 % (ref 0–2)
BILIRUB SERPL-MCNC: 0.7 MG/DL (ref 0.2–1.8)
BUN BLD-MCNC: 8 MG/DL (ref 7–21)
BUN/CREAT SERPL: 10 (ref 7–25)
CALCIUM SPEC-SCNC: 9.3 MG/DL (ref 7.7–10)
CHLORIDE SERPL-SCNC: 94 MMOL/L (ref 99–112)
CO2 SERPL-SCNC: 37.2 MMOL/L (ref 24.3–31.9)
CREAT BLD-MCNC: 0.8 MG/DL (ref 0.43–1.29)
DEPRECATED RDW RBC AUTO: 47.9 FL (ref 37–54)
EOSINOPHIL # BLD AUTO: 0.07 10*3/MM3 (ref 0–0.7)
EOSINOPHIL NFR BLD AUTO: 0.9 % (ref 0–7)
ERYTHROCYTE [DISTWIDTH] IN BLOOD BY AUTOMATED COUNT: 14.4 % (ref 11.5–14.5)
GFR SERPL CREATININE-BSD FRML MDRD: 96 ML/MIN/1.73
GLOBULIN UR ELPH-MCNC: 2.9 GM/DL
GLUCOSE BLD-MCNC: 84 MG/DL (ref 70–110)
HCT VFR BLD AUTO: 50.9 % (ref 42–52)
HGB BLD-MCNC: 16.9 G/DL (ref 14–18)
IMM GRANULOCYTES # BLD: 0.01 10*3/MM3 (ref 0–0.03)
IMM GRANULOCYTES NFR BLD: 0.1 % (ref 0–0.5)
LYMPHOCYTES # BLD AUTO: 1.01 10*3/MM3 (ref 1–3)
LYMPHOCYTES NFR BLD AUTO: 13.6 % (ref 16–46)
MCH RBC QN AUTO: 30.2 PG (ref 27–33)
MCHC RBC AUTO-ENTMCNC: 33.2 G/DL (ref 33–37)
MCV RBC AUTO: 90.9 FL (ref 80–94)
MONOCYTES # BLD AUTO: 0.45 10*3/MM3 (ref 0.1–0.9)
MONOCYTES NFR BLD AUTO: 6.1 % (ref 0–12)
NEUTROPHILS # BLD AUTO: 5.84 10*3/MM3 (ref 1.4–6.5)
NEUTROPHILS NFR BLD AUTO: 79 % (ref 40–75)
OSMOLALITY SERPL CALC.SUM OF ELEC: 265.8 MOSM/KG (ref 273–305)
PLATELET # BLD AUTO: 200 10*3/MM3 (ref 130–400)
PMV BLD AUTO: 11 FL (ref 6–10)
POTASSIUM BLD-SCNC: 3.8 MMOL/L (ref 3.5–5.3)
PROT SERPL-MCNC: 6.5 G/DL (ref 6–8)
RBC # BLD AUTO: 5.6 10*6/MM3 (ref 4.7–6.1)
SODIUM BLD-SCNC: 134 MMOL/L (ref 135–153)
WBC NRBC COR # BLD: 7.4 10*3/MM3 (ref 4.5–12.5)

## 2018-08-20 PROCEDURE — 85025 COMPLETE CBC W/AUTO DIFF WBC: CPT | Performed by: INTERNAL MEDICINE

## 2018-08-20 PROCEDURE — 83883 ASSAY NEPHELOMETRY NOT SPEC: CPT | Performed by: INTERNAL MEDICINE

## 2018-08-20 PROCEDURE — 80053 COMPREHEN METABOLIC PANEL: CPT | Performed by: INTERNAL MEDICINE

## 2018-08-20 PROCEDURE — 86334 IMMUNOFIX E-PHORESIS SERUM: CPT | Performed by: INTERNAL MEDICINE

## 2018-08-20 PROCEDURE — 84165 PROTEIN E-PHORESIS SERUM: CPT | Performed by: INTERNAL MEDICINE

## 2018-08-20 PROCEDURE — 82784 ASSAY IGA/IGD/IGG/IGM EACH: CPT | Performed by: INTERNAL MEDICINE

## 2018-08-20 PROCEDURE — 99214 OFFICE O/P EST MOD 30 MIN: CPT | Performed by: INTERNAL MEDICINE

## 2018-08-20 RX ORDER — SERTRALINE HYDROCHLORIDE 25 MG/1
25 TABLET, FILM COATED ORAL DAILY
COMMUNITY

## 2018-08-21 LAB
ALBUMIN SERPL-MCNC: 2.8 G/DL (ref 2.9–4.4)
ALBUMIN/GLOB SERPL: 0.9 {RATIO} (ref 0.7–1.7)
ALPHA1 GLOB FLD ELPH-MCNC: 0.3 G/DL (ref 0–0.4)
ALPHA2 GLOB SERPL ELPH-MCNC: 1 G/DL (ref 0.4–1)
B-GLOBULIN SERPL ELPH-MCNC: 1.2 G/DL (ref 0.7–1.3)
GAMMA GLOB SERPL ELPH-MCNC: 0.8 G/DL (ref 0.4–1.8)
GLOBULIN SER CALC-MCNC: 3.4 G/DL (ref 2.2–3.9)
IGA SERPL-MCNC: 358 MG/DL (ref 61–437)
IGG SERPL-MCNC: 794 MG/DL (ref 700–1600)
IGM SERPL-MCNC: 139 MG/DL (ref 20–172)
INTERPRETATION SERPL IEP-IMP: ABNORMAL
KAPPA LC SERPL-MCNC: 53.1 MG/L (ref 3.3–19.4)
KAPPA LC/LAMBDA SER: 1.15 {RATIO} (ref 0.26–1.65)
LAMBDA LC FREE SERPL-MCNC: 46.2 MG/L (ref 5.7–26.3)
Lab: ABNORMAL
M-SPIKE: ABNORMAL G/DL
PROT SERPL-MCNC: 6.2 G/DL (ref 6–8.5)

## 2018-08-28 ENCOUNTER — OFFICE VISIT (OUTPATIENT)
Dept: SURGERY | Facility: CLINIC | Age: 68
End: 2018-08-28

## 2018-08-28 VITALS
WEIGHT: 238 LBS | DIASTOLIC BLOOD PRESSURE: 88 MMHG | RESPIRATION RATE: 18 BRPM | HEART RATE: 80 BPM | TEMPERATURE: 98 F | OXYGEN SATURATION: 96 % | BODY MASS INDEX: 32.23 KG/M2 | HEIGHT: 72 IN | SYSTOLIC BLOOD PRESSURE: 150 MMHG

## 2018-08-28 DIAGNOSIS — I73.9 PVD (PERIPHERAL VASCULAR DISEASE) (HCC): Primary | ICD-10-CM

## 2018-08-28 DIAGNOSIS — I87.2 VENOUS STASIS DERMATITIS OF BOTH LOWER EXTREMITIES: ICD-10-CM

## 2018-08-28 PROCEDURE — 99213 OFFICE O/P EST LOW 20 MIN: CPT | Performed by: SURGERY

## 2018-08-28 NOTE — PROGRESS NOTES
8/28/2018    Patient Information  Heath Bajwa  [address]  [place] [address]  1950  [phone] (home)     Chief Complaint   Patient presents with   • Follow-up     FU CTA AND JOSE G       HPI  Patient's a 68-year-old white male with severe peripheral vascular disease and multiple comorbidities here for follow-up on CTA and ABIs.  His ABIs bilaterally are 0.6.  His CTA is inadequate.  Patient says he does not want to have anything done about his legs at this time    Review of Systems   Constitutional: Positive for unexpected weight change.   HENT: Negative.    Eyes: Negative.    Respiratory: Positive for shortness of breath.    Cardiovascular: Positive for palpitations.   Gastrointestinal: Negative.    Endocrine: Negative.    Genitourinary: Negative.    Musculoskeletal: Positive for arthralgias, back pain, joint swelling and myalgias.   Skin: Positive for wound.   Allergic/Immunologic: Negative.    Neurological: Positive for numbness.   Hematological: Bruises/bleeds easily.   Psychiatric/Behavioral: The patient is nervous/anxious.      The Review of Systems has been reviewed and confirmed.    Patient Active Problem List   Diagnosis   • BPH without urinary obstruction   • Elevated prostate specific antigen (PSA)   • Acute respiratory failure with hypoxia and hypercapnia (CMS/[facility])   • Venous stasis dermatitis of both lower extremities   • Elevated troponin         Past Medical History:   Diagnosis Date   • COPD (chronic obstructive pulmonary disease) (CMS/[facility])    • Diabetes mellitus (CMS/[facility])    • Hypertension          History reviewed. No pertinent surgical history.      Family History   Problem Relation Age of Onset   • No Known Problems Mother    • Heart failure Father          Social History   Substance Use Topics   • Smoking status: Current Every Day Smoker     Packs/day: 1.00     Years: 25.00     Types: Cigarettes   • Smokeless tobacco: Never Used   • Alcohol use No       Current Outpatient  "Prescriptions   Medication Sig Dispense Refill   • albuterol (PROVENTIL HFA;VENTOLIN HFA) 108 (90 Base) MCG/ACT inhaler Inhale 2 puffs Every 6 (Six) Hours As Needed for Wheezing.     • amLODIPine (NORVASC) 10 MG tablet Take 1 tablet by mouth Daily. 30 tablet 0   • aspirin 325 MG tablet Take 325 mg by mouth Daily.     • atorvastatin (LIPITOR) 40 MG tablet Take 1 tablet by mouth Daily. 30 tablet 0   • budesonide-formoterol (SYMBICORT) 160-4.5 MCG/ACT inhaler Inhale 2 puffs 2 (two) times a day.     • donepezil (ARICEPT) 10 MG tablet Take 10 mg by mouth Every Night.     • gabapentin (NEURONTIN) 800 MG tablet Take 800 mg by mouth 3 (Three) Times a Day.     • hydrochlorothiazide (HYDRODIURIL) 25 MG tablet Take 25 mg by mouth daily.     • insulin NPH-insulin regular (humuLIN 70/30,novoLIN 70/30) (70-30) 100 UNIT/ML injection Inject 20 Units under the skin 2 (Two) Times a Day With Meals.  0   • ipratropium-albuterol (DUO-NEB) 0.5-2.5 mg/3 ml nebulizer Take 3 mL by nebulization Every 4 (Four) Hours As Needed for Shortness of Air. 360 mL 0   • levocetirizine (XYZAL) 5 MG tablet Take 5 mg by mouth Daily As Needed for Allergies.     • Liraglutide (VICTOZA) 18 MG/3ML solution pen-injector injection Inject 1.8 mg under the skin Daily.     • lisinopril (PRINIVIL,ZESTRIL) 40 MG tablet Take 40 mg by mouth daily.     • sertraline (ZOLOFT) 25 MG tablet Take 25 mg by mouth Daily.     • tamsulosin (FLOMAX) 0.4 MG capsule 24 hr capsule Take 1 capsule by mouth Every Evening.       No current facility-administered medications for this visit.          Allergies  Patient has no known allergies.    /88   Pulse 80   Temp 98 °F (36.7 °C)   Resp 18   Ht 182.9 cm (72.01\")   Wt 108 kg (238 lb)   SpO2 96%   BMI 32.27 kg/m²      Physical Exam  Gen. a 68-year-old white male in no acute distress  Both lower extremities cool.  Stigmata of venous stasis disease bilaterally        Assessment   Severe peripheral vascular disease and multiple " comorbidities  Venous stasis disease      Plan     Return when he is ready to undergo arteriogram and possible stent placement.      Patient's Body mass index is 32.27 kg/m². BMI is above normal parameters. Recommendations include: educational material.      Pepe Cordon MD

## 2019-04-09 ENCOUNTER — LAB REQUISITION (OUTPATIENT)
Dept: LAB | Facility: HOSPITAL | Age: 69
End: 2019-04-09

## 2019-04-09 DIAGNOSIS — E11.9 TYPE 2 DIABETES MELLITUS WITHOUT COMPLICATIONS (HCC): ICD-10-CM

## 2019-04-09 DIAGNOSIS — I10 ESSENTIAL (PRIMARY) HYPERTENSION: ICD-10-CM

## 2019-04-09 DIAGNOSIS — J44.9 CHRONIC OBSTRUCTIVE PULMONARY DISEASE (HCC): ICD-10-CM

## 2019-04-09 LAB
ANION GAP SERPL CALCULATED.3IONS-SCNC: 6.8 MMOL/L
BASOPHILS # BLD AUTO: 0.02 10*3/MM3 (ref 0–0.2)
BASOPHILS NFR BLD AUTO: 0.3 % (ref 0–1.5)
BUN BLD-MCNC: 9 MG/DL (ref 8–23)
BUN/CREAT SERPL: 10.5 (ref 7–25)
CALCIUM SPEC-SCNC: 9.3 MG/DL (ref 8.6–10.5)
CHLORIDE SERPL-SCNC: 85 MMOL/L (ref 98–107)
CO2 SERPL-SCNC: 43.2 MMOL/L (ref 22–29)
CREAT BLD-MCNC: 0.86 MG/DL (ref 0.76–1.27)
DEPRECATED RDW RBC AUTO: 52.2 FL (ref 37–54)
EOSINOPHIL # BLD AUTO: 0.14 10*3/MM3 (ref 0–0.4)
EOSINOPHIL NFR BLD AUTO: 2.1 % (ref 0.3–6.2)
ERYTHROCYTE [DISTWIDTH] IN BLOOD BY AUTOMATED COUNT: 15.1 % (ref 12.3–15.4)
GFR SERPL CREATININE-BSD FRML MDRD: 88 ML/MIN/1.73
GLUCOSE BLD-MCNC: 63 MG/DL (ref 65–99)
HBA1C MFR BLD: 7.5 % (ref 4.8–5.6)
HCT VFR BLD AUTO: 50.4 % (ref 37.5–51)
HGB BLD-MCNC: 16 G/DL (ref 13–17.7)
IMM GRANULOCYTES # BLD AUTO: 0.02 10*3/MM3 (ref 0–0.05)
IMM GRANULOCYTES NFR BLD AUTO: 0.3 % (ref 0–0.5)
LYMPHOCYTES # BLD AUTO: 1.11 10*3/MM3 (ref 0.7–3.1)
LYMPHOCYTES NFR BLD AUTO: 16.8 % (ref 19.6–45.3)
MCH RBC QN AUTO: 30.9 PG (ref 26.6–33)
MCHC RBC AUTO-ENTMCNC: 31.7 G/DL (ref 31.5–35.7)
MCV RBC AUTO: 97.3 FL (ref 79–97)
MONOCYTES # BLD AUTO: 0.54 10*3/MM3 (ref 0.1–0.9)
MONOCYTES NFR BLD AUTO: 8.2 % (ref 5–12)
NEUTROPHILS # BLD AUTO: 4.78 10*3/MM3 (ref 1.4–7)
NEUTROPHILS NFR BLD AUTO: 72.3 % (ref 42.7–76)
PLATELET # BLD AUTO: 177 10*3/MM3 (ref 140–450)
PMV BLD AUTO: 10.6 FL (ref 6–12)
POTASSIUM BLD-SCNC: 4.6 MMOL/L (ref 3.5–5.2)
RBC # BLD AUTO: 5.18 10*6/MM3 (ref 4.14–5.8)
SODIUM BLD-SCNC: 135 MMOL/L (ref 136–145)
WBC NRBC COR # BLD: 6.61 10*3/MM3 (ref 3.4–10.8)

## 2019-04-09 PROCEDURE — 85025 COMPLETE CBC W/AUTO DIFF WBC: CPT | Performed by: PHYSICAL MEDICINE & REHABILITATION

## 2019-04-09 PROCEDURE — 83036 HEMOGLOBIN GLYCOSYLATED A1C: CPT | Performed by: PHYSICAL MEDICINE & REHABILITATION

## 2019-04-09 PROCEDURE — 80048 BASIC METABOLIC PNL TOTAL CA: CPT | Performed by: PHYSICAL MEDICINE & REHABILITATION

## 2019-05-11 ENCOUNTER — APPOINTMENT (OUTPATIENT)
Dept: CT IMAGING | Facility: HOSPITAL | Age: 69
End: 2019-05-11

## 2019-05-11 ENCOUNTER — APPOINTMENT (OUTPATIENT)
Dept: GENERAL RADIOLOGY | Facility: HOSPITAL | Age: 69
End: 2019-05-11

## 2019-05-11 ENCOUNTER — HOSPITAL ENCOUNTER (INPATIENT)
Facility: HOSPITAL | Age: 69
LOS: 11 days | Discharge: HOME HEALTH | End: 2019-05-22
Attending: INTERNAL MEDICINE | Admitting: INTERNAL MEDICINE

## 2019-05-11 ENCOUNTER — HOSPITAL ENCOUNTER (EMERGENCY)
Facility: HOSPITAL | Age: 69
Discharge: ANOTHER HOSPITAL | End: 2019-05-11
Attending: EMERGENCY MEDICINE | Admitting: EMERGENCY MEDICINE

## 2019-05-11 VITALS
HEIGHT: 74 IN | DIASTOLIC BLOOD PRESSURE: 67 MMHG | OXYGEN SATURATION: 90 % | HEART RATE: 66 BPM | BODY MASS INDEX: 35.29 KG/M2 | TEMPERATURE: 97.5 F | SYSTOLIC BLOOD PRESSURE: 115 MMHG | RESPIRATION RATE: 18 BRPM | WEIGHT: 275 LBS

## 2019-05-11 DIAGNOSIS — J96.01 ACUTE RESPIRATORY FAILURE WITH HYPOXIA AND HYPERCAPNIA (HCC): ICD-10-CM

## 2019-05-11 DIAGNOSIS — J96.02 ACUTE RESPIRATORY FAILURE WITH HYPOXIA AND HYPERCAPNIA (HCC): ICD-10-CM

## 2019-05-11 DIAGNOSIS — J18.9 PNEUMONIA OF BOTH LOWER LOBES DUE TO INFECTIOUS ORGANISM: ICD-10-CM

## 2019-05-11 DIAGNOSIS — J96.01 ACUTE RESPIRATORY FAILURE WITH HYPOXIA AND HYPERCAPNIA (HCC): Primary | ICD-10-CM

## 2019-05-11 DIAGNOSIS — Z74.09 IMPAIRED FUNCTIONAL MOBILITY AND ACTIVITY TOLERANCE: Primary | ICD-10-CM

## 2019-05-11 DIAGNOSIS — J96.02 ACUTE RESPIRATORY FAILURE WITH HYPOXIA AND HYPERCAPNIA (HCC): Primary | ICD-10-CM

## 2019-05-11 PROBLEM — J96.90 RESPIRATORY FAILURE (HCC): Status: ACTIVE | Noted: 2019-05-11

## 2019-05-11 LAB
A-A DO2: 188.4 MMHG (ref 0–300)
A-A DO2: 235 MMHG (ref 0–300)
ALBUMIN SERPL-MCNC: 2.3 G/DL (ref 3.5–5.2)
ALBUMIN SERPL-MCNC: 2.38 G/DL (ref 3.5–5.2)
ALBUMIN/GLOB SERPL: 0.6 G/DL
ALBUMIN/GLOB SERPL: 0.6 G/DL
ALP SERPL-CCNC: 101 U/L (ref 39–117)
ALP SERPL-CCNC: 105 U/L (ref 39–117)
ALT SERPL W P-5'-P-CCNC: 16 U/L (ref 1–41)
ALT SERPL W P-5'-P-CCNC: 17 U/L (ref 1–41)
ANION GAP SERPL CALCULATED.3IONS-SCNC: 7.8 MMOL/L
ANION GAP SERPL CALCULATED.3IONS-SCNC: 9.4 MMOL/L
ARTERIAL PATENCY WRIST A: ABNORMAL
ARTERIAL PATENCY WRIST A: POSITIVE
AST SERPL-CCNC: 17 U/L (ref 1–40)
AST SERPL-CCNC: 20 U/L (ref 1–40)
ATMOSPHERIC PRESS: 726 MMHG
ATMOSPHERIC PRESS: 751.4 MMHG
BACTERIA UR QL AUTO: ABNORMAL /HPF
BASE EXCESS BLDA CALC-SCNC: 6 MMOL/L
BASE EXCESS BLDA CALC-SCNC: 6.8 MMOL/L
BASE EXCESS BLDA CALC-SCNC: 7.5 MMOL/L (ref 0–2)
BASE EXCESS BLDA CALC-SCNC: 8.8 MMOL/L
BASOPHILS # BLD AUTO: 0.06 10*3/MM3 (ref 0–0.2)
BASOPHILS # BLD AUTO: 0.06 10*3/MM3 (ref 0–0.2)
BASOPHILS NFR BLD AUTO: 0.4 % (ref 0–1.5)
BASOPHILS NFR BLD AUTO: 0.4 % (ref 0–1.5)
BDY SITE: ABNORMAL
BILIRUB SERPL-MCNC: 0.5 MG/DL (ref 0.2–1.2)
BILIRUB SERPL-MCNC: 0.5 MG/DL (ref 0.2–1.2)
BILIRUB UR QL STRIP: NEGATIVE
BODY TEMPERATURE: 98.6 C
BUN BLD-MCNC: 22 MG/DL (ref 8–23)
BUN BLD-MCNC: 23 MG/DL (ref 8–23)
BUN/CREAT SERPL: 23.7 (ref 7–25)
BUN/CREAT SERPL: 25.3 (ref 7–25)
CALCIUM SPEC-SCNC: 8.3 MG/DL (ref 8.6–10.5)
CALCIUM SPEC-SCNC: 8.7 MG/DL (ref 8.6–10.5)
CHLORIDE SERPL-SCNC: 93 MMOL/L (ref 98–107)
CHLORIDE SERPL-SCNC: 95 MMOL/L (ref 98–107)
CLARITY UR: CLEAR
CO2 SERPL-SCNC: 35.2 MMOL/L (ref 22–29)
CO2 SERPL-SCNC: 35.6 MMOL/L (ref 22–29)
COHGB MFR BLD: 5 % (ref 0–5)
COHGB MFR BLD: 5.7 % (ref 0–5)
COHGB MFR BLD: 6.3 % (ref 0–5)
COLOR UR: ABNORMAL
CREAT BLD-MCNC: 0.91 MG/DL (ref 0.76–1.27)
CREAT BLD-MCNC: 0.93 MG/DL (ref 0.76–1.27)
CRP SERPL-MCNC: 2.12 MG/DL (ref 0–0.5)
D-LACTATE SERPL-SCNC: 1 MMOL/L (ref 0.5–2)
D-LACTATE SERPL-SCNC: 1.5 MMOL/L (ref 0.5–2)
DEPRECATED RDW RBC AUTO: 51.7 FL (ref 37–54)
DEPRECATED RDW RBC AUTO: 54.4 FL (ref 37–54)
EOSINOPHIL # BLD AUTO: 0.01 10*3/MM3 (ref 0–0.4)
EOSINOPHIL # BLD AUTO: 0.02 10*3/MM3 (ref 0–0.4)
EOSINOPHIL NFR BLD AUTO: 0.1 % (ref 0.3–6.2)
EOSINOPHIL NFR BLD AUTO: 0.1 % (ref 0.3–6.2)
ERYTHROCYTE [DISTWIDTH] IN BLOOD BY AUTOMATED COUNT: 14.2 % (ref 12.3–15.4)
ERYTHROCYTE [DISTWIDTH] IN BLOOD BY AUTOMATED COUNT: 14.9 % (ref 12.3–15.4)
GFR SERPL CREATININE-BSD FRML MDRD: 80 ML/MIN/1.73
GFR SERPL CREATININE-BSD FRML MDRD: 82 ML/MIN/1.73
GLOBULIN UR ELPH-MCNC: 3.6 GM/DL
GLOBULIN UR ELPH-MCNC: 3.7 GM/DL
GLUCOSE BLD-MCNC: 215 MG/DL (ref 65–99)
GLUCOSE BLD-MCNC: 304 MG/DL (ref 65–99)
GLUCOSE BLDC GLUCOMTR-MCNC: 175 MG/DL (ref 70–130)
GLUCOSE BLDC GLUCOMTR-MCNC: 197 MG/DL (ref 70–130)
GLUCOSE BLDC GLUCOMTR-MCNC: 201 MG/DL (ref 70–130)
GLUCOSE BLDC GLUCOMTR-MCNC: 204 MG/DL (ref 70–130)
GLUCOSE BLDC GLUCOMTR-MCNC: 247 MG/DL (ref 70–130)
GLUCOSE UR STRIP-MCNC: ABNORMAL MG/DL
HBA1C MFR BLD: 7.95 % (ref 4.8–5.6)
HCO3 BLDA-SCNC: 34.9 MMOL/L (ref 22–26)
HCO3 BLDA-SCNC: 36.4 MMOL/L (ref 22–26)
HCO3 BLDA-SCNC: 36.5 MMOL/L (ref 22–28)
HCO3 BLDA-SCNC: 47 MMOL/L (ref 22–26)
HCT VFR BLD AUTO: 50.8 % (ref 37.5–51)
HCT VFR BLD AUTO: 52.5 % (ref 37.5–51)
HCT VFR BLD CALC: 47 % (ref 42–52)
HCT VFR BLD CALC: 48 % (ref 42–52)
HCT VFR BLD CALC: 52 % (ref 42–52)
HGB BLD-MCNC: 15.5 G/DL (ref 13–17.7)
HGB BLD-MCNC: 16.2 G/DL (ref 13–17.7)
HGB BLDA-MCNC: 16 G/DL (ref 12–16)
HGB BLDA-MCNC: 16.2 G/DL (ref 12–16)
HGB BLDA-MCNC: 17.8 G/DL (ref 12–16)
HGB UR QL STRIP.AUTO: ABNORMAL
HOROWITZ INDEX BLD+IHG-RTO: 100 %
HOROWITZ INDEX BLD+IHG-RTO: 28 %
HOROWITZ INDEX BLD+IHG-RTO: 50 %
HOROWITZ INDEX BLD+IHG-RTO: 50 %
HYALINE CASTS UR QL AUTO: ABNORMAL /LPF
IMM GRANULOCYTES # BLD AUTO: 0.07 10*3/MM3 (ref 0–0.05)
IMM GRANULOCYTES # BLD AUTO: 0.38 10*3/MM3 (ref 0–0.05)
IMM GRANULOCYTES NFR BLD AUTO: 0.5 % (ref 0–0.5)
IMM GRANULOCYTES NFR BLD AUTO: 2.7 % (ref 0–0.5)
INR PPP: 1.17 (ref 0.9–1.1)
KETONES UR QL STRIP: NEGATIVE
LEUKOCYTE ESTERASE UR QL STRIP.AUTO: NEGATIVE
LYMPHOCYTES # BLD AUTO: 0.45 10*3/MM3 (ref 0.7–3.1)
LYMPHOCYTES # BLD AUTO: 0.79 10*3/MM3 (ref 0.7–3.1)
LYMPHOCYTES NFR BLD AUTO: 3.2 % (ref 19.6–45.3)
LYMPHOCYTES NFR BLD AUTO: 5.3 % (ref 19.6–45.3)
MAGNESIUM SERPL-MCNC: 1.8 MG/DL (ref 1.6–2.4)
MAGNESIUM SERPL-MCNC: 1.8 MG/DL (ref 1.6–2.4)
MCH RBC QN AUTO: 30.5 PG (ref 26.6–33)
MCH RBC QN AUTO: 30.7 PG (ref 26.6–33)
MCHC RBC AUTO-ENTMCNC: 30.5 G/DL (ref 31.5–35.7)
MCHC RBC AUTO-ENTMCNC: 30.9 G/DL (ref 31.5–35.7)
MCV RBC AUTO: 100.6 FL (ref 79–97)
MCV RBC AUTO: 98.7 FL (ref 79–97)
METHGB BLD QL: 0.3 % (ref 0–3)
METHGB BLD QL: 0.3 % (ref 0–3)
METHGB BLD QL: 0.4 % (ref 0–3)
MODALITY: ABNORMAL
MONOCYTES # BLD AUTO: 0.7 10*3/MM3 (ref 0.1–0.9)
MONOCYTES # BLD AUTO: 1.15 10*3/MM3 (ref 0.1–0.9)
MONOCYTES NFR BLD AUTO: 5 % (ref 5–12)
MONOCYTES NFR BLD AUTO: 7.7 % (ref 5–12)
NEUTROPHILS # BLD AUTO: 12.31 10*3/MM3 (ref 1.7–7)
NEUTROPHILS # BLD AUTO: 12.83 10*3/MM3 (ref 1.7–7)
NEUTROPHILS NFR BLD AUTO: 86 % (ref 42.7–76)
NEUTROPHILS NFR BLD AUTO: 88.6 % (ref 42.7–76)
NITRITE UR QL STRIP: NEGATIVE
NOTE: ABNORMAL
NRBC BLD AUTO-RTO: 0.1 /100 WBC (ref 0–0.2)
O2 A-A PPRESDIFF RESPIRATORY: 0.2 MMHG
OXYHGB MFR BLDV: 63.7 % (ref 85–100)
OXYHGB MFR BLDV: 73.8 % (ref 85–100)
OXYHGB MFR BLDV: 84.4 % (ref 85–100)
PCO2 BLDA: 161.2 MM HG (ref 35–45)
PCO2 BLDA: 63.7 MM HG (ref 35–45)
PCO2 BLDA: 67.5 MM HG (ref 35–45)
PCO2 BLDA: 80.6 MM HG (ref 35–45)
PCO2 TEMP ADJ BLD: ABNORMAL MM HG (ref 35–48)
PEEP RESPIRATORY: 18 CM[H2O]
PEEP RESPIRATORY: 5 CM[H2O]
PEEP RESPIRATORY: 5 CM[H2O]
PH BLDA: 7.08 PH UNITS (ref 7.35–7.45)
PH BLDA: 7.27 PH UNITS (ref 7.35–7.45)
PH BLDA: 7.34 PH UNITS (ref 7.35–7.45)
PH BLDA: 7.36 PH UNITS (ref 7.35–7.45)
PH UR STRIP.AUTO: 5.5 [PH] (ref 5–8)
PH, TEMP CORRECTED: ABNORMAL PH UNITS (ref 7.35–7.45)
PHOSPHATE SERPL-MCNC: 2.2 MG/DL (ref 2.5–4.5)
PLATELET # BLD AUTO: 208 10*3/MM3 (ref 140–450)
PLATELET # BLD AUTO: 212 10*3/MM3 (ref 140–450)
PMV BLD AUTO: 10.7 FL (ref 6–12)
PMV BLD AUTO: 10.9 FL (ref 6–12)
PO2 BLDA: 142.3 MM HG (ref 80–100)
PO2 BLDA: 32.8 MM HG (ref 80–100)
PO2 BLDA: 53.5 MM HG (ref 80–100)
PO2 BLDA: 60.4 MM HG (ref 80–100)
PO2 TEMP ADJ BLD: ABNORMAL MM HG (ref 83–108)
POTASSIUM BLD-SCNC: 4.3 MMOL/L (ref 3.5–5.2)
POTASSIUM BLD-SCNC: 4.7 MMOL/L (ref 3.5–5.2)
PROCALCITONIN SERPL-MCNC: 0.13 NG/ML (ref 0.1–0.25)
PROT SERPL-MCNC: 5.9 G/DL (ref 6–8.5)
PROT SERPL-MCNC: 6.1 G/DL (ref 6–8.5)
PROT UR QL STRIP: ABNORMAL
PROTHROMBIN TIME: 14.7 SECONDS (ref 11.7–14.2)
RBC # BLD AUTO: 5.05 10*6/MM3 (ref 4.14–5.8)
RBC # BLD AUTO: 5.32 10*6/MM3 (ref 4.14–5.8)
RBC # UR: ABNORMAL /HPF
REF LAB TEST METHOD: ABNORMAL
SAO2 % BLDCOA: 67.3 % (ref 90–100)
SAO2 % BLDCOA: 79 % (ref 90–100)
SAO2 % BLDCOA: 89.8 % (ref 90–100)
SAO2 % BLDCOA: 98.9 % (ref 92–99)
SET MECH RESP RATE: 22
SET MECH RESP RATE: 24
SET MECH RESP RATE: 28
SODIUM BLD-SCNC: 136 MMOL/L (ref 136–145)
SODIUM BLD-SCNC: 140 MMOL/L (ref 136–145)
SP GR UR STRIP: 1.03 (ref 1–1.03)
SQUAMOUS #/AREA URNS HPF: ABNORMAL /HPF
TOTAL RATE: 28 BREATHS/MINUTE
TROPONIN T SERPL-MCNC: 0.06 NG/ML (ref 0–0.03)
TROPONIN T SERPL-MCNC: 0.07 NG/ML (ref 0–0.03)
TROPONIN T SERPL-MCNC: 0.17 NG/ML (ref 0–0.03)
UROBILINOGEN UR QL STRIP: ABNORMAL
VENTILATOR MODE: ABNORMAL
VENTILATOR MODE: AC
VENTILATOR MODE: AC
VT ON VENT VENT: 476 ML
VT ON VENT VENT: 650 ML
VT ON VENT VENT: 700 ML
WBC NRBC COR # BLD: 13.92 10*3/MM3 (ref 3.4–10.8)
WBC NRBC COR # BLD: 14.91 10*3/MM3 (ref 3.4–10.8)
WBC UR QL AUTO: ABNORMAL /HPF

## 2019-05-11 PROCEDURE — 96365 THER/PROPH/DIAG IV INF INIT: CPT

## 2019-05-11 PROCEDURE — 25010000002 ENOXAPARIN PER 10 MG: Performed by: INTERNAL MEDICINE

## 2019-05-11 PROCEDURE — 84145 PROCALCITONIN (PCT): CPT | Performed by: INTERNAL MEDICINE

## 2019-05-11 PROCEDURE — 83050 HGB METHEMOGLOBIN QUAN: CPT | Performed by: NURSE PRACTITIONER

## 2019-05-11 PROCEDURE — 5A1955Z RESPIRATORY VENTILATION, GREATER THAN 96 CONSECUTIVE HOURS: ICD-10-PCS | Performed by: INTERNAL MEDICINE

## 2019-05-11 PROCEDURE — 85025 COMPLETE CBC W/AUTO DIFF WBC: CPT | Performed by: NURSE PRACTITIONER

## 2019-05-11 PROCEDURE — 25010000002 AZITHROMYCIN PER 500 MG: Performed by: INTERNAL MEDICINE

## 2019-05-11 PROCEDURE — 82805 BLOOD GASES W/O2 SATURATION: CPT | Performed by: NURSE PRACTITIONER

## 2019-05-11 PROCEDURE — 94799 UNLISTED PULMONARY SVC/PX: CPT

## 2019-05-11 PROCEDURE — 36600 WITHDRAWAL OF ARTERIAL BLOOD: CPT | Performed by: NURSE PRACTITIONER

## 2019-05-11 PROCEDURE — 82805 BLOOD GASES W/O2 SATURATION: CPT | Performed by: EMERGENCY MEDICINE

## 2019-05-11 PROCEDURE — 93010 ELECTROCARDIOGRAM REPORT: CPT | Performed by: INTERNAL MEDICINE

## 2019-05-11 PROCEDURE — 84484 ASSAY OF TROPONIN QUANT: CPT | Performed by: EMERGENCY MEDICINE

## 2019-05-11 PROCEDURE — 71045 X-RAY EXAM CHEST 1 VIEW: CPT | Performed by: RADIOLOGY

## 2019-05-11 PROCEDURE — 82375 ASSAY CARBOXYHB QUANT: CPT | Performed by: EMERGENCY MEDICINE

## 2019-05-11 PROCEDURE — 25010000002 METHYLPREDNISOLONE PER 40 MG: Performed by: INTERNAL MEDICINE

## 2019-05-11 PROCEDURE — 71045 X-RAY EXAM CHEST 1 VIEW: CPT

## 2019-05-11 PROCEDURE — 87070 CULTURE OTHR SPECIMN AEROBIC: CPT | Performed by: INTERNAL MEDICINE

## 2019-05-11 PROCEDURE — 94002 VENT MGMT INPAT INIT DAY: CPT

## 2019-05-11 PROCEDURE — 86140 C-REACTIVE PROTEIN: CPT | Performed by: NURSE PRACTITIONER

## 2019-05-11 PROCEDURE — 82962 GLUCOSE BLOOD TEST: CPT

## 2019-05-11 PROCEDURE — 84100 ASSAY OF PHOSPHORUS: CPT | Performed by: INTERNAL MEDICINE

## 2019-05-11 PROCEDURE — 0 IOVERSOL 68 % SOLUTION: Performed by: EMERGENCY MEDICINE

## 2019-05-11 PROCEDURE — 87205 SMEAR GRAM STAIN: CPT | Performed by: INTERNAL MEDICINE

## 2019-05-11 PROCEDURE — 85610 PROTHROMBIN TIME: CPT | Performed by: INTERNAL MEDICINE

## 2019-05-11 PROCEDURE — 80053 COMPREHEN METABOLIC PANEL: CPT | Performed by: INTERNAL MEDICINE

## 2019-05-11 PROCEDURE — 99291 CRITICAL CARE FIRST HOUR: CPT

## 2019-05-11 PROCEDURE — 80053 COMPREHEN METABOLIC PANEL: CPT | Performed by: NURSE PRACTITIONER

## 2019-05-11 PROCEDURE — 93005 ELECTROCARDIOGRAM TRACING: CPT | Performed by: NURSE PRACTITIONER

## 2019-05-11 PROCEDURE — C1751 CATH, INF, PER/CENT/MIDLINE: HCPCS

## 2019-05-11 PROCEDURE — 36600 WITHDRAWAL OF ARTERIAL BLOOD: CPT | Performed by: EMERGENCY MEDICINE

## 2019-05-11 PROCEDURE — 81001 URINALYSIS AUTO W/SCOPE: CPT | Performed by: NURSE PRACTITIONER

## 2019-05-11 PROCEDURE — 85025 COMPLETE CBC W/AUTO DIFF WBC: CPT | Performed by: INTERNAL MEDICINE

## 2019-05-11 PROCEDURE — 83605 ASSAY OF LACTIC ACID: CPT | Performed by: NURSE PRACTITIONER

## 2019-05-11 PROCEDURE — 83036 HEMOGLOBIN GLYCOSYLATED A1C: CPT | Performed by: INTERNAL MEDICINE

## 2019-05-11 PROCEDURE — 36600 WITHDRAWAL OF ARTERIAL BLOOD: CPT

## 2019-05-11 PROCEDURE — 84484 ASSAY OF TROPONIN QUANT: CPT | Performed by: NURSE PRACTITIONER

## 2019-05-11 PROCEDURE — 71275 CT ANGIOGRAPHY CHEST: CPT

## 2019-05-11 PROCEDURE — 25010000002 CEFTRIAXONE PER 250 MG: Performed by: INTERNAL MEDICINE

## 2019-05-11 PROCEDURE — 3E0G76Z INTRODUCTION OF NUTRITIONAL SUBSTANCE INTO UPPER GI, VIA NATURAL OR ARTIFICIAL OPENING: ICD-10-PCS | Performed by: INTERNAL MEDICINE

## 2019-05-11 PROCEDURE — 70450 CT HEAD/BRAIN W/O DYE: CPT | Performed by: RADIOLOGY

## 2019-05-11 PROCEDURE — 93005 ELECTROCARDIOGRAM TRACING: CPT | Performed by: INTERNAL MEDICINE

## 2019-05-11 PROCEDURE — 63710000001 INSULIN LISPRO (HUMAN) PER 5 UNITS: Performed by: INTERNAL MEDICINE

## 2019-05-11 PROCEDURE — 99222 1ST HOSP IP/OBS MODERATE 55: CPT | Performed by: INTERNAL MEDICINE

## 2019-05-11 PROCEDURE — 82375 ASSAY CARBOXYHB QUANT: CPT | Performed by: NURSE PRACTITIONER

## 2019-05-11 PROCEDURE — 31500 INSERT EMERGENCY AIRWAY: CPT

## 2019-05-11 PROCEDURE — 82803 BLOOD GASES ANY COMBINATION: CPT

## 2019-05-11 PROCEDURE — 83050 HGB METHEMOGLOBIN QUAN: CPT | Performed by: EMERGENCY MEDICINE

## 2019-05-11 PROCEDURE — 84484 ASSAY OF TROPONIN QUANT: CPT | Performed by: INTERNAL MEDICINE

## 2019-05-11 PROCEDURE — 96375 TX/PRO/DX INJ NEW DRUG ADDON: CPT

## 2019-05-11 PROCEDURE — 83605 ASSAY OF LACTIC ACID: CPT | Performed by: INTERNAL MEDICINE

## 2019-05-11 PROCEDURE — 25010000002 FENTANYL CITRATE (PF) 100 MCG/2ML SOLUTION: Performed by: INTERNAL MEDICINE

## 2019-05-11 PROCEDURE — 71275 CT ANGIOGRAPHY CHEST: CPT | Performed by: RADIOLOGY

## 2019-05-11 PROCEDURE — 96368 THER/DIAG CONCURRENT INF: CPT

## 2019-05-11 PROCEDURE — 83735 ASSAY OF MAGNESIUM: CPT | Performed by: INTERNAL MEDICINE

## 2019-05-11 PROCEDURE — 83735 ASSAY OF MAGNESIUM: CPT | Performed by: NURSE PRACTITIONER

## 2019-05-11 PROCEDURE — 25010000002 LEVOFLOXACIN PER 250 MG: Performed by: EMERGENCY MEDICINE

## 2019-05-11 PROCEDURE — 87040 BLOOD CULTURE FOR BACTERIA: CPT | Performed by: NURSE PRACTITIONER

## 2019-05-11 PROCEDURE — 70450 CT HEAD/BRAIN W/O DYE: CPT

## 2019-05-11 PROCEDURE — 25010000002 MIDAZOLAM 0.5 MG/ML 100 ML NS: Performed by: EMERGENCY MEDICINE

## 2019-05-11 PROCEDURE — 96366 THER/PROPH/DIAG IV INF ADDON: CPT

## 2019-05-11 PROCEDURE — 51702 INSERT TEMP BLADDER CATH: CPT

## 2019-05-11 PROCEDURE — 25010000002 PROPOFOL 1000 MG/ML EMULSION

## 2019-05-11 PROCEDURE — 94640 AIRWAY INHALATION TREATMENT: CPT

## 2019-05-11 RX ORDER — LABETALOL HYDROCHLORIDE 5 MG/ML
20 INJECTION, SOLUTION INTRAVENOUS
Status: DISCONTINUED | OUTPATIENT
Start: 2019-05-11 | End: 2019-05-22 | Stop reason: HOSPADM

## 2019-05-11 RX ORDER — PROPOFOL 10 MG/ML
VIAL (ML) INTRAVENOUS
Status: DISCONTINUED
Start: 2019-05-11 | End: 2019-05-11 | Stop reason: HOSPADM

## 2019-05-11 RX ORDER — ALBUTEROL SULFATE 90 UG/1
6 AEROSOL, METERED RESPIRATORY (INHALATION)
Status: DISCONTINUED | OUTPATIENT
Start: 2019-05-11 | End: 2019-05-16

## 2019-05-11 RX ORDER — SODIUM CHLORIDE 0.9 % (FLUSH) 0.9 %
3-10 SYRINGE (ML) INJECTION AS NEEDED
Status: DISCONTINUED | OUTPATIENT
Start: 2019-05-11 | End: 2019-05-22 | Stop reason: HOSPADM

## 2019-05-11 RX ORDER — ETOMIDATE 2 MG/ML
40 INJECTION INTRAVENOUS ONCE
Status: COMPLETED | OUTPATIENT
Start: 2019-05-11 | End: 2019-05-11

## 2019-05-11 RX ORDER — FENTANYL CITRATE 50 UG/ML
50 INJECTION, SOLUTION INTRAMUSCULAR; INTRAVENOUS
Status: DISCONTINUED | OUTPATIENT
Start: 2019-05-11 | End: 2019-05-13

## 2019-05-11 RX ORDER — MIDAZOLAM IN 0.9 % SOD.CHLORID 1 MG/ML
1-10 PLASTIC BAG, INJECTION (ML) INTRAVENOUS
Status: DISCONTINUED | OUTPATIENT
Start: 2019-05-11 | End: 2019-05-11 | Stop reason: HOSPADM

## 2019-05-11 RX ORDER — FAMOTIDINE 10 MG/ML
20 INJECTION, SOLUTION INTRAVENOUS EVERY 12 HOURS SCHEDULED
Status: DISCONTINUED | OUTPATIENT
Start: 2019-05-11 | End: 2019-05-17

## 2019-05-11 RX ORDER — LEVOFLOXACIN 5 MG/ML
750 INJECTION, SOLUTION INTRAVENOUS ONCE
Status: COMPLETED | OUTPATIENT
Start: 2019-05-11 | End: 2019-05-11

## 2019-05-11 RX ORDER — NYSTATIN 100000 [USP'U]/G
POWDER TOPICAL EVERY 12 HOURS SCHEDULED
Status: DISCONTINUED | OUTPATIENT
Start: 2019-05-11 | End: 2019-05-22 | Stop reason: HOSPADM

## 2019-05-11 RX ORDER — METHYLPREDNISOLONE SODIUM SUCCINATE 40 MG/ML
20 INJECTION, POWDER, LYOPHILIZED, FOR SOLUTION INTRAMUSCULAR; INTRAVENOUS EVERY 12 HOURS
Status: DISCONTINUED | OUTPATIENT
Start: 2019-05-11 | End: 2019-05-17

## 2019-05-11 RX ORDER — ONDANSETRON 2 MG/ML
4 INJECTION INTRAMUSCULAR; INTRAVENOUS EVERY 6 HOURS PRN
Status: DISCONTINUED | OUTPATIENT
Start: 2019-05-11 | End: 2019-05-22 | Stop reason: HOSPADM

## 2019-05-11 RX ORDER — CHLORHEXIDINE GLUCONATE 0.12 MG/ML
15 RINSE ORAL EVERY 12 HOURS SCHEDULED
Status: DISCONTINUED | OUTPATIENT
Start: 2019-05-11 | End: 2019-05-19

## 2019-05-11 RX ORDER — NICOTINE POLACRILEX 4 MG
15 LOZENGE BUCCAL
Status: DISCONTINUED | OUTPATIENT
Start: 2019-05-11 | End: 2019-05-22 | Stop reason: HOSPADM

## 2019-05-11 RX ORDER — CEFTRIAXONE SODIUM 1 G/50ML
1 INJECTION, SOLUTION INTRAVENOUS EVERY 24 HOURS
Status: COMPLETED | OUTPATIENT
Start: 2019-05-11 | End: 2019-05-17

## 2019-05-11 RX ORDER — SODIUM CHLORIDE 0.9 % (FLUSH) 0.9 %
3 SYRINGE (ML) INJECTION EVERY 12 HOURS SCHEDULED
Status: DISCONTINUED | OUTPATIENT
Start: 2019-05-11 | End: 2019-05-22 | Stop reason: HOSPADM

## 2019-05-11 RX ORDER — PROPOFOL 10 MG/ML
VIAL (ML) INTRAVENOUS
Status: DISPENSED
Start: 2019-05-11 | End: 2019-05-11

## 2019-05-11 RX ORDER — DEXTROSE MONOHYDRATE 25 G/50ML
25 INJECTION, SOLUTION INTRAVENOUS
Status: DISCONTINUED | OUTPATIENT
Start: 2019-05-11 | End: 2019-05-22 | Stop reason: HOSPADM

## 2019-05-11 RX ORDER — DEXMEDETOMIDINE HYDROCHLORIDE 4 UG/ML
.2-1.5 INJECTION, SOLUTION INTRAVENOUS
Status: DISCONTINUED | OUTPATIENT
Start: 2019-05-11 | End: 2019-05-18

## 2019-05-11 RX ORDER — VECURONIUM BROMIDE 1 MG/ML
10 INJECTION, POWDER, LYOPHILIZED, FOR SOLUTION INTRAVENOUS ONCE
Status: COMPLETED | OUTPATIENT
Start: 2019-05-11 | End: 2019-05-11

## 2019-05-11 RX ORDER — ACETAMINOPHEN 325 MG/1
650 TABLET ORAL EVERY 4 HOURS PRN
Status: DISCONTINUED | OUTPATIENT
Start: 2019-05-11 | End: 2019-05-22 | Stop reason: HOSPADM

## 2019-05-11 RX ORDER — SODIUM CHLORIDE, SODIUM LACTATE, POTASSIUM CHLORIDE, CALCIUM CHLORIDE 600; 310; 30; 20 MG/100ML; MG/100ML; MG/100ML; MG/100ML
125 INJECTION, SOLUTION INTRAVENOUS CONTINUOUS
Status: DISCONTINUED | OUTPATIENT
Start: 2019-05-11 | End: 2019-05-14

## 2019-05-11 RX ORDER — ACETAMINOPHEN 650 MG/1
650 SUPPOSITORY RECTAL EVERY 4 HOURS PRN
Status: DISCONTINUED | OUTPATIENT
Start: 2019-05-11 | End: 2019-05-22 | Stop reason: HOSPADM

## 2019-05-11 RX ADMIN — DEXMEDETOMIDINE 0.2 MCG/KG/HR: 100 INJECTION, SOLUTION, CONCENTRATE INTRAVENOUS at 12:10

## 2019-05-11 RX ADMIN — SODIUM CHLORIDE 1000 ML: 9 INJECTION, SOLUTION INTRAVENOUS at 13:10

## 2019-05-11 RX ADMIN — ALBUTEROL SULFATE 6 PUFF: 90 AEROSOL, METERED RESPIRATORY (INHALATION) at 15:45

## 2019-05-11 RX ADMIN — METHYLPREDNISOLONE SODIUM SUCCINATE 20 MG: 40 INJECTION, POWDER, FOR SOLUTION INTRAMUSCULAR; INTRAVENOUS at 11:13

## 2019-05-11 RX ADMIN — NYSTATIN: 100000 POWDER TOPICAL at 12:30

## 2019-05-11 RX ADMIN — FENTANYL CITRATE 50 MCG: 50 INJECTION, SOLUTION INTRAMUSCULAR; INTRAVENOUS at 20:28

## 2019-05-11 RX ADMIN — IPRATROPIUM BROMIDE 6 PUFF: 17 AEROSOL, METERED RESPIRATORY (INHALATION) at 23:20

## 2019-05-11 RX ADMIN — FAMOTIDINE 20 MG: 10 INJECTION INTRAVENOUS at 12:30

## 2019-05-11 RX ADMIN — SODIUM CHLORIDE, POTASSIUM CHLORIDE, SODIUM LACTATE AND CALCIUM CHLORIDE 500 ML: 600; 310; 30; 20 INJECTION, SOLUTION INTRAVENOUS at 16:35

## 2019-05-11 RX ADMIN — Medication 1 MG/HR: at 07:53

## 2019-05-11 RX ADMIN — NYSTATIN: 100000 POWDER TOPICAL at 20:49

## 2019-05-11 RX ADMIN — IOVERSOL 70 ML: 678 INJECTION INTRA-ARTERIAL; INTRAVENOUS at 04:37

## 2019-05-11 RX ADMIN — SODIUM CHLORIDE, POTASSIUM CHLORIDE, SODIUM LACTATE AND CALCIUM CHLORIDE 125 ML/HR: 600; 310; 30; 20 INJECTION, SOLUTION INTRAVENOUS at 11:13

## 2019-05-11 RX ADMIN — VECURONIUM BROMIDE 10 MG: 1 INJECTION, POWDER, LYOPHILIZED, FOR SOLUTION INTRAVENOUS at 02:27

## 2019-05-11 RX ADMIN — INSULIN LISPRO 4 UNITS: 100 INJECTION, SOLUTION INTRAVENOUS; SUBCUTANEOUS at 17:23

## 2019-05-11 RX ADMIN — ALBUTEROL SULFATE 6 PUFF: 90 AEROSOL, METERED RESPIRATORY (INHALATION) at 19:12

## 2019-05-11 RX ADMIN — IPRATROPIUM BROMIDE 6 PUFF: 17 AEROSOL, METERED RESPIRATORY (INHALATION) at 19:12

## 2019-05-11 RX ADMIN — CEFTRIAXONE SODIUM 1 G: 1 INJECTION, SOLUTION INTRAVENOUS at 11:13

## 2019-05-11 RX ADMIN — AZITHROMYCIN MONOHYDRATE 500 MG: 500 INJECTION, POWDER, LYOPHILIZED, FOR SOLUTION INTRAVENOUS at 11:56

## 2019-05-11 RX ADMIN — ETOMIDATE 40 MG: 2 INJECTION, SOLUTION INTRAVENOUS at 02:24

## 2019-05-11 RX ADMIN — SODIUM CHLORIDE, POTASSIUM CHLORIDE, SODIUM LACTATE AND CALCIUM CHLORIDE 200 ML/HR: 600; 310; 30; 20 INJECTION, SOLUTION INTRAVENOUS at 20:03

## 2019-05-11 RX ADMIN — VECURONIUM BROMIDE 10 MG: 1 INJECTION, POWDER, LYOPHILIZED, FOR SOLUTION INTRAVENOUS at 03:31

## 2019-05-11 RX ADMIN — INSULIN LISPRO 8 UNITS: 100 INJECTION, SOLUTION INTRAVENOUS; SUBCUTANEOUS at 20:52

## 2019-05-11 RX ADMIN — INSULIN LISPRO 4 UNITS: 100 INJECTION, SOLUTION INTRAVENOUS; SUBCUTANEOUS at 11:13

## 2019-05-11 RX ADMIN — LEVOFLOXACIN 750 MG: 5 INJECTION, SOLUTION INTRAVENOUS at 04:49

## 2019-05-11 RX ADMIN — ENOXAPARIN SODIUM 40 MG: 40 INJECTION SUBCUTANEOUS at 11:13

## 2019-05-11 RX ADMIN — FENTANYL CITRATE 50 MCG: 50 INJECTION, SOLUTION INTRAMUSCULAR; INTRAVENOUS at 12:30

## 2019-05-11 RX ADMIN — FENTANYL CITRATE 50 MCG: 50 INJECTION, SOLUTION INTRAMUSCULAR; INTRAVENOUS at 18:27

## 2019-05-11 RX ADMIN — SODIUM CHLORIDE, POTASSIUM CHLORIDE, SODIUM LACTATE AND CALCIUM CHLORIDE 1000 ML: 600; 310; 30; 20 INJECTION, SOLUTION INTRAVENOUS at 11:31

## 2019-05-11 RX ADMIN — CHLORHEXIDINE GLUCONATE 15 ML: 1.2 RINSE ORAL at 20:49

## 2019-05-11 RX ADMIN — SODIUM CHLORIDE, PRESERVATIVE FREE 3 ML: 5 INJECTION INTRAVENOUS at 20:57

## 2019-05-11 RX ADMIN — ALBUTEROL SULFATE 6 PUFF: 90 AEROSOL, METERED RESPIRATORY (INHALATION) at 23:19

## 2019-05-11 RX ADMIN — SODIUM CHLORIDE, PRESERVATIVE FREE 3 ML: 5 INJECTION INTRAVENOUS at 11:14

## 2019-05-11 RX ADMIN — AZTREONAM 2 G: 2 INJECTION, POWDER, FOR SOLUTION INTRAMUSCULAR; INTRAVENOUS at 04:46

## 2019-05-11 RX ADMIN — CHLORHEXIDINE GLUCONATE 15 ML: 1.2 RINSE ORAL at 11:06

## 2019-05-11 RX ADMIN — FENTANYL CITRATE 50 MCG: 50 INJECTION, SOLUTION INTRAMUSCULAR; INTRAVENOUS at 23:48

## 2019-05-11 RX ADMIN — SODIUM CHLORIDE 2466 ML: 9 INJECTION, SOLUTION INTRAVENOUS at 02:55

## 2019-05-11 RX ADMIN — DEXMEDETOMIDINE 0.7 MCG/KG/HR: 100 INJECTION, SOLUTION, CONCENTRATE INTRAVENOUS at 20:48

## 2019-05-11 RX ADMIN — FAMOTIDINE 20 MG: 10 INJECTION INTRAVENOUS at 20:53

## 2019-05-11 RX ADMIN — IPRATROPIUM BROMIDE 6 PUFF: 17 AEROSOL, METERED RESPIRATORY (INHALATION) at 15:45

## 2019-05-11 NOTE — ED PROVIDER NOTES
Subjective   Pt brought from home by EMS for AMS and SOA.  Pt has known IDDM, COPD.  He normally wears 2l N/C.  Turned up to greater than 5L.  Per EMS pt has had worsening SOA for 2 days.  Wears oxygen in his mouth        History provided by:  EMS personnel  History limited by:  Patient unresponsive  Altered Mental Status   Presenting symptoms: confusion, lethargy and unresponsiveness    Severity:  Severe  Episode history:  Unable to specify  Timing:  Constant  Progression:  Worsening  Associated symptoms: difficulty breathing and weakness        Review of Systems   Unable to perform ROS: Patient unresponsive   Constitutional: Positive for activity change.   Respiratory: Positive for shortness of breath.    Neurological: Positive for weakness.   Psychiatric/Behavioral: Positive for confusion and decreased concentration.       Past Medical History:   Diagnosis Date   • COPD (chronic obstructive pulmonary disease) (CMS/[facility])    • Diabetes mellitus (CMS/[facility])    • Hypertension        No Known Allergies    No past surgical history on file.    Family History   Problem Relation Age of Onset   • No Known Problems Mother    • Heart failure Father        Social History     Socioeconomic History   • Marital status:      Spouse name: Not on file   • Number of children: Not on file   • Years of education: Not on file   • Highest education level: Not on file   Tobacco Use   • Smoking status: Current Every Day Smoker     Packs/day: 1.00     Years: 25.00     Pack years: 25.00     Types: Cigarettes   • Smokeless tobacco: Never Used   Substance and Sexual Activity   • Alcohol use: No   • Drug use: No   • Sexual activity: Defer           Objective   Physical Exam   Constitutional: He appears well-developed and well-nourished. He appears toxic. He appears ill. He appears distressed. He is not intubated.   Pt in extremis.  Unresponsive.  Gasping resps.   HENT:   Head: Normocephalic and atraumatic.   Copious thick purulent  secretion in oropharynx.  Nodule on vocal chords   Eyes:   Pupils non-reactive.   Neck: No JVD present. No tracheal deviation present.   Cardiovascular: Normal rate and regular rhythm.   Pulmonary/Chest: Accessory muscle usage present. He is not intubated. He is in respiratory distress. He has decreased breath sounds. He has no wheezes. He has no rhonchi. He has no rales. He exhibits retraction. He exhibits no mass and no deformity.   AGONAL, GASPING.  dIMINISHED BREATH SOUNDS bilaterally   Abdominal: Soft. He exhibits no mass.   Musculoskeletal:        Right lower leg: He exhibits no edema.        Left lower leg: He exhibits no edema.   Neurological:   Unresponisve, GCS-3   Skin: Skin is warm and dry. There is cyanosis.   Psychiatric:   Unable to assess   Nursing note and vitals reviewed.      Intubation  Date/Time: 5/11/2019 2:26 AM  Performed by: Oniel Xiong MD  Authorized by: Oniel Xiong MD     Consent:     Consent obtained:  Emergent situation  Pre-procedure details:     Patient status:  Unresponsive    Mallampati score:  IV    Pretreatment meds: Etomidate.    Paralytics:  Vecuronium  Procedure details:     Preoxygenation:  Bag valve mask    CPR in progress: no      Intubation method:  Oral    Laryngoscope blade:  Mac 4    Tube size (mm):  8.0    Tube type:  Cuffed    Number of attempts:  1    Cricoid pressure: yes      Tube visualized through cords: yes    Placement assessment:     ETT to lip:  21    Tube secured with:  ETT bland    Breath sounds:  Equal    Placement verification: chest rise, condensation, CXR verification, direct visualization, equal breath sounds and tube exhalation      CXR findings:  ETT in proper place  Post-procedure details:     Patient tolerance of procedure:  Tolerated well, no immediate complications  Central Line At Bedside  Date/Time: 5/26/2019 7:06 AM  Performed by: Oniel Xiong MD  Authorized by: Oniel Xiong MD     Consent:     Consent  obtained:  Emergent situation  Pre-procedure details:     Hand hygiene: Hand hygiene performed prior to insertion      Sterile barrier technique: All elements of maximal sterile technique followed      Skin preparation:  2% chlorhexidine    Skin preparation agent: Skin preparation agent completely dried prior to procedure    Sedation:     Sedation type: Intubated, sedated, paralyzed.  Anesthesia (see MAR for exact dosages):     Anesthesia method:  Local infiltration    Local anesthetic:  Lidocaine 1% WITH epi  Procedure details:     Location:  R femoral    Patient position:  Flat    Catheter size:  7 Fr    Landmarks identified: yes      Ultrasound guidance: no      Number of attempts:  1    Successful placement: yes    Post-procedure details:     Post-procedure:  Dressing applied and line sutured    Assessment:  Blood return through all ports and free fluid flow    Patient tolerance of procedure:  Tolerated well, no immediate complications               ED Course  ED Course as of May 26 0707   Sat May 11, 2019   0554 Were having a great deal of difficulty maintaining the patient's oxygen saturation.  He appears to repeatedly get mucous plugs in his endotracheal tube as well as his bronchi.  They have been able to be cleared with ventilation by Ambu bag and endotracheal suction.  [TZ]   0602 [facility]  [TZ]   0613 No CCU beds available at this facility  [TZ]   0637 D/W Dr Guzmán, [facility] ICU  [TZ]      ED Course User Index  [TZ] Oniel Xiong MD      XR Chest 1 View   Final Result   1. CHF.   2. Bibasilar airspace disease.   3. Support devices as above.       This report was finalized on 5/11/2019 7:28 AM by Dr. Escobar Peralta MD.          CT Chest Pulmonary Embolism With Contrast   ED Interpretation   CT angiography chest with contrast, PE protocol   Fax report from [facility]   Findings:   Lungs: Mild emphysematous changes.  Extensive consolidation within the left  lower lobe with associated volume loss.  Moderate consolidation within the right lower lobe with associated volume loss.  Mild patchy airspace disease within the right middle lobe.  Minimal peripheral groundglass/airspace opacities within left upper lobe.  Minimal interlobular septal thickening.  0.3 cm right upper lobe nodule.   Impression:   1.  No definite CT evidence of pulmonary embolism.   2.  Bibasilar atelectasis plus or minus pneumonia.   3.  Possible early interstitial edema.   4.  Kidney lesion, incompletely characterized.   5.  Pulmonary nodule.   Signed Lee Langford MD      Final Result   1. No PE.   2. CHF/edema.   3. Bibasilar consolidations compatible with pneumonia.   4. Liver cirrhosis.   5. Indeterminant cyst left kidney. Ultrasound follow-up may be helpful.       This report was finalized on 5/11/2019 7:27 AM by Dr. Escobar Peralta MD.          CT Head Without Contrast   ED Interpretation   CT head without contrast   Fax report from [facility]   Impression:   1.  No definite acute intracranial abnormality.   2.  Sinus disease.   3.  Mastoid disease.   Signed Lee Langford MD      Final Result   1. No CT evidence of acute intracranial abnormality.   2. Estimated effusions and pansinusitis.       This report was finalized on 5/11/2019 7:24 AM by Dr. Escobar Peralta MD.            Labs Reviewed   COMPREHENSIVE METABOLIC PANEL - Abnormal; Notable for the following components:       Result Value    Glucose 304 (*)     Chloride 93 (*)     CO2 35.2 (*)     Calcium 8.3 (*)     Albumin 2.38 (*)     BUN/Creatinine Ratio 25.3 (*)     All other components within normal limits    Narrative:     GFR Normal >60  Chronic Kidney Disease <60  Kidney Failure <15   URINALYSIS W/ MICROSCOPIC IF INDICATED (NO CULTURE) - Abnormal; Notable for the following components:    Color, UA Dark Yellow (*)     Glucose,  mg/dL (2+) (*)     Blood, UA Small (1+) (*)     Protein, UA >=300 mg/dL (3+) (*)     All other  components within normal limits   TROPONIN (IN-HOUSE) - Abnormal; Notable for the following components:    Troponin T 0.057 (*)     All other components within normal limits    Narrative:     Troponin T Reference Range:  <= 0.03 ng/mL-   Negative for AMI  >0.03 ng/mL-     Abnormal for myocardial necrosis.  Clinicians would have to utilize clinical acumen, EKG, Troponin and serial changes to determine if it is an Acute Myocardial Infarction or myocardial injury due to an underlying chronic condition.    BLOOD GAS, ARTERIAL - Abnormal; Notable for the following components:    pH, Arterial 7.083 (*)     pCO2, Arterial 161.2 (*)     pO2, Arterial 53.5 (*)     HCO3, Arterial 47.0 (*)     O2 Saturation, Arterial 79.0 (*)     Hemoglobin, Blood Gas 17.8 (*)     Oxyhemoglobin 73.8 (*)     Carboxyhemoglobin 6.3 (*)     All other components within normal limits   C-REACTIVE PROTEIN - Abnormal; Notable for the following components:    C-Reactive Protein 2.12 (*)     All other components within normal limits   CBC WITH AUTO DIFFERENTIAL - Abnormal; Notable for the following components:    WBC 13.92 (*)     .6 (*)     MCHC 30.5 (*)     RDW-SD 54.4 (*)     Neutrophil % 88.6 (*)     Lymphocyte % 3.2 (*)     Eosinophil % 0.1 (*)     Immature Grans % 2.7 (*)     Neutrophils, Absolute 12.31 (*)     Lymphocytes, Absolute 0.45 (*)     Immature Grans, Absolute 0.38 (*)     All other components within normal limits   URINALYSIS, MICROSCOPIC ONLY - Abnormal; Notable for the following components:    RBC, UA 6-12 (*)     All other components within normal limits   BLOOD GAS, ARTERIAL W/CO-OXIMETRY - Abnormal; Notable for the following components:    pH, Arterial 7.273 (*)     pCO2, Arterial 80.6 (*)     pO2, Arterial 60.4 (*)     HCO3, Arterial 36.4 (*)     O2 Saturation, Arterial 89.8 (*)     Oxyhemoglobin 84.4 (*)     Carboxyhemoglobin 5.7 (*)     All other components within normal limits   BLOOD GAS, ARTERIAL - Abnormal; Notable  for the following components:    pCO2, Arterial 63.7 (*)     pO2, Arterial 32.8 (*)     HCO3, Arterial 34.9 (*)     O2 Saturation, Arterial 67.3 (*)     Hemoglobin, Blood Gas 16.2 (*)     Oxyhemoglobin 63.7 (*)     All other components within normal limits   TROPONIN (IN-HOUSE) - Abnormal; Notable for the following components:    Troponin T 0.069 (*)     All other components within normal limits    Narrative:     Troponin T Reference Range:  <= 0.03 ng/mL-   Negative for AMI  >0.03 ng/mL-     Abnormal for myocardial necrosis.  Clinicians would have to utilize clinical acumen, EKG, Troponin and serial changes to determine if it is an Acute Myocardial Infarction or myocardial injury due to an underlying chronic condition.    POCT GLUCOSE FINGERSTICK - Abnormal; Notable for the following components:    Glucose 247 (*)     All other components within normal limits   MAGNESIUM - Normal   LACTIC ACID, PLASMA - Normal   BLOOD CULTURE   BLOOD CULTURE   CBC AND DIFFERENTIAL    Narrative:     The following orders were created for panel order CBC & Differential.  Procedure                               Abnormality         Status                     ---------                               -----------         ------                     CBC Auto Differential[[id number]]        Abnormal            Final result                 Please view results for these tests on the individual orders.        Medication List      ASK your doctor about these medications    albuterol sulfate  (90 Base) MCG/ACT inhaler  Commonly known as:  PROVENTIL HFA;VENTOLIN HFA;PROAIR HFA     amLODIPine 10 MG tablet  Commonly known as:  NORVASC  Take 1 tablet by mouth Daily.     aspirin 325 MG tablet     atorvastatin 40 MG tablet  Commonly known as:  LIPITOR  Take 1 tablet by mouth Daily.     budesonide-formoterol 160-4.5 MCG/ACT inhaler  Commonly known as:  SYMBICORT     donepezil 10 MG tablet  Commonly known as:  ARICEPT     gabapentin 800 MG  tablet  Commonly known as:  NEURONTIN     insulin NPH-insulin regular (70-30) 100 UNIT/ML injection  Commonly known as:  humuLIN 70/30,novoLIN 70/30  Inject 20 Units under the skin 2 (Two) Times a Day With Meals.     ipratropium-albuterol 0.5-2.5 mg/3 ml nebulizer  Commonly known as:  DUO-NEB  Take 3 mL by nebulization Every 4 (Four) Hours As Needed for Shortness of   Air.     levocetirizine 5 MG tablet  Commonly known as:  XYZAL     lisinopril 40 MG tablet  Commonly known as:  PRINIVIL,ZESTRIL     sertraline 25 MG tablet  Commonly known as:  ZOLOFT     tamsulosin 0.4 MG capsule 24 hr capsule  Commonly known as:  FLOMAX     VICTOZA 18 MG/3ML solution pen-injector injection  Generic drug:  Liraglutide                    MDM  Number of Diagnoses or Management Options  Acute respiratory failure with hypoxia and hypercapnia (CMS/HCC): new and requires workup  Pneumonia of both lower lobes due to infectious organism (CMS/HCC): new and requires workup     Amount and/or Complexity of Data Reviewed  Clinical lab tests: ordered and reviewed  Tests in the radiology section of CPT®: ordered and reviewed  Decide to obtain previous medical records or to obtain history from someone other than the patient: yes  Discuss the patient with other providers: yes  Independent visualization of images, tracings, or specimens: yes    Risk of Complications, Morbidity, and/or Mortality  Presenting problems: high  Diagnostic procedures: high  Management options: high  General comments: Critical care time 60 minutes: Initial resuscitation, laboratory interpretation, imaging interpretation, ventilator management, blood gas interpretation, antibiotic management, EKG assessment, ICU consultation, transfer coordination, documentation.    Critical Care  Total time providing critical care: 30-74 minutes    Patient Progress  Patient progress: (Critical)        Final diagnoses:   Acute respiratory failure with hypoxia and hypercapnia (CMS/HCC)    Pneumonia of both lower lobes due to infectious organism (CMS/[facility])            Oniel Xiong MD  05/12/19 0621       Oniel Xiong MD  05/26/19 0728

## 2019-05-11 NOTE — ED NOTES
Called [facility] regarding transport. [facility] states they will call [facility] and give us a call back.      Dvaid Erickson  05/11/19 0641

## 2019-05-11 NOTE — ED NOTES
[facility] arrived [facility]     Nora Bravo  05/11/19 0730

## 2019-05-11 NOTE — CONSULTS
"Adult Nutrition  Assessment/PES    Patient Name:  Heath Bajwa  YOB: 1950  MRN: [id number]  Admit Date:  5/11/2019    Assessment Date:  5/11/2019    Comments:  TF assessment. Resp. Failure, transferred from [facility]. Had standing order of Fibersource HN @ goal rate of 60ml/hr with 30ml water flush q1h. Pt is diabetic and receiving steroids. Formula changed to Diabetisource, will continue current rate starting at 20ml/hr with goal rate of 60ml/hr and 30ml/hr water flushes. Hx of BLE venous stasis dermatitis, no current skin breakdown. Will continue to follow.     Reason for Assessment     Row Name 05/11/19 1132          Reason for Assessment    Reason For Assessment  TF/PN;physician consult     Diagnosis  pulmonary disease;endocrine conditions Respiratory failure, BPH, PSA, BLE Venous stasis dermatitis     Identified At Risk by Screening Criteria  tube feeding or parenteral nutrition         Nutrition/Diet History     Row Name 05/11/19 1133          Nutrition/Diet History    Typical Food/Fluid Intake  Transferred from [facility]. Resp. Failure. Nursing reports will use OG tube for feeds, no need for cortrak placement at this time. Assessing for needs.      Factors Affecting Nutritional Intake  compromised airway         Anthropometrics     Row Name 05/11/19 1138 05/11/19 1028       Anthropometrics    Height Method  measured  --    Height  188 cm (74\")  --    Current Weight Method  measured  --    Weight  114 kg (251 lb 5.2 oz)  114 kg (251 lb 5.2 oz)       Ideal Body Weight (IBW)    Ideal Body Weight (IBW) (kg)  87.66  --    % Ideal Body Weight  130.05  --       Body Mass Index (BMI)    BMI (kg/m2)  32.34  --    BMI Assessment  BMI 30-34.9: obesity grade I  --     Labs/Tests/Procedures/Meds     Row Name 05/11/19 1139          Labs/Procedures/Meds    Lab Results Reviewed  reviewed, pertinent     Lab Results Comments  Cl, Glu, Alb, C-reactive protein         Diagnostic Tests/Procedures    " "Diagnostic Test/Procedure Reviewed  reviewed        Medications    Pertinent Medications Reviewed  reviewed, pertinent     Pertinent Medications Comments  IV rocephin, IV zithromax, lovenox, pepcid, humalog, solu-medrol, diprivan, 125ml/hr IV's         Physical Findings     Row Name 05/11/19 1141          Physical Findings    Overall Physical Appearance  overweight;obese;on ventilator support     Gastrointestinal  feeding tube     Tubes  orogastric tube     Skin  other (see comments) BLE venous stasis dermatitis         Estimated/Assessed Needs     Row Name 05/11/19 1147 05/11/19 1142       Calculation Measurements    Weight Used For Calculations  114 kg (251 lb 5.2 oz)  114 kg (251 lb 5.2 oz)       Estimated/Assessed Needs    Additional Documentation  --  Fluid Requirements (Group);[place]-St. Jeor Equation (Group);Protein Requirements (Group);Calorie Requirements (Group)       Calorie Requirements    Weight Used For Calorie Calculations  --  114 kg (251 lb 5.2 oz)    Estimated Calorie Requirement (kcal/day)  --  1970    Estimated Calorie Need Method  --  [place]-St Jeor    Estimated Calorie Requirement Comment  --  8212-8978 kcal, 90-115g, 2000ml       KCAL/KG                18 Kcal/Kg (kcal)  2052 2052    20 Kcal/Kg (kcal)  2280  2280    25 Kcal/Kg (kcal)  2850  2850    30 Kcal/Kg (kcal)  3420  3420                               [place]-St. Jeor Equation    RMR (Lav[place]-St. Jeor Equation)  1964.75  1964.75       Protein Requirements    Weight Used For Protein Calculations  --  114 kg (251 lb 5.2 oz)    Est Protein Requirement Amount (gms/kg)  --  1.0 gm protein    Estimated Protein Requirements (gms/day)  --  114       Fluid Requirements    Estimated Fluid Requirements (mL/day)  --  2000    Estimated Fluid Requirement Method  --  RDA Method    RDA Method (mL)  --  2000          Row Name 05/11/19 1138          Calculation Measurements    Height  188 cm (74\")         Nutrition Prescription Ordered     Row Name " "05/11/19 1146          Nutrition Prescription PO    Current PO Diet  NPO        Nutrition Prescription EN    Enteral Route  OG     Product  Fibersource HN (Jevity 1.2)     TF Delivery Method  Continuous     Continuous TF Goal Rate (mL/hr)  60 mL/hr     Continuous TF Current Rate (mL/hr)  20 mL/hr     Water flush (mL)   30 mL     Water Flush Frequency  Per hour         Evaluation of Received Nutrient/Fluid Intake     Row Name 05/11/19 1147 05/11/19 1142       Calculation Measurements    Weight Used For Calculations  114 kg (251 lb 5.2 oz)  114 kg (251 lb 5.2 oz)       Calories Evaluation    Enteral Calories (kcal)  576  --       Protein Evaluation    Enteral Protein (gm)  26  --       Fluid Intake Evaluation    Enteral (Free Water) Fluid (mL)  388  --    Free Water Flush Fluid (mL)  720  --    Total Free Water Intake (mL)  1108 mL  --    IV Fluid (mL)  3000  --    Row Name 05/11/19 1138          Calculation Measurements    Height  188 cm (74\")         Evaluation of Prescribed Nutrient/Fluid Intake     Row Name 05/11/19 1147 05/11/19 1142       Calculation Measurements    Weight Used For Calculations  114 kg (251 lb 5.2 oz)  114 kg (251 lb 5.2 oz)    Row Name 05/11/19 1138          Calculation Measurements    Height  188 cm (74\")         Problem/Interventions:  Problem 1     Row Name 05/11/19 1151          Nutrition Diagnoses Problem 1    Problem 1  Needs Alternate Route     Etiology (related to)  Medical Diagnosis     Pulmonary/Critical Care  Acute respiratory failure;Ventilator     Signs/Symptoms (evidenced by)  NPO         Intervention Goal     Row Name 05/11/19 1152          Intervention Goal    General  Maintain nutrition;Nutrition support treatment;Improved nutrition related lab(s);Reduce/improve symptoms;Meet nutritional needs for age/condition;Disease management/therapy     TF/PN  Inititiate TF/PN;Tolerate TF at goal;Adjust TF/PN;Deliver estimated need (%)     Weight  No significant weight loss     "     Nutrition Intervention     Row Name 05/11/19 1153          Nutrition Intervention    RD/Tech Action  Recommend/ordered;Follow Tx progress;Care plan reviewd     Recommended/Ordered  EN         Nutrition Prescription     Row Name 05/11/19 1154          Nutrition Prescription EN    Enteral Prescription  Enteral begin/change     Enteral Route  OG     Product  Diabetisource     TF Delivery Method  Continuous     Continuous TF Goal Rate (mL/hr)  60 mL/hr     Continuous TF Starting Rate (mL/hr)  20 mL/hr     Water flush (mL)   30 mL     Water Flush Frequency  Per hour     New EN Prescription Ordered?  Yes         Education/Evaluation     Row Name 05/11/19 1156          Education    Education  Education not appropriate at this time     Please explain  Patient intubated        Monitor/Evaluation    Monitor  Per protocol;I&O;Pertinent labs;TF delivery/tolerance;Weight;Skin status;Symptoms     Education Follow-up  Reinforce PRN           Electronically signed by:  Camille Louis MS,RD,LD  05/11/19 12:01 PM

## 2019-05-11 NOTE — ED NOTES
Called [facility] and spoke with Hannah. She states they have CCU beds and will have the intensivist call us back.      David Erickson  05/11/19 0616

## 2019-05-11 NOTE — ED NOTES
Called [facility] and spoke with Molina, he states they have no beds and all their acute beds have holds for awaiting pts.      David Erickson  05/11/19 0607

## 2019-05-11 NOTE — ED NOTES
Dr Guzmán from [facility] accepted pt and they will going to . Left a number for report [phone].     David Erickson  05/11/19 0638

## 2019-05-11 NOTE — ED NOTES
[facility] declined due to weather. Alta from [facility] did say we could call back after noon.      David Erickson  05/11/19 0604

## 2019-05-11 NOTE — ED NOTES
Called  MD they connected Dr. Dey with Dr. Xiong. They put pt on a waiting list and said would call us when they had a bed available.      David Erickson  05/11/19 0605

## 2019-05-11 NOTE — ED NOTES
[facility] is getting bed placement and will call us back.      David Erickson  05/11/19 0654

## 2019-05-11 NOTE — ED NOTES
Attempted to Call report at this time. Nurse states she will call back after shift change.     Magan Reinoso, RN  05/11/19 0651

## 2019-05-11 NOTE — ED NOTES
[facility] approved transfer if we can get a truck [place]. I called [facility], waiting on [facility] to call back     Nora Bravo  05/11/19 9965

## 2019-05-11 NOTE — ED NOTES
Called [facility] to check bed availability. Rebeka states they have no ICU beds at [facility] or [place].      David Erickson  05/11/19 0601

## 2019-05-11 NOTE — ED NOTES
Zoie with [facility] called back and [facility]'ll [facility] here in about 15 minutes.  House supervisor, Alex orozco.     Nora Bravo  05/11/19 1961

## 2019-05-11 NOTE — CONSULTS
Patient Name: Heath Bajwa  :1950  69 y.o.    Date of Admission: 2019  Date of Consultation:  19  Encounter Provider: Selvin Martinez MD  Place of Service: [facility]  Referring Provider: Jose Luis Jasmine MD  Patient Care Team:  Provider, No Known as PCP - General  Provider, No Known as PCP - Family Medicine      Chief complaint: increased troponin    History of Present Illness:  Mr. Bajwa is a 69 year old man with history of COPD on home oxygen, type II diabetes, peripheral vascular disease, hypertension, and tobacco abuse. He was admitted to [facility] in 2018 with COPD exacerbation. He was noted to have indeterminate troponins. Nuclear stress test showed EF 69%, small-sized, mild degree area of ischemia, and EF 69%. Echocardiogram showed EF 61-65%, mild pulmonary hypertension, and no significant valvular disease. He was treated medically.     He was transported to ED at [facility] on 19 with dyspnea and altered mental status. He was intubated and transferred to [facility] for difficulty in oxygenating the patient. A CTA chest showed extensive left lower lobe consolidation and moderate consolidation in the right lower lobe. His troponin at [facility] was 0.057 and 0.069. At [facility] it was 0.172. We've been asked to see for increase in troponin.    Cardiac Testing:  Nuclear Stress Test 18  Interpretation Summary     · Findings consistent with a normal ECG stress test.  · Myocardial perfusion imaging indicates a small-sized, mild degree of ischemia .  · Normal LV cavity size. Normal LV wall motion noted.  · Left ventricular ejection fraction is normal (Calculated EF = 69%).  · Impressions are consistent with a low risk study.       Echocardiogram 7/10/18  Interpretation Summary     · Normal left ventricular cavity size and wall thickness noted. All left ventricular wall segments contract normally.  · Estimated EF appears to be in the range of 61 -  65%.  · The aortic valve is structurally normal. No aortic valve regurgitation is present. No aortic valve stenosis is present.  · The mitral valve is normal in structure. No mitral valve regurgitation is present. No significant mitral valve stenosis is present.  · The tricuspid valve is normal. No tricuspid valve stenosis is present. Mild tricuspid valve regurgitation is present. Estimated right ventricular systolic pressure from tricuspid regurgitation is mildly elevated (35-45 mmHg).  · Mild pulmonary hypertension is present.  · There is no evidence of pericardial effusion.       Past Medical History:   Diagnosis Date   • COPD (chronic obstructive pulmonary disease) (CMS/[facility])    • Diabetes mellitus (CMS/[facility])    • Hypertension        History reviewed. No pertinent surgical history.      Prior to Admission medications    Medication Sig Start Date End Date Taking? Authorizing Provider   albuterol (PROVENTIL HFA;VENTOLIN HFA) 108 (90 Base) MCG/ACT inhaler Inhale 2 puffs Every 6 (Six) Hours As Needed for Wheezing.    Sim Almonte MD   amLODIPine (NORVASC) 10 MG tablet Take 1 tablet by mouth Daily. 7/16/18   Julian Stern MD   aspirin 325 MG tablet Take 325 mg by mouth Daily.    Sim Almonte MD   atorvastatin (LIPITOR) 40 MG tablet Take 1 tablet by mouth Daily. 7/16/18   Julian Stern MD   budesonide-formoterol (SYMBICORT) 160-4.5 MCG/ACT inhaler Inhale 2 puffs 2 (two) times a day.    Sim Almonte MD   donepezil (ARICEPT) 10 MG tablet Take 10 mg by mouth Every Night.    Sim Almonte MD   gabapentin (NEURONTIN) 800 MG tablet Take 800 mg by mouth 3 (Three) Times a Day.    Sim Almonte MD   hydrochlorothiazide (HYDRODIURIL) 25 MG tablet Take 25 mg by mouth daily.    Sim Almonte MD   insulin NPH-insulin regular (humuLIN 70/30,novoLIN 70/30) (70-30) 100 UNIT/ML injection Inject 20 Units under the skin 2 (Two) Times a Day With Meals. 7/15/18   Julian Stern MD    ipratropium-albuterol (DUO-NEB) 0.5-2.5 mg/3 ml nebulizer Take 3 mL by nebulization Every 4 (Four) Hours As Needed for Shortness of Air. 8/13/18   Rocky Sharma MD   levocetirizine (XYZAL) 5 MG tablet Take 5 mg by mouth Daily As Needed for Allergies.    Sim Almonte MD   Liraglutide (VICTOZA) 18 MG/3ML solution pen-injector injection Inject 1.8 mg under the skin Daily.    Sim Almonte MD   lisinopril (PRINIVIL,ZESTRIL) 40 MG tablet Take 40 mg by mouth daily.    Sim Almonte MD   sertraline (ZOLOFT) 25 MG tablet Take 25 mg by mouth Daily.    Sim Almonte MD   tamsulosin (FLOMAX) 0.4 MG capsule 24 hr capsule Take 1 capsule by mouth Every Evening.    Sim Almonte MD       No Known Allergies    Social History     Socioeconomic History   • Marital status:      Spouse name: Not on file   • Number of children: Not on file   • Years of education: Not on file   • Highest education level: Not on file   Tobacco Use   • Smoking status: Current Every Day Smoker     Packs/day: 1.00     Years: 25.00     Pack years: 25.00     Types: Cigarettes   • Smokeless tobacco: Never Used   Substance and Sexual Activity   • Alcohol use: No   • Drug use: No   • Sexual activity: Defer       Family History   Problem Relation Age of Onset   • No Known Problems Mother    • Heart failure Father        REVIEW OF SYSTEMS:   All systems reviewed.  Pertinent positives identified in HPI.  All other systems are negative.      Objective:     Vitals:    05/11/19 1903 05/11/19 1912 05/11/19 1930 05/11/19 2053   BP: 98/62      BP Location:       Patient Position:       Pulse: 63 62  60   Resp:  28     Temp:   99.8 °F (37.7 °C)    TempSrc:   Oral    SpO2: 96% 96%  96%   Weight:       Height:         Body mass index is 32.27 kg/m².    General Appearance:    Alert, cooperative, in no acute distress   Head:    Normocephalic, without obvious abnormality, atraumatic   Eyes:            Lids and  lashes normal, conjunctivae and sclerae normal, no   icterus, no pallor, corneas clear, PERRLA   Ears:    Ears appear intact with no abnormalities noted   Throat:   No oral lesions, no thrush, oral mucosa moist   Neck:   No adenopathy, supple, trachea midline, no thyromegaly, no   carotid bruit, no JVD   Back:     No kyphosis present, no scoliosis present, no skin lesions, erythema or scars, no tenderness to percussion or palpation, range of motion normal   Lungs:     Clear to auscultation,respirations regular, even and unlabored    Heart:    Regular rhythm and normal rate, normal S1 and S2, no murmur, no gallop, no rub, no click   Chest Wall:    No abnormalities observed   Abdomen:     Normal bowel sounds, no masses, no organomegaly, soft        non-tender, non-distended, no guarding, no rebound  tenderness   Extremities:   Moves all extremities well, no edema, no cyanosis, no redness   Pulses:   Pulses palpable and equal bilaterally. Normal radial, carotid, femoral, dorsalis pedis and posterior tibial pulses bilaterally. Normal abdominal aorta   Skin:  Psychiatric:   No bleeding, bruising or rash    Alert and oriented x 3, normal mood and affect   Lab Review:     Results from last 7 days   Lab Units 05/11/19  1104   SODIUM mmol/L 140   POTASSIUM mmol/L 4.3   CHLORIDE mmol/L 95*   CO2 mmol/L 35.6*   BUN mg/dL 22   CREATININE mg/dL 0.93   CALCIUM mg/dL 8.7   BILIRUBIN mg/dL 0.5   ALK PHOS U/L 101   ALT (SGPT) U/L 17   AST (SGOT) U/L 17   GLUCOSE mg/dL 215*     Results from last 7 days   Lab Units 05/11/19  1104 05/11/19  0542 05/11/19  0339   TROPONIN T ng/mL 0.172* 0.069* 0.057*     Results from last 7 days   Lab Units 05/11/19  1104   WBC 10*3/mm3 14.91*   HEMOGLOBIN g/dL 16.2   HEMATOCRIT % 52.5*   PLATELETS 10*3/mm3 212     Results from last 7 days   Lab Units 05/11/19  1104   INR  1.17*     Results from last 7 days   Lab Units 05/11/19  1104   MAGNESIUM mg/dL 1.8            Telemetry:           EKG:                I personally viewed and interpreted the patient's EKG/Telemetry data.        Assessment and Plan:       69 year old man transferred from [place] with extensive pneumonia.  In that setting, he has a mildly elevated troponin.  No obvious ischemic change on EKG.    I would support his oxygenation and treat for pneumonia.  Check echocardiogram.  No evidence of ACS at this time.    Selvin Martinez MD  05/11/19  9:31 PM

## 2019-05-11 NOTE — ED NOTES
Called [facility] for weather check. Kaye states she will call us back.      David Erickson  05/11/19 0655

## 2019-05-11 NOTE — ED NOTES
Dr. Guzmán called back from [facility] and is on the line with Dr. Xiong at this time.      David Erickson  05/11/19 0675

## 2019-05-11 NOTE — ED NOTES
[facility] arrived to transport pt to [facility]. [facility] is aware and requested we call them back when the truck leaves.      Nora Bravo  05/11/19 0757

## 2019-05-11 NOTE — ED NOTES
Called [facility], they state they don't have any ICU beds.      David Erickson  05/11/19 0604

## 2019-05-11 NOTE — H&P
Patient Care Team:  Provider, No Known as PCP - General  Provider, No Known as PCP - Family Medicine      Subjective     Patient is a 69 y.o. male.  Who was transferred from [facility] for ventilator management.  Patient apparently was brought to their facility by EMS for altered mental status and shortness of air.  Apparently he has known COPD on chronic 2 L nasal cannula to that he had turned up to 5 L or more report of 2 days of increasing shortness of breath.  There is no family here the only history I have is from reviewing computer records.  He apparently has diabetes hypertension severe peripheral vascular disease for which he has declined further work-up and treatment.  100% O2 5 of PEEP oxygen saturations around 90 to 91%.  He was on propofol and apparently got some ketamine as well in route.  He had a CT angiogram of the chest done at their facility apparently no pulmonary embolism noted there is bibasilar atelectasis and infiltrates possible early interstitial edema and a 3 mm right upper lobe nodule and incompletely characterized kidney lesion.  CT head report no definite acute intracranial normality with some sinus and mastoid disease.      Review of Systems:  Unable to obtain      History  Past Medical History:   Diagnosis Date   • COPD (chronic obstructive pulmonary disease) (CMS/[facility])    • Diabetes mellitus (CMS/[facility])    • Hypertension      No past surgical history on file.  Social History     Socioeconomic History   • Marital status:      Spouse name: Not on file   • Number of children: Not on file   • Years of education: Not on file   • Highest education level: Not on file   Tobacco Use   • Smoking status: Current Every Day Smoker     Packs/day: 1.00     Years: 25.00     Pack years: 25.00     Types: Cigarettes   • Smokeless tobacco: Never Used   Substance and Sexual Activity   • Alcohol use: No   • Drug use: No   • Sexual activity: Defer     Family History   Problem  Relation Age of Onset   • No Known Problems Mother    • Heart failure Father          Allergies:  Patient has no known allergies.    Medications:  Prior to Admission medications    Medication Sig Start Date End Date Taking? Authorizing Provider   albuterol (PROVENTIL HFA;VENTOLIN HFA) 108 (90 Base) MCG/ACT inhaler Inhale 2 puffs Every 6 (Six) Hours As Needed for Wheezing.    Sim Almonte MD   amLODIPine (NORVASC) 10 MG tablet Take 1 tablet by mouth Daily. 7/16/18   Julian Stern MD   aspirin 325 MG tablet Take 325 mg by mouth Daily.    Sim Almonte MD   atorvastatin (LIPITOR) 40 MG tablet Take 1 tablet by mouth Daily. 7/16/18   Julian Stern MD   budesonide-formoterol (SYMBICORT) 160-4.5 MCG/ACT inhaler Inhale 2 puffs 2 (two) times a day.    Sim Almonte MD   donepezil (ARICEPT) 10 MG tablet Take 10 mg by mouth Every Night.    Sim Almonte MD   gabapentin (NEURONTIN) 800 MG tablet Take 800 mg by mouth 3 (Three) Times a Day.    Sim Almonte MD   hydrochlorothiazide (HYDRODIURIL) 25 MG tablet Take 25 mg by mouth daily.    Sim Almonte MD   insulin NPH-insulin regular (humuLIN 70/30,novoLIN 70/30) (70-30) 100 UNIT/ML injection Inject 20 Units under the skin 2 (Two) Times a Day With Meals. 7/15/18   Julian Stern MD   ipratropium-albuterol (DUO-NEB) 0.5-2.5 mg/3 ml nebulizer Take 3 mL by nebulization Every 4 (Four) Hours As Needed for Shortness of Air. 8/13/18   Rocky Sharma MD   levocetirizine (XYZAL) 5 MG tablet Take 5 mg by mouth Daily As Needed for Allergies.    Sim Almonte MD   Liraglutide (VICTOZA) 18 MG/3ML solution pen-injector injection Inject 1.8 mg under the skin Daily.    Sim Almonte MD   lisinopril (PRINIVIL,ZESTRIL) 40 MG tablet Take 40 mg by mouth daily.    Sim Almonte MD   sertraline (ZOLOFT) 25 MG tablet Take 25 mg by mouth Daily.    Sim Almonte MD   tamsulosin (FLOMAX) 0.4 MG capsule 24 hr  capsule Take 1 capsule by mouth Every Evening.    Provider, MD Sim       Propofol             Objective     Vital Signs  Vital Sign Min/Max for last 24 hours  Temp  Min: 97.5 °F (36.4 °C)  Max: 97.5 °F (36.4 °C)   BP  Min: 96/64  Max: 189/107   Pulse  Min: 62  Max: 123   Resp  Min: 2  Max: 24   SpO2  Min: 60 %  Max: 98 %   Flow (L/min)  Min: 15  Max: 15   Weight  Min: 125 kg (275 lb)  Max: 125 kg (275 lb)     No intake or output data in the 24 hours ending 05/11/19 1007  No intake/output data recorded.  Last Weight and Admission Weight    There were no vitals filed for this visit.      There is no height or weight on file to calculate BMI.           Physical Exam:  General Appearance: Obese white male orally intubated with an 8.0 endotracheal tube at about 20 to 21 cm at the lips.  Sedated and unresponsive on propofol.  Eyes: Conjunctival are clear and anicteric pupils are equal they are pinpoint  ENT: Oral mucous membranes are dry nasal septum appears midline I do not see any exudates or erythema  Neck: No adenopathy no jugular venous distention trachea midline, no palpable thyromegaly  Lungs: Coarse breath sounds bilaterally seem to be equal.  I do not hear any definite wheezes rales or rhonchi  Cardiac: Regular rate rhythm no murmur  Abdomen: Soft no palpable organomegaly or masses  : Normal external male genitalia he does have significant erythema looks like Candida intertrigo he has a Aguilar catheter in place  Musculoskeletal: He has what looks like chronic arterial and venous insufficiency in his lower extremities.  He has smooth thin skin with doughy edema in both lower extremities.  There is a right femoral central venous type catheter  Skin: He has a blood-filled blister on the dorsum of the left great toe and he is a small blood-filled blister on his right heel no jaundice no petechiae  Neuro: Unable to assess his sedation  Extremities/P Vascular: He does have palpable radial pulses and very weak  dorsalis pedis pulses bilaterally  MSE: Unable to assess      Labs:  Results from last 7 days   Lab Units 05/11/19  0339   GLUCOSE mg/dL 304*   SODIUM mmol/L 136   POTASSIUM mmol/L 4.7   MAGNESIUM mg/dL 1.8   CO2 mmol/L 35.2*   CHLORIDE mmol/L 93*   ANION GAP mmol/L 7.8   CREATININE mg/dL 0.91   BUN mg/dL 23   BUN / CREAT RATIO  25.3*   CALCIUM mg/dL 8.3*   EGFR IF NONAFRICN AM mL/min/1.73 82   ALK PHOS U/L 105   TOTAL PROTEIN g/dL 6.1   ALT (SGPT) U/L 16   AST (SGOT) U/L 20   BILIRUBIN mg/dL 0.5   ALBUMIN g/dL 2.38*   GLOBULIN gm/dL 3.7     Estimated Creatinine Clearance: 104.6 mL/min (by C-G formula based on SCr of 0.91 mg/dL).      Results from last 7 days   Lab Units 05/11/19  0339   WBC 10*3/mm3 13.92*   RBC 10*6/mm3 5.05   HEMOGLOBIN g/dL 15.5   HEMATOCRIT % 50.8   MCV fL 100.6*   MCH pg 30.7   MCHC g/dL 30.5*   RDW % 14.9   RDW-SD fl 54.4*   MPV fL 10.7   PLATELETS 10*3/mm3 208   NEUTROPHIL % % 88.6*   LYMPHOCYTE % % 3.2*   MONOCYTES % % 5.0   EOSINOPHIL % % 0.1*   BASOPHIL % % 0.4   IMM GRAN % % 2.7*   NEUTROS ABS 10*3/mm3 12.31*   LYMPHS ABS 10*3/mm3 0.45*   MONOS ABS 10*3/mm3 0.70   EOS ABS 10*3/mm3 0.02   BASOS ABS 10*3/mm3 0.06   IMMATURE GRANS (ABS) 10*3/mm3 0.38*     Results from last 7 days   Lab Units 05/11/19  0513   PH, ARTERIAL pH units 7.357   PO2 ART mm Hg 32.8*   PCO2, ARTERIAL mm Hg 63.7*   HCO3 ART mmol/L 34.9*     Results from last 7 days   Lab Units 05/11/19  0542 05/11/19  0339   TROPONIN T ng/mL 0.069* 0.057*             Results from last 7 days   Lab Units 05/11/19  0339   LACTATE mmol/L 1.0         Microbiology Results (last 10 days)     ** No results found for the last 240 hours. **            Diagnostics:  Ct Head Without Contrast    Result Date: 5/11/2019  EXAMINATION: CT HEAD WO CONTRAST-  CLINICAL INDICATION:     ams  COMPARISON:    None  Technique: Multiple CT axial images were obtained through the level of the brain without IV contrast administration. Reformatted images in the  coronal and/or sagittal plane(s) were generated from the axial data set to facilitate diagnostic accuracy and/or surgical planning.  Radiation dose reduction techniques were utilized per ALARA protocol. Automated exposure control was initiated through either or [employer] or Next 1 Interactive software packages by  protocol.   DOSE (DLP mGy-cm): 1469.74 mGy.cm  FINDINGS:   Mild generalized cerebral atrophy and chronic small vessel ischemic disease noted. No acute intracranial abnormality. No midline shift or mass effect. No hydrocephalus or intracranial hemorrhage. There is no CT evidence of acute vascular territory infarct. Bone windows show no acute osseous abnormality. Bilateral right greater than left mastoid effusions. Pansinusitis.      1. No CT evidence of acute intracranial abnormality. 2. Estimated effusions and pansinusitis.  This report was finalized on 5/11/2019 7:24 AM by Dr. Escobar Peralta MD.      Xr Chest 1 View    Result Date: 5/11/2019  EXAMINATION: XR CHEST 1 VW-  CLINICAL INDICATION:     tube placement  TECHNIQUE:  XR CHEST 1 VW-  COMPARISON: 08/13/2018  FINDINGS: ET tube at level of clavicles. NG tube extends into stomach.  CHF/edema developed from previous exam. Consolidative airspace disease right greater than left mid and lower lung regions. Trace pleural effusions. No pneumothorax. No acute bony findings.      1. CHF. 2. Bibasilar airspace disease. 3. Support devices as above.  This report was finalized on 5/11/2019 7:28 AM by Dr. Escobar Peralta MD.      Ct Chest Pulmonary Embolism With Contrast    Result Date: 5/11/2019  EXAMINATION: CT CHEST PULMONARY EMBOLISM W CONTRAST-  CLINICAL INDICATION:Pulmonary embolism  COMPARISON: NONE.  TECHNIQUE: Multiple CT axial images were obtained through the level of pulmonary arteries, following IV contrast administration per CT PE protocol. Volume Rendered 3D or MIP images performed.  Radiation dose reduction techniques were utilized per ALARA protocol.  Automated exposure control was initiated through either or [employer] or DoseRight software packages by  protocol.  DOSE (DLP mGy-cm):   FINDINGS:  PULMONARY ARTERIES: No evidence of pulmonary embolism.  LUNGS: CONSOLIDATIVE AIRSPACE DISEASE BILATERAL LOWER LOBES CONSISTENT WITH COMBINATION OF ATELECTASIS AND PNEUMONIA. PULMONARY VASCULAR CONGESTION IS NOTED WITH INTERSTITIAL EDEMA.  HEART: MILD CARDIOMEGALY WITH SEVERE CORONARY ARTERY CALCIFICATIONS.  PERICARDIUM: No effusion.  MEDIASTINUM: PROBABLE REACTIVE HILAR AND MEDIASTINAL LYMPH NODES.  PLEURA: TRACE PLEURAL EFFUSIONS.  MAJOR AIRWAYS: Clear. No intrinsic mass.  VASCULATURE: No evidence of aneurysm.  VISUALIZED UPPER ABDOMEN:NODULAR LIVER MARGINS SUGGESTING CIRRHOSIS. CYST LEFT KIDNEY.  OTHER: None.  BONES: DEGENERATIVE CHANGES THORACIC SPINE. NO ACUTE BONY FINDINGS.      1. No PE. 2. CHF/edema. 3. Bibasilar consolidations compatible with pneumonia. 4. Liver cirrhosis. 5. Indeterminant cyst left kidney. Ultrasound follow-up may be helpful.  This report was finalized on 5/11/2019 7:27 AM by Dr. Escobar Peralta MD.      Results for orders placed during the hospital encounter of 07/09/18   Adult Transthoracic Echo Complete W/ Cont if Necessary Per Protocol    Narrative · Normal left ventricular cavity size and wall thickness noted. All left   ventricular wall segments contract normally.  · Estimated EF appears to be in the range of 61 - 65%.  · The aortic valve is structurally normal. No aortic valve regurgitation   is present. No aortic valve stenosis is present.  · The mitral valve is normal in structure. No mitral valve regurgitation   is present. No significant mitral valve stenosis is present.  · The tricuspid valve is normal. No tricuspid valve stenosis is present.   Mild tricuspid valve regurgitation is present. Estimated right ventricular   systolic pressure from tricuspid regurgitation is mildly elevated (35-45   mmHg).  · Mild pulmonary  hypertension is present.  · There is no evidence of pericardial effusion.          Viewed the chest CT and head CT from [facility] he does have significant emphysematous changes and bilateral infiltrates and atelectasis primarily in the bases.  Like the ET tube is barely in the thoracic cavity  EKG reviewed and compared to 8/13/2018 normal sinus rhythm no change from prior EKG    Assessment/Plan     1. Acute on chronic hypoxemic and hypercapnic respiratory failure.  We need to increase his PEEP and see if we can wean his FiO2.  Also will try and ventilate him on lower tidal volumes move towards an ARDS net protocol.  2. Pneumonia bilateral bibasilar certainly raises the concern for aspiration.  Do not see any allergies noted.  3. Elevated and rising troponin non ST elevation this could be non-ST elevation MI due to demand ischemia with his profound hypoxia.  We will trend troponin and ask cardiology to see.  4. COPD by history and certainly CT suggest bronchodilator therapy, given his severe hypoxia I will go ahead and put him on some steroids as well I do not hear wheezing but difficult to determine on a ventilator  5. Hypertension by history will follow blood pressures treat as necessary  6. Hyperglycemia I do not see a history of diabetes we will check a hemoglobin A1c and cover with sliding scale insulin probably related to stress of acute illness.  7. Hypoalbuminemia probably poor nutritional status, will go ahead and get some feeding tube and start tube feeds to protect gut and prevent further deterioration  8. Hematuria may be secondary to Aguilar catheter placement I does recommend a follow-up urine in a couple of weeks once the catheter is out.  9. 3 mm pulmonary nodule noncalcified he will need follow-up CT on this given his smoking history  10. Incompletely evaluated renal lesion again when he recovers he should have a CT to fully evaluate  11. Peripheral vascular disease patient has declined intervention  recently.  12. Skin lesions right heel and left great toe ask wound to evaluate  13. Candida intertrigo nystatin powder      Jose Luis Jasmine MD  05/11/19  10:07 AM    Time: Spent just over 1 hour in critical care time with the patient thus far today

## 2019-05-11 NOTE — PLAN OF CARE
Problem: Patient Care Overview  Goal: Plan of Care Review  Outcome: Ongoing (interventions implemented as appropriate)   05/11/19 1651   Coping/Psychosocial   Plan of Care Reviewed With patient   OTHER   Outcome Summary new admisstion for José Miguel Dietrich; remains sedated on vent; hypotensive, treating with fluids at this time; monitoring urine via f/c; OG in place, tolerating tube feeds; will monitor in ccu overnight        Problem: Ventilation, Mechanical Invasive (Adult)  Goal: Signs and Symptoms of Listed Potential Problems Will be Absent, Minimized or Managed (Ventilation, Mechanical Invasive)   05/11/19 1651   Goal/Outcome Evaluation   Problems Assessed (Mechanical Ventilation, Invasive) all   Problems Present (Mech Vent, Invasive) inability to wean       Problem: Pneumonia (Adult)  Goal: Signs and Symptoms of Listed Potential Problems Will be Absent, Minimized or Managed (Pneumonia)  Outcome: Ongoing (interventions implemented as appropriate)   05/11/19 1651   Goal/Outcome Evaluation   Problems Assessed (Pneumonia) all   Problems Present (Pneumonia) fluid/electrolyte imbalance;respiratory compromise       Problem: Pain, Acute (Adult)  Goal: Identify Related Risk Factors and Signs and Symptoms  Outcome: Outcome(s) achieved Date Met: 05/11/19 05/11/19 1651   Pain, Acute (Adult)   Related Risk Factors (Acute Pain) communication barrier;disease process;infection;positioning   Signs and Symptoms (Acute Pain) fatigue/weakness;other (see comments)  (nods head yes/no when asked if in pain; history of pain)       Problem: Skin Injury Risk (Adult)  Goal: Identify Related Risk Factors and Signs and Symptoms  Outcome: Outcome(s) achieved Date Met: 05/11/19 05/11/19 1651   Skin Injury Risk (Adult)   Related Risk Factors (Skin Injury Risk) advanced age;body weight extremes;cognitive impairment;critical care admission;fluid intake inadequate;infection;mechanical forces;medical devices;medication       Problem: Fall Risk  (Adult)  Goal: Identify Related Risk Factors and Signs and Symptoms  Outcome: Outcome(s) achieved Date Met: 05/11/19 05/11/19 1651   Fall Risk (Adult)   Related Risk Factors (Fall Risk) age-related changes;culprit medication(s);slippery/uneven surfaces;environment unfamiliar   Signs and Symptoms (Fall Risk) presence of risk factors       Problem: Restraint, Nonbehavioral (Nonviolent)  Goal: Rationale and Justification  Outcome: Ongoing (interventions implemented as appropriate)   05/11/19 1651   Restraint, Nonbehavioral (Nonviolent)   Rationale and Justification prevent line/tube removal     Goal: Nonbehavioral (Nonviolent) Restraint: Absence of Injury/Harm  Outcome: Ongoing (interventions implemented as appropriate)   05/11/19 1651   Restraint, Nonbehavioral (Nonviolent)   Nonbehavioral (Nonviolent) Restraint: Absence of Injury/Harm met     Goal: Nonbehavioral (Nonviolent) Restraint: Achievement of Discontinuation Criteria  Outcome: Ongoing (interventions implemented as appropriate)   05/11/19 1651   Restraint, Nonbehavioral (Nonviolent)   Nonbehavioral (Nonviolent) Restraint: Achievement of Discontinuation Criteria not met  (remain on)     Goal: Nonbehavioral (Nonviolent) Restraint: Preservation of Dignity and Wellbeing  Outcome: Ongoing (interventions implemented as appropriate)   05/11/19 1651   Restraint, Nonbehavioral (Nonviolent)   Nonbehavioral (Nonviolent) Restraint: Preservation of Dignity and Wellbeing met

## 2019-05-11 NOTE — ED NOTES
Called [facility] to check for ICU beds. [facility].      David Erickson  05/11/19 0603

## 2019-05-12 ENCOUNTER — APPOINTMENT (OUTPATIENT)
Dept: CARDIOLOGY | Facility: HOSPITAL | Age: 69
End: 2019-05-12

## 2019-05-12 ENCOUNTER — APPOINTMENT (OUTPATIENT)
Dept: GENERAL RADIOLOGY | Facility: HOSPITAL | Age: 69
End: 2019-05-12

## 2019-05-12 LAB
ANION GAP SERPL CALCULATED.3IONS-SCNC: 10.6 MMOL/L
ARTERIAL PATENCY WRIST A: POSITIVE
ATMOSPHERIC PRESS: 744 MMHG
BASE EXCESS BLDA CALC-SCNC: 7.7 MMOL/L (ref 0–2)
BDY SITE: ABNORMAL
BH CV ECHO MEAS - ACS: 2.2 CM
BH CV ECHO MEAS - AO MAX PG (FULL): -0.17 MMHG
BH CV ECHO MEAS - AO MAX PG: 4.2 MMHG
BH CV ECHO MEAS - AO MEAN PG (FULL): 0 MMHG
BH CV ECHO MEAS - AO MEAN PG: 2 MMHG
BH CV ECHO MEAS - AO ROOT AREA (BSA CORRECTED): 1.6
BH CV ECHO MEAS - AO ROOT AREA: 11.3 CM^2
BH CV ECHO MEAS - AO ROOT DIAM: 3.8 CM
BH CV ECHO MEAS - AO V2 MAX: 103 CM/SEC
BH CV ECHO MEAS - AO V2 MEAN: 62 CM/SEC
BH CV ECHO MEAS - AO V2 VTI: 26 CM
BH CV ECHO MEAS - AVA(I,A): 3.3 CM^2
BH CV ECHO MEAS - AVA(I,D): 3.3 CM^2
BH CV ECHO MEAS - AVA(V,A): 3.5 CM^2
BH CV ECHO MEAS - AVA(V,D): 3.5 CM^2
BH CV ECHO MEAS - BSA(HAYCOCK): 2.5 M^2
BH CV ECHO MEAS - BSA: 2.4 M^2
BH CV ECHO MEAS - BZI_BMI: 33.4 KILOGRAMS/M^2
BH CV ECHO MEAS - BZI_METRIC_HEIGHT: 188 CM
BH CV ECHO MEAS - BZI_METRIC_WEIGHT: 117.9 KG
BH CV ECHO MEAS - EDV(MOD-SP2): 184 ML
BH CV ECHO MEAS - EDV(MOD-SP4): 163 ML
BH CV ECHO MEAS - EDV(TEICH): 216.8 ML
BH CV ECHO MEAS - EF(CUBED): 75.6 %
BH CV ECHO MEAS - EF(MOD-SP2): 58.7 %
BH CV ECHO MEAS - EF(MOD-SP4): 55.8 %
BH CV ECHO MEAS - EF(TEICH): 66.3 %
BH CV ECHO MEAS - ESV(MOD-SP2): 76 ML
BH CV ECHO MEAS - ESV(MOD-SP4): 72 ML
BH CV ECHO MEAS - ESV(TEICH): 72.9 ML
BH CV ECHO MEAS - FS: 37.5 %
BH CV ECHO MEAS - IVS/LVPW: 0.9
BH CV ECHO MEAS - IVSD: 0.87 CM
BH CV ECHO MEAS - LAT PEAK E' VEL: 10 CM/SEC
BH CV ECHO MEAS - LV DIASTOLIC VOL/BSA (35-75): 67.1 ML/M^2
BH CV ECHO MEAS - LV MASS(C)D: 253.9 GRAMS
BH CV ECHO MEAS - LV MASS(C)DI: 104.5 GRAMS/M^2
BH CV ECHO MEAS - LV MAX PG: 4.4 MMHG
BH CV ECHO MEAS - LV MEAN PG: 2 MMHG
BH CV ECHO MEAS - LV SYSTOLIC VOL/BSA (12-30): 29.6 ML/M^2
BH CV ECHO MEAS - LV V1 MAX: 105 CM/SEC
BH CV ECHO MEAS - LV V1 MEAN: 65.1 CM/SEC
BH CV ECHO MEAS - LV V1 VTI: 24.9 CM
BH CV ECHO MEAS - LVIDD: 6.5 CM
BH CV ECHO MEAS - LVIDS: 4.1 CM
BH CV ECHO MEAS - LVLD AP2: 9.1 CM
BH CV ECHO MEAS - LVLD AP4: 8.5 CM
BH CV ECHO MEAS - LVLS AP2: 7.7 CM
BH CV ECHO MEAS - LVLS AP4: 7.5 CM
BH CV ECHO MEAS - LVOT AREA (M): 3.5 CM^2
BH CV ECHO MEAS - LVOT AREA: 3.5 CM^2
BH CV ECHO MEAS - LVOT DIAM: 2.1 CM
BH CV ECHO MEAS - LVPWD: 0.97 CM
BH CV ECHO MEAS - MED PEAK E' VEL: 7 CM/SEC
BH CV ECHO MEAS - MR MAX PG: 42.5 MMHG
BH CV ECHO MEAS - MR MAX VEL: 326 CM/SEC
BH CV ECHO MEAS - MV A DUR: 0.17 SEC
BH CV ECHO MEAS - MV A MAX VEL: 71.8 CM/SEC
BH CV ECHO MEAS - MV DEC SLOPE: 368 CM/SEC^2
BH CV ECHO MEAS - MV DEC TIME: 0.21 SEC
BH CV ECHO MEAS - MV E MAX VEL: 75.2 CM/SEC
BH CV ECHO MEAS - MV E/A: 1
BH CV ECHO MEAS - MV MAX PG: 2.6 MMHG
BH CV ECHO MEAS - MV MEAN PG: 1 MMHG
BH CV ECHO MEAS - MV P1/2T MAX VEL: 75.2 CM/SEC
BH CV ECHO MEAS - MV P1/2T: 59.9 MSEC
BH CV ECHO MEAS - MV V2 MAX: 80.8 CM/SEC
BH CV ECHO MEAS - MV V2 MEAN: 41.3 CM/SEC
BH CV ECHO MEAS - MV V2 VTI: 32.8 CM
BH CV ECHO MEAS - MVA P1/2T LCG: 2.9 CM^2
BH CV ECHO MEAS - MVA(P1/2T): 3.7 CM^2
BH CV ECHO MEAS - MVA(VTI): 2.6 CM^2
BH CV ECHO MEAS - PA ACC TIME: 0.14 SEC
BH CV ECHO MEAS - PA MAX PG (FULL): 1.1 MMHG
BH CV ECHO MEAS - PA MAX PG: 3.1 MMHG
BH CV ECHO MEAS - PA PR(ACCEL): 15.1 MMHG
BH CV ECHO MEAS - PA V2 MAX: 87.6 CM/SEC
BH CV ECHO MEAS - PULM A REVS DUR: 0.13 SEC
BH CV ECHO MEAS - PULM A REVS VEL: 30.9 CM/SEC
BH CV ECHO MEAS - PULM DIAS VEL: 37.8 CM/SEC
BH CV ECHO MEAS - PULM S/D: 0.88
BH CV ECHO MEAS - PULM SYS VEL: 33.3 CM/SEC
BH CV ECHO MEAS - RAP SYSTOLE: 15 MMHG
BH CV ECHO MEAS - RV MAX PG: 2 MMHG
BH CV ECHO MEAS - RV MEAN PG: 1 MMHG
BH CV ECHO MEAS - RV V1 MAX: 70.2 CM/SEC
BH CV ECHO MEAS - RV V1 MEAN: 49.6 CM/SEC
BH CV ECHO MEAS - RV V1 VTI: 19.5 CM
BH CV ECHO MEAS - RVSP: 42.7 MMHG
BH CV ECHO MEAS - SI(AO): 121.4 ML/M^2
BH CV ECHO MEAS - SI(CUBED): 85.8 ML/M^2
BH CV ECHO MEAS - SI(LVOT): 35.5 ML/M^2
BH CV ECHO MEAS - SI(MOD-SP2): 44.5 ML/M^2
BH CV ECHO MEAS - SI(MOD-SP4): 37.5 ML/M^2
BH CV ECHO MEAS - SI(TEICH): 59.2 ML/M^2
BH CV ECHO MEAS - SUP REN AO DIAM: 2.2 CM
BH CV ECHO MEAS - SV(AO): 294.9 ML
BH CV ECHO MEAS - SV(CUBED): 208.5 ML
BH CV ECHO MEAS - SV(LVOT): 86.2 ML
BH CV ECHO MEAS - SV(MOD-SP2): 108 ML
BH CV ECHO MEAS - SV(MOD-SP4): 91 ML
BH CV ECHO MEAS - SV(TEICH): 143.8 ML
BH CV ECHO MEAS - TAPSE (>1.6): 2.1 CM2
BH CV ECHO MEAS - TR MAX VEL: 263 CM/SEC
BH CV ECHO MEASUREMENTS AVERAGE E/E' RATIO: 8.85
BH CV XLRA - RV BASE: 3.7 CM
BH CV XLRA - TDI S': 13 CM/SEC
BUN BLD-MCNC: 32 MG/DL (ref 8–23)
BUN/CREAT SERPL: 25 (ref 7–25)
CALCIUM SPEC-SCNC: 8.3 MG/DL (ref 8.6–10.5)
CHLORIDE SERPL-SCNC: 98 MMOL/L (ref 98–107)
CO2 SERPL-SCNC: 30.4 MMOL/L (ref 22–29)
CREAT BLD-MCNC: 1.28 MG/DL (ref 0.76–1.27)
DEPRECATED RDW RBC AUTO: 51.3 FL (ref 37–54)
ERYTHROCYTE [DISTWIDTH] IN BLOOD BY AUTOMATED COUNT: 14.8 % (ref 12.3–15.4)
GFR SERPL CREATININE-BSD FRML MDRD: 55 ML/MIN/1.73
GLUCOSE BLD-MCNC: 209 MG/DL (ref 65–99)
GLUCOSE BLDC GLUCOMTR-MCNC: 159 MG/DL (ref 70–130)
GLUCOSE BLDC GLUCOMTR-MCNC: 177 MG/DL (ref 70–130)
GLUCOSE BLDC GLUCOMTR-MCNC: 203 MG/DL (ref 70–130)
GLUCOSE BLDC GLUCOMTR-MCNC: 229 MG/DL (ref 70–130)
GLUCOSE BLDC GLUCOMTR-MCNC: 248 MG/DL (ref 70–130)
GLUCOSE BLDC GLUCOMTR-MCNC: 272 MG/DL (ref 70–130)
HCO3 BLDA-SCNC: 33.4 MMOL/L (ref 22–28)
HCT VFR BLD AUTO: 48.7 % (ref 37.5–51)
HGB BLD-MCNC: 15.3 G/DL (ref 13–17.7)
HOROWITZ INDEX BLD+IHG-RTO: 40 %
LEFT ATRIUM VOLUME INDEX: 25 ML/M2
MAGNESIUM SERPL-MCNC: 1.8 MG/DL (ref 1.6–2.4)
MAXIMAL PREDICTED HEART RATE: 149 BPM
MCH RBC QN AUTO: 30.2 PG (ref 26.6–33)
MCHC RBC AUTO-ENTMCNC: 31.4 G/DL (ref 31.5–35.7)
MCV RBC AUTO: 96.1 FL (ref 79–97)
MODALITY: ABNORMAL
O2 A-A PPRESDIFF RESPIRATORY: 0.3 MMHG
PCO2 BLDA: 47.5 MM HG (ref 35–45)
PEEP RESPIRATORY: 18 CM[H2O]
PH BLDA: 7.46 PH UNITS (ref 7.35–7.45)
PLATELET # BLD AUTO: 211 10*3/MM3 (ref 140–450)
PMV BLD AUTO: 11.5 FL (ref 6–12)
PO2 BLDA: 70.8 MM HG (ref 80–100)
POTASSIUM BLD-SCNC: 5.1 MMOL/L (ref 3.5–5.2)
RBC # BLD AUTO: 5.07 10*6/MM3 (ref 4.14–5.8)
SAO2 % BLDCOA: 94.5 % (ref 92–99)
SET MECH RESP RATE: 28
SODIUM BLD-SCNC: 139 MMOL/L (ref 136–145)
STRESS TARGET HR: 127 BPM
TOTAL RATE: 28 BREATHS/MINUTE
VENTILATOR MODE: ABNORMAL
WBC NRBC COR # BLD: 16.03 10*3/MM3 (ref 3.4–10.8)

## 2019-05-12 PROCEDURE — 82962 GLUCOSE BLOOD TEST: CPT

## 2019-05-12 PROCEDURE — 25010000002 FENTANYL CITRATE (PF) 100 MCG/2ML SOLUTION: Performed by: INTERNAL MEDICINE

## 2019-05-12 PROCEDURE — 83735 ASSAY OF MAGNESIUM: CPT | Performed by: INTERNAL MEDICINE

## 2019-05-12 PROCEDURE — 80048 BASIC METABOLIC PNL TOTAL CA: CPT | Performed by: INTERNAL MEDICINE

## 2019-05-12 PROCEDURE — 71045 X-RAY EXAM CHEST 1 VIEW: CPT

## 2019-05-12 PROCEDURE — 82803 BLOOD GASES ANY COMBINATION: CPT

## 2019-05-12 PROCEDURE — 94003 VENT MGMT INPAT SUBQ DAY: CPT

## 2019-05-12 PROCEDURE — 25010000002 METHYLPREDNISOLONE PER 40 MG: Performed by: INTERNAL MEDICINE

## 2019-05-12 PROCEDURE — 94002 VENT MGMT INPAT INIT DAY: CPT

## 2019-05-12 PROCEDURE — 63710000001 INSULIN LISPRO (HUMAN) PER 5 UNITS: Performed by: INTERNAL MEDICINE

## 2019-05-12 PROCEDURE — 94799 UNLISTED PULMONARY SVC/PX: CPT

## 2019-05-12 PROCEDURE — 25010000002 ONDANSETRON PER 1 MG: Performed by: INTERNAL MEDICINE

## 2019-05-12 PROCEDURE — 25010000002 PERFLUTREN (DEFINITY) 8.476 MG IN SODIUM CHLORIDE 0.9 % 10 ML INJECTION: Performed by: INTERNAL MEDICINE

## 2019-05-12 PROCEDURE — 93306 TTE W/DOPPLER COMPLETE: CPT

## 2019-05-12 PROCEDURE — 85027 COMPLETE CBC AUTOMATED: CPT | Performed by: INTERNAL MEDICINE

## 2019-05-12 PROCEDURE — 25010000002 ENOXAPARIN PER 10 MG: Performed by: INTERNAL MEDICINE

## 2019-05-12 PROCEDURE — 99232 SBSQ HOSP IP/OBS MODERATE 35: CPT | Performed by: INTERNAL MEDICINE

## 2019-05-12 PROCEDURE — 93306 TTE W/DOPPLER COMPLETE: CPT | Performed by: INTERNAL MEDICINE

## 2019-05-12 PROCEDURE — 36600 WITHDRAWAL OF ARTERIAL BLOOD: CPT

## 2019-05-12 PROCEDURE — 63710000001 INSULIN GLARGINE PER 5 UNITS: Performed by: INTERNAL MEDICINE

## 2019-05-12 PROCEDURE — 25010000002 CEFTRIAXONE PER 250 MG: Performed by: INTERNAL MEDICINE

## 2019-05-12 PROCEDURE — 25010000002 AZITHROMYCIN PER 500 MG: Performed by: INTERNAL MEDICINE

## 2019-05-12 RX ORDER — DONEPEZIL HYDROCHLORIDE 10 MG/1
10 TABLET, FILM COATED ORAL NIGHTLY
Status: DISCONTINUED | OUTPATIENT
Start: 2019-05-12 | End: 2019-05-22 | Stop reason: HOSPADM

## 2019-05-12 RX ORDER — GABAPENTIN 300 MG/1
600 CAPSULE ORAL EVERY 8 HOURS SCHEDULED
Status: DISCONTINUED | OUTPATIENT
Start: 2019-05-12 | End: 2019-05-13

## 2019-05-12 RX ORDER — ATORVASTATIN CALCIUM 20 MG/1
40 TABLET, FILM COATED ORAL DAILY
Status: DISCONTINUED | OUTPATIENT
Start: 2019-05-12 | End: 2019-05-22 | Stop reason: HOSPADM

## 2019-05-12 RX ORDER — INSULIN GLARGINE 100 [IU]/ML
10 INJECTION, SOLUTION SUBCUTANEOUS NIGHTLY
Status: DISCONTINUED | OUTPATIENT
Start: 2019-05-12 | End: 2019-05-13

## 2019-05-12 RX ORDER — SERTRALINE HYDROCHLORIDE 25 MG/1
25 TABLET, FILM COATED ORAL DAILY
Status: DISCONTINUED | OUTPATIENT
Start: 2019-05-12 | End: 2019-05-22 | Stop reason: HOSPADM

## 2019-05-12 RX ORDER — ASPIRIN 325 MG
325 TABLET ORAL DAILY
Status: DISCONTINUED | OUTPATIENT
Start: 2019-05-12 | End: 2019-05-22 | Stop reason: HOSPADM

## 2019-05-12 RX ORDER — AMLODIPINE BESYLATE 10 MG/1
10 TABLET ORAL
Status: DISCONTINUED | OUTPATIENT
Start: 2019-05-12 | End: 2019-05-22 | Stop reason: HOSPADM

## 2019-05-12 RX ADMIN — DEXMEDETOMIDINE 0.9 MCG/KG/HR: 100 INJECTION, SOLUTION, CONCENTRATE INTRAVENOUS at 12:20

## 2019-05-12 RX ADMIN — ASPIRIN 325 MG: 325 TABLET ORAL at 12:21

## 2019-05-12 RX ADMIN — DEXMEDETOMIDINE 0.8 MCG/KG/HR: 100 INJECTION, SOLUTION, CONCENTRATE INTRAVENOUS at 08:09

## 2019-05-12 RX ADMIN — AMLODIPINE BESYLATE 10 MG: 10 TABLET ORAL at 12:21

## 2019-05-12 RX ADMIN — INSULIN LISPRO 8 UNITS: 100 INJECTION, SOLUTION INTRAVENOUS; SUBCUTANEOUS at 17:59

## 2019-05-12 RX ADMIN — CHLORHEXIDINE GLUCONATE 15 ML: 1.2 RINSE ORAL at 20:43

## 2019-05-12 RX ADMIN — SODIUM CHLORIDE, PRESERVATIVE FREE 3 ML: 5 INJECTION INTRAVENOUS at 20:39

## 2019-05-12 RX ADMIN — AZITHROMYCIN MONOHYDRATE 500 MG: 500 INJECTION, POWDER, LYOPHILIZED, FOR SOLUTION INTRAVENOUS at 12:47

## 2019-05-12 RX ADMIN — IPRATROPIUM BROMIDE 6 PUFF: 17 AEROSOL, METERED RESPIRATORY (INHALATION) at 19:23

## 2019-05-12 RX ADMIN — METHYLPREDNISOLONE SODIUM SUCCINATE 20 MG: 40 INJECTION, POWDER, FOR SOLUTION INTRAMUSCULAR; INTRAVENOUS at 12:21

## 2019-05-12 RX ADMIN — INSULIN LISPRO 8 UNITS: 100 INJECTION, SOLUTION INTRAVENOUS; SUBCUTANEOUS at 08:58

## 2019-05-12 RX ADMIN — ALBUTEROL SULFATE 6 PUFF: 90 AEROSOL, METERED RESPIRATORY (INHALATION) at 15:00

## 2019-05-12 RX ADMIN — FENTANYL CITRATE 50 MCG: 50 INJECTION, SOLUTION INTRAMUSCULAR; INTRAVENOUS at 20:34

## 2019-05-12 RX ADMIN — IPRATROPIUM BROMIDE 6 PUFF: 17 AEROSOL, METERED RESPIRATORY (INHALATION) at 22:59

## 2019-05-12 RX ADMIN — IPRATROPIUM BROMIDE 6 PUFF: 17 AEROSOL, METERED RESPIRATORY (INHALATION) at 11:27

## 2019-05-12 RX ADMIN — GABAPENTIN 600 MG: 300 CAPSULE ORAL at 23:24

## 2019-05-12 RX ADMIN — FENTANYL CITRATE 50 MCG: 50 INJECTION, SOLUTION INTRAMUSCULAR; INTRAVENOUS at 15:30

## 2019-05-12 RX ADMIN — SODIUM CHLORIDE, POTASSIUM CHLORIDE, SODIUM LACTATE AND CALCIUM CHLORIDE 125 ML/HR: 600; 310; 30; 20 INJECTION, SOLUTION INTRAVENOUS at 21:01

## 2019-05-12 RX ADMIN — SODIUM CHLORIDE, POTASSIUM CHLORIDE, SODIUM LACTATE AND CALCIUM CHLORIDE 125 ML/HR: 600; 310; 30; 20 INJECTION, SOLUTION INTRAVENOUS at 12:21

## 2019-05-12 RX ADMIN — SERTRALINE 25 MG: 25 TABLET, FILM COATED ORAL at 12:21

## 2019-05-12 RX ADMIN — ALBUTEROL SULFATE 6 PUFF: 90 AEROSOL, METERED RESPIRATORY (INHALATION) at 22:59

## 2019-05-12 RX ADMIN — GABAPENTIN 600 MG: 300 CAPSULE ORAL at 13:54

## 2019-05-12 RX ADMIN — DONEPEZIL HYDROCHLORIDE 10 MG: 10 TABLET, FILM COATED ORAL at 20:48

## 2019-05-12 RX ADMIN — ATORVASTATIN CALCIUM 40 MG: 20 TABLET, FILM COATED ORAL at 12:22

## 2019-05-12 RX ADMIN — IPRATROPIUM BROMIDE 6 PUFF: 17 AEROSOL, METERED RESPIRATORY (INHALATION) at 03:10

## 2019-05-12 RX ADMIN — DEXMEDETOMIDINE 1 MCG/KG/HR: 100 INJECTION, SOLUTION, CONCENTRATE INTRAVENOUS at 20:58

## 2019-05-12 RX ADMIN — IPRATROPIUM BROMIDE 6 PUFF: 17 AEROSOL, METERED RESPIRATORY (INHALATION) at 08:08

## 2019-05-12 RX ADMIN — CEFTRIAXONE SODIUM 1 G: 1 INJECTION, SOLUTION INTRAVENOUS at 12:21

## 2019-05-12 RX ADMIN — CHLORHEXIDINE GLUCONATE 15 ML: 1.2 RINSE ORAL at 08:58

## 2019-05-12 RX ADMIN — ONDANSETRON HYDROCHLORIDE 4 MG: 2 SOLUTION INTRAMUSCULAR; INTRAVENOUS at 00:57

## 2019-05-12 RX ADMIN — FENTANYL CITRATE 50 MCG: 50 INJECTION, SOLUTION INTRAMUSCULAR; INTRAVENOUS at 08:57

## 2019-05-12 RX ADMIN — LABETALOL 20 MG/4 ML (5 MG/ML) INTRAVENOUS SYRINGE 20 MG: at 03:38

## 2019-05-12 RX ADMIN — IPRATROPIUM BROMIDE 6 PUFF: 17 AEROSOL, METERED RESPIRATORY (INHALATION) at 15:00

## 2019-05-12 RX ADMIN — DEXMEDETOMIDINE 0.6 MCG/KG/HR: 100 INJECTION, SOLUTION, CONCENTRATE INTRAVENOUS at 03:39

## 2019-05-12 RX ADMIN — INSULIN GLARGINE 10 UNITS: 100 INJECTION, SOLUTION SUBCUTANEOUS at 20:26

## 2019-05-12 RX ADMIN — FAMOTIDINE 20 MG: 10 INJECTION INTRAVENOUS at 20:45

## 2019-05-12 RX ADMIN — FENTANYL CITRATE 50 MCG: 50 INJECTION, SOLUTION INTRAMUSCULAR; INTRAVENOUS at 00:55

## 2019-05-12 RX ADMIN — ALBUTEROL SULFATE 6 PUFF: 90 AEROSOL, METERED RESPIRATORY (INHALATION) at 11:27

## 2019-05-12 RX ADMIN — ENOXAPARIN SODIUM 40 MG: 40 INJECTION SUBCUTANEOUS at 12:21

## 2019-05-12 RX ADMIN — ALBUTEROL SULFATE 6 PUFF: 90 AEROSOL, METERED RESPIRATORY (INHALATION) at 08:08

## 2019-05-12 RX ADMIN — METHYLPREDNISOLONE SODIUM SUCCINATE 20 MG: 40 INJECTION, POWDER, FOR SOLUTION INTRAMUSCULAR; INTRAVENOUS at 23:30

## 2019-05-12 RX ADMIN — FENTANYL CITRATE 50 MCG: 50 INJECTION, SOLUTION INTRAMUSCULAR; INTRAVENOUS at 09:59

## 2019-05-12 RX ADMIN — FAMOTIDINE 20 MG: 10 INJECTION INTRAVENOUS at 08:57

## 2019-05-12 RX ADMIN — NYSTATIN: 100000 POWDER TOPICAL at 20:46

## 2019-05-12 RX ADMIN — PERFLUTREN 1 ML: 6.52 INJECTION, SUSPENSION INTRAVENOUS at 14:31

## 2019-05-12 RX ADMIN — FENTANYL CITRATE 50 MCG: 50 INJECTION, SOLUTION INTRAMUSCULAR; INTRAVENOUS at 13:54

## 2019-05-12 RX ADMIN — ALBUTEROL SULFATE 6 PUFF: 90 AEROSOL, METERED RESPIRATORY (INHALATION) at 19:23

## 2019-05-12 RX ADMIN — FENTANYL CITRATE 50 MCG: 50 INJECTION, SOLUTION INTRAMUSCULAR; INTRAVENOUS at 23:38

## 2019-05-12 RX ADMIN — FENTANYL CITRATE 50 MCG: 50 INJECTION, SOLUTION INTRAMUSCULAR; INTRAVENOUS at 06:25

## 2019-05-12 RX ADMIN — ALBUTEROL SULFATE 6 PUFF: 90 AEROSOL, METERED RESPIRATORY (INHALATION) at 03:10

## 2019-05-12 RX ADMIN — METHYLPREDNISOLONE SODIUM SUCCINATE 20 MG: 40 INJECTION, POWDER, FOR SOLUTION INTRAMUSCULAR; INTRAVENOUS at 00:33

## 2019-05-12 RX ADMIN — SODIUM CHLORIDE, PRESERVATIVE FREE 3 ML: 5 INJECTION INTRAVENOUS at 08:58

## 2019-05-12 RX ADMIN — FENTANYL CITRATE 50 MCG: 50 INJECTION, SOLUTION INTRAMUSCULAR; INTRAVENOUS at 04:27

## 2019-05-12 RX ADMIN — DEXMEDETOMIDINE 1 MCG/KG/HR: 100 INJECTION, SOLUTION, CONCENTRATE INTRAVENOUS at 17:59

## 2019-05-12 RX ADMIN — INSULIN LISPRO 8 UNITS: 100 INJECTION, SOLUTION INTRAVENOUS; SUBCUTANEOUS at 20:25

## 2019-05-12 RX ADMIN — INSULIN LISPRO 4 UNITS: 100 INJECTION, SOLUTION INTRAVENOUS; SUBCUTANEOUS at 12:47

## 2019-05-12 NOTE — PLAN OF CARE
Problem: Patient Care Overview  Goal: Plan of Care Review  Outcome: Ongoing (interventions implemented as appropriate)   05/12/19 0918   Coping/Psychosocial   Plan of Care Reviewed With patient   OTHER   Outcome Summary Pt. weaned to 12.5 peep with pressure support of 12. Tolerating vent well at this mode. Frequent doses of fentanyl administered for generalized pain and anxiety. Pt. tolerating tube feeds. Urine output improved. Home meds restarted and blood pressure better controlled. Large creamy secretions suctioned from lungs frequently. Pt. brother contacted and updated on patient care. Continuing to monitor.    Plan of Care Review   Progress improving       Problem: Ventilation, Mechanical Invasive (Adult)  Goal: Signs and Symptoms of Listed Potential Problems Will be Absent, Minimized or Managed (Ventilation, Mechanical Invasive)  Outcome: Ongoing (interventions implemented as appropriate)      Problem: Pneumonia (Adult)  Goal: Signs and Symptoms of Listed Potential Problems Will be Absent, Minimized or Managed (Pneumonia)  Outcome: Ongoing (interventions implemented as appropriate)      Problem: Skin Injury Risk (Adult)  Goal: Skin Health and Integrity  Outcome: Ongoing (interventions implemented as appropriate)      Problem: Nutrition, Enteral (Adult)  Goal: Signs and Symptoms of Listed Potential Problems Will be Absent, Minimized or Managed (Nutrition, Enteral)  Outcome: Ongoing (interventions implemented as appropriate)      Problem: Fall Risk (Adult)  Goal: Absence of Fall  Outcome: Ongoing (interventions implemented as appropriate)      Problem: Restraint, Nonbehavioral (Nonviolent)  Goal: Nonbehavioral (Nonviolent) Restraint: Preservation of Dignity and Wellbeing  Outcome: Ongoing (interventions implemented as appropriate)

## 2019-05-12 NOTE — PROGRESS NOTES
LOS: 1 day   Patient Care Team:  Provider, No Known as PCP - General  Provider, No Known as PCP - Family Medicine    Chief Complaint: pneumonia, elevated troponin       Interval History: He remains intubated.  Stable hemodynamics.  On abx.      Objective   Vital Signs  Temp:  [98.6 °F (37 °C)-99.8 °F (37.7 °C)] 98.8 °F (37.1 °C)  Heart Rate:  [46-83] 55  Resp:  [20-28] 20  BP: ()/() 136/75  FiO2 (%):  [40 %-81 %] 41 %    Intake/Output Summary (Last 24 hours) at 5/12/2019 1139  Last data filed at 5/12/2019 0900  Gross per 24 hour   Intake 8689.2 ml   Output 475 ml   Net 8214.2 ml       Intubated, sedated  PERRL, conjunctiva clear  Neck supple, no JVD or thyromegaly appreciated  S1/S2 RRR, no m/r/g  Lungs CTA B, normal effort  Abdomen S/NT/ND (+) BS, no HSM appreciated  Extremities warm, no clubbing, cyanosis, or edema  No visible or palpable skin lesions      Results Review:      Results from last 7 days   Lab Units 05/12/19  0351 05/11/19  1104 05/11/19  0339   SODIUM mmol/L 139 140 136   POTASSIUM mmol/L 5.1 4.3 4.7   CHLORIDE mmol/L 98 95* 93*   CO2 mmol/L 30.4* 35.6* 35.2*   BUN mg/dL 32* 22 23   CREATININE mg/dL 1.28* 0.93 0.91   GLUCOSE mg/dL 209* 215* 304*   CALCIUM mg/dL 8.3* 8.7 8.3*     Results from last 7 days   Lab Units 05/11/19  1104 05/11/19  0542 05/11/19  0339   TROPONIN T ng/mL 0.172* 0.069* 0.057*     Results from last 7 days   Lab Units 05/12/19  0351 05/11/19  1104 05/11/19  0339   WBC 10*3/mm3 16.03* 14.91* 13.92*   HEMOGLOBIN g/dL 15.3 16.2 15.5   HEMATOCRIT % 48.7 52.5* 50.8   PLATELETS 10*3/mm3 211 212 208     Results from last 7 days   Lab Units 05/11/19  1104   INR  1.17*         Results from last 7 days   Lab Units 05/12/19  0351   MAGNESIUM mg/dL 1.8           I reviewed the patient's new clinical results.  I personally viewed and interpreted the patient's EKG/Telemetry data        Medication Review:     albuterol sulfate HFA 6 puff Inhalation Q4H - RT   alteplase 2 mg  Intracatheter Once   alteplase 2 mg Intracatheter Once   alteplase 2 mg Intracatheter Once   amLODIPine 10 mg Oral Q24H   aspirin 325 mg Oral Daily   atorvastatin 40 mg Oral Daily   azithromycin 500 mg Intravenous Q24H   ceftriaxone 1 g Intravenous Q24H   chlorhexidine 15 mL Mouth/Throat Q12H   donepezil 10 mg Oral Nightly   enoxaparin 40 mg Subcutaneous Q24H   famotidine 20 mg Intravenous Q12H   gabapentin 600 mg Oral Q8H   insulin glargine 10 Units Subcutaneous Nightly   insulin lispro 0-24 Units Subcutaneous 4x Daily With Meals & Nightly   ipratropium 6 puff Inhalation Q4H - RT   methylPREDNISolone sodium succinate 20 mg Intravenous Q12H   nystatin  Topical Q12H   sertraline 25 mg Oral Daily   sodium chloride 3 mL Intravenous Q12H         dexmedetomidine 0.2-1.5 mcg/kg/hr Last Rate: 1 mcg/kg/hr (05/12/19 0900)   lactated ringers 125 mL/hr Last Rate: 125 mL/hr (05/12/19 0910)       Assessment/Plan       Respiratory failure (CMS/HCC)    69 year old man with history of multiple cardiac risk factors including DM, COPD, PAD, and hypertension.  Now with acute respiratory failure due to COPD/pneumonia.    Elevated troponin, but no ischemic changes on EKG.  I suspect Type II (demand-related) infarct.    Continue supportive care.  Check echo to assess LV function.    Selvin Martinez MD  05/12/19  11:39 AM

## 2019-05-12 NOTE — PROGRESS NOTES
LOS: 1 day   Patient Care Team:  Provider, No Known as PCP - General  Provider, No Known as PCP - Family Medicine    Subjective     Patient remains intubated he is sedated on Precedex at 0.8 mics per kilogram per minute.  He is awake and following commands.  He apparently uses O2 at 4 L/min at home he has sleep apnea supposed to use a Pap machine he also has a CO2 retainer.  He is still smoking at home.    Review of Systems:          Objective     Vital Signs  Vital Sign Min/Max for last 24 hours  Temp  Min: 98.6 °F (37 °C)  Max: 100.4 °F (38 °C)   BP  Min: 58/41  Max: 175/93   Pulse  Min: 46  Max: 92   Resp  Min: 27  Max: 28   SpO2  Min: 90 %  Max: 100 %   No Data Recorded   Weight  Min: 114 kg (251 lb 5.2 oz)  Max: 118 kg (260 lb 12.9 oz)        Ventilator/Non-Invasive Ventilation Settings (From admission, onward)    Start     Ordered    05/11/19 1021  Ventilator - AC/PC; (28); 100; 88%; Other (see comments); 18; 17  Continuous     Question Answer Comment   Vent Mode AC/PC    Breath rate  28   FiO2 100    FiO2 titrate for Sp02% =/> 88%    PEEP Other (see comments)    PEEP: 18    Inspiratory Pressure 17        05/11/19 1027                       Body mass index is 33.49 kg/m².  I/O last 3 completed shifts:  In: 7099.9 [I.V.:6320.9; Other:390; NG/GT:389]  Out: 375 [Urine:375]  No intake/output data recorded.        Physical Exam:    General Appearance: Obese white male orally intubated with an 8.0 endotracheal tube at about 24 cm at the lips.  Sedated with Precedex at 0.8 mics per kilogram per minute he is awake alert cooperative  Eyes: Conjunctival are clear and anicteric pupils are equal about 2 to 2-1/2 mm  ENT: Oral mucous membranes are dry nasal septum appears midline I do not see any exudates or erythema the oral endotracheal tube and orogastric tube are in place  Neck: No adenopathy no jugular venous distention trachea midline, no palpable thyromegaly  Lungs: Coarse breath sounds bilaterally seem to  be equal.  I do not hear any definite wheezes, rales or rhonchi  Cardiac: Regular rhythm no murmur little bradycardic heart rate in the lower 50s.  Abdomen: Soft no palpable organomegaly or masses  :  he has a Aguilar catheter in place  Musculoskeletal: He has what looks like chronic arterial and venous insufficiency in his lower extremities.  He has smooth thin skin with doughy edema in both lower extremities.  Edema is really quite minimal today is less than it was yesterday. There is a right femoral central venous type catheter  Skin: He has a blood-filled blister on the dorsum of the left great toe and he is a small blood-filled blister on his right heel but no change from yesterday.  No jaundice no petechiae  Neuro:  He is definitely awake and alert he is cooperating he is following commands moving all extremities  Extremities/P Vascular: He does have palpable radial pulses and very weak dorsalis pedis pulses bilaterally nursing is able to Doppler them I think I feel both although they still are quite weak.  MSE:  He is a little anxious.           Labs:  Results from last 7 days   Lab Units 05/12/19  0351 05/11/19  1104 05/11/19  0339   GLUCOSE mg/dL 209* 215* 304*   SODIUM mmol/L 139 140 136   POTASSIUM mmol/L 5.1 4.3 4.7   MAGNESIUM mg/dL 1.8 1.8 1.8   CO2 mmol/L 30.4* 35.6* 35.2*   CHLORIDE mmol/L 98 95* 93*   ANION GAP mmol/L 10.6 9.4 7.8   CREATININE mg/dL 1.28* 0.93 0.91   BUN mg/dL 32* 22 23   BUN / CREAT RATIO  25.0 23.7 25.3*   CALCIUM mg/dL 8.3* 8.7 8.3*   EGFR IF NONAFRICN AM mL/min/1.73 55* 80 82   ALK PHOS U/L  --  101 105   TOTAL PROTEIN g/dL  --  5.9* 6.1   ALT (SGPT) U/L  --  17 16   AST (SGOT) U/L  --  17 20   BILIRUBIN mg/dL  --  0.5 0.5   ALBUMIN g/dL  --  2.30* 2.38*   GLOBULIN gm/dL  --  3.6 3.7     Estimated Creatinine Clearance: 72.2 mL/min (A) (by C-G formula based on SCr of 1.28 mg/dL (H)).      Results from last 7 days   Lab Units 05/12/19  0351 05/11/19  1104 05/11/19  0339   WBC  10*3/mm3 16.03* 14.91* 13.92*   RBC 10*6/mm3 5.07 5.32 5.05   HEMOGLOBIN g/dL 15.3 16.2 15.5   HEMATOCRIT % 48.7 52.5* 50.8   MCV fL 96.1 98.7* 100.6*   MCH pg 30.2 30.5 30.7   MCHC g/dL 31.4* 30.9* 30.5*   RDW % 14.8 14.2 14.9   RDW-SD fl 51.3 51.7 54.4*   MPV fL 11.5 10.9 10.7   PLATELETS 10*3/mm3 211 212 208   NEUTROPHIL % %  --  86.0* 88.6*   LYMPHOCYTE % %  --  5.3* 3.2*   MONOCYTES % %  --  7.7 5.0   EOSINOPHIL % %  --  0.1* 0.1*   BASOPHIL % %  --  0.4 0.4   IMM GRAN % %  --  0.5 2.7*   NEUTROS ABS 10*3/mm3  --  12.83* 12.31*   LYMPHS ABS 10*3/mm3  --  0.79 0.45*   MONOS ABS 10*3/mm3  --  1.15* 0.70   EOS ABS 10*3/mm3  --  0.01 0.02   BASOS ABS 10*3/mm3  --  0.06 0.06   IMMATURE GRANS (ABS) 10*3/mm3  --  0.07* 0.38*   NRBC /100 WBC  --  0.1  --      Results from last 7 days   Lab Units 05/12/19  0410   PH, ARTERIAL pH units 7.455*   PO2 ART mm Hg 70.8*   PCO2, ARTERIAL mm Hg 47.5*   HCO3 ART mmol/L 33.4*     Results from last 7 days   Lab Units 05/11/19  1104 05/11/19  0542 05/11/19  0339   TROPONIN T ng/mL 0.172* 0.069* 0.057*             Results from last 7 days   Lab Units 05/11/19  1104 05/11/19  0339   LACTATE mmol/L 1.5 1.0   PROCALCITONIN ng/mL 0.13  --      Results from last 7 days   Lab Units 05/11/19  1104   INR  1.17*     Microbiology Results (last 10 days)     Procedure Component Value - Date/Time    Respiratory Culture - Aspirate, ET Suction [[id number]] Collected:  05/11/19 1209    Lab Status:  Preliminary result Specimen:  Aspirate from ET Suction Updated:  05/12/19 0754     Respiratory Culture Scant growth (1+) Normal Respiratory Kassandra     Gram Stain Few (2+) WBCs seen      Rare (1+) Epithelial cells per low power field      Mixed bacterial morphotypes seen on Gram Stain    Blood Culture - Blood, Blood, Venous Line [[id number]] Collected:  05/11/19 0339    Lab Status:  Preliminary result Specimen:  Blood, Venous Line Updated:  05/12/19 0345     Blood Culture No growth at 24 hours    Blood  Culture - Blood, Arm, Right [[id number]] Collected:  05/11/19 0339    Lab Status:  Preliminary result Specimen:  Blood from Arm, Right Updated:  05/12/19 0345     Blood Culture No growth at 24 hours                albuterol sulfate HFA 6 puff Inhalation Q4H - RT   alteplase 2 mg Intracatheter Once   alteplase 2 mg Intracatheter Once   alteplase 2 mg Intracatheter Once   azithromycin 500 mg Intravenous Q24H   ceftriaxone 1 g Intravenous Q24H   chlorhexidine 15 mL Mouth/Throat Q12H   enoxaparin 40 mg Subcutaneous Q24H   famotidine 20 mg Intravenous Q12H   insulin lispro 0-24 Units Subcutaneous 4x Daily With Meals & Nightly   ipratropium 6 puff Inhalation Q4H - RT   methylPREDNISolone sodium succinate 20 mg Intravenous Q12H   nystatin  Topical Q12H   sodium chloride 3 mL Intravenous Q12H       dexmedetomidine 0.2-1.5 mcg/kg/hr Last Rate: 0.8 mcg/kg/hr (05/12/19 0632)   lactated ringers 200 mL/hr Last Rate: 200 mL/hr (05/11/19 2003)       Diagnostics:  Ct Head Without Contrast    Result Date: 5/11/2019  EXAMINATION: CT HEAD WO CONTRAST-  CLINICAL INDICATION:     ams  COMPARISON:    None  Technique: Multiple CT axial images were obtained through the level of the brain without IV contrast administration. Reformatted images in the coronal and/or sagittal plane(s) were generated from the axial data set to facilitate diagnostic accuracy and/or surgical planning.  Radiation dose reduction techniques were utilized per ALARA protocol. Automated exposure control was initiated through either or CareDoSellobuy or DoseRigMyows software packages by  protocol.   DOSE (DLP mGy-cm): 1469.74 mGy.cm  FINDINGS:   Mild generalized cerebral atrophy and chronic small vessel ischemic disease noted. No acute intracranial abnormality. No midline shift or mass effect. No hydrocephalus or intracranial hemorrhage. There is no CT evidence of acute vascular territory infarct. Bone windows show no acute osseous abnormality. Bilateral right greater  than left mastoid effusions. Pansinusitis.      1. No CT evidence of acute intracranial abnormality. 2. Estimated effusions and pansinusitis.  This report was finalized on 5/11/2019 7:24 AM by Dr. Escobar Peralta MD.      Xr Chest 1 View    Result Date: 5/12/2019  PORTABLE CHEST RADIOGRAPH  HISTORY: Intubated patient  COMPARISON: 05/11/2019  FINDINGS: Cardiomediastinal silhouette is unchanged. Endotracheal tube appears to terminate at the level of the clavicles. It could be advanced about 3 cm. A nasogastric tube is present. It appears to extend to the stomach. No pneumothorax is seen. There is persistent bibasilar consolidation. There are small bilateral pleural effusions. Vascular congestion is unchanged.      Endotracheal tube terminates at the level of clavicles. It could be advanced about 3 cm.  This report was finalized on 5/12/2019 5:00 AM by Dr. Yuliana Chandra M.D.      Xr Chest 1 View    Result Date: 5/11/2019  XR CHEST 1 VW-  HISTORY: Male who is 69 years-old,  endotracheal intubation  TECHNIQUE: Frontal views of the chest  COMPARISON: None available  FINDINGS: Patient is rotated towards the left. The endotracheal tube tip appears to be about 8 cm above the candace. NG tube extends beyond the inferior extent of the image. Heart size is normal. Mild prominence of vascular and interstitial markings. Patchy infiltrates at the bases, small left pleural effusion. Heart, mediastinum and pulmonary vasculature are unremarkable. No focal pulmonary consolidation, pleural effusion, or pneumothorax. No acute osseous process.      Endotracheal intubation. Bibasilar infiltrates, left pleural effusion, mild prominence of vascular and interstitial markings, follow-up recommended.  This report was finalized on 5/11/2019 10:51 AM by Dr. Rufino Bello M.D.      Xr Chest 1 View    Result Date: 5/11/2019  EXAMINATION: XR CHEST 1 VW-  CLINICAL INDICATION:     tube placement  TECHNIQUE:  XR CHEST 1 VW-  COMPARISON:  08/13/2018  FINDINGS: ET tube at level of clavicles. NG tube extends into stomach.  CHF/edema developed from previous exam. Consolidative airspace disease right greater than left mid and lower lung regions. Trace pleural effusions. No pneumothorax. No acute bony findings.      1. CHF. 2. Bibasilar airspace disease. 3. Support devices as above.  This report was finalized on 5/11/2019 7:28 AM by Dr. Escobar Peralta MD.      Ct Chest Pulmonary Embolism With Contrast    Result Date: 5/11/2019  EXAMINATION: CT CHEST PULMONARY EMBOLISM W CONTRAST-  CLINICAL INDICATION:Pulmonary embolism  COMPARISON: NONE.  TECHNIQUE: Multiple CT axial images were obtained through the level of pulmonary arteries, following IV contrast administration per CT PE protocol. Volume Rendered 3D or MIP images performed.  Radiation dose reduction techniques were utilized per ALARA protocol. Automated exposure control was initiated through either or Adiana or DoseRight software packages by  protocol.  DOSE (DLP mGy-cm):   FINDINGS:  PULMONARY ARTERIES: No evidence of pulmonary embolism.  LUNGS: CONSOLIDATIVE AIRSPACE DISEASE BILATERAL LOWER LOBES CONSISTENT WITH COMBINATION OF ATELECTASIS AND PNEUMONIA. PULMONARY VASCULAR CONGESTION IS NOTED WITH INTERSTITIAL EDEMA.  HEART: MILD CARDIOMEGALY WITH SEVERE CORONARY ARTERY CALCIFICATIONS.  PERICARDIUM: No effusion.  MEDIASTINUM: PROBABLE REACTIVE HILAR AND MEDIASTINAL LYMPH NODES.  PLEURA: TRACE PLEURAL EFFUSIONS.  MAJOR AIRWAYS: Clear. No intrinsic mass.  VASCULATURE: No evidence of aneurysm.  VISUALIZED UPPER ABDOMEN:NODULAR LIVER MARGINS SUGGESTING CIRRHOSIS. CYST LEFT KIDNEY.  OTHER: None.  BONES: DEGENERATIVE CHANGES THORACIC SPINE. NO ACUTE BONY FINDINGS.      1. No PE. 2. CHF/edema. 3. Bibasilar consolidations compatible with pneumonia. 4. Liver cirrhosis. 5. Indeterminant cyst left kidney. Ultrasound follow-up may be helpful.  This report was finalized on 5/11/2019 7:27 AM by   Escobar Pearlta MD.      Results for orders placed during the hospital encounter of 07/09/18   Adult Transthoracic Echo Complete W/ Cont if Necessary Per Protocol    Narrative · Normal left ventricular cavity size and wall thickness noted. All left   ventricular wall segments contract normally.  · Estimated EF appears to be in the range of 61 - 65%.  · The aortic valve is structurally normal. No aortic valve regurgitation   is present. No aortic valve stenosis is present.  · The mitral valve is normal in structure. No mitral valve regurgitation   is present. No significant mitral valve stenosis is present.  · The tricuspid valve is normal. No tricuspid valve stenosis is present.   Mild tricuspid valve regurgitation is present. Estimated right ventricular   systolic pressure from tricuspid regurgitation is mildly elevated (35-45   mmHg).  · Mild pulmonary hypertension is present.  · There is no evidence of pericardial effusion.          Basilar infiltrates and a little bit of linear atelectasis particularly on the left base and endotracheal tube is at the level of the clavicles    Active Hospital Problems    Diagnosis  POA   • Respiratory failure (CMS/HCC) [J96.90]  Yes      Resolved Hospital Problems   No resolved problems to display.         Assessment/Plan     1. Acute on chronic hypoxemic and hypercapnic respiratory failure.    Has improved with the ventilation significantly we will start slowly weaning PEEP as tolerated.  2. Pneumonia bilateral bibasilar certainly raises the concern for aspiration.  Continue antibiotics  3. Elevated and rising troponin non ST elevation this could be non-ST elevation MI due to demand ischemia with his profound hypoxia.  Cardiology sees no evidence of acute coronary syndrome either echocardiogram was ordered it is still pending.  4. COPD with acute exacerbation continue steroids and bronchodilators.  5. Acute kidney injury creatinine is up slightly not surprising will need to  continue to follow urine output is improving although still not ideal I do not think he is fluid overloaded but I think we are getting closer to euvolemia I will slow down his IV fluids some especially with him getting the tube feeds now.  6. Hypertension by history will follow blood pressures treat as necessary  7. Hyperglycemia I do not see a history of diabetes but hemoglobin A1c elevated about 8 and we got a new list of home medications and he is on insulin at home are supposed to be.  I will add a little Lantus tonight and continue sliding scale.  8. Hypoalbuminemia probably poor nutritional status,   9. Hematuria may be secondary to Aguilar catheter placement I do recommend a follow-up urine in a couple of weeks once the catheter is out.  10. 3 mm pulmonary nodule noncalcified he will need follow-up CT on this given his smoking history  11. Incompletely evaluated renal lesion again when he recovers he should have a CT to fully evaluate  12. Peripheral vascular disease patient has declined intervention recently.  He certainly looks like he is got significant disease.  13. Skin lesions right heel and left great toe ask wound to evaluate  14. Candida intertrigo nystatin powder  15. Fluids/electro lites/nutrition patient is tolerating tube feeds well I will decrease IV fluids         16.     Plan for disposition:    Jose Luis Jasmine MD  05/12/19  7:59 AM    Time: Critical care time in excess of 50 minutes

## 2019-05-12 NOTE — PLAN OF CARE
Problem: Patient Care Overview  Goal: Plan of Care Review  Outcome: Ongoing (interventions implemented as appropriate)   05/12/19 0512   Coping/Psychosocial   Plan of Care Reviewed With patient   OTHER   Outcome Summary Pain medication given 4 times thus far able to wean FIO2 now at 40% Peep remians at 18 ABG this am not great but nothing critiical uop decreased 150 cc thus far this shift pt faces turns purple when HOB laid down flat thick yellow to brown secretions from ett moderate to large amount OG in place tube feeding is now at 40 cc able to follow commands Restraints in place to protect ETT attempt to contact family member unsuccessful        Problem: Ventilation, Mechanical Invasive (Adult)  Goal: Signs and Symptoms of Listed Potential Problems Will be Absent, Minimized or Managed (Ventilation, Mechanical Invasive)  Outcome: Ongoing (interventions implemented as appropriate)      Problem: Pain, Acute (Adult)  Goal: Acceptable Pain Control/Comfort Level  Outcome: Outcome(s) achieved Date Met: 05/12/19      Problem: Skin Injury Risk (Adult)  Goal: Skin Health and Integrity  Outcome: Ongoing (interventions implemented as appropriate)      Problem: Nutrition, Enteral (Adult)  Goal: Signs and Symptoms of Listed Potential Problems Will be Absent, Minimized or Managed (Nutrition, Enteral)  Outcome: Ongoing (interventions implemented as appropriate)      Problem: Fall Risk (Adult)  Goal: Absence of Fall  Outcome: Ongoing (interventions implemented as appropriate)      Problem: Restraint, Nonbehavioral (Nonviolent)  Goal: Rationale and Justification  Outcome: Ongoing (interventions implemented as appropriate)

## 2019-05-12 NOTE — NURSING NOTE
Appears to go into spasms with suctioning face dark purple coughing coughing indicates he is on 4 liters all the time at home and smokes 1/2 PPD of cigarettes also indicates he has sleep apnea but does not wear his cpap

## 2019-05-13 ENCOUNTER — APPOINTMENT (OUTPATIENT)
Dept: GENERAL RADIOLOGY | Facility: HOSPITAL | Age: 69
End: 2019-05-13

## 2019-05-13 LAB
ANION GAP SERPL CALCULATED.3IONS-SCNC: 10.6 MMOL/L
ARTERIAL PATENCY WRIST A: POSITIVE
ATMOSPHERIC PRESS: 745.9 MMHG
BACTERIA SPEC RESP CULT: NORMAL
BASE EXCESS BLDA CALC-SCNC: 7.6 MMOL/L (ref 0–2)
BDY SITE: ABNORMAL
BUN BLD-MCNC: 38 MG/DL (ref 8–23)
BUN/CREAT SERPL: 38.4 (ref 7–25)
CALCIUM SPEC-SCNC: 8.7 MG/DL (ref 8.6–10.5)
CHLORIDE SERPL-SCNC: 98 MMOL/L (ref 98–107)
CO2 SERPL-SCNC: 29.4 MMOL/L (ref 22–29)
CREAT BLD-MCNC: 0.99 MG/DL (ref 0.76–1.27)
GFR SERPL CREATININE-BSD FRML MDRD: 75 ML/MIN/1.73
GLUCOSE BLD-MCNC: 278 MG/DL (ref 65–99)
GLUCOSE BLDC GLUCOMTR-MCNC: 231 MG/DL (ref 70–130)
GLUCOSE BLDC GLUCOMTR-MCNC: 238 MG/DL (ref 70–130)
GLUCOSE BLDC GLUCOMTR-MCNC: 249 MG/DL (ref 70–130)
GLUCOSE BLDC GLUCOMTR-MCNC: 285 MG/DL (ref 70–130)
GLUCOSE BLDC GLUCOMTR-MCNC: 320 MG/DL (ref 70–130)
GRAM STN SPEC: NORMAL
HCO3 BLDA-SCNC: 35.2 MMOL/L (ref 22–28)
HOROWITZ INDEX BLD+IHG-RTO: 40 %
MODALITY: ABNORMAL
O2 A-A PPRESDIFF RESPIRATORY: 0.3 MMHG
PCO2 BLDA: 57.6 MM HG (ref 35–45)
PEEP RESPIRATORY: 7.5 CM[H2O]
PH BLDA: 7.39 PH UNITS (ref 7.35–7.45)
PO2 BLDA: 76 MM HG (ref 80–100)
POTASSIUM BLD-SCNC: 5.3 MMOL/L (ref 3.5–5.2)
SAO2 % BLDCOA: 94.5 % (ref 92–99)
SET MECH RESP RATE: 20
SODIUM BLD-SCNC: 138 MMOL/L (ref 136–145)
TOTAL RATE: 20 BREATHS/MINUTE
VENTILATOR MODE: ABNORMAL

## 2019-05-13 PROCEDURE — 71045 X-RAY EXAM CHEST 1 VIEW: CPT

## 2019-05-13 PROCEDURE — 94799 UNLISTED PULMONARY SVC/PX: CPT

## 2019-05-13 PROCEDURE — 82962 GLUCOSE BLOOD TEST: CPT

## 2019-05-13 PROCEDURE — 25010000002 ENOXAPARIN PER 10 MG: Performed by: INTERNAL MEDICINE

## 2019-05-13 PROCEDURE — 82803 BLOOD GASES ANY COMBINATION: CPT

## 2019-05-13 PROCEDURE — 97110 THERAPEUTIC EXERCISES: CPT

## 2019-05-13 PROCEDURE — 63710000001 INSULIN LISPRO (HUMAN) PER 5 UNITS: Performed by: INTERNAL MEDICINE

## 2019-05-13 PROCEDURE — 80048 BASIC METABOLIC PNL TOTAL CA: CPT | Performed by: INTERNAL MEDICINE

## 2019-05-13 PROCEDURE — 36600 WITHDRAWAL OF ARTERIAL BLOOD: CPT

## 2019-05-13 PROCEDURE — 25010000002 METHYLPREDNISOLONE PER 40 MG: Performed by: INTERNAL MEDICINE

## 2019-05-13 PROCEDURE — 97163 PT EVAL HIGH COMPLEX 45 MIN: CPT

## 2019-05-13 PROCEDURE — 63710000001 INSULIN GLARGINE PER 5 UNITS: Performed by: INTERNAL MEDICINE

## 2019-05-13 PROCEDURE — 94003 VENT MGMT INPAT SUBQ DAY: CPT

## 2019-05-13 PROCEDURE — 25010000002 CEFTRIAXONE PER 250 MG: Performed by: INTERNAL MEDICINE

## 2019-05-13 PROCEDURE — 25010000002 AZITHROMYCIN PER 500 MG: Performed by: INTERNAL MEDICINE

## 2019-05-13 PROCEDURE — 99232 SBSQ HOSP IP/OBS MODERATE 35: CPT | Performed by: INTERNAL MEDICINE

## 2019-05-13 PROCEDURE — 25010000002 FENTANYL CITRATE (PF) 100 MCG/2ML SOLUTION: Performed by: INTERNAL MEDICINE

## 2019-05-13 RX ORDER — AMMONIUM LACTATE 120 MG/G
CREAM TOPICAL EVERY 12 HOURS PRN
Status: DISCONTINUED | OUTPATIENT
Start: 2019-05-13 | End: 2019-05-22 | Stop reason: HOSPADM

## 2019-05-13 RX ORDER — FENTANYL CITRATE 50 UG/ML
100 INJECTION, SOLUTION INTRAMUSCULAR; INTRAVENOUS
Status: DISCONTINUED | OUTPATIENT
Start: 2019-05-13 | End: 2019-05-16

## 2019-05-13 RX ORDER — INSULIN GLARGINE 100 [IU]/ML
10 INJECTION, SOLUTION SUBCUTANEOUS EVERY 12 HOURS SCHEDULED
Status: DISCONTINUED | OUTPATIENT
Start: 2019-05-13 | End: 2019-05-22 | Stop reason: HOSPADM

## 2019-05-13 RX ORDER — LISINOPRIL 40 MG/1
40 TABLET ORAL DAILY
Status: DISCONTINUED | OUTPATIENT
Start: 2019-05-13 | End: 2019-05-22 | Stop reason: HOSPADM

## 2019-05-13 RX ORDER — GABAPENTIN 250 MG/5ML
600 SOLUTION ORAL EVERY 8 HOURS SCHEDULED
Status: DISCONTINUED | OUTPATIENT
Start: 2019-05-13 | End: 2019-05-19

## 2019-05-13 RX ADMIN — INSULIN LISPRO 8 UNITS: 100 INJECTION, SOLUTION INTRAVENOUS; SUBCUTANEOUS at 17:32

## 2019-05-13 RX ADMIN — LABETALOL 20 MG/4 ML (5 MG/ML) INTRAVENOUS SYRINGE 20 MG: at 08:19

## 2019-05-13 RX ADMIN — LABETALOL 20 MG/4 ML (5 MG/ML) INTRAVENOUS SYRINGE 20 MG: at 05:55

## 2019-05-13 RX ADMIN — IPRATROPIUM BROMIDE 6 PUFF: 17 AEROSOL, METERED RESPIRATORY (INHALATION) at 10:46

## 2019-05-13 RX ADMIN — NYSTATIN: 100000 POWDER TOPICAL at 20:49

## 2019-05-13 RX ADMIN — INSULIN LISPRO 16 UNITS: 100 INJECTION, SOLUTION INTRAVENOUS; SUBCUTANEOUS at 08:56

## 2019-05-13 RX ADMIN — FAMOTIDINE 20 MG: 10 INJECTION INTRAVENOUS at 08:30

## 2019-05-13 RX ADMIN — FENTANYL CITRATE 50 MCG: 50 INJECTION, SOLUTION INTRAMUSCULAR; INTRAVENOUS at 01:26

## 2019-05-13 RX ADMIN — INSULIN LISPRO 12 UNITS: 100 INJECTION, SOLUTION INTRAVENOUS; SUBCUTANEOUS at 20:41

## 2019-05-13 RX ADMIN — DEXMEDETOMIDINE 1.1 MCG/KG/HR: 100 INJECTION, SOLUTION, CONCENTRATE INTRAVENOUS at 01:14

## 2019-05-13 RX ADMIN — FENTANYL CITRATE 50 MCG: 50 INJECTION, SOLUTION INTRAMUSCULAR; INTRAVENOUS at 08:59

## 2019-05-13 RX ADMIN — METOPROLOL TARTRATE 25 MG: 25 TABLET ORAL at 09:37

## 2019-05-13 RX ADMIN — FAMOTIDINE 20 MG: 10 INJECTION INTRAVENOUS at 20:41

## 2019-05-13 RX ADMIN — CHLORHEXIDINE GLUCONATE 15 ML: 1.2 RINSE ORAL at 09:37

## 2019-05-13 RX ADMIN — ASPIRIN 325 MG: 325 TABLET ORAL at 08:30

## 2019-05-13 RX ADMIN — FENTANYL CITRATE 50 MCG: 50 INJECTION, SOLUTION INTRAMUSCULAR; INTRAVENOUS at 09:54

## 2019-05-13 RX ADMIN — LABETALOL 20 MG/4 ML (5 MG/ML) INTRAVENOUS SYRINGE 20 MG: at 06:59

## 2019-05-13 RX ADMIN — ATORVASTATIN CALCIUM 40 MG: 20 TABLET, FILM COATED ORAL at 08:30

## 2019-05-13 RX ADMIN — LISINOPRIL 40 MG: 40 TABLET ORAL at 09:37

## 2019-05-13 RX ADMIN — SODIUM CHLORIDE, POTASSIUM CHLORIDE, SODIUM LACTATE AND CALCIUM CHLORIDE 125 ML/HR: 600; 310; 30; 20 INJECTION, SOLUTION INTRAVENOUS at 18:49

## 2019-05-13 RX ADMIN — IPRATROPIUM BROMIDE 6 PUFF: 17 AEROSOL, METERED RESPIRATORY (INHALATION) at 07:00

## 2019-05-13 RX ADMIN — IPRATROPIUM BROMIDE 6 PUFF: 17 AEROSOL, METERED RESPIRATORY (INHALATION) at 03:32

## 2019-05-13 RX ADMIN — SODIUM CHLORIDE, PRESERVATIVE FREE 3 ML: 5 INJECTION INTRAVENOUS at 08:35

## 2019-05-13 RX ADMIN — IPRATROPIUM BROMIDE 6 PUFF: 17 AEROSOL, METERED RESPIRATORY (INHALATION) at 23:32

## 2019-05-13 RX ADMIN — ALBUTEROL SULFATE 6 PUFF: 90 AEROSOL, METERED RESPIRATORY (INHALATION) at 15:03

## 2019-05-13 RX ADMIN — DEXMEDETOMIDINE 1.2 MCG/KG/HR: 100 INJECTION, SOLUTION, CONCENTRATE INTRAVENOUS at 12:37

## 2019-05-13 RX ADMIN — IPRATROPIUM BROMIDE 6 PUFF: 17 AEROSOL, METERED RESPIRATORY (INHALATION) at 15:03

## 2019-05-13 RX ADMIN — DEXMEDETOMIDINE 0.8 MCG/KG/HR: 100 INJECTION, SOLUTION, CONCENTRATE INTRAVENOUS at 20:50

## 2019-05-13 RX ADMIN — DEXMEDETOMIDINE 1 MCG/KG/HR: 100 INJECTION, SOLUTION, CONCENTRATE INTRAVENOUS at 04:12

## 2019-05-13 RX ADMIN — GABAPENTIN 600 MG: 250 SOLUTION ORAL at 15:42

## 2019-05-13 RX ADMIN — INSULIN GLARGINE 10 UNITS: 100 INJECTION, SOLUTION SUBCUTANEOUS at 20:41

## 2019-05-13 RX ADMIN — ALBUTEROL SULFATE 6 PUFF: 90 AEROSOL, METERED RESPIRATORY (INHALATION) at 10:46

## 2019-05-13 RX ADMIN — GABAPENTIN 600 MG: 250 SOLUTION ORAL at 21:47

## 2019-05-13 RX ADMIN — FENTANYL CITRATE 50 MCG: 50 INJECTION, SOLUTION INTRAMUSCULAR; INTRAVENOUS at 08:19

## 2019-05-13 RX ADMIN — ALBUTEROL SULFATE 6 PUFF: 90 AEROSOL, METERED RESPIRATORY (INHALATION) at 20:42

## 2019-05-13 RX ADMIN — METOPROLOL TARTRATE 25 MG: 25 TABLET ORAL at 20:42

## 2019-05-13 RX ADMIN — FENTANYL CITRATE 100 MCG: 50 INJECTION INTRAMUSCULAR; INTRAVENOUS at 23:11

## 2019-05-13 RX ADMIN — NYSTATIN: 100000 POWDER TOPICAL at 09:54

## 2019-05-13 RX ADMIN — IPRATROPIUM BROMIDE 6 PUFF: 17 AEROSOL, METERED RESPIRATORY (INHALATION) at 20:43

## 2019-05-13 RX ADMIN — AMLODIPINE BESYLATE 10 MG: 10 TABLET ORAL at 08:30

## 2019-05-13 RX ADMIN — LABETALOL 20 MG/4 ML (5 MG/ML) INTRAVENOUS SYRINGE 20 MG: at 10:48

## 2019-05-13 RX ADMIN — CEFTRIAXONE SODIUM 1 G: 1 INJECTION, SOLUTION INTRAVENOUS at 10:44

## 2019-05-13 RX ADMIN — ALBUTEROL SULFATE 6 PUFF: 90 AEROSOL, METERED RESPIRATORY (INHALATION) at 03:32

## 2019-05-13 RX ADMIN — DONEPEZIL HYDROCHLORIDE 10 MG: 10 TABLET, FILM COATED ORAL at 20:42

## 2019-05-13 RX ADMIN — LABETALOL 20 MG/4 ML (5 MG/ML) INTRAVENOUS SYRINGE 20 MG: at 08:05

## 2019-05-13 RX ADMIN — FENTANYL CITRATE 100 MCG: 50 INJECTION INTRAMUSCULAR; INTRAVENOUS at 21:48

## 2019-05-13 RX ADMIN — ENOXAPARIN SODIUM 40 MG: 40 INJECTION SUBCUTANEOUS at 10:43

## 2019-05-13 RX ADMIN — METHYLPREDNISOLONE SODIUM SUCCINATE 20 MG: 40 INJECTION, POWDER, FOR SOLUTION INTRAMUSCULAR; INTRAVENOUS at 11:21

## 2019-05-13 RX ADMIN — SODIUM CHLORIDE, POTASSIUM CHLORIDE, SODIUM LACTATE AND CALCIUM CHLORIDE 125 ML/HR: 600; 310; 30; 20 INJECTION, SOLUTION INTRAVENOUS at 04:13

## 2019-05-13 RX ADMIN — INSULIN LISPRO 8 UNITS: 100 INJECTION, SOLUTION INTRAVENOUS; SUBCUTANEOUS at 11:20

## 2019-05-13 RX ADMIN — DEXMEDETOMIDINE 0.8 MCG/KG/HR: 100 INJECTION, SOLUTION, CONCENTRATE INTRAVENOUS at 16:45

## 2019-05-13 RX ADMIN — AZITHROMYCIN MONOHYDRATE 500 MG: 500 INJECTION, POWDER, LYOPHILIZED, FOR SOLUTION INTRAVENOUS at 10:43

## 2019-05-13 RX ADMIN — FENTANYL CITRATE 100 MCG: 50 INJECTION INTRAMUSCULAR; INTRAVENOUS at 20:41

## 2019-05-13 RX ADMIN — METHYLPREDNISOLONE SODIUM SUCCINATE 20 MG: 40 INJECTION, POWDER, FOR SOLUTION INTRAMUSCULAR; INTRAVENOUS at 23:14

## 2019-05-13 RX ADMIN — FENTANYL CITRATE 100 MCG: 50 INJECTION INTRAMUSCULAR; INTRAVENOUS at 17:33

## 2019-05-13 RX ADMIN — SERTRALINE 25 MG: 25 TABLET, FILM COATED ORAL at 08:30

## 2019-05-13 RX ADMIN — ALBUTEROL SULFATE 6 PUFF: 90 AEROSOL, METERED RESPIRATORY (INHALATION) at 07:00

## 2019-05-13 RX ADMIN — ALBUTEROL SULFATE 6 PUFF: 90 AEROSOL, METERED RESPIRATORY (INHALATION) at 23:32

## 2019-05-13 RX ADMIN — DEXMEDETOMIDINE 1 MCG/KG/HR: 100 INJECTION, SOLUTION, CONCENTRATE INTRAVENOUS at 08:39

## 2019-05-13 RX ADMIN — GABAPENTIN 600 MG: 300 CAPSULE ORAL at 06:38

## 2019-05-13 RX ADMIN — INSULIN GLARGINE 10 UNITS: 100 INJECTION, SOLUTION SUBCUTANEOUS at 09:03

## 2019-05-13 NOTE — PLAN OF CARE
Problem: Patient Care Overview  Goal: Plan of Care Review  Outcome: Ongoing (interventions implemented as appropriate)   05/13/19 3652   Coping/Psychosocial   Plan of Care Reviewed With patient   OTHER   Outcome Summary pt presents with impaired functional mobility and activity tolerance, he will benefit from PT to address, Pt sat edge of bed today but had a difficult time with dec o2 sats and difficulty tolerating R hip flexion due to IV site in R groin. Will progress as tolerated

## 2019-05-13 NOTE — THERAPY EVALUATION
Acute Care - Physical Therapy Initial Evaluation  [facility]     Patient Name: Heath Bajwa  : 1950  MRN: [id number]  Today's Date: 2019   Onset of Illness/Injury or Date of Surgery: 19  Date of Referral to PT: 19  Referring Physician: Mitali      Admit Date: 2019    Visit Dx:     ICD-10-CM ICD-9-CM   1. Impaired functional mobility and activity tolerance Z74.09 V49.89     Patient Active Problem List   Diagnosis   • BPH without urinary obstruction   • Elevated prostate specific antigen (PSA)   • Acute respiratory failure with hypoxia and hypercapnia (CMS/HCC)   • Venous stasis dermatitis of both lower extremities   • Elevated troponin   • Respiratory failure (CMS/HCC)     Past Medical History:   Diagnosis Date   • COPD (chronic obstructive pulmonary disease) (CMS/HCC)    • Diabetes mellitus (CMS/HCC)    • Hypertension      History reviewed. No pertinent surgical history.     PT ASSESSMENT (last 12 hours)      Physical Therapy Evaluation     Row Name 19 1452          PT Evaluation Time/Intention    Subjective Information  -- multiple c/os, difficult to understand due to intubated  -PC     Document Type  evaluation  -PC     Mode of Treatment  physical therapy  -PC     Patient Effort  fair  -PC     Symptoms Noted During/After Treatment  shortness of breath  -PC     Comment  pt with dec o2 sats upon sitting, pt in visible discofort and becoming distressed  -PC     Row Name 19 5914          General Information    Patient Profile Reviewed?  yes  -PC     Onset of Illness/Injury or Date of Surgery  19  -PC     Referring Physician  Mitali  -     Patient Observations  cooperative;agree to therapy  -PC     Patient/Family Observations  pt is in CCU, intubated on vent, multiple lines, has IV in groin  -PC     Prior Level of Function  independent:;all household mobility  -PC     Equipment Currently Used at Home  oxygen;rollator  -PC     Pertinent History of Current Functional  Problem  pt adm with respiratory failure, orally intubated, COPD, PNA, PVD with breakdown L great toe, and R heel  -     Existing Precautions/Restrictions  fall;oxygen therapy device and L/min  -PC     Barriers to Rehab  medically complex;previous functional deficit  -     Row Name 05/13/19 1450          Relationship/Environment    Lives With  -- unknown  -     Row Name 05/13/19 1450          Cognitive Assessment/Intervention- PT/OT    Orientation Status (Cognition)  oriented x 3  -PC     Follows Commands (Cognition)  follows one step commands;75-90% accuracy  -     Safety Deficit (Cognitive)  insight into deficits/self awareness;safety precautions follow-through/compliance;mild deficit  -     Row Name 05/13/19 1450          Bed Mobility Assessment/Treatment    Bed Mobility Assessment/Treatment  supine-sit;sit-supine  -PC     Supine-Sit [place] (Bed Mobility)  moderate assist (50% patient effort);2 person assist  -PC     Sit-Supine [place] (Bed Mobility)  moderate assist (50% patient effort);2 person assist  -PC     Bed Mobility, Safety Issues  decreased use of legs for bridging/pushing;decreased use of arms for pushing/pulling;impaired trunk control for bed mobility  -     Assistive Device (Bed Mobility)  draw sheet  -     Row Name 05/13/19 1450          Transfer Assessment/Treatment    Transfer Assessment/Treatment  sit-stand transfer;stand-sit transfer  -     Comment (Transfers)  pt not able to tolerate sitting, not safe to attempt standing  -     Row Name 05/13/19 1450          General ROM    GENERAL ROM COMMENTS  WNL  -     Row Name 05/13/19 1450          MMT (Manual Muscle Testing)    General MMT Comments  BUE 3/5, B LE 3/5  -     Row Name 05/13/19 1450          Motor Assessment/Intervention    Additional Documentation  Balance (Group)  -     Row Name 05/13/19 1450          Balance    Balance  static sitting balance;static standing balance  -     Row Name 05/13/19 1450           Static Sitting Balance    Level of [place] (Unsupported Sitting, Static Balance)  moderate assist, 50 to 74% patient effort  -PC     Sitting Position (Unsupported Sitting, Static Balance)  sitting on edge of bed  -PC     Time Able to Maintain Position (Unsupported Sitting, Static Balance)  1 to 2 minutes  -PC     Comment (Supported Sitting, Static Balance)  pt with inability to tolerate flexion of R hip due to IV there, and had dec o2 sats  -PC     Row Name 05/13/19 3531          Pain Assessment    Additional Documentation  Pain Scale: Word Pre/Post-Treatment (Group);Pain Scale: Numbers Pre/Post-Treatment (Group)  -PC     Row Name 05/13/19 0660          Pain Scale: Word Pre/Post-Treatment    Pre/Post Treatment Pain Comment  pt seemed to have pain in R hip upon trying to sit edge of bed, due to IV there  -PC     Row Name             Wound 05/11/19 1015 Left anterior toe blisters    Wound - Properties Group Date first assessed: 05/11/19  -JN Time first assessed: 1015  -JN Present On Admission : yes  -JN Side: Left  -JN Orientation: anterior  -JN Location: toe  -JN, great toe  Type: blisters  -JN    Row Name 05/13/19 3055          Plan of Care Review    Plan of Care Reviewed With  patient  -PC     Row Name 05/13/19 2617          Physical Therapy Clinical Impression    Date of Referral to PT  05/13/19  -     Criteria for Skilled Interventions Met (PT Clinical Impression)  yes;treatment indicated  -PC     Impairments Found (describe specific impairments)  arousal, attention, and cognition;gait, locomotion, and balance  -PC     Rehab Potential (PT Clinical Summary)  fair, will monitor progress closely  -     Row Name 05/13/19 8611          Vital Signs    Pre SpO2 (%)  93  -PC     O2 Delivery Pre Treatment  ventilator  -PC     Intra SpO2 (%)  82  -PC     O2 Delivery Intra Treatment  ventilator  -PC     Post SpO2 (%)  90  -PC     O2 Delivery Post Treatment  ventilator  -PC     Row Name 05/13/19 5564           Physical Therapy Goals    Bed Mobility Goal Selection (PT)  bed mobility, PT goal 1  -PC     Transfer Goal Selection (PT)  transfer, PT goal 1  -PC     Gait Training Goal Selection (PT)  gait training, PT goal 1  -PC     Row Name 05/13/19 1450          Bed Mobility Goal 1 (PT)    Activity/Assistive Device (Bed Mobility Goal 1, PT)  sit to supine;supine to sit  -PC     [place] Level/Cues Needed (Bed Mobility Goal 1, PT)  minimum assist (75% or more patient effort)  -PC     Time Frame (Bed Mobility Goal 1, PT)  2 weeks  -PC     Row Name 05/13/19 1453          Transfer Goal 1 (PT)    Activity/Assistive Device (Transfer Goal 1, PT)  sit-to-stand/stand-to-sit  -PC     [place] Level/Cues Needed (Transfer Goal 1, PT)  minimum assist (75% or more patient effort)  -PC     Time Frame (Transfer Goal 1, PT)  2 weeks  -PC     Row Name 05/13/19 1453          Gait Training Goal 1 (PT)    Activity/Assistive Device (Gait Training Goal 1, PT)  gait (walking locomotion);assistive device use  -PC     [place] Level (Gait Training Goal 1, PT)  minimum assist (75% or more patient effort)  -PC     Distance (Gait Goal 1, PT)  25 ft  -PC     Time Frame (Gait Training Goal 1, PT)  2 weeks  -PC     Row Name 05/13/19 1459          Positioning and Restraints    Pre-Treatment Position  in bed  -PC     Post Treatment Position  bed  -PC     In Bed  supine;call light within reach;encouraged to call for assist  -PC       User Key  (r) = Recorded By, (t) = Taken By, (c) = Cosigned By    Initials Name Provider Type    PC Alley Frazier PT Physical Therapist    Ariel Baez RN Registered Nurse        Physical Therapy Education     Title: PT OT SLP Therapies (In Progress)     Topic: Physical Therapy (In Progress)     Point: Mobility training (In Progress)     Learning Progress Summary           Patient Acceptance, E,D, NR by PC at 5/13/2019  3:05 PM                   Point: Home exercise program (In Progress)     Learning Progress  Summary           Patient Acceptance, E,D, NR by  at 5/13/2019  3:05 PM                   Point: Body mechanics (In Progress)     Learning Progress Summary           Patient Acceptance, E,D, NR by  at 5/13/2019  3:05 PM                   Point: Precautions (In Progress)     Learning Progress Summary           Patient Acceptance, E,D, NR by  at 5/13/2019  3:05 PM                               User Key     Initials Effective Dates Name Provider Type Discipline     04/03/18 -  Alley Frazier, PT Physical Therapist PT              PT Recommendation and Plan  Anticipated Discharge Disposition (PT): skilled nursing facility, home with assist(depends on progress)  Planned Therapy Interventions (PT Eval): balance training, bed mobility training, gait training, home exercise program, transfer training, strengthening  Therapy Frequency (PT Clinical Impression): daily  Outcome Summary/Treatment Plan (PT)  Anticipated Discharge Disposition (PT): skilled nursing facility, home with assist(depends on progress)  Plan of Care Reviewed With: patient  Outcome Summary: pt presents with impaired functional mobility and activity tolerance, he will benefit from PT to address, Pt sat edge of bed today but had a difficult time with dec o2 sats and difficulty tolerating R hip flexion due to IV site in R groin. Will progress as tolerated  Outcome Measures     Row Name 05/13/19 1500             How much help from another person do you currently need...    Turning from your back to your side while in flat bed without using bedrails?  2  -PC      Moving from lying on back to sitting on the side of a flat bed without bedrails?  2  -PC      Moving to and from a bed to a chair (including a wheelchair)?  1  -PC      Standing up from a chair using your arms (e.g., wheelchair, bedside chair)?  1  -PC      Climbing 3-5 steps with a railing?  1  -PC      To walk in hospital room?  1  -PC      AM-PAC 6 Clicks Score  8  -PC         Functional  Assessment    Outcome Measure Options  AM-PAC 6 Clicks Basic Mobility (PT)  -PC        User Key  (r) = Recorded By, (t) = Taken By, (c) = Cosigned By    Initials Name Provider Type    PC Alley Frazier, PT Physical Therapist         Time Calculation:   PT Charges     Row Name 05/13/19 1507             Time Calculation    Start Time  1324  -PC      Stop Time  1348  -PC      Time Calculation (min)  24 min  -PC      PT Received On  05/13/19  -PC      PT - Next Appointment  05/14/19  -PC      PT Goal Re-Cert Due Date  05/27/19  -PC        User Key  (r) = Recorded By, (t) = Taken By, (c) = Cosigned By    Initials Name Provider Type    PC Alley Frazier, PT Physical Therapist        Therapy Charges for Today     Code Description Service Date Service Provider Modifiers Qty    90907520382 HC PT EVAL HIGH COMPLEXITY 2 5/13/2019 Alley Frazier, PT GP 1    84806492326 HC PT THER PROC EA 15 MIN 5/13/2019 Alley Frazier, PT GP 1    09971447703 HC PT THER SUPP EA 15 MIN 5/13/2019 Alley Frazier, PT GP 2          PT G-Codes  Outcome Measure Options: AM-PAC 6 Clicks Basic Mobility (PT)  AM-PAC 6 Clicks Score: 8      Alley Frazier, PT  5/13/2019

## 2019-05-13 NOTE — PLAN OF CARE
Problem: Patient Care Overview  Goal: Plan of Care Review   05/13/19 0514   Coping/Psychosocial   Plan of Care Reviewed With patient   OTHER   Outcome Summary continues to have slow improvement on vent support currently rate of 20 Peep of 7.5 FiO2 40%e  secretions seems to be thinner uop has picked up 800 cc thus far night shift still seems puffy along flank and outer thighs unable to wean precedex pt becomes anxious and angry rather quickly BP creeping up currently 167/93 tolerating tube feeds goal rate achieved +flatus K+ this am 5.3 Lr running at 125 creatine less than 1.0 restraints in place to protect ETT       Problem: Ventilation, Mechanical Invasive (Adult)  Goal: Signs and Symptoms of Listed Potential Problems Will be Absent, Minimized or Managed (Ventilation, Mechanical Invasive)  Outcome: Ongoing (interventions implemented as appropriate)      Problem: Skin Injury Risk (Adult)  Goal: Skin Health and Integrity  Outcome: Ongoing (interventions implemented as appropriate)      Problem: Nutrition, Enteral (Adult)  Goal: Signs and Symptoms of Listed Potential Problems Will be Absent, Minimized or Managed (Nutrition, Enteral)  Outcome: Ongoing (interventions implemented as appropriate)      Problem: Fall Risk (Adult)  Goal: Absence of Fall  Outcome: Ongoing (interventions implemented as appropriate)      Problem: Restraint, Nonbehavioral (Nonviolent)  Goal: Rationale and Justification  Outcome: Ongoing (interventions implemented as appropriate)    Goal: Nonbehavioral (Nonviolent) Restraint: Absence of Injury/Harm  Outcome: Ongoing (interventions implemented as appropriate)    Goal: Nonbehavioral (Nonviolent) Restraint: Achievement of Discontinuation Criteria  Outcome: Ongoing (interventions implemented as appropriate)    Goal: Nonbehavioral (Nonviolent) Restraint: Preservation of Dignity and Wellbeing  Outcome: Ongoing (interventions implemented as appropriate)

## 2019-05-13 NOTE — PROGRESS NOTES
Endotracheal tube advanced 2 cm per Dr. Jasmine order.  Beginning measurement was 24cm at the patient lip.  Endotracheal tube is now at 26cm at the patient lip.  Bilateral BS are heard at this time.

## 2019-05-13 NOTE — CONSULTS
Adult Nutrition  Assessment/PES    Patient Name:  Heath Bajwa  YOB: 1950  MRN: [id number]  Admit Date:  5/11/2019    Assessment Date:  5/13/2019    Comments:  TF follow up. Pt tolerating TF's Diabetisourece AC at 60ml/hr and water flushes 30cc/hr. TF at goal rate. Will continue to follow.    Reason for Assessment     Row Name 05/13/19 0816          Reason for Assessment    Reason For Assessment  follow-up protocol;TF/PN           Anthropometrics     Row Name 05/13/19 0200          Anthropometrics    Weight  118 kg (260 lb 12.9 oz)         Labs/Tests/Procedures/Meds     Row Name 05/13/19 0816          Labs/Procedures/Meds    Lab Results Reviewed  reviewed     Lab Results Comments  k, glu,bun, wbc        Diagnostic Tests/Procedures    Diagnostic Test/Procedure Reviewed  reviewed        Medications    Pertinent Medications Reviewed  reviewed     Pertinent Medications Comments  abx, lovenox, pepcid, insulin, solu-medrol         Physical Findings     Row Name 05/13/19 0819          Physical Findings    Overall Physical Appearance  overweight;on ventilator support     Gastrointestinal  feeding tube     Tubes  orogastric tube     Skin  -- left toe ulceration, blister           Nutrition Prescription Ordered     Row Name 05/13/19 0820          Nutrition Prescription PO    Current PO Diet  NPO        Nutrition Prescription EN    Enteral Route  OG     Product  Diabetisource AC (Glucerna 1.2)     TF Delivery Method  Continuous     Continuous TF Goal Rate (mL/hr)  60 mL/hr     Continuous TF Current Rate (mL/hr)  60 mL/hr     Water flush (mL)   30 mL     Water Flush Frequency  Per hour        Propofol Considerations    Propofol (mL/hr)  0 mL/hr         Evaluation of Received Nutrient/Fluid Intake     Row Name 05/13/19 0820          Calories Evaluation    Enteral Calories (kcal)  1728     % of Kcal Needs  96        Protein Evaluation    Enteral Protein (gm)  86     % of Protein Needs  75        Fluid Intake  Evaluation    Enteral (Free Water) Fluid (mL)  1180     Free Water Flush Fluid (mL)  720     Total Free Water Intake (mL)  1900 mL        EN Evaluation    HOB  Greater than or equal to 30 degress               Problem/Interventions:            Intervention Goal     Row Name 05/13/19 0823          Intervention Goal    General  Maintain nutrition;Nutrition support treatment     TF/PN  Tolerate TF at goal     Transition  TF to PO     Weight  No significant weight loss         Nutrition Intervention     Row Name 05/13/19 0823          Nutrition Intervention    RD/Tech Action  Follow Tx progress;Care plan reviewd         Nutrition Prescription     Row Name 05/13/19 0823          Nutrition Prescription EN    Enteral Prescription  Continue same protocol         Education/Evaluation     Row Name 05/13/19 0823          Monitor/Evaluation    Monitor  Per protocol;I&O;Pertinent labs;TF delivery/tolerance;Weight;Skin status           Electronically signed by:  Cherelle Ferrari RD  05/13/19 8:24 AM

## 2019-05-13 NOTE — PROGRESS NOTES
LOS: 2 days   Patient Care Team:  Provider, No Known as PCP - General  Provider, No Known as PCP - Family Medicine    Subjective     Patient remains intubated he is sedated on Precedex at 0.9 mics per kilogram per minute.  He is awake and following commands.  He does not like the ventilator.    Review of Systems:          Objective     Vital Signs  Vital Sign Min/Max for last 24 hours  Temp  Min: 97.9 °F (36.6 °C)  Max: 98.8 °F (37.1 °C)   BP  Min: 100/86  Max: 167/96   Pulse  Min: 44  Max: 72   Resp  Min: 20  Max: 26   SpO2  Min: 90 %  Max: 98 %   No Data Recorded   Weight  Min: 118 kg (260 lb)  Max: 118 kg (260 lb 12.9 oz)        Ventilator/Non-Invasive Ventilation Settings (From admission, onward)    Start     Ordered    05/11/19 1021  Ventilator - AC/PC; (28); 100; 88%; Other (see comments); 18; 17  Continuous     Question Answer Comment   Vent Mode AC/PC    Breath rate  28   FiO2 100    FiO2 titrate for Sp02% =/> 88%    PEEP Other (see comments)    PEEP: 18    Inspiratory Pressure 17        05/11/19 1027                       Body mass index is 33.49 kg/m².  I/O last 3 completed shifts:  In: 88435.1 [I.V.:8683.2; Other:831; NG/GT:1007; IV Piggyback:299.9]  Out: 950 [Urine:950]  I/O this shift:  In: 3064.4 [I.V.:1944.4; Other:451; NG/GT:669]  Out: 800 [Urine:800]        Physical Exam:    General Appearance: Obese white male orally intubated with an 8.0 endotracheal tube at about 24 cm at the lips.  Sedated with Precedex at 0.8 mics per kilogram per minute he is awake alert cooperative  Eyes: Conjunctival are clear and anicteric pupils are equal about 2-1/2 mm  ENT: Oral mucous membranes are dry nasal septum appears midline I do not see any exudates or erythema the oral endotracheal tube and orogastric tube are in place  Neck: No adenopathy no jugular venous distention, trachea midline, no palpable thyromegaly  Lungs: Coarse breath sounds bilaterally seem to be equal.  I do not hear any definite wheezes,  rales or rhonchi  Cardiac: Regular rhythm no murmur   Abdomen: Soft no palpable organomegaly or masses  :  he has a Aguilar catheter in place  Musculoskeletal: He has what looks like chronic arterial and venous insufficiency in his lower extremities.  He has smooth thin skin with doughy edema in both lower extremities.   There is a right femoral central venous type catheter  Skin: He has a blood-filled blister on the dorsum of the left great toe and he is a small blood-filled blister on his right heel but no change from yesterday.  No jaundice no petechiae  Neuro:  He is definitely awake and alert he is cooperating he is following commands moving all extremities.  He is writing messages.  Extremities/P Vascular: He does have palpable radial pulses and very weak dorsalis pedis pulses bilaterally nursing is able to Doppler them I think I feel both although they still are quite weak.  MSE:  He is a little anxious.           Labs:  Results from last 7 days   Lab Units 05/13/19  0427 05/12/19  0351 05/11/19  1104 05/11/19  0339   GLUCOSE mg/dL 278* 209* 215* 304*   SODIUM mmol/L 138 139 140 136   POTASSIUM mmol/L 5.3* 5.1 4.3 4.7   MAGNESIUM mg/dL  --  1.8 1.8 1.8   CO2 mmol/L 29.4* 30.4* 35.6* 35.2*   CHLORIDE mmol/L 98 98 95* 93*   ANION GAP mmol/L 10.6 10.6 9.4 7.8   CREATININE mg/dL 0.99 1.28* 0.93 0.91   BUN mg/dL 38* 32* 22 23   BUN / CREAT RATIO  38.4* 25.0 23.7 25.3*   CALCIUM mg/dL 8.7 8.3* 8.7 8.3*   EGFR IF NONAFRICN AM mL/min/1.73 75 55* 80 82   ALK PHOS U/L  --   --  101 105   TOTAL PROTEIN g/dL  --   --  5.9* 6.1   ALT (SGPT) U/L  --   --  17 16   AST (SGOT) U/L  --   --  17 20   BILIRUBIN mg/dL  --   --  0.5 0.5   ALBUMIN g/dL  --   --  2.30* 2.38*   GLOBULIN gm/dL  --   --  3.6 3.7     Estimated Creatinine Clearance: 93.4 mL/min (by C-G formula based on SCr of 0.99 mg/dL).      Results from last 7 days   Lab Units 05/12/19  0351 05/11/19  1104 05/11/19  0339   WBC 10*3/mm3 16.03* 14.91* 13.92*   RBC  10*6/mm3 5.07 5.32 5.05   HEMOGLOBIN g/dL 15.3 16.2 15.5   HEMATOCRIT % 48.7 52.5* 50.8   MCV fL 96.1 98.7* 100.6*   MCH pg 30.2 30.5 30.7   MCHC g/dL 31.4* 30.9* 30.5*   RDW % 14.8 14.2 14.9   RDW-SD fl 51.3 51.7 54.4*   MPV fL 11.5 10.9 10.7   PLATELETS 10*3/mm3 211 212 208   NEUTROPHIL % %  --  86.0* 88.6*   LYMPHOCYTE % %  --  5.3* 3.2*   MONOCYTES % %  --  7.7 5.0   EOSINOPHIL % %  --  0.1* 0.1*   BASOPHIL % %  --  0.4 0.4   IMM GRAN % %  --  0.5 2.7*   NEUTROS ABS 10*3/mm3  --  12.83* 12.31*   LYMPHS ABS 10*3/mm3  --  0.79 0.45*   MONOS ABS 10*3/mm3  --  1.15* 0.70   EOS ABS 10*3/mm3  --  0.01 0.02   BASOS ABS 10*3/mm3  --  0.06 0.06   IMMATURE GRANS (ABS) 10*3/mm3  --  0.07* 0.38*   NRBC /100 WBC  --  0.1  --      Results from last 7 days   Lab Units 05/13/19  0344   PH, ARTERIAL pH units 7.394   PO2 ART mm Hg 76.0*   PCO2, ARTERIAL mm Hg 57.6*   HCO3 ART mmol/L 35.2*     Results from last 7 days   Lab Units 05/11/19  1104 05/11/19  0542 05/11/19  0339   TROPONIN T ng/mL 0.172* 0.069* 0.057*             Results from last 7 days   Lab Units 05/11/19  1104 05/11/19  0339   LACTATE mmol/L 1.5 1.0   PROCALCITONIN ng/mL 0.13  --      Results from last 7 days   Lab Units 05/11/19  1104   INR  1.17*     Microbiology Results (last 10 days)     Procedure Component Value - Date/Time    Respiratory Culture - Aspirate, ET Suction [[id number]] Collected:  05/11/19 1209    Lab Status:  Preliminary result Specimen:  Aspirate from ET Suction Updated:  05/12/19 0754     Respiratory Culture Scant growth (1+) Normal Respiratory Kassandra     Gram Stain Few (2+) WBCs seen      Rare (1+) Epithelial cells per low power field      Mixed bacterial morphotypes seen on Gram Stain    Blood Culture - Blood, Blood, Venous Line [[id number]] Collected:  05/11/19 0339    Lab Status:  Preliminary result Specimen:  Blood, Venous Line Updated:  05/13/19 0345     Blood Culture No growth at 2 days    Blood Culture - Blood, Arm, Right [[id number]]  Collected:  05/11/19 0339    Lab Status:  Preliminary result Specimen:  Blood from Arm, Right Updated:  05/13/19 0345     Blood Culture No growth at 2 days                albuterol sulfate HFA 6 puff Inhalation Q4H - RT   alteplase 2 mg Intracatheter Once   alteplase 2 mg Intracatheter Once   alteplase 2 mg Intracatheter Once   amLODIPine 10 mg Oral Q24H   aspirin 325 mg Oral Daily   atorvastatin 40 mg Oral Daily   azithromycin 500 mg Intravenous Q24H   ceftriaxone 1 g Intravenous Q24H   chlorhexidine 15 mL Mouth/Throat Q12H   donepezil 10 mg Oral Nightly   enoxaparin 40 mg Subcutaneous Q24H   famotidine 20 mg Intravenous Q12H   gabapentin 600 mg Nasogastric Q8H   insulin glargine 10 Units Subcutaneous Nightly   insulin lispro 0-24 Units Subcutaneous 4x Daily With Meals & Nightly   ipratropium 6 puff Inhalation Q4H - RT   methylPREDNISolone sodium succinate 20 mg Intravenous Q12H   nystatin  Topical Q12H   sertraline 25 mg Oral Daily   sodium chloride 3 mL Intravenous Q12H       dexmedetomidine 0.2-1.5 mcg/kg/hr Last Rate: 1 mcg/kg/hr (05/13/19 0412)   lactated ringers 125 mL/hr Last Rate: 125 mL/hr (05/13/19 0413)       Diagnostics:  Ct Head Without Contrast    Result Date: 5/11/2019  EXAMINATION: CT HEAD WO CONTRAST-  CLINICAL INDICATION:     ams  COMPARISON:    None  Technique: Multiple CT axial images were obtained through the level of the brain without IV contrast administration. Reformatted images in the coronal and/or sagittal plane(s) were generated from the axial data set to facilitate diagnostic accuracy and/or surgical planning.  Radiation dose reduction techniques were utilized per ALARA protocol. Automated exposure control was initiated through either or [employer] or DoseRight software packages by  protocol.   DOSE (DLP mGy-cm): 1469.74 mGy.cm  FINDINGS:   Mild generalized cerebral atrophy and chronic small vessel ischemic disease noted. No acute intracranial abnormality. No midline shift or  mass effect. No hydrocephalus or intracranial hemorrhage. There is no CT evidence of acute vascular territory infarct. Bone windows show no acute osseous abnormality. Bilateral right greater than left mastoid effusions. Pansinusitis.      1. No CT evidence of acute intracranial abnormality. 2. Estimated effusions and pansinusitis.  This report was finalized on 5/11/2019 7:24 AM by Dr. Escobar Peralta MD.      Xr Chest 1 View    Result Date: 5/12/2019  PORTABLE CHEST RADIOGRAPH  HISTORY: Intubated patient  COMPARISON: 05/11/2019  FINDINGS: Cardiomediastinal silhouette is unchanged. Endotracheal tube appears to terminate at the level of the clavicles. It could be advanced about 3 cm. A nasogastric tube is present. It appears to extend to the stomach. No pneumothorax is seen. There is persistent bibasilar consolidation. There are small bilateral pleural effusions. Vascular congestion is unchanged.      Endotracheal tube terminates at the level of clavicles. It could be advanced about 3 cm.  This report was finalized on 5/12/2019 5:00 AM by Dr. Yuliana Chandra M.D.      Xr Chest 1 View    Result Date: 5/11/2019  XR CHEST 1 VW-  HISTORY: Male who is 69 years-old,  endotracheal intubation  TECHNIQUE: Frontal views of the chest  COMPARISON: None available  FINDINGS: Patient is rotated towards the left. The endotracheal tube tip appears to be about 8 cm above the candace. NG tube extends beyond the inferior extent of the image. Heart size is normal. Mild prominence of vascular and interstitial markings. Patchy infiltrates at the bases, small left pleural effusion. Heart, mediastinum and pulmonary vasculature are unremarkable. No focal pulmonary consolidation, pleural effusion, or pneumothorax. No acute osseous process.      Endotracheal intubation. Bibasilar infiltrates, left pleural effusion, mild prominence of vascular and interstitial markings, follow-up recommended.  This report was finalized on 5/11/2019 10:51 AM by  Dr. Rufino Bello M.D.      Xr Chest 1 View    Result Date: 5/11/2019  EXAMINATION: XR CHEST 1 VW-  CLINICAL INDICATION:     tube placement  TECHNIQUE:  XR CHEST 1 VW-  COMPARISON: 08/13/2018  FINDINGS: ET tube at level of clavicles. NG tube extends into stomach.  CHF/edema developed from previous exam. Consolidative airspace disease right greater than left mid and lower lung regions. Trace pleural effusions. No pneumothorax. No acute bony findings.      1. CHF. 2. Bibasilar airspace disease. 3. Support devices as above.  This report was finalized on 5/11/2019 7:28 AM by Dr. Escobar Peralta MD.      Ct Chest Pulmonary Embolism With Contrast    Result Date: 5/11/2019  EXAMINATION: CT CHEST PULMONARY EMBOLISM W CONTRAST-  CLINICAL INDICATION:Pulmonary embolism  COMPARISON: NONE.  TECHNIQUE: Multiple CT axial images were obtained through the level of pulmonary arteries, following IV contrast administration per CT PE protocol. Volume Rendered 3D or MIP images performed.  Radiation dose reduction techniques were utilized per ALARA protocol. Automated exposure control was initiated through either or [employer] or DoseRight software packages by  protocol.  DOSE (DLP mGy-cm):   FINDINGS:  PULMONARY ARTERIES: No evidence of pulmonary embolism.  LUNGS: CONSOLIDATIVE AIRSPACE DISEASE BILATERAL LOWER LOBES CONSISTENT WITH COMBINATION OF ATELECTASIS AND PNEUMONIA. PULMONARY VASCULAR CONGESTION IS NOTED WITH INTERSTITIAL EDEMA.  HEART: MILD CARDIOMEGALY WITH SEVERE CORONARY ARTERY CALCIFICATIONS.  PERICARDIUM: No effusion.  MEDIASTINUM: PROBABLE REACTIVE HILAR AND MEDIASTINAL LYMPH NODES.  PLEURA: TRACE PLEURAL EFFUSIONS.  MAJOR AIRWAYS: Clear. No intrinsic mass.  VASCULATURE: No evidence of aneurysm.  VISUALIZED UPPER ABDOMEN:NODULAR LIVER MARGINS SUGGESTING CIRRHOSIS. CYST LEFT KIDNEY.  OTHER: None.  BONES: DEGENERATIVE CHANGES THORACIC SPINE. NO ACUTE BONY FINDINGS.      1. No PE. 2. CHF/edema. 3. Bibasilar  consolidations compatible with pneumonia. 4. Liver cirrhosis. 5. Indeterminant cyst left kidney. Ultrasound follow-up may be helpful.  This report was finalized on 5/11/2019 7:27 AM by Dr. Escobar Peralta MD.      Results for orders placed during the hospital encounter of 05/11/19   Adult Transthoracic Echo Complete W/ Cont if Necessary Per Protocol    Narrative · Estimated EF appears to be in the range of 51 - 55%.  · Mild tricuspid valve regurgitation is present.  · Calculated right ventricular systolic pressure from tricuspid   regurgitation is 42.7 mmHg.          X-ray reviewed endotracheal tube tip is at the top of the clavicles looks like it is pulled back slightly there are increase in bilateral infiltrates but I think a lot of this is due to lower lung volumes    Active Hospital Problems    Diagnosis  POA   • Respiratory failure (CMS/HCC) [J96.90]  Yes      Resolved Hospital Problems   No resolved problems to display.         Assessment/Plan     1. Acute on chronic hypoxemic and hypercapnic respiratory failure.    Has improved with the ventilation not been able to wean his PEEP down below 7-1/2.  I do not think he is probably quite ready to extubate copious secretions  2. Pneumonia bilateral bibasilar certainly raises the concern for aspiration.  Continue antibiotics  3. Elevated and rising troponin non ST elevation this could be non-ST elevation MI due to demand ischemia with his profound hypoxia.  Cardiology sees no evidence of acute coronary syndrome either echocardiogram normal LVEF  4. COPD with acute exacerbation continue steroids and bronchodilators.  5. Acute kidney injury creatinine back down to normal  6. Hypertension  pressure up significantly will need to start maintenance therapy I will add lisinopril his home dose back to the amlodipine started yesterday.  7. Diabetes mellitus type 2 insulin requiring elevated hemoglobin A1c at presentation suggesting suboptimal control.  Blood sugars continue  to run high I will increase schedule insulin and continue sliding scale  8. Hypoalbuminemia probably poor nutritional status,   9. Hematuria may be secondary to Aguilar catheter placement I do recommend a follow-up urine in a couple of weeks once the catheter is out.  10. 3 mm pulmonary nodule noncalcified he will need follow-up CT on this given his smoking history  11. Incompletely evaluated renal lesion again when he recovers he should have a CT to fully evaluate  12. Peripheral vascular disease patient has declined intervention recently.  He certainly looks like he has significant disease.  13. Skin lesions right heel and left great toe  wound to evaluate  14. Candida intertrigo nystatin powder  15. Fluids/electro lites/nutrition patient is tolerating tube feeds well I will decrease IV fluids         16.     Plan for disposition:    Jose Luis Jasmine MD  05/13/19  6:43 AM    Time: Critical care time in excess of 43 minutes

## 2019-05-13 NOTE — PROGRESS NOTES
LOS: 2 days   Patient Care Team:  Provider, No Known as PCP - General  Provider, No Known as PCP - Family Medicine    Chief Complaint: pneumonia, elevated troponin       Interval History: He remains intubated.  BP elevated today.  May be shiloh component of anxiety.      Objective   Vital Signs  Temp:  [97.9 °F (36.6 °C)-98.8 °F (37.1 °C)] 98.6 °F (37 °C)  Heart Rate:  [44-79] 71  Resp:  [20-23] 22  BP: (100-190)/() 178/91  FiO2 (%):  [40 %-41 %] 41 %    Intake/Output Summary (Last 24 hours) at 5/13/2019 0845  Last data filed at 5/13/2019 0413  Gross per 24 hour   Intake 6785.6 ml   Output 1375 ml   Net 5410.6 ml       Intubated, sedated  PERRL, conjunctiva clear  Neck supple, no JVD or thyromegaly appreciated  S1/S2 RRR, no m/r/g  Lungs CTA B, normal effort  Abdomen S/NT/ND (+) BS, no HSM appreciated  Extremities warm, no clubbing, cyanosis, or edema  No visible or palpable skin lesions      Results Review:      Results from last 7 days   Lab Units 05/13/19  0427 05/12/19  0351 05/11/19  1104   SODIUM mmol/L 138 139 140   POTASSIUM mmol/L 5.3* 5.1 4.3   CHLORIDE mmol/L 98 98 95*   CO2 mmol/L 29.4* 30.4* 35.6*   BUN mg/dL 38* 32* 22   CREATININE mg/dL 0.99 1.28* 0.93   GLUCOSE mg/dL 278* 209* 215*   CALCIUM mg/dL 8.7 8.3* 8.7     Results from last 7 days   Lab Units 05/11/19  1104 05/11/19  0542 05/11/19  0339   TROPONIN T ng/mL 0.172* 0.069* 0.057*     Results from last 7 days   Lab Units 05/12/19  0351 05/11/19  1104 05/11/19  0339   WBC 10*3/mm3 16.03* 14.91* 13.92*   HEMOGLOBIN g/dL 15.3 16.2 15.5   HEMATOCRIT % 48.7 52.5* 50.8   PLATELETS 10*3/mm3 211 212 208     Results from last 7 days   Lab Units 05/11/19  1104   INR  1.17*         Results from last 7 days   Lab Units 05/12/19  0351   MAGNESIUM mg/dL 1.8           I reviewed the patient's new clinical results.  I personally viewed and interpreted the patient's EKG/Telemetry data        Medication Review:     albuterol sulfate HFA 6 puff Inhalation Q4H  - RT   alteplase 2 mg Intracatheter Once   alteplase 2 mg Intracatheter Once   alteplase 2 mg Intracatheter Once   amLODIPine 10 mg Oral Q24H   aspirin 325 mg Oral Daily   atorvastatin 40 mg Oral Daily   azithromycin 500 mg Intravenous Q24H   ceftriaxone 1 g Intravenous Q24H   chlorhexidine 15 mL Mouth/Throat Q12H   donepezil 10 mg Oral Nightly   enoxaparin 40 mg Subcutaneous Q24H   famotidine 20 mg Intravenous Q12H   gabapentin 600 mg Nasogastric Q8H   insulin glargine 10 Units Subcutaneous Q12H   insulin lispro 0-24 Units Subcutaneous 4x Daily With Meals & Nightly   ipratropium 6 puff Inhalation Q4H - RT   lisinopril 40 mg Oral Daily   methylPREDNISolone sodium succinate 20 mg Intravenous Q12H   nystatin  Topical Q12H   sertraline 25 mg Oral Daily   sodium chloride 3 mL Intravenous Q12H         dexmedetomidine 0.2-1.5 mcg/kg/hr Last Rate: 1 mcg/kg/hr (05/13/19 0818)   lactated ringers 125 mL/hr Last Rate: 125 mL/hr (05/13/19 0413)       Assessment/Plan       Respiratory failure (CMS/HCC)    69 year old man with history of multiple cardiac risk factors including DM, COPD, PAD, and hypertension.  Now with acute respiratory failure due to COPD/pneumonia.    Elevated troponin, but no ischemic changes on EKG.  I suspect Type II (demand-related) infarct.  ECHO shows normal EF.    I will add beta-blocker to his regimen.     Selvin Martinez MD  05/13/19  8:45 AM

## 2019-05-13 NOTE — NURSING NOTE
WOCN consult regarding left great toe dark blister 1.5cmx2.5cm  and right heel wound pencil head size.  BLE's dry. Patient able to communicate in writing. HH is treating the wounds no notes found in chart.  He is not sure which HH. Right great toe wound appears due to trauma, old dried blood base of toenail.  Patient nods head it was caused by trauma.  Previously seen by Dr Cordon. Patient with PVD . MD note patient did not want any treatment.  Recommend Amalactin cream bilateral legs.  Apply silicone border dressing to right great toe. If opens reconsult wound care.

## 2019-05-14 ENCOUNTER — APPOINTMENT (OUTPATIENT)
Dept: GENERAL RADIOLOGY | Facility: HOSPITAL | Age: 69
End: 2019-05-14

## 2019-05-14 ENCOUNTER — APPOINTMENT (OUTPATIENT)
Dept: CARDIOLOGY | Facility: HOSPITAL | Age: 69
End: 2019-05-14

## 2019-05-14 LAB
ANION GAP SERPL CALCULATED.3IONS-SCNC: 10 MMOL/L
ARTERIAL PATENCY WRIST A: POSITIVE
ARTERIAL PATENCY WRIST A: POSITIVE
ATMOSPHERIC PRESS: 751.7 MMHG
ATMOSPHERIC PRESS: 753.9 MMHG
BASE EXCESS BLDA CALC-SCNC: 6.6 MMOL/L (ref 0–2)
BASE EXCESS BLDA CALC-SCNC: 7.8 MMOL/L (ref 0–2)
BDY SITE: ABNORMAL
BDY SITE: ABNORMAL
BUN BLD-MCNC: 31 MG/DL (ref 8–23)
BUN/CREAT SERPL: 42.5 (ref 7–25)
CALCIUM SPEC-SCNC: 8.9 MG/DL (ref 8.6–10.5)
CHLORIDE SERPL-SCNC: 96 MMOL/L (ref 98–107)
CO2 SERPL-SCNC: 31 MMOL/L (ref 22–29)
CREAT BLD-MCNC: 0.73 MG/DL (ref 0.76–1.27)
DEPRECATED RDW RBC AUTO: 50.1 FL (ref 37–54)
ERYTHROCYTE [DISTWIDTH] IN BLOOD BY AUTOMATED COUNT: 14.6 % (ref 12.3–15.4)
GFR SERPL CREATININE-BSD FRML MDRD: 106 ML/MIN/1.73
GLUCOSE BLD-MCNC: 165 MG/DL (ref 65–99)
GLUCOSE BLDC GLUCOMTR-MCNC: 195 MG/DL (ref 70–130)
GLUCOSE BLDC GLUCOMTR-MCNC: 199 MG/DL (ref 70–130)
GLUCOSE BLDC GLUCOMTR-MCNC: 221 MG/DL (ref 70–130)
GLUCOSE BLDC GLUCOMTR-MCNC: 229 MG/DL (ref 70–130)
GLUCOSE BLDC GLUCOMTR-MCNC: 230 MG/DL (ref 70–130)
HCO3 BLDA-SCNC: 34.3 MMOL/L (ref 22–28)
HCO3 BLDA-SCNC: 37 MMOL/L (ref 22–28)
HCT VFR BLD AUTO: 47.5 % (ref 37.5–51)
HGB BLD-MCNC: 15.3 G/DL (ref 13–17.7)
HOROWITZ INDEX BLD+IHG-RTO: 40 %
HOROWITZ INDEX BLD+IHG-RTO: 40 %
MCH RBC QN AUTO: 30.2 PG (ref 26.6–33)
MCHC RBC AUTO-ENTMCNC: 32.2 G/DL (ref 31.5–35.7)
MCV RBC AUTO: 93.9 FL (ref 79–97)
MODALITY: ABNORMAL
MODALITY: ABNORMAL
O2 A-A PPRESDIFF RESPIRATORY: 0.3 MMHG
O2 A-A PPRESDIFF RESPIRATORY: 0.3 MMHG
PCO2 BLDA: 58.6 MM HG (ref 35–45)
PCO2 BLDA: 66 MM HG (ref 35–45)
PEEP RESPIRATORY: 5 CM[H2O]
PEEP RESPIRATORY: 5 CM[H2O]
PH BLDA: 7.36 PH UNITS (ref 7.35–7.45)
PH BLDA: 7.38 PH UNITS (ref 7.35–7.45)
PLATELET # BLD AUTO: 179 10*3/MM3 (ref 140–450)
PMV BLD AUTO: 11.5 FL (ref 6–12)
PO2 BLDA: 71.9 MM HG (ref 80–100)
PO2 BLDA: 77.1 MM HG (ref 80–100)
POTASSIUM BLD-SCNC: 4.8 MMOL/L (ref 3.5–5.2)
PSV: 5 CMH2O
RBC # BLD AUTO: 5.06 10*6/MM3 (ref 4.14–5.8)
SAO2 % BLDCOA: 92.7 % (ref 92–99)
SAO2 % BLDCOA: 94.5 % (ref 92–99)
SET MECH RESP RATE: 20
SODIUM BLD-SCNC: 137 MMOL/L (ref 136–145)
TOTAL RATE: 19 BREATHS/MINUTE
TOTAL RATE: 22 BREATHS/MINUTE
VENTILATOR MODE: ABNORMAL
VENTILATOR MODE: ABNORMAL
VT ON VENT VENT: 623 ML
WBC NRBC COR # BLD: 12.21 10*3/MM3 (ref 3.4–10.8)

## 2019-05-14 PROCEDURE — 94799 UNLISTED PULMONARY SVC/PX: CPT

## 2019-05-14 PROCEDURE — 97110 THERAPEUTIC EXERCISES: CPT

## 2019-05-14 PROCEDURE — 71045 X-RAY EXAM CHEST 1 VIEW: CPT

## 2019-05-14 PROCEDURE — 25010000002 FUROSEMIDE PER 20 MG: Performed by: INTERNAL MEDICINE

## 2019-05-14 PROCEDURE — 94003 VENT MGMT INPAT SUBQ DAY: CPT

## 2019-05-14 PROCEDURE — 25010000002 ENOXAPARIN PER 10 MG: Performed by: INTERNAL MEDICINE

## 2019-05-14 PROCEDURE — 85027 COMPLETE CBC AUTOMATED: CPT | Performed by: INTERNAL MEDICINE

## 2019-05-14 PROCEDURE — 63710000001 INSULIN GLARGINE PER 5 UNITS: Performed by: INTERNAL MEDICINE

## 2019-05-14 PROCEDURE — 25010000002 METHYLPREDNISOLONE PER 40 MG: Performed by: INTERNAL MEDICINE

## 2019-05-14 PROCEDURE — 99232 SBSQ HOSP IP/OBS MODERATE 35: CPT | Performed by: INTERNAL MEDICINE

## 2019-05-14 PROCEDURE — 82962 GLUCOSE BLOOD TEST: CPT

## 2019-05-14 PROCEDURE — 63710000001 INSULIN LISPRO (HUMAN) PER 5 UNITS: Performed by: INTERNAL MEDICINE

## 2019-05-14 PROCEDURE — 25010000002 AZITHROMYCIN PER 500 MG: Performed by: INTERNAL MEDICINE

## 2019-05-14 PROCEDURE — 82803 BLOOD GASES ANY COMBINATION: CPT

## 2019-05-14 PROCEDURE — 93970 EXTREMITY STUDY: CPT

## 2019-05-14 PROCEDURE — 80048 BASIC METABOLIC PNL TOTAL CA: CPT | Performed by: INTERNAL MEDICINE

## 2019-05-14 PROCEDURE — 36600 WITHDRAWAL OF ARTERIAL BLOOD: CPT

## 2019-05-14 PROCEDURE — 25010000002 FENTANYL CITRATE (PF) 100 MCG/2ML SOLUTION: Performed by: INTERNAL MEDICINE

## 2019-05-14 PROCEDURE — 25010000002 CEFTRIAXONE PER 250 MG: Performed by: INTERNAL MEDICINE

## 2019-05-14 RX ORDER — FUROSEMIDE 10 MG/ML
40 INJECTION INTRAMUSCULAR; INTRAVENOUS EVERY 8 HOURS
Status: DISCONTINUED | OUTPATIENT
Start: 2019-05-14 | End: 2019-05-18

## 2019-05-14 RX ADMIN — FENTANYL CITRATE 100 MCG: 50 INJECTION INTRAMUSCULAR; INTRAVENOUS at 13:13

## 2019-05-14 RX ADMIN — FENTANYL CITRATE 100 MCG: 50 INJECTION INTRAMUSCULAR; INTRAVENOUS at 00:39

## 2019-05-14 RX ADMIN — ALBUTEROL SULFATE 6 PUFF: 90 AEROSOL, METERED RESPIRATORY (INHALATION) at 05:59

## 2019-05-14 RX ADMIN — DEXMEDETOMIDINE 0.6 MCG/KG/HR: 100 INJECTION, SOLUTION, CONCENTRATE INTRAVENOUS at 11:59

## 2019-05-14 RX ADMIN — FENTANYL CITRATE 100 MCG: 50 INJECTION INTRAMUSCULAR; INTRAVENOUS at 19:15

## 2019-05-14 RX ADMIN — FENTANYL CITRATE 100 MCG: 50 INJECTION INTRAMUSCULAR; INTRAVENOUS at 22:31

## 2019-05-14 RX ADMIN — FENTANYL CITRATE 100 MCG: 50 INJECTION INTRAMUSCULAR; INTRAVENOUS at 10:30

## 2019-05-14 RX ADMIN — INSULIN LISPRO 12 UNITS: 100 INJECTION, SOLUTION INTRAVENOUS; SUBCUTANEOUS at 20:47

## 2019-05-14 RX ADMIN — DEXMEDETOMIDINE 1.2 MCG/KG/HR: 100 INJECTION, SOLUTION, CONCENTRATE INTRAVENOUS at 17:06

## 2019-05-14 RX ADMIN — INSULIN GLARGINE 10 UNITS: 100 INJECTION, SOLUTION SUBCUTANEOUS at 08:39

## 2019-05-14 RX ADMIN — FENTANYL CITRATE 100 MCG: 50 INJECTION INTRAMUSCULAR; INTRAVENOUS at 23:48

## 2019-05-14 RX ADMIN — DEXMEDETOMIDINE 0.8 MCG/KG/HR: 100 INJECTION, SOLUTION, CONCENTRATE INTRAVENOUS at 05:16

## 2019-05-14 RX ADMIN — METOPROLOL TARTRATE 25 MG: 25 TABLET ORAL at 08:24

## 2019-05-14 RX ADMIN — ALBUTEROL SULFATE 6 PUFF: 90 AEROSOL, METERED RESPIRATORY (INHALATION) at 15:15

## 2019-05-14 RX ADMIN — FENTANYL CITRATE 100 MCG: 50 INJECTION INTRAMUSCULAR; INTRAVENOUS at 08:38

## 2019-05-14 RX ADMIN — FENTANYL CITRATE 100 MCG: 50 INJECTION INTRAMUSCULAR; INTRAVENOUS at 15:33

## 2019-05-14 RX ADMIN — ALBUTEROL SULFATE 6 PUFF: 90 AEROSOL, METERED RESPIRATORY (INHALATION) at 10:25

## 2019-05-14 RX ADMIN — SERTRALINE 25 MG: 25 TABLET, FILM COATED ORAL at 08:24

## 2019-05-14 RX ADMIN — IPRATROPIUM BROMIDE 6 PUFF: 17 AEROSOL, METERED RESPIRATORY (INHALATION) at 15:15

## 2019-05-14 RX ADMIN — FAMOTIDINE 20 MG: 10 INJECTION INTRAVENOUS at 20:47

## 2019-05-14 RX ADMIN — IPRATROPIUM BROMIDE 6 PUFF: 17 AEROSOL, METERED RESPIRATORY (INHALATION) at 19:25

## 2019-05-14 RX ADMIN — DEXMEDETOMIDINE 1.2 MCG/KG/HR: 100 INJECTION, SOLUTION, CONCENTRATE INTRAVENOUS at 20:56

## 2019-05-14 RX ADMIN — FENTANYL CITRATE 100 MCG: 50 INJECTION INTRAMUSCULAR; INTRAVENOUS at 13:48

## 2019-05-14 RX ADMIN — INSULIN GLARGINE 10 UNITS: 100 INJECTION, SOLUTION SUBCUTANEOUS at 20:48

## 2019-05-14 RX ADMIN — FENTANYL CITRATE 100 MCG: 50 INJECTION INTRAMUSCULAR; INTRAVENOUS at 18:41

## 2019-05-14 RX ADMIN — FUROSEMIDE 40 MG: 10 INJECTION, SOLUTION INTRAMUSCULAR; INTRAVENOUS at 09:01

## 2019-05-14 RX ADMIN — IPRATROPIUM BROMIDE 6 PUFF: 17 AEROSOL, METERED RESPIRATORY (INHALATION) at 23:17

## 2019-05-14 RX ADMIN — ATORVASTATIN CALCIUM 40 MG: 20 TABLET, FILM COATED ORAL at 08:24

## 2019-05-14 RX ADMIN — IPRATROPIUM BROMIDE 6 PUFF: 17 AEROSOL, METERED RESPIRATORY (INHALATION) at 05:59

## 2019-05-14 RX ADMIN — ASPIRIN 325 MG: 325 TABLET ORAL at 08:23

## 2019-05-14 RX ADMIN — FENTANYL CITRATE 100 MCG: 50 INJECTION INTRAMUSCULAR; INTRAVENOUS at 12:00

## 2019-05-14 RX ADMIN — FENTANYL CITRATE 100 MCG: 50 INJECTION INTRAMUSCULAR; INTRAVENOUS at 20:47

## 2019-05-14 RX ADMIN — FENTANYL CITRATE 100 MCG: 50 INJECTION INTRAMUSCULAR; INTRAVENOUS at 17:05

## 2019-05-14 RX ADMIN — NYSTATIN: 100000 POWDER TOPICAL at 20:49

## 2019-05-14 RX ADMIN — ENOXAPARIN SODIUM 40 MG: 40 INJECTION SUBCUTANEOUS at 10:31

## 2019-05-14 RX ADMIN — GABAPENTIN 600 MG: 250 SOLUTION ORAL at 13:48

## 2019-05-14 RX ADMIN — FAMOTIDINE 20 MG: 10 INJECTION INTRAVENOUS at 08:38

## 2019-05-14 RX ADMIN — AZITHROMYCIN MONOHYDRATE 500 MG: 500 INJECTION, POWDER, LYOPHILIZED, FOR SOLUTION INTRAVENOUS at 10:30

## 2019-05-14 RX ADMIN — SODIUM CHLORIDE, PRESERVATIVE FREE 3 ML: 5 INJECTION INTRAVENOUS at 08:26

## 2019-05-14 RX ADMIN — FENTANYL CITRATE 100 MCG: 50 INJECTION INTRAMUSCULAR; INTRAVENOUS at 14:45

## 2019-05-14 RX ADMIN — IPRATROPIUM BROMIDE 6 PUFF: 17 AEROSOL, METERED RESPIRATORY (INHALATION) at 03:21

## 2019-05-14 RX ADMIN — FENTANYL CITRATE 100 MCG: 50 INJECTION INTRAMUSCULAR; INTRAVENOUS at 11:11

## 2019-05-14 RX ADMIN — SODIUM CHLORIDE, PRESERVATIVE FREE 3 ML: 5 INJECTION INTRAVENOUS at 21:06

## 2019-05-14 RX ADMIN — INSULIN LISPRO 8 UNITS: 100 INJECTION, SOLUTION INTRAVENOUS; SUBCUTANEOUS at 11:11

## 2019-05-14 RX ADMIN — DEXMEDETOMIDINE 1 MCG/KG/HR: 100 INJECTION, SOLUTION, CONCENTRATE INTRAVENOUS at 01:36

## 2019-05-14 RX ADMIN — NYSTATIN: 100000 POWDER TOPICAL at 08:25

## 2019-05-14 RX ADMIN — ALBUTEROL SULFATE 6 PUFF: 90 AEROSOL, METERED RESPIRATORY (INHALATION) at 23:17

## 2019-05-14 RX ADMIN — DONEPEZIL HYDROCHLORIDE 10 MG: 10 TABLET, FILM COATED ORAL at 20:47

## 2019-05-14 RX ADMIN — CHLORHEXIDINE GLUCONATE 15 ML: 1.2 RINSE ORAL at 08:27

## 2019-05-14 RX ADMIN — FUROSEMIDE 40 MG: 10 INJECTION, SOLUTION INTRAMUSCULAR; INTRAVENOUS at 17:06

## 2019-05-14 RX ADMIN — FENTANYL CITRATE 100 MCG: 50 INJECTION INTRAMUSCULAR; INTRAVENOUS at 07:24

## 2019-05-14 RX ADMIN — FENTANYL CITRATE 100 MCG: 50 INJECTION INTRAMUSCULAR; INTRAVENOUS at 03:07

## 2019-05-14 RX ADMIN — AMLODIPINE BESYLATE 10 MG: 10 TABLET ORAL at 08:24

## 2019-05-14 RX ADMIN — DEXMEDETOMIDINE 1.2 MCG/KG/HR: 100 INJECTION, SOLUTION, CONCENTRATE INTRAVENOUS at 20:46

## 2019-05-14 RX ADMIN — METHYLPREDNISOLONE SODIUM SUCCINATE 20 MG: 40 INJECTION, POWDER, FOR SOLUTION INTRAMUSCULAR; INTRAVENOUS at 23:49

## 2019-05-14 RX ADMIN — IPRATROPIUM BROMIDE 6 PUFF: 17 AEROSOL, METERED RESPIRATORY (INHALATION) at 10:25

## 2019-05-14 RX ADMIN — CHLORHEXIDINE GLUCONATE 15 ML: 1.2 RINSE ORAL at 21:07

## 2019-05-14 RX ADMIN — METHYLPREDNISOLONE SODIUM SUCCINATE 20 MG: 40 INJECTION, POWDER, FOR SOLUTION INTRAMUSCULAR; INTRAVENOUS at 11:11

## 2019-05-14 RX ADMIN — ALBUTEROL SULFATE 6 PUFF: 90 AEROSOL, METERED RESPIRATORY (INHALATION) at 03:21

## 2019-05-14 RX ADMIN — SODIUM CHLORIDE, POTASSIUM CHLORIDE, SODIUM LACTATE AND CALCIUM CHLORIDE 125 ML/HR: 600; 310; 30; 20 INJECTION, SOLUTION INTRAVENOUS at 01:36

## 2019-05-14 RX ADMIN — INSULIN LISPRO 4 UNITS: 100 INJECTION, SOLUTION INTRAVENOUS; SUBCUTANEOUS at 17:49

## 2019-05-14 RX ADMIN — GABAPENTIN 600 MG: 250 SOLUTION ORAL at 05:16

## 2019-05-14 RX ADMIN — METOPROLOL TARTRATE 25 MG: 25 TABLET ORAL at 20:47

## 2019-05-14 RX ADMIN — FENTANYL CITRATE 100 MCG: 50 INJECTION INTRAMUSCULAR; INTRAVENOUS at 16:00

## 2019-05-14 RX ADMIN — FENTANYL CITRATE 100 MCG: 50 INJECTION INTRAMUSCULAR; INTRAVENOUS at 09:46

## 2019-05-14 RX ADMIN — CEFTRIAXONE SODIUM 1 G: 1 INJECTION, SOLUTION INTRAVENOUS at 10:30

## 2019-05-14 RX ADMIN — ALBUTEROL SULFATE 6 PUFF: 90 AEROSOL, METERED RESPIRATORY (INHALATION) at 19:25

## 2019-05-14 RX ADMIN — FENTANYL CITRATE 100 MCG: 50 INJECTION INTRAMUSCULAR; INTRAVENOUS at 01:41

## 2019-05-14 RX ADMIN — LISINOPRIL 40 MG: 40 TABLET ORAL at 08:23

## 2019-05-14 RX ADMIN — GABAPENTIN 600 MG: 250 SOLUTION ORAL at 22:31

## 2019-05-14 RX ADMIN — INSULIN LISPRO 8 UNITS: 100 INJECTION, SOLUTION INTRAVENOUS; SUBCUTANEOUS at 07:43

## 2019-05-14 NOTE — PLAN OF CARE
Problem: Ventilation, Mechanical Invasive (Adult)  Goal: Signs and Symptoms of Listed Potential Problems Will be Absent, Minimized or Managed (Ventilation, Mechanical Invasive)  Outcome: Ongoing (interventions implemented as appropriate)  RT note 5/14/2019    Patient completed an SBT today, however after appox. One hour on PS/CPAP, and ABG was drawn and the Co2 value came back critical at 66.  Dr. Jasmine was given the result and ordered the patient to return back to set rate.  Dr. Jasmine is planning diuresis for the patient and we will reevaluate and attempt another SBT tomorrow morning.  Patient is alert and oriented on the vent, following commands and has good tidal volumes and sats.  Will continue to monitor.   05/14/19 1646   Goal/Outcome Evaluation   Problems Assessed (Mechanical Ventilation, Invasive) inability to wean   Problems Present (Mech Vent, Invasive) inability to wean

## 2019-05-14 NOTE — PLAN OF CARE
Problem: Patient Care Overview  Goal: Plan of Care Review  Outcome: Ongoing (interventions implemented as appropriate)   05/14/19 6020   Coping/Psychosocial   Plan of Care Reviewed With patient   OTHER   Outcome Summary VSS. Pt remains on precedex, now at 1.2. Very anxious. Requiring pain med's frequently for pain/anxiety. Plan to extubate in the morning if PCO2 comes down. Lasix started, urine output adequate. Continue to monitor per orders.   Plan of Care Review   Progress improving       Problem: Ventilation, Mechanical Invasive (Adult)  Goal: Signs and Symptoms of Listed Potential Problems Will be Absent, Minimized or Managed (Ventilation, Mechanical Invasive)  Outcome: Ongoing (interventions implemented as appropriate)      Problem: Pneumonia (Adult)  Goal: Signs and Symptoms of Listed Potential Problems Will be Absent, Minimized or Managed (Pneumonia)  Outcome: Ongoing (interventions implemented as appropriate)      Problem: Skin Injury Risk (Adult)  Goal: Skin Health and Integrity  Outcome: Ongoing (interventions implemented as appropriate)      Problem: Nutrition, Enteral (Adult)  Goal: Signs and Symptoms of Listed Potential Problems Will be Absent, Minimized or Managed (Nutrition, Enteral)  Outcome: Ongoing (interventions implemented as appropriate)      Problem: Fall Risk (Adult)  Goal: Absence of Fall  Outcome: Ongoing (interventions implemented as appropriate)      Problem: Restraint, Nonbehavioral (Nonviolent)  Goal: Rationale and Justification  Outcome: Ongoing (interventions implemented as appropriate)    Goal: Nonbehavioral (Nonviolent) Restraint: Absence of Injury/Harm  Outcome: Ongoing (interventions implemented as appropriate)    Goal: Nonbehavioral (Nonviolent) Restraint: Achievement of Discontinuation Criteria  Outcome: Ongoing (interventions implemented as appropriate)    Goal: Nonbehavioral (Nonviolent) Restraint: Preservation of Dignity and Wellbeing  Outcome: Ongoing (interventions  implemented as appropriate)

## 2019-05-14 NOTE — PROGRESS NOTES
LOS: 3 days   Patient Care Team:  Provider, No Known as PCP - General  Provider, No Known as PCP - Family Medicine    Subjective     Patient remains intubated he is sedated on Precedex at 1.4 mics per kilogram per minute.  He is awake and following commands.    Patient and nurse both note that his secretions have improved significantly today  Review of Systems:          Objective     Vital Signs  Vital Sign Min/Max for last 24 hours  Temp  Min: 98.3 °F (36.8 °C)  Max: 98.6 °F (37 °C)   BP  Min: 141/73  Max: 200/106   Pulse  Min: 43  Max: 80   Resp  Min: 20  Max: 24   SpO2  Min: 91 %  Max: 97 %   No Data Recorded   Weight  Min: 119 kg (262 lb 2 oz)  Max: 119 kg (262 lb 2 oz)        Ventilator/Non-Invasive Ventilation Settings (From admission, onward)    Start     Ordered    05/11/19 1021  Ventilator - AC/PC; (28); 100; 88%; Other (see comments); 18; 17  Continuous     Question Answer Comment   Vent Mode AC/PC    Breath rate  28   FiO2 100    FiO2 titrate for Sp02% =/> 88%    PEEP Other (see comments)    PEEP: 18    Inspiratory Pressure 17        05/11/19 1027                       Body mass index is 33.66 kg/m².  I/O last 3 completed shifts:  In: 9617.4 [I.V.:6190.4; Other:1233; NG/GT:2194]  Out: 3900 [Urine:3900]  No intake/output data recorded.        Physical Exam:    General Appearance: Obese white male orally intubated with an 8.0 endotracheal tube at about 24 cm at the lips.  Sedated with Precedex at 1.4 mics per kilogram per minute he is awake alert cooperative.  He does not look in acute distress.  He is on pressure control 12 inspiratory pressure of 12 cm PEEP of 5 cm FiO2 of 40% he is pulling tidal volumes in the mid 500s, is not laboring and his oxygen saturations are 94%  Eyes: Conjunctival are clear and anicteric pupils are equal about 3 mm  ENT: Oral mucous membranes are dry nasal septum appears midline I do not see any exudates or erythema the oral endotracheal tube and orogastric tube are in  place  Neck: No adenopathy no jugular venous distention, trachea midline, no palpable thyromegaly  Lungs: Coarse breath sounds bilaterally seem to be equal.  I do not hear any definite wheezes, rales or rhonchi  Cardiac: Regular rhythm no murmur   Abdomen: Soft no palpable organomegaly or masses  :  he has a Aguilar catheter in place  Musculoskeletal: He has what looks like chronic arterial and venous insufficiency in his lower extremities.  He has smooth thin skin with doughy edema in both lower extremities.   There is a right femoral central venous type catheter  Skin: He has a blood-filled blister on the dorsum of the left great toe and he is a small blood-filled blister on his right heel but no change from admission.  No jaundice no petechiae  Neuro:  He is definitely awake and alert he is cooperating he is following commands moving all extremities.    Extremities/P Vascular: He does have palpable radial pulses and very weak dorsalis pedis pulses bilaterally   MSE:  He is a little anxious.           Labs:  Results from last 7 days   Lab Units 05/14/19  0314 05/13/19  0427 05/12/19  0351 05/11/19  1104 05/11/19  0339   GLUCOSE mg/dL 165* 278* 209* 215* 304*   SODIUM mmol/L 137 138 139 140 136   POTASSIUM mmol/L 4.8 5.3* 5.1 4.3 4.7   MAGNESIUM mg/dL  --   --  1.8 1.8 1.8   CO2 mmol/L 31.0* 29.4* 30.4* 35.6* 35.2*   CHLORIDE mmol/L 96* 98 98 95* 93*   ANION GAP mmol/L 10.0 10.6 10.6 9.4 7.8   CREATININE mg/dL 0.73* 0.99 1.28* 0.93 0.91   BUN mg/dL 31* 38* 32* 22 23   BUN / CREAT RATIO  42.5* 38.4* 25.0 23.7 25.3*   CALCIUM mg/dL 8.9 8.7 8.3* 8.7 8.3*   EGFR IF NONAFRICN AM mL/min/1.73 106 75 55* 80 82   ALK PHOS U/L  --   --   --  101 105   TOTAL PROTEIN g/dL  --   --   --  5.9* 6.1   ALT (SGPT) U/L  --   --   --  17 16   AST (SGOT) U/L  --   --   --  17 20   BILIRUBIN mg/dL  --   --   --  0.5 0.5   ALBUMIN g/dL  --   --   --  2.30* 2.38*   GLOBULIN gm/dL  --   --   --  3.6 3.7     Estimated Creatinine Clearance:  116.1 mL/min (A) (by C-G formula based on SCr of 0.73 mg/dL (L)).      Results from last 7 days   Lab Units 05/14/19  0314 05/12/19  0351 05/11/19  1104 05/11/19  0339   WBC 10*3/mm3 12.21* 16.03* 14.91* 13.92*   RBC 10*6/mm3 5.06 5.07 5.32 5.05   HEMOGLOBIN g/dL 15.3 15.3 16.2 15.5   HEMATOCRIT % 47.5 48.7 52.5* 50.8   MCV fL 93.9 96.1 98.7* 100.6*   MCH pg 30.2 30.2 30.5 30.7   MCHC g/dL 32.2 31.4* 30.9* 30.5*   RDW % 14.6 14.8 14.2 14.9   RDW-SD fl 50.1 51.3 51.7 54.4*   MPV fL 11.5 11.5 10.9 10.7   PLATELETS 10*3/mm3 179 211 212 208   NEUTROPHIL % %  --   --  86.0* 88.6*   LYMPHOCYTE % %  --   --  5.3* 3.2*   MONOCYTES % %  --   --  7.7 5.0   EOSINOPHIL % %  --   --  0.1* 0.1*   BASOPHIL % %  --   --  0.4 0.4   IMM GRAN % %  --   --  0.5 2.7*   NEUTROS ABS 10*3/mm3  --   --  12.83* 12.31*   LYMPHS ABS 10*3/mm3  --   --  0.79 0.45*   MONOS ABS 10*3/mm3  --   --  1.15* 0.70   EOS ABS 10*3/mm3  --   --  0.01 0.02   BASOS ABS 10*3/mm3  --   --  0.06 0.06   IMMATURE GRANS (ABS) 10*3/mm3  --   --  0.07* 0.38*   NRBC /100 WBC  --   --  0.1  --      Results from last 7 days   Lab Units 05/14/19  0330   PH, ARTERIAL pH units 7.375   PO2 ART mm Hg 77.1*   PCO2, ARTERIAL mm Hg 58.6*   HCO3 ART mmol/L 34.3*     Results from last 7 days   Lab Units 05/11/19  1104 05/11/19  0542 05/11/19  0339   TROPONIN T ng/mL 0.172* 0.069* 0.057*             Results from last 7 days   Lab Units 05/11/19  1104 05/11/19  0339   LACTATE mmol/L 1.5 1.0   PROCALCITONIN ng/mL 0.13  --      Results from last 7 days   Lab Units 05/11/19  1104   INR  1.17*     Microbiology Results (last 10 days)     Procedure Component Value - Date/Time    Respiratory Culture - Aspirate, ET Suction [[id number]] Collected:  05/11/19 1209    Lab Status:  Final result Specimen:  Aspirate from ET Suction Updated:  05/13/19 0753     Respiratory Culture Scant growth (1+) Normal Respiratory Kassandra     Gram Stain Few (2+) WBCs seen      Rare (1+) Epithelial cells per low  power field      Mixed bacterial morphotypes seen on Gram Stain    Blood Culture - Blood, Blood, Venous Line [[id number]] Collected:  05/11/19 0339    Lab Status:  Preliminary result Specimen:  Blood, Venous Line Updated:  05/14/19 0345     Blood Culture No growth at 3 days    Blood Culture - Blood, Arm, Right [[id number]] Collected:  05/11/19 0339    Lab Status:  Preliminary result Specimen:  Blood from Arm, Right Updated:  05/14/19 0345     Blood Culture No growth at 3 days                albuterol sulfate HFA 6 puff Inhalation Q4H - RT   alteplase 2 mg Intracatheter Once   alteplase 2 mg Intracatheter Once   alteplase 2 mg Intracatheter Once   amLODIPine 10 mg Oral Q24H   aspirin 325 mg Oral Daily   atorvastatin 40 mg Oral Daily   azithromycin 500 mg Intravenous Q24H   ceftriaxone 1 g Intravenous Q24H   chlorhexidine 15 mL Mouth/Throat Q12H   donepezil 10 mg Oral Nightly   enoxaparin 40 mg Subcutaneous Q24H   famotidine 20 mg Intravenous Q12H   gabapentin 600 mg Nasogastric Q8H   insulin glargine 10 Units Subcutaneous Q12H   insulin lispro 0-24 Units Subcutaneous 4x Daily With Meals & Nightly   ipratropium 6 puff Inhalation Q4H - RT   lisinopril 40 mg Oral Daily   methylPREDNISolone sodium succinate 20 mg Intravenous Q12H   metoprolol tartrate 25 mg Oral Q12H   nystatin  Topical Q12H   sertraline 25 mg Oral Daily   sodium chloride 3 mL Intravenous Q12H       dexmedetomidine 0.2-1.5 mcg/kg/hr Last Rate: 0.6 mcg/kg/hr (05/14/19 0632)   lactated ringers 125 mL/hr Last Rate: 125 mL/hr (05/14/19 0136)       Diagnostics:  Ct Head Without Contrast    Result Date: 5/11/2019  EXAMINATION: CT HEAD WO CONTRAST-  CLINICAL INDICATION:     ams  COMPARISON:    None  Technique: Multiple CT axial images were obtained through the level of the brain without IV contrast administration. Reformatted images in the coronal and/or sagittal plane(s) were generated from the axial data set to facilitate diagnostic accuracy and/or surgical  planning.  Radiation dose reduction techniques were utilized per ALARA protocol. Automated exposure control was initiated through either or [employer] or DoseRight software packages by  protocol.   DOSE (DLP mGy-cm): 1469.74 mGy.cm  FINDINGS:   Mild generalized cerebral atrophy and chronic small vessel ischemic disease noted. No acute intracranial abnormality. No midline shift or mass effect. No hydrocephalus or intracranial hemorrhage. There is no CT evidence of acute vascular territory infarct. Bone windows show no acute osseous abnormality. Bilateral right greater than left mastoid effusions. Pansinusitis.      1. No CT evidence of acute intracranial abnormality. 2. Estimated effusions and pansinusitis.  This report was finalized on 5/11/2019 7:24 AM by Dr. Escobar Peralta MD.      Xr Chest 1 View    Result Date: 5/12/2019  PORTABLE CHEST RADIOGRAPH  HISTORY: Intubated patient  COMPARISON: 05/11/2019  FINDINGS: Cardiomediastinal silhouette is unchanged. Endotracheal tube appears to terminate at the level of the clavicles. It could be advanced about 3 cm. A nasogastric tube is present. It appears to extend to the stomach. No pneumothorax is seen. There is persistent bibasilar consolidation. There are small bilateral pleural effusions. Vascular congestion is unchanged.      Endotracheal tube terminates at the level of clavicles. It could be advanced about 3 cm.  This report was finalized on 5/12/2019 5:00 AM by Dr. Yuliana Chandra M.D.      Xr Chest 1 View    Result Date: 5/11/2019  XR CHEST 1 VW-  HISTORY: Male who is 69 years-old,  endotracheal intubation  TECHNIQUE: Frontal views of the chest  COMPARISON: None available  FINDINGS: Patient is rotated towards the left. The endotracheal tube tip appears to be about 8 cm above the candace. NG tube extends beyond the inferior extent of the image. Heart size is normal. Mild prominence of vascular and interstitial markings. Patchy infiltrates at the bases,  small left pleural effusion. Heart, mediastinum and pulmonary vasculature are unremarkable. No focal pulmonary consolidation, pleural effusion, or pneumothorax. No acute osseous process.      Endotracheal intubation. Bibasilar infiltrates, left pleural effusion, mild prominence of vascular and interstitial markings, follow-up recommended.  This report was finalized on 5/11/2019 10:51 AM by Dr. Rufino Bello M.D.      Xr Chest 1 View    Result Date: 5/11/2019  EXAMINATION: XR CHEST 1 VW-  CLINICAL INDICATION:     tube placement  TECHNIQUE:  XR CHEST 1 VW-  COMPARISON: 08/13/2018  FINDINGS: ET tube at level of clavicles. NG tube extends into stomach.  CHF/edema developed from previous exam. Consolidative airspace disease right greater than left mid and lower lung regions. Trace pleural effusions. No pneumothorax. No acute bony findings.      1. CHF. 2. Bibasilar airspace disease. 3. Support devices as above.  This report was finalized on 5/11/2019 7:28 AM by Dr. Escobar Peralta MD.      Ct Chest Pulmonary Embolism With Contrast    Result Date: 5/11/2019  EXAMINATION: CT CHEST PULMONARY EMBOLISM W CONTRAST-  CLINICAL INDICATION:Pulmonary embolism  COMPARISON: NONE.  TECHNIQUE: Multiple CT axial images were obtained through the level of pulmonary arteries, following IV contrast administration per CT PE protocol. Volume Rendered 3D or MIP images performed.  Radiation dose reduction techniques were utilized per ALARA protocol. Automated exposure control was initiated through either or [employer] or [employer] software packages by  protocol.  DOSE (DLP mGy-cm):   FINDINGS:  PULMONARY ARTERIES: No evidence of pulmonary embolism.  LUNGS: CONSOLIDATIVE AIRSPACE DISEASE BILATERAL LOWER LOBES CONSISTENT WITH COMBINATION OF ATELECTASIS AND PNEUMONIA. PULMONARY VASCULAR CONGESTION IS NOTED WITH INTERSTITIAL EDEMA.  HEART: MILD CARDIOMEGALY WITH SEVERE CORONARY ARTERY CALCIFICATIONS.  PERICARDIUM: No effusion.   MEDIASTINUM: PROBABLE REACTIVE HILAR AND MEDIASTINAL LYMPH NODES.  PLEURA: TRACE PLEURAL EFFUSIONS.  MAJOR AIRWAYS: Clear. No intrinsic mass.  VASCULATURE: No evidence of aneurysm.  VISUALIZED UPPER ABDOMEN:NODULAR LIVER MARGINS SUGGESTING CIRRHOSIS. CYST LEFT KIDNEY.  OTHER: None.  BONES: DEGENERATIVE CHANGES THORACIC SPINE. NO ACUTE BONY FINDINGS.      1. No PE. 2. CHF/edema. 3. Bibasilar consolidations compatible with pneumonia. 4. Liver cirrhosis. 5. Indeterminant cyst left kidney. Ultrasound follow-up may be helpful.  This report was finalized on 5/11/2019 7:27 AM by Dr. Escobar Peralta MD.      Results for orders placed during the hospital encounter of 05/11/19   Adult Transthoracic Echo Complete W/ Cont if Necessary Per Protocol    Narrative · Estimated EF appears to be in the range of 51 - 55%.  · Mild tricuspid valve regurgitation is present.  · Calculated right ventricular systolic pressure from tricuspid   regurgitation is 42.7 mmHg.          X-ray reviewed endotracheal tube tip is at the top of the clavicles looks like it is pulled back slightly there are increase in bilateral infiltrates but I think a lot of this is due to lower lung volumes    Active Hospital Problems    Diagnosis  POA   • Respiratory failure (CMS/HCC) [J96.90]  Yes      Resolved Hospital Problems   No resolved problems to display.         Assessment/Plan     1. Acute on chronic hypoxemic and hypercapnic respiratory failure.    Has improved his x-ray looks like he is got little extra fluid on board I am going to diurese him some but his oxygenation has improved his secretions have decreased.  Initially on his spontaneous breathing trial that I just started he seems to be doing very well hopefully we can extubate him later today  2. Pneumonia bilateral bibasilar certainly raises the concern for aspiration.  Continue antibiotics  3. Elevated  troponin no ST elevation, this could be non-ST elevation MI due to demand ischemia with his  profound hypoxia.  Cardiology sees no evidence of acute coronary syndrome either echocardiogram normal LVEF  4. COPD with acute exacerbation continue steroids and bronchodilators.  5. Acute kidney injury creatinine back down to normal  6. Hypertension blood pressures a little better still little high on his home medications I think diuresis will help with this  7. Diabetes mellitus type 2 insulin requiring elevated hemoglobin A1c at presentation suggesting suboptimal control.  Blood sugars continue to run high I will increase schedule insulin and continue sliding scale  8. Hypoalbuminemia probably poor nutritional status,   9. Hematuria may be secondary to Aguilar catheter placement I do recommend a follow-up urine in a couple of weeks once the catheter is out.  10. 3 mm pulmonary nodule noncalcified he will need follow-up CT on this given his smoking history  11. Incompletely evaluated renal lesion again when he recovers he should have a CT to fully evaluate  12. Peripheral vascular disease patient has declined intervention recently.  He certainly looks like he has significant disease.  13. Skin lesions right heel and left great toe  wound to evaluate  14. Candida intertrigo nystatin powder  15. Fluids/electro lites/nutrition patient is tolerating tube feeds well will DC IV fluids if he is extubated will probably DC tube feeds as well         16.     Plan for disposition:    Jose Luis Jasmine MD  05/14/19  7:16 AM    Time: Critical care time in excess of 40 minutes

## 2019-05-14 NOTE — PLAN OF CARE
Problem: Patient Care Overview  Goal: Plan of Care Review  Outcome: Ongoing (interventions implemented as appropriate)   05/14/19 0509   Coping/Psychosocial   Plan of Care Reviewed With patient   OTHER   Outcome Summary pt remains in ccu obn ventilator. VSS. c/o pain treated with PRN fentanyl, good UOP, Labs stable. Will continue to monitor.    Plan of Care Review   Progress improving       Problem: Ventilation, Mechanical Invasive (Adult)  Goal: Signs and Symptoms of Listed Potential Problems Will be Absent, Minimized or Managed (Ventilation, Mechanical Invasive)  Outcome: Ongoing (interventions implemented as appropriate)   05/14/19 0509   Goal/Outcome Evaluation   Problems Assessed (Mechanical Ventilation, Invasive) all   Problems Present (Mech Vent, Invasive) immobility;situational response       Problem: Pneumonia (Adult)  Goal: Signs and Symptoms of Listed Potential Problems Will be Absent, Minimized or Managed (Pneumonia)  Outcome: Ongoing (interventions implemented as appropriate)   05/14/19 0509   Goal/Outcome Evaluation   Problems Assessed (Pneumonia) all   Problems Present (Pneumonia) respiratory compromise;fluid/electrolyte imbalance       Problem: Skin Injury Risk (Adult)  Goal: Skin Health and Integrity  Outcome: Ongoing (interventions implemented as appropriate)   05/14/19 0509   Skin Injury Risk (Adult)   Skin Health and Integrity making progress toward outcome       Problem: Nutrition, Enteral (Adult)  Goal: Signs and Symptoms of Listed Potential Problems Will be Absent, Minimized or Managed (Nutrition, Enteral)  Outcome: Ongoing (interventions implemented as appropriate)   05/14/19 0509   Goal/Outcome Evaluation   Problems Assessed (Enteral Nutrition) all   Problems Present (Enteral Nutrition) fluid/electrolyte imbalance       Problem: Fall Risk (Adult)  Goal: Absence of Fall  Outcome: Ongoing (interventions implemented as appropriate)   05/14/19 0509   Fall Risk (Adult)   Absence of Fall making  progress toward outcome       Problem: Restraint, Nonbehavioral (Nonviolent)  Goal: Rationale and Justification  Outcome: Ongoing (interventions implemented as appropriate)   05/14/19 0509   Restraint, Nonbehavioral (Nonviolent)   Rationale and Justification prevent line/tube removal     Goal: Nonbehavioral (Nonviolent) Restraint: Absence of Injury/Harm  Outcome: Ongoing (interventions implemented as appropriate)   05/14/19 0509   Restraint, Nonbehavioral (Nonviolent)   Nonbehavioral (Nonviolent) Restraint: Absence of Injury/Harm met     Goal: Nonbehavioral (Nonviolent) Restraint: Achievement of Discontinuation Criteria  Outcome: Ongoing (interventions implemented as appropriate)   05/14/19 0509   Restraint, Nonbehavioral (Nonviolent)   Nonbehavioral (Nonviolent) Restraint: Achievement of Discontinuation Criteria not met     Goal: Nonbehavioral (Nonviolent) Restraint: Preservation of Dignity and Wellbeing  Outcome: Ongoing (interventions implemented as appropriate)   05/14/19 0509   Restraint, Nonbehavioral (Nonviolent)   Nonbehavioral (Nonviolent) Restraint: Preservation of Dignity and Wellbeing met

## 2019-05-14 NOTE — PROGRESS NOTES
LOS: 3 days   Patient Care Team:  Provider, No Known as PCP - General  Provider, No Known as PCP - Family Medicine    Chief Complaint: pneumonia, elevated troponin       Interval History: He remains intubated, though his ventilator support has been weaning.  Stable hemodynamically.    Objective   Vital Signs  Temp:  [98.2 °F (36.8 °C)-98.6 °F (37 °C)] 98.2 °F (36.8 °C)  Heart Rate:  [43-73] 54  Resp:  [20-24] 22  BP: (141-185)/() 141/74  FiO2 (%):  [40 %-41 %] 40 %    Intake/Output Summary (Last 24 hours) at 5/14/2019 1028  Last data filed at 5/14/2019 0950  Gross per 24 hour   Intake 6553 ml   Output 4025 ml   Net 2528 ml       Intubated, sedated  PERRL, conjunctiva clear  Neck supple, no JVD or thyromegaly appreciated  S1/S2 RRR, no m/r/g  Lungs CTA B, normal effort  Abdomen S/NT/ND (+) BS, no HSM appreciated  Extremities warm, no clubbing, cyanosis, or edema  No visible or palpable skin lesions      Results Review:      Results from last 7 days   Lab Units 05/14/19  0314 05/13/19  0427 05/12/19  0351   SODIUM mmol/L 137 138 139   POTASSIUM mmol/L 4.8 5.3* 5.1   CHLORIDE mmol/L 96* 98 98   CO2 mmol/L 31.0* 29.4* 30.4*   BUN mg/dL 31* 38* 32*   CREATININE mg/dL 0.73* 0.99 1.28*   GLUCOSE mg/dL 165* 278* 209*   CALCIUM mg/dL 8.9 8.7 8.3*     Results from last 7 days   Lab Units 05/11/19  1104 05/11/19  0542 05/11/19  0339   TROPONIN T ng/mL 0.172* 0.069* 0.057*     Results from last 7 days   Lab Units 05/14/19  0314 05/12/19  0351 05/11/19  1104   WBC 10*3/mm3 12.21* 16.03* 14.91*   HEMOGLOBIN g/dL 15.3 15.3 16.2   HEMATOCRIT % 47.5 48.7 52.5*   PLATELETS 10*3/mm3 179 211 212     Results from last 7 days   Lab Units 05/11/19  1104   INR  1.17*         Results from last 7 days   Lab Units 05/12/19  0351   MAGNESIUM mg/dL 1.8           I reviewed the patient's new clinical results.  I personally viewed and interpreted the patient's EKG/Telemetry data        Medication Review:     albuterol sulfate HFA 6 puff  Inhalation Q4H - RT   amLODIPine 10 mg Oral Q24H   aspirin 325 mg Oral Daily   atorvastatin 40 mg Oral Daily   azithromycin 500 mg Intravenous Q24H   ceftriaxone 1 g Intravenous Q24H   chlorhexidine 15 mL Mouth/Throat Q12H   donepezil 10 mg Oral Nightly   enoxaparin 40 mg Subcutaneous Q24H   famotidine 20 mg Intravenous Q12H   furosemide 40 mg Intravenous Q8H   gabapentin 600 mg Nasogastric Q8H   insulin glargine 10 Units Subcutaneous Q12H   insulin lispro 0-24 Units Subcutaneous 4x Daily With Meals & Nightly   ipratropium 6 puff Inhalation Q4H - RT   lisinopril 40 mg Oral Daily   methylPREDNISolone sodium succinate 20 mg Intravenous Q12H   metoprolol tartrate 25 mg Oral Q12H   nystatin  Topical Q12H   sertraline 25 mg Oral Daily   sodium chloride 3 mL Intravenous Q12H         dexmedetomidine 0.2-1.5 mcg/kg/hr Last Rate: 0.6 mcg/kg/hr (05/14/19 0632)       Assessment/Plan       Respiratory failure (CMS/HCC)    69 year old man with history of multiple cardiac risk factors including DM, COPD, PAD, and hypertension.  Now with acute respiratory failure due to COPD/pneumonia.    Elevated troponin, but no ischemic changes on EKG.  I suspect Type II (demand-related) infarct.  ECHO shows normal EF.    Now on beta-blocker.  BP reasonable.    Continues to wean from ventilator support.     Selvin Martinez MD  05/14/19  10:28 AM

## 2019-05-14 NOTE — PROGRESS NOTES
Discharge Planning Assessment  [facility]     Patient Name: Heath Bajwa  MRN: [id number]  Today's Date: 5/14/2019    Admit Date: 5/11/2019    Discharge Needs Assessment     Row Name 05/14/19 1511       Living Environment    Lives With  alone    Current Living Arrangements  home/apartment/condo    Primary Care Provided by  -- Unable to assess at this time    Provides Primary Care For  no one    Family Caregiver if Needed  sibling(s)    Family Caregiver Names  Kell (laura)    Able to Return to Prior Arrangements  -- Yet to be determined       Transition Planning    Transportation Anticipated  health plan transportation       Discharge Needs Assessment    Readmission Within the Last 30 Days  no previous admission in last 30 days    Concerns to be Addressed  discharge planning    Equipment Currently Used at Home  wheelchair;bipap/cpap;oxygen Information gathered per brother and other encounters    Anticipated Changes Related to Illness  inability to care for self    Equipment Needed After Discharge  -- Yet to be determined    Offered/Gave Vendor List  no Not at this time        Discharge Plan     Row Name 05/14/19 1515       Plan    Plan  Undetermined    Patient/Family in Agreement with Plan  unable to assess    Plan Comments  IMM 5/13. Brother verified facesheet. Patient on vent-has a siser but patient Kell (laura) said they have been astranged for many years. No other family. Brother stated he did not know the name of PCP/APRN but patient told him that they would come to his house to check on him.             Demographic Summary     Row Name 05/14/19 1501       General Information    Admission Type  inpatient    Arrived From  home;emergency department    Required Notices Provided  Important Message from Medicare IMM 5/13    Referral Source  admission list    Reason for Consult  discharge planning    Preferred Language  English     Used During This Interaction  no    General Information Comments   Patient is on a ventilator/does not communicate       Contact Information    Permission Granted to Share Info With      Contact Information Comments  Jose Ramon Bajwa [phone]        Functional Status     Row Name 05/14/19 1508       Functional Status    Usual Activity Tolerance  poor    Functional Status Comments  W/C per brother       Functional Status, IADL    Medications  independent    Meal Preparation  other (see comments)    Housekeeping  other (see comments)    Laundry  other (see comments)    Shopping  other (see comments)    IADL Comments  Patient ventilated and unable to be screened.Brother was unsure of all the above       Mental Status Summary    Recent Changes in Mental Status/Cognitive Functioning  unable to assess       Employment/    Employment Status  retired            Madie Fox RN

## 2019-05-15 ENCOUNTER — APPOINTMENT (OUTPATIENT)
Dept: GENERAL RADIOLOGY | Facility: HOSPITAL | Age: 69
End: 2019-05-15

## 2019-05-15 ENCOUNTER — APPOINTMENT (OUTPATIENT)
Dept: ULTRASOUND IMAGING | Facility: HOSPITAL | Age: 69
End: 2019-05-15

## 2019-05-15 LAB
ANION GAP SERPL CALCULATED.3IONS-SCNC: 6.4 MMOL/L
APPEARANCE FLD: ABNORMAL
BH CV LOWER VASCULAR LEFT COMMON FEMORAL AUGMENT: NORMAL
BH CV LOWER VASCULAR LEFT COMMON FEMORAL COMPETENT: NORMAL
BH CV LOWER VASCULAR LEFT COMMON FEMORAL COMPRESS: NORMAL
BH CV LOWER VASCULAR LEFT COMMON FEMORAL PHASIC: NORMAL
BH CV LOWER VASCULAR LEFT COMMON FEMORAL SPONT: NORMAL
BH CV LOWER VASCULAR LEFT DISTAL FEMORAL COMPRESS: NORMAL
BH CV LOWER VASCULAR LEFT GASTRONEMIUS COMPRESS: NORMAL
BH CV LOWER VASCULAR LEFT GREATER SAPH AK COMPRESS: NORMAL
BH CV LOWER VASCULAR LEFT GREATER SAPH BK COMPRESS: NORMAL
BH CV LOWER VASCULAR LEFT MID FEMORAL AUGMENT: NORMAL
BH CV LOWER VASCULAR LEFT MID FEMORAL COMPETENT: NORMAL
BH CV LOWER VASCULAR LEFT MID FEMORAL COMPRESS: NORMAL
BH CV LOWER VASCULAR LEFT MID FEMORAL PHASIC: NORMAL
BH CV LOWER VASCULAR LEFT MID FEMORAL SPONT: NORMAL
BH CV LOWER VASCULAR LEFT PERONEAL COMPRESS: NORMAL
BH CV LOWER VASCULAR LEFT POPLITEAL AUGMENT: NORMAL
BH CV LOWER VASCULAR LEFT POPLITEAL COMPETENT: NORMAL
BH CV LOWER VASCULAR LEFT POPLITEAL COMPRESS: NORMAL
BH CV LOWER VASCULAR LEFT POPLITEAL PHASIC: NORMAL
BH CV LOWER VASCULAR LEFT POPLITEAL SPONT: NORMAL
BH CV LOWER VASCULAR LEFT POSTERIOR TIBIAL COMPRESS: NORMAL
BH CV LOWER VASCULAR LEFT PROXIMAL FEMORAL COMPRESS: NORMAL
BH CV LOWER VASCULAR LEFT SAPHENOFEMORAL JUNCTION AUGMENT: NORMAL
BH CV LOWER VASCULAR LEFT SAPHENOFEMORAL JUNCTION COMPRESS: NORMAL
BH CV LOWER VASCULAR LEFT SAPHENOFEMORAL JUNCTION PHASIC: NORMAL
BH CV LOWER VASCULAR LEFT SAPHENOFEMORAL JUNCTION SPONT: NORMAL
BH CV LOWER VASCULAR RIGHT COMMON FEMORAL AUGMENT: NORMAL
BH CV LOWER VASCULAR RIGHT COMMON FEMORAL COMPETENT: NORMAL
BH CV LOWER VASCULAR RIGHT COMMON FEMORAL COMPRESS: NORMAL
BH CV LOWER VASCULAR RIGHT COMMON FEMORAL PHASIC: NORMAL
BH CV LOWER VASCULAR RIGHT COMMON FEMORAL SPONT: NORMAL
BH CV LOWER VASCULAR RIGHT DISTAL FEMORAL COMPRESS: NORMAL
BH CV LOWER VASCULAR RIGHT GASTRONEMIUS COMPRESS: NORMAL
BH CV LOWER VASCULAR RIGHT GREATER SAPH AK COMPRESS: NORMAL
BH CV LOWER VASCULAR RIGHT GREATER SAPH BK COMPRESS: NORMAL
BH CV LOWER VASCULAR RIGHT MID FEMORAL AUGMENT: NORMAL
BH CV LOWER VASCULAR RIGHT MID FEMORAL COMPETENT: NORMAL
BH CV LOWER VASCULAR RIGHT MID FEMORAL COMPRESS: NORMAL
BH CV LOWER VASCULAR RIGHT MID FEMORAL PHASIC: NORMAL
BH CV LOWER VASCULAR RIGHT MID FEMORAL SPONT: NORMAL
BH CV LOWER VASCULAR RIGHT PERONEAL COMPRESS: NORMAL
BH CV LOWER VASCULAR RIGHT POPLITEAL AUGMENT: NORMAL
BH CV LOWER VASCULAR RIGHT POPLITEAL COMPETENT: NORMAL
BH CV LOWER VASCULAR RIGHT POPLITEAL COMPRESS: NORMAL
BH CV LOWER VASCULAR RIGHT POPLITEAL PHASIC: NORMAL
BH CV LOWER VASCULAR RIGHT POPLITEAL SPONT: NORMAL
BH CV LOWER VASCULAR RIGHT POSTERIOR TIBIAL COMPRESS: NORMAL
BH CV LOWER VASCULAR RIGHT PROXIMAL FEMORAL COMPRESS: NORMAL
BH CV LOWER VASCULAR RIGHT SAPHENOFEMORAL JUNCTION AUGMENT: NORMAL
BH CV LOWER VASCULAR RIGHT SAPHENOFEMORAL JUNCTION COMPRESS: NORMAL
BH CV LOWER VASCULAR RIGHT SAPHENOFEMORAL JUNCTION PHASIC: NORMAL
BH CV LOWER VASCULAR RIGHT SAPHENOFEMORAL JUNCTION SPONT: NORMAL
BUN BLD-MCNC: 28 MG/DL (ref 8–23)
BUN/CREAT SERPL: 45.2 (ref 7–25)
CALCIUM SPEC-SCNC: 8.7 MG/DL (ref 8.6–10.5)
CHLORIDE SERPL-SCNC: 86 MMOL/L (ref 98–107)
CO2 SERPL-SCNC: 39.6 MMOL/L (ref 22–29)
COLOR FLD: ABNORMAL
CREAT BLD-MCNC: 0.62 MG/DL (ref 0.76–1.27)
GFR SERPL CREATININE-BSD FRML MDRD: 128 ML/MIN/1.73
GLUCOSE BLD-MCNC: 207 MG/DL (ref 65–99)
GLUCOSE BLDC GLUCOMTR-MCNC: 154 MG/DL (ref 70–130)
GLUCOSE BLDC GLUCOMTR-MCNC: 179 MG/DL (ref 70–130)
GLUCOSE BLDC GLUCOMTR-MCNC: 180 MG/DL (ref 70–130)
GLUCOSE BLDC GLUCOMTR-MCNC: 191 MG/DL (ref 70–130)
GLUCOSE BLDC GLUCOMTR-MCNC: 206 MG/DL (ref 70–130)
GLUCOSE FLD-MCNC: 221 MG/DL
INR PPP: 1.04 (ref 0.9–1.1)
LDH FLD-CCNC: 130 U/L
LDH SERPL-CCNC: 198 U/L (ref 135–225)
LYMPHOCYTES NFR FLD MANUAL: 8 %
MONOCYTES NFR FLD: 22 %
MONOS+MACROS NFR FLD: 39 %
NEUTROPHILS NFR FLD MANUAL: 31 %
PH FLD: 8.06 [PH]
POTASSIUM BLD-SCNC: 4.2 MMOL/L (ref 3.5–5.2)
PROT FLD-MCNC: 1.5 G/DL
PROT SERPL-MCNC: 5.6 G/DL (ref 6–8.5)
PROTHROMBIN TIME: 13.4 SECONDS (ref 11.7–14.2)
RBC # FLD AUTO: 6000 /MM3
SODIUM BLD-SCNC: 132 MMOL/L (ref 136–145)
WBC # FLD AUTO: 186 /MM3

## 2019-05-15 PROCEDURE — 94003 VENT MGMT INPAT SUBQ DAY: CPT

## 2019-05-15 PROCEDURE — 83615 LACTATE (LD) (LDH) ENZYME: CPT | Performed by: INTERNAL MEDICINE

## 2019-05-15 PROCEDURE — 97110 THERAPEUTIC EXERCISES: CPT

## 2019-05-15 PROCEDURE — 76942 ECHO GUIDE FOR BIOPSY: CPT

## 2019-05-15 PROCEDURE — 25010000002 CEFTRIAXONE PER 250 MG: Performed by: INTERNAL MEDICINE

## 2019-05-15 PROCEDURE — 99232 SBSQ HOSP IP/OBS MODERATE 35: CPT | Performed by: INTERNAL MEDICINE

## 2019-05-15 PROCEDURE — 82945 GLUCOSE OTHER FLUID: CPT | Performed by: INTERNAL MEDICINE

## 2019-05-15 PROCEDURE — 25010000002 AZITHROMYCIN PER 500 MG: Performed by: INTERNAL MEDICINE

## 2019-05-15 PROCEDURE — 87015 SPECIMEN INFECT AGNT CONCNTJ: CPT | Performed by: INTERNAL MEDICINE

## 2019-05-15 PROCEDURE — 87205 SMEAR GRAM STAIN: CPT | Performed by: INTERNAL MEDICINE

## 2019-05-15 PROCEDURE — 84155 ASSAY OF PROTEIN SERUM: CPT | Performed by: INTERNAL MEDICINE

## 2019-05-15 PROCEDURE — 83986 ASSAY PH BODY FLUID NOS: CPT | Performed by: INTERNAL MEDICINE

## 2019-05-15 PROCEDURE — 87070 CULTURE OTHR SPECIMN AEROBIC: CPT | Performed by: INTERNAL MEDICINE

## 2019-05-15 PROCEDURE — 94799 UNLISTED PULMONARY SVC/PX: CPT

## 2019-05-15 PROCEDURE — 63710000001 INSULIN LISPRO (HUMAN) PER 5 UNITS: Performed by: INTERNAL MEDICINE

## 2019-05-15 PROCEDURE — 84157 ASSAY OF PROTEIN OTHER: CPT | Performed by: INTERNAL MEDICINE

## 2019-05-15 PROCEDURE — 0W993ZZ DRAINAGE OF RIGHT PLEURAL CAVITY, PERCUTANEOUS APPROACH: ICD-10-PCS | Performed by: RADIOLOGY

## 2019-05-15 PROCEDURE — 25010000002 METHYLPREDNISOLONE PER 40 MG: Performed by: INTERNAL MEDICINE

## 2019-05-15 PROCEDURE — 85610 PROTHROMBIN TIME: CPT | Performed by: INTERNAL MEDICINE

## 2019-05-15 PROCEDURE — 82962 GLUCOSE BLOOD TEST: CPT

## 2019-05-15 PROCEDURE — 25010000002 FUROSEMIDE PER 20 MG: Performed by: INTERNAL MEDICINE

## 2019-05-15 PROCEDURE — 25010000002 FENTANYL CITRATE (PF) 100 MCG/2ML SOLUTION: Performed by: INTERNAL MEDICINE

## 2019-05-15 PROCEDURE — 63710000001 INSULIN GLARGINE PER 5 UNITS: Performed by: INTERNAL MEDICINE

## 2019-05-15 PROCEDURE — 80048 BASIC METABOLIC PNL TOTAL CA: CPT | Performed by: INTERNAL MEDICINE

## 2019-05-15 PROCEDURE — 89051 BODY FLUID CELL COUNT: CPT | Performed by: INTERNAL MEDICINE

## 2019-05-15 PROCEDURE — 88305 TISSUE EXAM BY PATHOLOGIST: CPT | Performed by: INTERNAL MEDICINE

## 2019-05-15 PROCEDURE — 71045 X-RAY EXAM CHEST 1 VIEW: CPT

## 2019-05-15 PROCEDURE — 88112 CYTOPATH CELL ENHANCE TECH: CPT | Performed by: INTERNAL MEDICINE

## 2019-05-15 RX ORDER — LIDOCAINE HYDROCHLORIDE 10 MG/ML
20 INJECTION, SOLUTION INFILTRATION; PERINEURAL ONCE
Status: COMPLETED | OUTPATIENT
Start: 2019-05-15 | End: 2019-05-15

## 2019-05-15 RX ADMIN — DEXMEDETOMIDINE 1.4 MCG/KG/HR: 100 INJECTION, SOLUTION, CONCENTRATE INTRAVENOUS at 09:25

## 2019-05-15 RX ADMIN — FUROSEMIDE 40 MG: 10 INJECTION, SOLUTION INTRAMUSCULAR; INTRAVENOUS at 08:30

## 2019-05-15 RX ADMIN — FAMOTIDINE 20 MG: 10 INJECTION INTRAVENOUS at 20:00

## 2019-05-15 RX ADMIN — FENTANYL CITRATE 100 MCG: 50 INJECTION INTRAMUSCULAR; INTRAVENOUS at 00:22

## 2019-05-15 RX ADMIN — LISINOPRIL 40 MG: 40 TABLET ORAL at 08:15

## 2019-05-15 RX ADMIN — DEXMEDETOMIDINE 1.5 MCG/KG/HR: 100 INJECTION, SOLUTION, CONCENTRATE INTRAVENOUS at 23:13

## 2019-05-15 RX ADMIN — INSULIN LISPRO 8 UNITS: 100 INJECTION, SOLUTION INTRAVENOUS; SUBCUTANEOUS at 08:15

## 2019-05-15 RX ADMIN — DEXMEDETOMIDINE 1.3 MCG/KG/HR: 100 INJECTION, SOLUTION, CONCENTRATE INTRAVENOUS at 03:45

## 2019-05-15 RX ADMIN — INSULIN GLARGINE 10 UNITS: 100 INJECTION, SOLUTION SUBCUTANEOUS at 08:15

## 2019-05-15 RX ADMIN — FENTANYL CITRATE 100 MCG: 50 INJECTION INTRAMUSCULAR; INTRAVENOUS at 20:38

## 2019-05-15 RX ADMIN — LIDOCAINE HYDROCHLORIDE 20 ML: 10 INJECTION, SOLUTION INFILTRATION; PERINEURAL at 11:02

## 2019-05-15 RX ADMIN — CEFTRIAXONE SODIUM 1 G: 1 INJECTION, SOLUTION INTRAVENOUS at 11:20

## 2019-05-15 RX ADMIN — ALBUTEROL SULFATE 6 PUFF: 90 AEROSOL, METERED RESPIRATORY (INHALATION) at 02:30

## 2019-05-15 RX ADMIN — ALBUTEROL SULFATE 6 PUFF: 90 AEROSOL, METERED RESPIRATORY (INHALATION) at 22:49

## 2019-05-15 RX ADMIN — FENTANYL CITRATE 100 MCG: 50 INJECTION INTRAMUSCULAR; INTRAVENOUS at 05:51

## 2019-05-15 RX ADMIN — ALBUTEROL SULFATE 6 PUFF: 90 AEROSOL, METERED RESPIRATORY (INHALATION) at 08:03

## 2019-05-15 RX ADMIN — AMLODIPINE BESYLATE 10 MG: 10 TABLET ORAL at 08:15

## 2019-05-15 RX ADMIN — FENTANYL CITRATE 100 MCG: 50 INJECTION INTRAMUSCULAR; INTRAVENOUS at 02:30

## 2019-05-15 RX ADMIN — ASPIRIN 325 MG: 325 TABLET ORAL at 08:15

## 2019-05-15 RX ADMIN — METOPROLOL TARTRATE 25 MG: 25 TABLET ORAL at 20:00

## 2019-05-15 RX ADMIN — DEXMEDETOMIDINE 1.4 MCG/KG/HR: 100 INJECTION, SOLUTION, CONCENTRATE INTRAVENOUS at 15:00

## 2019-05-15 RX ADMIN — IPRATROPIUM BROMIDE 6 PUFF: 17 AEROSOL, METERED RESPIRATORY (INHALATION) at 08:03

## 2019-05-15 RX ADMIN — FENTANYL CITRATE 100 MCG: 50 INJECTION INTRAMUSCULAR; INTRAVENOUS at 06:26

## 2019-05-15 RX ADMIN — FENTANYL CITRATE 100 MCG: 50 INJECTION INTRAMUSCULAR; INTRAVENOUS at 17:15

## 2019-05-15 RX ADMIN — FENTANYL CITRATE 100 MCG: 50 INJECTION INTRAMUSCULAR; INTRAVENOUS at 01:53

## 2019-05-15 RX ADMIN — FENTANYL CITRATE 100 MCG: 50 INJECTION INTRAMUSCULAR; INTRAVENOUS at 16:00

## 2019-05-15 RX ADMIN — DEXMEDETOMIDINE 1.5 MCG/KG/HR: 100 INJECTION, SOLUTION, CONCENTRATE INTRAVENOUS at 20:27

## 2019-05-15 RX ADMIN — ALBUTEROL SULFATE 6 PUFF: 90 AEROSOL, METERED RESPIRATORY (INHALATION) at 15:48

## 2019-05-15 RX ADMIN — FENTANYL CITRATE 100 MCG: 50 INJECTION INTRAMUSCULAR; INTRAVENOUS at 10:25

## 2019-05-15 RX ADMIN — FUROSEMIDE 40 MG: 10 INJECTION, SOLUTION INTRAMUSCULAR; INTRAVENOUS at 00:40

## 2019-05-15 RX ADMIN — FENTANYL CITRATE 100 MCG: 50 INJECTION INTRAMUSCULAR; INTRAVENOUS at 01:01

## 2019-05-15 RX ADMIN — FENTANYL CITRATE 100 MCG: 50 INJECTION INTRAMUSCULAR; INTRAVENOUS at 23:51

## 2019-05-15 RX ADMIN — INSULIN LISPRO 4 UNITS: 100 INJECTION, SOLUTION INTRAVENOUS; SUBCUTANEOUS at 12:20

## 2019-05-15 RX ADMIN — INSULIN LISPRO 4 UNITS: 100 INJECTION, SOLUTION INTRAVENOUS; SUBCUTANEOUS at 20:00

## 2019-05-15 RX ADMIN — INSULIN LISPRO 4 UNITS: 100 INJECTION, SOLUTION INTRAVENOUS; SUBCUTANEOUS at 17:25

## 2019-05-15 RX ADMIN — GABAPENTIN 600 MG: 250 SOLUTION ORAL at 15:00

## 2019-05-15 RX ADMIN — SODIUM CHLORIDE, PRESERVATIVE FREE 3 ML: 5 INJECTION INTRAVENOUS at 08:30

## 2019-05-15 RX ADMIN — LABETALOL 20 MG/4 ML (5 MG/ML) INTRAVENOUS SYRINGE 20 MG: at 04:06

## 2019-05-15 RX ADMIN — GABAPENTIN 600 MG: 250 SOLUTION ORAL at 05:11

## 2019-05-15 RX ADMIN — SERTRALINE 25 MG: 25 TABLET, FILM COATED ORAL at 08:15

## 2019-05-15 RX ADMIN — AZITHROMYCIN MONOHYDRATE 500 MG: 500 INJECTION, POWDER, LYOPHILIZED, FOR SOLUTION INTRAVENOUS at 12:20

## 2019-05-15 RX ADMIN — FENTANYL CITRATE 100 MCG: 50 INJECTION INTRAMUSCULAR; INTRAVENOUS at 03:49

## 2019-05-15 RX ADMIN — SODIUM CHLORIDE, PRESERVATIVE FREE 3 ML: 5 INJECTION INTRAVENOUS at 21:34

## 2019-05-15 RX ADMIN — NYSTATIN: 100000 POWDER TOPICAL at 08:15

## 2019-05-15 RX ADMIN — IPRATROPIUM BROMIDE 6 PUFF: 17 AEROSOL, METERED RESPIRATORY (INHALATION) at 22:49

## 2019-05-15 RX ADMIN — CHLORHEXIDINE GLUCONATE 15 ML: 1.2 RINSE ORAL at 08:15

## 2019-05-15 RX ADMIN — FENTANYL CITRATE 100 MCG: 50 INJECTION INTRAMUSCULAR; INTRAVENOUS at 08:30

## 2019-05-15 RX ADMIN — FENTANYL CITRATE 100 MCG: 50 INJECTION INTRAMUSCULAR; INTRAVENOUS at 05:02

## 2019-05-15 RX ADMIN — NYSTATIN 1 APPLICATION: 100000 POWDER TOPICAL at 20:02

## 2019-05-15 RX ADMIN — FUROSEMIDE 40 MG: 10 INJECTION, SOLUTION INTRAMUSCULAR; INTRAVENOUS at 17:15

## 2019-05-15 RX ADMIN — INSULIN GLARGINE 10 UNITS: 100 INJECTION, SOLUTION SUBCUTANEOUS at 20:00

## 2019-05-15 RX ADMIN — DEXMEDETOMIDINE 1.4 MCG/KG/HR: 100 INJECTION, SOLUTION, CONCENTRATE INTRAVENOUS at 18:15

## 2019-05-15 RX ADMIN — DEXMEDETOMIDINE 1.3 MCG/KG/HR: 100 INJECTION, SOLUTION, CONCENTRATE INTRAVENOUS at 00:23

## 2019-05-15 RX ADMIN — ALBUTEROL SULFATE 6 PUFF: 90 AEROSOL, METERED RESPIRATORY (INHALATION) at 11:32

## 2019-05-15 RX ADMIN — DEXMEDETOMIDINE 1.4 MCG/KG/HR: 100 INJECTION, SOLUTION, CONCENTRATE INTRAVENOUS at 06:13

## 2019-05-15 RX ADMIN — FENTANYL CITRATE 100 MCG: 50 INJECTION INTRAMUSCULAR; INTRAVENOUS at 23:13

## 2019-05-15 RX ADMIN — METOPROLOL TARTRATE 25 MG: 25 TABLET ORAL at 08:15

## 2019-05-15 RX ADMIN — IPRATROPIUM BROMIDE 6 PUFF: 17 AEROSOL, METERED RESPIRATORY (INHALATION) at 11:32

## 2019-05-15 RX ADMIN — CHLORHEXIDINE GLUCONATE 15 ML: 1.2 RINSE ORAL at 20:01

## 2019-05-15 RX ADMIN — FENTANYL CITRATE 100 MCG: 50 INJECTION INTRAMUSCULAR; INTRAVENOUS at 11:10

## 2019-05-15 RX ADMIN — IPRATROPIUM BROMIDE 6 PUFF: 17 AEROSOL, METERED RESPIRATORY (INHALATION) at 15:48

## 2019-05-15 RX ADMIN — FENTANYL CITRATE 100 MCG: 50 INJECTION INTRAMUSCULAR; INTRAVENOUS at 20:00

## 2019-05-15 RX ADMIN — FENTANYL CITRATE 100 MCG: 50 INJECTION INTRAMUSCULAR; INTRAVENOUS at 13:30

## 2019-05-15 RX ADMIN — GABAPENTIN 600 MG: 250 SOLUTION ORAL at 21:33

## 2019-05-15 RX ADMIN — FENTANYL CITRATE 100 MCG: 50 INJECTION INTRAMUSCULAR; INTRAVENOUS at 18:55

## 2019-05-15 RX ADMIN — DEXMEDETOMIDINE 1.4 MCG/KG/HR: 100 INJECTION, SOLUTION, CONCENTRATE INTRAVENOUS at 12:20

## 2019-05-15 RX ADMIN — IPRATROPIUM BROMIDE 6 PUFF: 17 AEROSOL, METERED RESPIRATORY (INHALATION) at 20:08

## 2019-05-15 RX ADMIN — FENTANYL CITRATE 100 MCG: 50 INJECTION INTRAMUSCULAR; INTRAVENOUS at 21:34

## 2019-05-15 RX ADMIN — FENTANYL CITRATE 100 MCG: 50 INJECTION INTRAMUSCULAR; INTRAVENOUS at 22:17

## 2019-05-15 RX ADMIN — ALBUTEROL SULFATE 6 PUFF: 90 AEROSOL, METERED RESPIRATORY (INHALATION) at 20:08

## 2019-05-15 RX ADMIN — METHYLPREDNISOLONE SODIUM SUCCINATE 20 MG: 40 INJECTION, POWDER, FOR SOLUTION INTRAMUSCULAR; INTRAVENOUS at 11:20

## 2019-05-15 RX ADMIN — IPRATROPIUM BROMIDE 6 PUFF: 17 AEROSOL, METERED RESPIRATORY (INHALATION) at 02:30

## 2019-05-15 RX ADMIN — FAMOTIDINE 20 MG: 10 INJECTION INTRAVENOUS at 08:15

## 2019-05-15 RX ADMIN — DONEPEZIL HYDROCHLORIDE 10 MG: 10 TABLET, FILM COATED ORAL at 20:00

## 2019-05-15 RX ADMIN — ATORVASTATIN CALCIUM 40 MG: 20 TABLET, FILM COATED ORAL at 08:15

## 2019-05-15 NOTE — PLAN OF CARE
Problem: Patient Care Overview  Goal: Plan of Care Review   05/15/19 0628   Coping/Psychosocial   Plan of Care Reviewed With patient   OTHER   Outcome Summary Patient on precedex, unable to wean. Requiring prn pain medicine frequently. Patient has high anxiety, hard to calm. Blood pressures elevated, given prn labetolol. Currently awaiting am labs   Plan of Care Review   Progress no change

## 2019-05-15 NOTE — PROGRESS NOTES
LOS: 4 days   Patient Care Team:  Provider, No Known as PCP - General  Provider, No Known as PCP - Family Medicine    Chief Complaint: pneumonia, elevated troponin       Interval History: He failed his SBT.  Getting thoracentesis and diuresis to try to improve breathing.    Objective   Vital Signs  Temp:  [98 °F (36.7 °C)-98.3 °F (36.8 °C)] 98.3 °F (36.8 °C)  Heart Rate:  [45-62] 46  Resp:  [20-24] 21  BP: (130-191)/() 155/78  FiO2 (%):  [40 %-41 %] 41 %    Intake/Output Summary (Last 24 hours) at 5/15/2019 1003  Last data filed at 5/15/2019 0830  Gross per 24 hour   Intake 2673 ml   Output 7700 ml   Net -5027 ml       Intubated, sedated  PERRL, conjunctiva clear  Neck supple, no JVD or thyromegaly appreciated  S1/S2 RRR, no m/r/g  Lungs CTA B, normal effort  Abdomen S/NT/ND (+) BS, no HSM appreciated  Extremities warm, no clubbing, cyanosis, or edema  No visible or palpable skin lesions      Results Review:      Results from last 7 days   Lab Units 05/15/19  0507 05/14/19  0314 05/13/19  0427   SODIUM mmol/L 132* 137 138   POTASSIUM mmol/L 4.2 4.8 5.3*   CHLORIDE mmol/L 86* 96* 98   CO2 mmol/L 39.6* 31.0* 29.4*   BUN mg/dL 28* 31* 38*   CREATININE mg/dL 0.62* 0.73* 0.99   GLUCOSE mg/dL 207* 165* 278*   CALCIUM mg/dL 8.7 8.9 8.7     Results from last 7 days   Lab Units 05/11/19  1104 05/11/19  0542 05/11/19  0339   TROPONIN T ng/mL 0.172* 0.069* 0.057*     Results from last 7 days   Lab Units 05/14/19  0314 05/12/19  0351 05/11/19  1104   WBC 10*3/mm3 12.21* 16.03* 14.91*   HEMOGLOBIN g/dL 15.3 15.3 16.2   HEMATOCRIT % 47.5 48.7 52.5*   PLATELETS 10*3/mm3 179 211 212     Results from last 7 days   Lab Units 05/11/19  1104   INR  1.17*         Results from last 7 days   Lab Units 05/12/19  0351   MAGNESIUM mg/dL 1.8           I reviewed the patient's new clinical results.  I personally viewed and interpreted the patient's EKG/Telemetry data        Medication Review:     albuterol sulfate HFA 6 puff  Inhalation Q4H - RT   amLODIPine 10 mg Oral Q24H   aspirin 325 mg Oral Daily   atorvastatin 40 mg Oral Daily   azithromycin 500 mg Intravenous Q24H   ceftriaxone 1 g Intravenous Q24H   chlorhexidine 15 mL Mouth/Throat Q12H   donepezil 10 mg Oral Nightly   famotidine 20 mg Intravenous Q12H   furosemide 40 mg Intravenous Q8H   gabapentin 600 mg Nasogastric Q8H   insulin glargine 10 Units Subcutaneous Q12H   insulin lispro 0-24 Units Subcutaneous 4x Daily With Meals & Nightly   ipratropium 6 puff Inhalation Q4H - RT   lisinopril 40 mg Oral Daily   methylPREDNISolone sodium succinate 20 mg Intravenous Q12H   metoprolol tartrate 25 mg Oral Q12H   nystatin  Topical Q12H   sertraline 25 mg Oral Daily   sodium chloride 3 mL Intravenous Q12H         dexmedetomidine 0.2-1.5 mcg/kg/hr Last Rate: 1.4 mcg/kg/hr (05/15/19 0925)   hold 1 each        Assessment/Plan       Respiratory failure (CMS/HCC)    69 year old man with history of multiple cardiac risk factors including DM, COPD, PAD, and hypertension.  Now with acute respiratory failure due to COPD/pneumonia.    Elevated troponin, but no ischemic changes on EKG.  I suspect Type II (demand-related) infarct.  ECHO shows normal EF.    Now on beta-blocker.  BP reasonable.  HR a touch low.    Continues to wean from ventilator support.  For thoracentesis today.     Selvin Martinez MD  05/15/19  10:03 AM

## 2019-05-15 NOTE — PLAN OF CARE
Problem: Patient Care Overview  Goal: Plan of Care Review  Outcome: Ongoing (interventions implemented as appropriate)   05/15/19 1600   Coping/Psychosocial   Plan of Care Reviewed With patient   OTHER   Outcome Summary awake all day--despite sedation and pain meds. continues to ask for pain meds constantly. R thora completed at bedside--500cc off. unable to extubate today. continues to have copious amounts of secretions. BP better today. family updated via phone. turning and oral care q2--PATRICK Sosa RN   Plan of Care Review   Progress no change     Goal: Individualization and Mutuality  Outcome: Ongoing (interventions implemented as appropriate)    Goal: Discharge Needs Assessment  Outcome: Ongoing (interventions implemented as appropriate)    Goal: Interprofessional Rounds/Family Conf  Outcome: Ongoing (interventions implemented as appropriate)      Problem: Ventilation, Mechanical Invasive (Adult)  Goal: Signs and Symptoms of Listed Potential Problems Will be Absent, Minimized or Managed (Ventilation, Mechanical Invasive)  Outcome: Ongoing (interventions implemented as appropriate)      Problem: Pneumonia (Adult)  Goal: Signs and Symptoms of Listed Potential Problems Will be Absent, Minimized or Managed (Pneumonia)  Outcome: Ongoing (interventions implemented as appropriate)      Problem: Skin Injury Risk (Adult)  Goal: Skin Health and Integrity  Outcome: Ongoing (interventions implemented as appropriate)      Problem: Restraint, Nonbehavioral (Nonviolent)  Goal: Rationale and Justification  Outcome: Ongoing (interventions implemented as appropriate)    Goal: Nonbehavioral (Nonviolent) Restraint: Absence of Injury/Harm  Outcome: Ongoing (interventions implemented as appropriate)    Goal: Nonbehavioral (Nonviolent) Restraint: Achievement of Discontinuation Criteria  Outcome: Ongoing (interventions implemented as appropriate)    Goal: Nonbehavioral (Nonviolent) Restraint: Preservation of Dignity and  Wellbeing  Outcome: Ongoing (interventions implemented as appropriate)

## 2019-05-15 NOTE — PROGRESS NOTES
LOS: 4 days   Patient Care Team:  Provider, No Known as PCP - General  Provider, No Known as PCP - Family Medicine    Subjective     Patient remains intubated he is sedated on Precedex at 1.4 mics per kilogram per minute.  He is awake and following commands.    Says he feels like he cannot get deep enough breath or at least that is what he is mouthing the words to me it took us little while to comprehend what he was saying.  Review of Systems:          Objective     Vital Signs  Vital Sign Min/Max for last 24 hours  Temp  Min: 98 °F (36.7 °C)  Max: 98.2 °F (36.8 °C)   BP  Min: 132/64  Max: 181/86   Pulse  Min: 45  Max: 62   Resp  Min: 20  Max: 21   SpO2  Min: 89 %  Max: 96 %   No Data Recorded   No Data Recorded        Ventilator/Non-Invasive Ventilation Settings (From admission, onward)    Start     Ordered    05/11/19 1021  Ventilator - AC/PC; (28); 100; 88%; Other (see comments); 18; 17  Continuous     Question Answer Comment   Vent Mode AC/PC    Breath rate  28   FiO2 100    FiO2 titrate for Sp02% =/> 88%    PEEP Other (see comments)    PEEP: 18    Inspiratory Pressure 17        05/11/19 1027                       Body mass index is 33.66 kg/m².  I/O last 3 completed shifts:  In: 6799 [I.V.:2641; Other:1413; NG/GT:2745]  Out: 9025 [Urine:9025]  No intake/output data recorded.        Physical Exam:    General Appearance: Obese white male orally intubated with an 8.0 endotracheal tube at about 24 cm at the lips.  Sedated with Precedex at 1.4 mics per kilogram per minute he is awake alert cooperative.  He does not look in acute distress.  He is on pressure control 12 inspiratory pressure of 12 cm PEEP of 5 cm FiO2 of 40% he is pulling tidal volumes in the mid 500s, is not laboring and his oxygen saturations are 96%  Eyes: Conjunctival are clear and anicteric pupils are equal about 3 mm  ENT: Oral mucous membranes are dry nasal septum appears midline I do not see any exudates or erythema the oral  endotracheal tube and orogastric tube are in place  Neck: No adenopathy no jugular venous distention, trachea midline, no palpable thyromegaly  Lungs: Coarse breath sounds bilaterally seem to be equal.  I do not hear any definite wheezes, rales or rhonchi  Cardiac: Regular rhythm no murmur   Abdomen: Soft no palpable organomegaly or masses  :  he has a Aguilar catheter in place  Musculoskeletal: He has what looks like chronic arterial and venous insufficiency in his lower extremities.  He has smooth thin skin with doughy edema in both lower extremities.   There is a right femoral central venous type catheter  Skin: He has a blood-filled blister on the dorsum of the left great toe and he is a small blood-filled blister on his right heel but no change from admission.  No jaundice no petechiae  Neuro:  He is definitely awake and alert he is cooperating he is following commands moving all extremities.    Extremities/P Vascular: He does have palpable radial pulses and very weak dorsalis pedis pulses bilaterally   MSE:  Seemed very calm even though I had trouble figuring out what he was talking to me about he remained calm and kept repeating in and pointing until I understood           Labs:  Results from last 7 days   Lab Units 05/15/19  0507 05/14/19  0314 05/13/19  0427 05/12/19  0351 05/11/19  1104 05/11/19  0339   GLUCOSE mg/dL 207* 165* 278* 209* 215* 304*   SODIUM mmol/L 132* 137 138 139 140 136   POTASSIUM mmol/L 4.2 4.8 5.3* 5.1 4.3 4.7   MAGNESIUM mg/dL  --   --   --  1.8 1.8 1.8   CO2 mmol/L 39.6* 31.0* 29.4* 30.4* 35.6* 35.2*   CHLORIDE mmol/L 86* 96* 98 98 95* 93*   ANION GAP mmol/L 6.4 10.0 10.6 10.6 9.4 7.8   CREATININE mg/dL 0.62* 0.73* 0.99 1.28* 0.93 0.91   BUN mg/dL 28* 31* 38* 32* 22 23   BUN / CREAT RATIO  45.2* 42.5* 38.4* 25.0 23.7 25.3*   CALCIUM mg/dL 8.7 8.9 8.7 8.3* 8.7 8.3*   EGFR IF NONAFRICN AM mL/min/1.73 128 106 75 55* 80 82   ALK PHOS U/L  --   --   --   --  101 105   TOTAL PROTEIN g/dL   --   --   --   --  5.9* 6.1   ALT (SGPT) U/L  --   --   --   --  17 16   AST (SGOT) U/L  --   --   --   --  17 20   BILIRUBIN mg/dL  --   --   --   --  0.5 0.5   ALBUMIN g/dL  --   --   --   --  2.30* 2.38*   GLOBULIN gm/dL  --   --   --   --  3.6 3.7     Estimated Creatinine Clearance: 116.1 mL/min (A) (by C-G formula based on SCr of 0.62 mg/dL (L)).      Results from last 7 days   Lab Units 05/14/19  0314 05/12/19  0351 05/11/19  1104 05/11/19  0339   WBC 10*3/mm3 12.21* 16.03* 14.91* 13.92*   RBC 10*6/mm3 5.06 5.07 5.32 5.05   HEMOGLOBIN g/dL 15.3 15.3 16.2 15.5   HEMATOCRIT % 47.5 48.7 52.5* 50.8   MCV fL 93.9 96.1 98.7* 100.6*   MCH pg 30.2 30.2 30.5 30.7   MCHC g/dL 32.2 31.4* 30.9* 30.5*   RDW % 14.6 14.8 14.2 14.9   RDW-SD fl 50.1 51.3 51.7 54.4*   MPV fL 11.5 11.5 10.9 10.7   PLATELETS 10*3/mm3 179 211 212 208   NEUTROPHIL % %  --   --  86.0* 88.6*   LYMPHOCYTE % %  --   --  5.3* 3.2*   MONOCYTES % %  --   --  7.7 5.0   EOSINOPHIL % %  --   --  0.1* 0.1*   BASOPHIL % %  --   --  0.4 0.4   IMM GRAN % %  --   --  0.5 2.7*   NEUTROS ABS 10*3/mm3  --   --  12.83* 12.31*   LYMPHS ABS 10*3/mm3  --   --  0.79 0.45*   MONOS ABS 10*3/mm3  --   --  1.15* 0.70   EOS ABS 10*3/mm3  --   --  0.01 0.02   BASOS ABS 10*3/mm3  --   --  0.06 0.06   IMMATURE GRANS (ABS) 10*3/mm3  --   --  0.07* 0.38*   NRBC /100 WBC  --   --  0.1  --      Results from last 7 days   Lab Units 05/14/19  0957   PH, ARTERIAL pH units 7.357   PO2 ART mm Hg 71.9*   PCO2, ARTERIAL mm Hg 66.0*   HCO3 ART mmol/L 37.0*     Results from last 7 days   Lab Units 05/11/19  1104 05/11/19  0542 05/11/19  0339   TROPONIN T ng/mL 0.172* 0.069* 0.057*             Results from last 7 days   Lab Units 05/11/19  1104 05/11/19  0339   LACTATE mmol/L 1.5 1.0   PROCALCITONIN ng/mL 0.13  --      Results from last 7 days   Lab Units 05/11/19  1104   INR  1.17*     Microbiology Results (last 10 days)     Procedure Component Value - Date/Time    Respiratory Culture -  Aspirate, ET Suction [[id number]] Collected:  05/11/19 1209    Lab Status:  Final result Specimen:  Aspirate from ET Suction Updated:  05/13/19 0753     Respiratory Culture Scant growth (1+) Normal Respiratory Kassandra     Gram Stain Few (2+) WBCs seen      Rare (1+) Epithelial cells per low power field      Mixed bacterial morphotypes seen on Gram Stain    Blood Culture - Blood, Blood, Venous Line [[id number]] Collected:  05/11/19 0339    Lab Status:  Preliminary result Specimen:  Blood, Venous Line Updated:  05/15/19 0345     Blood Culture No growth at 4 days    Blood Culture - Blood, Arm, Right [[id number]] Collected:  05/11/19 0339    Lab Status:  Preliminary result Specimen:  Blood from Arm, Right Updated:  05/15/19 0345     Blood Culture No growth at 4 days                albuterol sulfate HFA 6 puff Inhalation Q4H - RT   amLODIPine 10 mg Oral Q24H   aspirin 325 mg Oral Daily   atorvastatin 40 mg Oral Daily   azithromycin 500 mg Intravenous Q24H   ceftriaxone 1 g Intravenous Q24H   chlorhexidine 15 mL Mouth/Throat Q12H   donepezil 10 mg Oral Nightly   enoxaparin 40 mg Subcutaneous Q24H   famotidine 20 mg Intravenous Q12H   furosemide 40 mg Intravenous Q8H   gabapentin 600 mg Nasogastric Q8H   insulin glargine 10 Units Subcutaneous Q12H   insulin lispro 0-24 Units Subcutaneous 4x Daily With Meals & Nightly   ipratropium 6 puff Inhalation Q4H - RT   lisinopril 40 mg Oral Daily   methylPREDNISolone sodium succinate 20 mg Intravenous Q12H   metoprolol tartrate 25 mg Oral Q12H   nystatin  Topical Q12H   sertraline 25 mg Oral Daily   sodium chloride 3 mL Intravenous Q12H       dexmedetomidine 0.2-1.5 mcg/kg/hr Last Rate: 1.4 mcg/kg/hr (05/15/19 0613)       Diagnostics:  Ct Head Without Contrast    Result Date: 5/11/2019  EXAMINATION: CT HEAD WO CONTRAST-  CLINICAL INDICATION:     ams  COMPARISON:    None  Technique: Multiple CT axial images were obtained through the level of the brain without IV contrast administration.  Reformatted images in the coronal and/or sagittal plane(s) were generated from the axial data set to facilitate diagnostic accuracy and/or surgical planning.  Radiation dose reduction techniques were utilized per ALARA protocol. Automated exposure control was initiated through either or Transifex or DoseRight software packages by  protocol.   DOSE (DLP mGy-cm): 1469.74 mGy.cm  FINDINGS:   Mild generalized cerebral atrophy and chronic small vessel ischemic disease noted. No acute intracranial abnormality. No midline shift or mass effect. No hydrocephalus or intracranial hemorrhage. There is no CT evidence of acute vascular territory infarct. Bone windows show no acute osseous abnormality. Bilateral right greater than left mastoid effusions. Pansinusitis.      1. No CT evidence of acute intracranial abnormality. 2. Estimated effusions and pansinusitis.  This report was finalized on 5/11/2019 7:24 AM by Dr. Escobar Peralta MD.      Xr Chest 1 View    Result Date: 5/12/2019  PORTABLE CHEST RADIOGRAPH  HISTORY: Intubated patient  COMPARISON: 05/11/2019  FINDINGS: Cardiomediastinal silhouette is unchanged. Endotracheal tube appears to terminate at the level of the clavicles. It could be advanced about 3 cm. A nasogastric tube is present. It appears to extend to the stomach. No pneumothorax is seen. There is persistent bibasilar consolidation. There are small bilateral pleural effusions. Vascular congestion is unchanged.      Endotracheal tube terminates at the level of clavicles. It could be advanced about 3 cm.  This report was finalized on 5/12/2019 5:00 AM by Dr. Yuliana Chandra M.D.      Xr Chest 1 View    Result Date: 5/11/2019  XR CHEST 1 VW-  HISTORY: Male who is 69 years-old,  endotracheal intubation  TECHNIQUE: Frontal views of the chest  COMPARISON: None available  FINDINGS: Patient is rotated towards the left. The endotracheal tube tip appears to be about 8 cm above the candace. NG tube extends  beyond the inferior extent of the image. Heart size is normal. Mild prominence of vascular and interstitial markings. Patchy infiltrates at the bases, small left pleural effusion. Heart, mediastinum and pulmonary vasculature are unremarkable. No focal pulmonary consolidation, pleural effusion, or pneumothorax. No acute osseous process.      Endotracheal intubation. Bibasilar infiltrates, left pleural effusion, mild prominence of vascular and interstitial markings, follow-up recommended.  This report was finalized on 5/11/2019 10:51 AM by Dr. Rufino Bello M.D.      Xr Chest 1 View    Result Date: 5/11/2019  EXAMINATION: XR CHEST 1 VW-  CLINICAL INDICATION:     tube placement  TECHNIQUE:  XR CHEST 1 VW-  COMPARISON: 08/13/2018  FINDINGS: ET tube at level of clavicles. NG tube extends into stomach.  CHF/edema developed from previous exam. Consolidative airspace disease right greater than left mid and lower lung regions. Trace pleural effusions. No pneumothorax. No acute bony findings.      1. CHF. 2. Bibasilar airspace disease. 3. Support devices as above.  This report was finalized on 5/11/2019 7:28 AM by Dr. Escobar Peralta MD.      Ct Chest Pulmonary Embolism With Contrast    Result Date: 5/11/2019  EXAMINATION: CT CHEST PULMONARY EMBOLISM W CONTRAST-  CLINICAL INDICATION:Pulmonary embolism  COMPARISON: NONE.  TECHNIQUE: Multiple CT axial images were obtained through the level of pulmonary arteries, following IV contrast administration per CT PE protocol. Volume Rendered 3D or MIP images performed.  Radiation dose reduction techniques were utilized per ALARA protocol. Automated exposure control was initiated through either or [employer] All natural or [email or web address] software packages by  protocol.  DOSE (DLP mGy-cm):   FINDINGS:  PULMONARY ARTERIES: No evidence of pulmonary embolism.  LUNGS: CONSOLIDATIVE AIRSPACE DISEASE BILATERAL LOWER LOBES CONSISTENT WITH COMBINATION OF ATELECTASIS AND PNEUMONIA. PULMONARY  VASCULAR CONGESTION IS NOTED WITH INTERSTITIAL EDEMA.  HEART: MILD CARDIOMEGALY WITH SEVERE CORONARY ARTERY CALCIFICATIONS.  PERICARDIUM: No effusion.  MEDIASTINUM: PROBABLE REACTIVE HILAR AND MEDIASTINAL LYMPH NODES.  PLEURA: TRACE PLEURAL EFFUSIONS.  MAJOR AIRWAYS: Clear. No intrinsic mass.  VASCULATURE: No evidence of aneurysm.  VISUALIZED UPPER ABDOMEN:NODULAR LIVER MARGINS SUGGESTING CIRRHOSIS. CYST LEFT KIDNEY.  OTHER: None.  BONES: DEGENERATIVE CHANGES THORACIC SPINE. NO ACUTE BONY FINDINGS.      1. No PE. 2. CHF/edema. 3. Bibasilar consolidations compatible with pneumonia. 4. Liver cirrhosis. 5. Indeterminant cyst left kidney. Ultrasound follow-up may be helpful.  This report was finalized on 5/11/2019 7:27 AM by Dr. Escobar Peralta MD.      Results for orders placed during the hospital encounter of 05/11/19   Adult Transthoracic Echo Complete W/ Cont if Necessary Per Protocol    Narrative · Estimated EF appears to be in the range of 51 - 55%.  · Mild tricuspid valve regurgitation is present.  · Calculated right ventricular systolic pressure from tricuspid   regurgitation is 42.7 mmHg.          X-ray reviewed endotracheal tube tip clavicles.  Bilateral pleural effusions layering with some ongoing vascular congestion    Active Hospital Problems    Diagnosis  POA   • Respiratory failure (CMS/HCC) [J96.90]  Yes      Resolved Hospital Problems   No resolved problems to display.         Assessment/Plan     1. Acute on chronic hypoxemic and hypercapnic respiratory failure.    He failed spontaneous breathing trial yesterday I am going to continue to try and diurese.  2. Pneumonia bilateral bibasilar certainly raises the concern for aspiration.  Continue antibiotics  3. Elevated  troponin no ST elevation, this could be non-ST elevation MI due to demand ischemia with his profound hypoxia.  Cardiology sees no evidence of acute coronary syndrome either echocardiogram normal LVEF   4.  bilateral pleural effusions I been  unable to mobilize these he feels like he has trouble taking a deep breath I think that is the effects of these effusions.  Since they do not look any better on x-ray and he failed a spontaneous breathing trial yesterday I am going to try and get a thoracentesis today to see if we can improve his respiratory status  5. COPD with acute exacerbation continue steroids and bronchodilators.  6. Acute kidney injury creatinine back down to normal  7. Hypertension blood pressures a little better still little high on his home medications I think diuresis will help with this  8. Diabetes mellitus type 2 insulin requiring elevated hemoglobin A1c at presentation suggesting suboptimal control.  Sugars are better continue current treatment  9. Hypoalbuminemia probably poor nutritional status,   10. Hematuria may be secondary to Aguilar catheter placement I do recommend a follow-up urine in a couple of weeks once the catheter is out.  11. 3 mm pulmonary nodule noncalcified he will need follow-up CT on this given his smoking history  12. Incompletely evaluated renal lesion again when he recovers he should have a CT to fully evaluate  13. Peripheral vascular disease patient has declined intervention recently.  He certainly looks like he has significant disease.  14. Skin lesions right heel and left great toe    15. Candida intertrigo nystatin powder  16. Fluids/electro lites/nutrition patient is tolerating tube feeds well will DC IV fluids if he is extubated will probably DC tube feeds as well         17.     Plan for disposition:    Jose Luis Jasmine MD  05/15/19  7:15 AM    Time: Critical care time in excess of 42 minutes

## 2019-05-15 NOTE — PLAN OF CARE
Problem: Patient Care Overview  Goal: Plan of Care Review  Outcome: Ongoing (interventions implemented as appropriate)   05/15/19 4648   Coping/Psychosocial   Plan of Care Reviewed With patient   OTHER   Outcome Summary pt able to participate with active assisted ex in bed, pt did ex with B ANDRES BROOKE, became upset with reapplying wrist restaint RUE that not able to work with LUE, pt anxious   Plan of Care Review   Progress no change

## 2019-05-15 NOTE — THERAPY TREATMENT NOTE
Acute Care - Physical Therapy Treatment Note  [facility]     Patient Name: Heath Bajwa  : 1950  MRN: [id number]  Today's Date: 5/15/2019  Onset of Illness/Injury or Date of Surgery: 19  Date of Referral to PT: 19  Referring Physician: Mitali    Admit Date: 2019    Visit Dx:    ICD-10-CM ICD-9-CM   1. Impaired functional mobility and activity tolerance Z74.09 V49.89     Patient Active Problem List   Diagnosis   • BPH without urinary obstruction   • Elevated prostate specific antigen (PSA)   • Acute respiratory failure with hypoxia and hypercapnia (CMS/HCC)   • Venous stasis dermatitis of both lower extremities   • Elevated troponin   • Respiratory failure (CMS/HCC)       Therapy Treatment    Rehabilitation Treatment Summary     Row Name 05/15/19 1518             Treatment Time/Intention    Discipline  physical therapist  -PC      Document Type  therapy note (daily note)  -PC      Subjective Information  no complaints  -PC      Mode of Treatment  physical therapy  -PC      Patient/Family Observations  pt is still intubated on vent, he has B wrist retraints, became upset when retraints had to be reapplied  -PC      Therapy Frequency (PT Clinical Impression)  daily  -PC      Patient Effort  fair  -PC      Existing Precautions/Restrictions  fall;oxygen therapy device and L/min  -PC      Recorded by [PC] Alley Frazier, PT 05/15/19 1523      Row Name 05/15/19 1518             Cognitive Assessment/Intervention- PT/OT    Orientation Status (Cognition)  oriented x 3  -PC      Follows Commands (Cognition)  follows one step commands;75-90% accuracy  -PC      Recorded by [PC] Alley Frazeir, PT 05/15/19 1523      Row Name 05/15/19 1518             Bed Mobility Assessment/Treatment    Comment (Bed Mobility)  pt very impulsive, RN and myself did not feel comfortable about trying to sit  -PC      Recorded by [PC] Alley Frazier, PT 05/15/19 1523      Row Name 05/15/19 1518             Therapeutic  Exercise    Comment (Therapeutic Exercise)  pt perf AAROM ex , difficult to communicate with pt   -PC      Recorded by [PC] Alley Frazier, PT 05/15/19 1523      Row Name                Wound 05/11/19 1015 Left anterior toe blisters    Wound - Properties Group Date first assessed: 05/11/19 [JN] Time first assessed: 1015 [JN] Present On Admission : yes [JN] Side: Left [JN] Orientation: anterior [JN] Location: toe [JN], great toe  Type: blisters [JN] Recorded by:  [JN] Ariel Schulte, RN 05/11/19 1301    Row Name 05/15/19 1518             Plan of Care Review    Plan of Care Reviewed With  patient  -PC      Recorded by [PC] Alley Frazier, PT 05/15/19 1523      Row Name 05/15/19 1518             Outcome Summary/Treatment Plan (PT)    Anticipated Discharge Disposition (PT)  skilled nursing facility;home with assist depends on progress  -PC      Recorded by [PC] Alley Frazier, PT 05/15/19 1523        User Key  (r) = Recorded By, (t) = Taken By, (c) = Cosigned By    Initials Name Effective Dates Discipline    PC Alley Frazier, PT 04/03/18 -  PT    Ariel Baez RN 10/12/16 -  Nurse          Wound 05/11/19 1015 Left anterior toe blisters (Active)   Dressing Appearance open to air 5/15/2019 12:20 PM   Closure Open to air 5/15/2019  4:02 AM   Base purple;red 5/15/2019 12:20 PM   Periwound dry;pink 5/15/2019  4:02 AM   Periwound Temperature warm 5/15/2019  4:02 AM   Periwound Skin Turgor firm 5/15/2019  4:02 AM   Dressing Care, Wound open to air 5/15/2019  8:30 AM           Physical Therapy Education     Title: PT OT SLP Therapies (In Progress)     Topic: Physical Therapy (In Progress)     Point: Mobility training (In Progress)     Learning Progress Summary           Patient Acceptance, E,D, NR by PC at 5/15/2019  3:23 PM    Acceptance, E,D, NR by PC at 5/13/2019  3:05 PM                   Point: Home exercise program (In Progress)     Learning Progress Summary           Patient Acceptance, E,D, NR by PC at  5/15/2019  3:23 PM    Acceptance, E,D, NR by PC at 5/14/2019 11:59 AM    Acceptance, E,D, NR by PC at 5/13/2019  3:05 PM                   Point: Body mechanics (In Progress)     Learning Progress Summary           Patient Acceptance, E,D, NR by PC at 5/15/2019  3:23 PM    Acceptance, E,D, NR by PC at 5/13/2019  3:05 PM                   Point: Precautions (In Progress)     Learning Progress Summary           Patient Acceptance, E,D, NR by PC at 5/15/2019  3:23 PM    Acceptance, E,D, NR by PC at 5/13/2019  3:05 PM                               User Key     Initials Effective Dates Name Provider Type Discipline     04/03/18 -  Alley Frazier, PT Physical Therapist PT                PT Recommendation and Plan  Anticipated Discharge Disposition (PT): skilled nursing facility, home with assist(depends on progress)  Planned Therapy Interventions (PT Eval): balance training, bed mobility training, gait training, home exercise program, transfer training, strengthening  Therapy Frequency (PT Clinical Impression): daily  Outcome Summary/Treatment Plan (PT)  Anticipated Discharge Disposition (PT): skilled nursing facility, home with assist(depends on progress)  Plan of Care Reviewed With: patient  Progress: no change  Outcome Summary: pt able to participate with active assisted ex in bed, pt did ex with ANDRES OLEA, became upset with reapplying wrist restaint RUE that not able to work with FRANCISCO, pt anxious  Outcome Measures     Row Name 05/15/19 1500 05/14/19 1200 05/13/19 1500       How much help from another person do you currently need...    Turning from your back to your side while in flat bed without using bedrails?  2  -PC  2  -PC  2  -PC    Moving from lying on back to sitting on the side of a flat bed without bedrails?  1  -PC  2  -PC  2  -PC    Moving to and from a bed to a chair (including a wheelchair)?  1  -PC  1  -PC  1  -PC    Standing up from a chair using your arms (e.g., wheelchair, bedside chair)?  1  -PC  1   -PC  1  -PC    Climbing 3-5 steps with a railing?  1  -PC  1  -PC  1  -PC    To walk in hospital room?  1  -PC  1  -PC  1  -PC    AM-PAC 6 Clicks Score  7  -PC  8  -PC  8  -PC       Functional Assessment    Outcome Measure Options  --  --  AM-PAC 6 Clicks Basic Mobility (PT)  -PC      User Key  (r) = Recorded By, (t) = Taken By, (c) = Cosigned By    Initials Name Provider Type    PC Alley Frazier, PT Physical Therapist         Time Calculation:   PT Charges     Row Name 05/15/19 1526             Time Calculation    Start Time  1504  -PC      Stop Time  1513  -PC      Time Calculation (min)  9 min  -PC      PT Received On  05/15/19  -PC      PT - Next Appointment  05/16/19  -PC        User Key  (r) = Recorded By, (t) = Taken By, (c) = Cosigned By    Initials Name Provider Type    PC Alley Frazier, PT Physical Therapist        Therapy Charges for Today     Code Description Service Date Service Provider Modifiers Qty    68786024235 HC PT THER PROC EA 15 MIN 5/14/2019 Alley Frazier, PT GP 1    56438938877 HC PT THER PROC EA 15 MIN 5/15/2019 Alley Frazier, PT GP 1          PT G-Codes  Outcome Measure Options: AM-PAC 6 Clicks Basic Mobility (PT)  AM-PAC 6 Clicks Score: 7    Alley Frazier, PT  5/15/2019

## 2019-05-16 ENCOUNTER — APPOINTMENT (OUTPATIENT)
Dept: GENERAL RADIOLOGY | Facility: HOSPITAL | Age: 69
End: 2019-05-16

## 2019-05-16 LAB
ANION GAP SERPL CALCULATED.3IONS-SCNC: 7.9 MMOL/L
ARTERIAL PATENCY WRIST A: POSITIVE
ATMOSPHERIC PRESS: 747.1 MMHG
BACTERIA SPEC AEROBE CULT: NORMAL
BACTERIA SPEC AEROBE CULT: NORMAL
BASE EXCESS BLDA CALC-SCNC: 16.3 MMOL/L (ref 0–2)
BDY SITE: ABNORMAL
BUN BLD-MCNC: 24 MG/DL (ref 8–23)
BUN/CREAT SERPL: 48 (ref 7–25)
CALCIUM SPEC-SCNC: 8.7 MG/DL (ref 8.6–10.5)
CHLORIDE SERPL-SCNC: 85 MMOL/L (ref 98–107)
CO2 SERPL-SCNC: 42.1 MMOL/L (ref 22–29)
CREAT BLD-MCNC: 0.5 MG/DL (ref 0.76–1.27)
CYTO UR: NORMAL
DEPRECATED RDW RBC AUTO: 47.4 FL (ref 37–54)
ERYTHROCYTE [DISTWIDTH] IN BLOOD BY AUTOMATED COUNT: 14.6 % (ref 12.3–15.4)
GFR SERPL CREATININE-BSD FRML MDRD: >150 ML/MIN/1.73
GLUCOSE BLD-MCNC: 197 MG/DL (ref 65–99)
GLUCOSE BLDC GLUCOMTR-MCNC: 169 MG/DL (ref 70–130)
GLUCOSE BLDC GLUCOMTR-MCNC: 172 MG/DL (ref 70–130)
GLUCOSE BLDC GLUCOMTR-MCNC: 232 MG/DL (ref 70–130)
GLUCOSE BLDC GLUCOMTR-MCNC: 257 MG/DL (ref 70–130)
GLUCOSE BLDC GLUCOMTR-MCNC: 69 MG/DL (ref 70–130)
HCO3 BLDA-SCNC: 44.4 MMOL/L (ref 22–28)
HCT VFR BLD AUTO: 50.5 % (ref 37.5–51)
HGB BLD-MCNC: 16.8 G/DL (ref 13–17.7)
HOROWITZ INDEX BLD+IHG-RTO: 40 %
LAB AP CASE REPORT: NORMAL
MCH RBC QN AUTO: 30.5 PG (ref 26.6–33)
MCHC RBC AUTO-ENTMCNC: 33.3 G/DL (ref 31.5–35.7)
MCV RBC AUTO: 91.8 FL (ref 79–97)
MODALITY: ABNORMAL
O2 A-A PPRESDIFF RESPIRATORY: 0.3 MMHG
PATH REPORT.FINAL DX SPEC: NORMAL
PATH REPORT.GROSS SPEC: NORMAL
PCO2 BLDA: 62.9 MM HG (ref 35–45)
PEEP RESPIRATORY: 5 CM[H2O]
PH BLDA: 7.46 PH UNITS (ref 7.35–7.45)
PLATELET # BLD AUTO: 141 10*3/MM3 (ref 140–450)
PMV BLD AUTO: 12.2 FL (ref 6–12)
PO2 BLDA: 75.3 MM HG (ref 80–100)
POTASSIUM BLD-SCNC: 3.8 MMOL/L (ref 3.5–5.2)
PSV: 7 CMH2O
RBC # BLD AUTO: 5.5 10*6/MM3 (ref 4.14–5.8)
SAO2 % BLDCOA: 95 % (ref 92–99)
SODIUM BLD-SCNC: 135 MMOL/L (ref 136–145)
TOTAL RATE: 15 BREATHS/MINUTE
VENTILATOR MODE: ABNORMAL
VT ON VENT VENT: 575 ML
WBC NRBC COR # BLD: 11.07 10*3/MM3 (ref 3.4–10.8)

## 2019-05-16 PROCEDURE — 94799 UNLISTED PULMONARY SVC/PX: CPT

## 2019-05-16 PROCEDURE — 94660 CPAP INITIATION&MGMT: CPT

## 2019-05-16 PROCEDURE — 82803 BLOOD GASES ANY COMBINATION: CPT

## 2019-05-16 PROCEDURE — 25010000002 METHYLPREDNISOLONE PER 40 MG: Performed by: INTERNAL MEDICINE

## 2019-05-16 PROCEDURE — 94003 VENT MGMT INPAT SUBQ DAY: CPT

## 2019-05-16 PROCEDURE — 25010000002 CEFTRIAXONE PER 250 MG: Performed by: INTERNAL MEDICINE

## 2019-05-16 PROCEDURE — 85027 COMPLETE CBC AUTOMATED: CPT | Performed by: INTERNAL MEDICINE

## 2019-05-16 PROCEDURE — 92610 EVALUATE SWALLOWING FUNCTION: CPT | Performed by: SPEECH-LANGUAGE PATHOLOGIST

## 2019-05-16 PROCEDURE — 82962 GLUCOSE BLOOD TEST: CPT

## 2019-05-16 PROCEDURE — 71045 X-RAY EXAM CHEST 1 VIEW: CPT

## 2019-05-16 PROCEDURE — 36600 WITHDRAWAL OF ARTERIAL BLOOD: CPT

## 2019-05-16 PROCEDURE — 99233 SBSQ HOSP IP/OBS HIGH 50: CPT | Performed by: INTERNAL MEDICINE

## 2019-05-16 PROCEDURE — 25010000002 FUROSEMIDE PER 20 MG: Performed by: INTERNAL MEDICINE

## 2019-05-16 PROCEDURE — 63710000001 INSULIN LISPRO (HUMAN) PER 5 UNITS: Performed by: INTERNAL MEDICINE

## 2019-05-16 PROCEDURE — 25010000002 ENOXAPARIN PER 10 MG: Performed by: INTERNAL MEDICINE

## 2019-05-16 PROCEDURE — 25010000002 FENTANYL CITRATE (PF) 100 MCG/2ML SOLUTION: Performed by: INTERNAL MEDICINE

## 2019-05-16 PROCEDURE — 94640 AIRWAY INHALATION TREATMENT: CPT

## 2019-05-16 PROCEDURE — 97110 THERAPEUTIC EXERCISES: CPT

## 2019-05-16 PROCEDURE — 80048 BASIC METABOLIC PNL TOTAL CA: CPT | Performed by: INTERNAL MEDICINE

## 2019-05-16 PROCEDURE — 63710000001 INSULIN GLARGINE PER 5 UNITS: Performed by: INTERNAL MEDICINE

## 2019-05-16 RX ORDER — FENTANYL CITRATE 50 UG/ML
50 INJECTION, SOLUTION INTRAMUSCULAR; INTRAVENOUS
Status: DISCONTINUED | OUTPATIENT
Start: 2019-05-16 | End: 2019-05-17

## 2019-05-16 RX ORDER — IPRATROPIUM BROMIDE AND ALBUTEROL SULFATE 2.5; .5 MG/3ML; MG/3ML
3 SOLUTION RESPIRATORY (INHALATION)
Status: DISCONTINUED | OUTPATIENT
Start: 2019-05-16 | End: 2019-05-18

## 2019-05-16 RX ORDER — HYDROCODONE BITARTRATE AND ACETAMINOPHEN 10; 325 MG/1; MG/1
1 TABLET ORAL EVERY 6 HOURS PRN
Status: DISCONTINUED | OUTPATIENT
Start: 2019-05-16 | End: 2019-05-17

## 2019-05-16 RX ADMIN — METHYLPREDNISOLONE SODIUM SUCCINATE 20 MG: 40 INJECTION, POWDER, FOR SOLUTION INTRAMUSCULAR; INTRAVENOUS at 23:27

## 2019-05-16 RX ADMIN — INSULIN LISPRO 8 UNITS: 100 INJECTION, SOLUTION INTRAVENOUS; SUBCUTANEOUS at 10:07

## 2019-05-16 RX ADMIN — DEXMEDETOMIDINE 1.5 MCG/KG/HR: 100 INJECTION, SOLUTION, CONCENTRATE INTRAVENOUS at 10:26

## 2019-05-16 RX ADMIN — FENTANYL CITRATE 50 MCG: 50 INJECTION, SOLUTION INTRAMUSCULAR; INTRAVENOUS at 16:06

## 2019-05-16 RX ADMIN — DEXMEDETOMIDINE 1.5 MCG/KG/HR: 100 INJECTION, SOLUTION, CONCENTRATE INTRAVENOUS at 02:04

## 2019-05-16 RX ADMIN — DEXMEDETOMIDINE 1.4 MCG/KG/HR: 100 INJECTION, SOLUTION, CONCENTRATE INTRAVENOUS at 13:06

## 2019-05-16 RX ADMIN — METHYLPREDNISOLONE SODIUM SUCCINATE 20 MG: 40 INJECTION, POWDER, FOR SOLUTION INTRAMUSCULAR; INTRAVENOUS at 00:39

## 2019-05-16 RX ADMIN — DEXMEDETOMIDINE 1.5 MCG/KG/HR: 100 INJECTION, SOLUTION, CONCENTRATE INTRAVENOUS at 07:53

## 2019-05-16 RX ADMIN — FUROSEMIDE 40 MG: 10 INJECTION, SOLUTION INTRAMUSCULAR; INTRAVENOUS at 17:25

## 2019-05-16 RX ADMIN — CEFTRIAXONE SODIUM 1 G: 1 INJECTION, SOLUTION INTRAVENOUS at 12:00

## 2019-05-16 RX ADMIN — FENTANYL CITRATE 100 MCG: 50 INJECTION INTRAMUSCULAR; INTRAVENOUS at 07:54

## 2019-05-16 RX ADMIN — INSULIN GLARGINE 10 UNITS: 100 INJECTION, SOLUTION SUBCUTANEOUS at 10:07

## 2019-05-16 RX ADMIN — FENTANYL CITRATE 100 MCG: 50 INJECTION INTRAMUSCULAR; INTRAVENOUS at 10:58

## 2019-05-16 RX ADMIN — CHLORHEXIDINE GLUCONATE 15 ML: 1.2 RINSE ORAL at 20:41

## 2019-05-16 RX ADMIN — SERTRALINE 25 MG: 25 TABLET, FILM COATED ORAL at 09:50

## 2019-05-16 RX ADMIN — GABAPENTIN 600 MG: 250 SOLUTION ORAL at 17:22

## 2019-05-16 RX ADMIN — METOPROLOL TARTRATE 37.5 MG: 25 TABLET ORAL at 20:40

## 2019-05-16 RX ADMIN — IPRATROPIUM BROMIDE AND ALBUTEROL SULFATE 3 ML: 2.5; .5 SOLUTION RESPIRATORY (INHALATION) at 16:23

## 2019-05-16 RX ADMIN — IPRATROPIUM BROMIDE AND ALBUTEROL SULFATE 3 ML: 2.5; .5 SOLUTION RESPIRATORY (INHALATION) at 23:58

## 2019-05-16 RX ADMIN — INSULIN GLARGINE 10 UNITS: 100 INJECTION, SOLUTION SUBCUTANEOUS at 20:41

## 2019-05-16 RX ADMIN — DONEPEZIL HYDROCHLORIDE 10 MG: 10 TABLET, FILM COATED ORAL at 20:41

## 2019-05-16 RX ADMIN — DEXMEDETOMIDINE 1.4 MCG/KG/HR: 100 INJECTION, SOLUTION, CONCENTRATE INTRAVENOUS at 16:09

## 2019-05-16 RX ADMIN — IPRATROPIUM BROMIDE 6 PUFF: 17 AEROSOL, METERED RESPIRATORY (INHALATION) at 03:12

## 2019-05-16 RX ADMIN — FENTANYL CITRATE 100 MCG: 50 INJECTION INTRAMUSCULAR; INTRAVENOUS at 02:57

## 2019-05-16 RX ADMIN — FENTANYL CITRATE 100 MCG: 50 INJECTION INTRAMUSCULAR; INTRAVENOUS at 05:34

## 2019-05-16 RX ADMIN — AMLODIPINE BESYLATE 10 MG: 10 TABLET ORAL at 09:50

## 2019-05-16 RX ADMIN — ALBUTEROL SULFATE 6 PUFF: 90 AEROSOL, METERED RESPIRATORY (INHALATION) at 03:12

## 2019-05-16 RX ADMIN — FAMOTIDINE 20 MG: 10 INJECTION INTRAVENOUS at 20:41

## 2019-05-16 RX ADMIN — FENTANYL CITRATE 100 MCG: 50 INJECTION INTRAMUSCULAR; INTRAVENOUS at 04:52

## 2019-05-16 RX ADMIN — IPRATROPIUM BROMIDE 6 PUFF: 17 AEROSOL, METERED RESPIRATORY (INHALATION) at 07:12

## 2019-05-16 RX ADMIN — FUROSEMIDE 40 MG: 10 INJECTION, SOLUTION INTRAMUSCULAR; INTRAVENOUS at 09:50

## 2019-05-16 RX ADMIN — CHLORHEXIDINE GLUCONATE 15 ML: 1.2 RINSE ORAL at 09:56

## 2019-05-16 RX ADMIN — INSULIN LISPRO 12 UNITS: 100 INJECTION, SOLUTION INTRAVENOUS; SUBCUTANEOUS at 12:33

## 2019-05-16 RX ADMIN — FAMOTIDINE 20 MG: 10 INJECTION INTRAVENOUS at 09:51

## 2019-05-16 RX ADMIN — METHYLPREDNISOLONE SODIUM SUCCINATE 20 MG: 40 INJECTION, POWDER, FOR SOLUTION INTRAMUSCULAR; INTRAVENOUS at 12:32

## 2019-05-16 RX ADMIN — FENTANYL CITRATE 50 MCG: 50 INJECTION, SOLUTION INTRAMUSCULAR; INTRAVENOUS at 14:55

## 2019-05-16 RX ADMIN — DEXMEDETOMIDINE 1.5 MCG/KG/HR: 100 INJECTION, SOLUTION, CONCENTRATE INTRAVENOUS at 04:52

## 2019-05-16 RX ADMIN — SODIUM CHLORIDE, PRESERVATIVE FREE 3 ML: 5 INJECTION INTRAVENOUS at 09:57

## 2019-05-16 RX ADMIN — IPRATROPIUM BROMIDE AND ALBUTEROL SULFATE 3 ML: 2.5; .5 SOLUTION RESPIRATORY (INHALATION) at 20:05

## 2019-05-16 RX ADMIN — GABAPENTIN 600 MG: 250 SOLUTION ORAL at 05:34

## 2019-05-16 RX ADMIN — NYSTATIN: 100000 POWDER TOPICAL at 20:41

## 2019-05-16 RX ADMIN — ASPIRIN 325 MG: 325 TABLET ORAL at 09:50

## 2019-05-16 RX ADMIN — ALBUTEROL SULFATE 6 PUFF: 90 AEROSOL, METERED RESPIRATORY (INHALATION) at 07:12

## 2019-05-16 RX ADMIN — FUROSEMIDE 40 MG: 10 INJECTION, SOLUTION INTRAMUSCULAR; INTRAVENOUS at 02:04

## 2019-05-16 RX ADMIN — GABAPENTIN 600 MG: 250 SOLUTION ORAL at 23:26

## 2019-05-16 RX ADMIN — DEXMEDETOMIDINE 1.3 MCG/KG/HR: 100 INJECTION, SOLUTION, CONCENTRATE INTRAVENOUS at 19:48

## 2019-05-16 RX ADMIN — NYSTATIN 1 APPLICATION: 100000 POWDER TOPICAL at 09:57

## 2019-05-16 RX ADMIN — HYDROCODONE BITARTRATE AND ACETAMINOPHEN 1 TABLET: 10; 325 TABLET ORAL at 17:25

## 2019-05-16 RX ADMIN — ENOXAPARIN SODIUM 40 MG: 40 INJECTION SUBCUTANEOUS at 12:36

## 2019-05-16 RX ADMIN — FENTANYL CITRATE 100 MCG: 50 INJECTION INTRAMUSCULAR; INTRAVENOUS at 00:39

## 2019-05-16 RX ADMIN — DEXMEDETOMIDINE 1.3 MCG/KG/HR: 100 INJECTION, SOLUTION, CONCENTRATE INTRAVENOUS at 22:43

## 2019-05-16 RX ADMIN — FENTANYL CITRATE 100 MCG: 50 INJECTION INTRAMUSCULAR; INTRAVENOUS at 04:14

## 2019-05-16 RX ADMIN — FENTANYL CITRATE 100 MCG: 50 INJECTION INTRAMUSCULAR; INTRAVENOUS at 09:50

## 2019-05-16 RX ADMIN — ATORVASTATIN CALCIUM 40 MG: 20 TABLET, FILM COATED ORAL at 09:50

## 2019-05-16 RX ADMIN — LISINOPRIL 40 MG: 40 TABLET ORAL at 09:50

## 2019-05-16 RX ADMIN — FENTANYL CITRATE 50 MCG: 50 INJECTION, SOLUTION INTRAMUSCULAR; INTRAVENOUS at 13:39

## 2019-05-16 NOTE — PLAN OF CARE
Problem: Patient Care Overview  Goal: Plan of Care Review   05/16/19 0611   Coping/Psychosocial   Plan of Care Reviewed With patient   OTHER   Outcome Summary Patient awake and agitated most of the night, increased agitation with stimulaton. Prn fent given frequently. Maxed on precedex, which doesn't seem to be helping. Will continue to monitor.   Plan of Care Review   Progress no change

## 2019-05-16 NOTE — PROGRESS NOTES
Hospital Follow Up    LOS:  LOS: 5 days   Patient Name: Heath Bajwa  Age/Sex: 69 y.o. male  : 1950  MRN: [id number]    Date of Hospital Visit: 19  Length of Stay: 5  Encounter Provider: Valerio Arango MD  Place of Service: [facility]    Subjective:     Follow Up for: Elevated troponin    Interval History: Patient feeling better today.  He does not complain of any chest pain.  He continues to diuresis significantly.  Blood pressure elevated today.    Objective:     Objective:  Temp:  [98.6 °F (37 °C)-99.6 °F (37.6 °C)] 98.6 °F (37 °C)  Heart Rate:  [44-61] 56  Resp:  [20-24] 24  BP: (127-171)/() 167/74  FiO2 (%):  [40 %-41 %] 40 %  Body mass index is 33.66 kg/m².    Intake/Output Summary (Last 24 hours) at 2019 0753  Last data filed at 2019 0502  Gross per 24 hour   Intake 2530 ml   Output 8925 ml   Net -6395 ml         19  1427 19  0200 19  0511   Weight: 118 kg (260 lb) 118 kg (260 lb 12.9 oz) 119 kg (262 lb 2 oz)     Weight change:     Physical Exam:   General Appearance: Alert, cooperative, in no acute distress. AAOx4.   HEENT: Normocephalic.  Neck: Supple. No JVD. No Carotid bruit. No thyromegaly  Lungs: CTAB. Normal respiratory effort and rate.  Heart:: Regular rate and rhythm, normal S1 and S2, no murmurs, gallops or rubs.  Abdomen: Soft, nontender, non-distended. positive bowel sounds  Extremities: Warm, no cyanosis, or clubbing. No edema.     Lab Review:   Results from last 7 days   Lab Units 05/15/19  0507 19  0314 19  0427   SODIUM mmol/L 132* 137 138   POTASSIUM mmol/L 4.2 4.8 5.3*   CHLORIDE mmol/L 86* 96* 98   CO2 mmol/L 39.6* 31.0* 29.4*   BUN mg/dL 28* 31* 38*   CREATININE mg/dL 0.62* 0.73* 0.99   GLUCOSE mg/dL 207* 165* 278*   CALCIUM mg/dL 8.7 8.9 8.7       Results from last 7 days   Lab Units 19  1104 19  0542 19  0339   TROPONIN T ng/mL 0.172* 0.069* 0.057*     Results  from last 7 days   Lab Units 05/16/19  0502 05/14/19  0314   WBC 10*3/mm3 11.07* 12.21*   HEMOGLOBIN g/dL 16.8 15.3   HEMATOCRIT % 50.5 47.5   PLATELETS 10*3/mm3 141 179     Results from last 7 days   Lab Units 05/15/19  0920 05/11/19  1104   INR  1.04 1.17*     Results from last 7 days   Lab Units 05/12/19  0351 05/11/19  1104   MAGNESIUM mg/dL 1.8 1.8                     I reviewed the patient's new clinical results.          I personally viewed and interpreted the patient's EKG/Telemetry data.  Current Medications:   Scheduled Meds:  albuterol sulfate HFA 6 puff Inhalation Q4H - RT   amLODIPine 10 mg Oral Q24H   aspirin 325 mg Oral Daily   atorvastatin 40 mg Oral Daily   ceftriaxone 1 g Intravenous Q24H   chlorhexidine 15 mL Mouth/Throat Q12H   donepezil 10 mg Oral Nightly   famotidine 20 mg Intravenous Q12H   furosemide 40 mg Intravenous Q8H   gabapentin 600 mg Nasogastric Q8H   insulin glargine 10 Units Subcutaneous Q12H   insulin lispro 0-24 Units Subcutaneous 4x Daily With Meals & Nightly   ipratropium 6 puff Inhalation Q4H - RT   lisinopril 40 mg Oral Daily   methylPREDNISolone sodium succinate 20 mg Intravenous Q12H   metoprolol tartrate 25 mg Oral Q12H   nystatin  Topical Q12H   sertraline 25 mg Oral Daily   sodium chloride 3 mL Intravenous Q12H     Continuous Infusions:  dexmedetomidine 0.2-1.5 mcg/kg/hr Last Rate: 1.5 mcg/kg/hr (05/16/19 0452)   hold 1 each        Allergies:  No Known Allergies    Assessment & Plan       Respiratory failure (CMS/[facility])  Elevated troponin: Probably type II infarct  3.  Hypertension: Continue to monitor blood pressure.  Adjust medication  4.  Diabetes mellitus  5.  COPD              Valerio Arango MD  05/16/19

## 2019-05-16 NOTE — PLAN OF CARE
Problem: Patient Care Overview  Goal: Plan of Care Review  Outcome: Ongoing (interventions implemented as appropriate)   05/16/19 6186   Coping/Psychosocial   Plan of Care Reviewed With patient   OTHER   Outcome Summary patient awake, able to follow commands for completion of bed exercises today with encouragement from nurse to participate.

## 2019-05-16 NOTE — PLAN OF CARE
Problem: Patient Care Overview  Goal: Plan of Care Review  Outcome: Ongoing (interventions implemented as appropriate)   05/16/19 2868   Coping/Psychosocial   Plan of Care Reviewed With patient   OTHER   Outcome Summary Pt demonstrated delayed throat clear with thin liquid. Did not accept more than single drink nectar. No overt s/s aspiration with honey or puree. Solids not trialed at this time. REC puree with honey thick liquids, meds crushed w/ puree. Will reassess for tolerance and ability to upgrade.

## 2019-05-16 NOTE — THERAPY EVALUATION
Acute Care - Speech Language Pathology   Swallow Initial Evaluation [facility]     Patient Name: Heath Bajwa  : 1950  MRN: [id number]  Today's Date: 2019  Onset of Illness/Injury or Date of Surgery: 19     Referring Physician: Mitali      Admit Date: 2019    Visit Dx:     ICD-10-CM ICD-9-CM   1. Impaired functional mobility and activity tolerance Z74.09 V49.89     Patient Active Problem List   Diagnosis   • BPH without urinary obstruction   • Elevated prostate specific antigen (PSA)   • Acute respiratory failure with hypoxia and hypercapnia (CMS/HCC)   • Venous stasis dermatitis of both lower extremities   • Elevated troponin   • Respiratory failure (CMS/HCC)     Past Medical History:   Diagnosis Date   • COPD (chronic obstructive pulmonary disease) (CMS/HCC)    • Diabetes mellitus (CMS/HCC)    • Hypertension      History reviewed. No pertinent surgical history.     SWALLOW EVALUATION (last 72 hours)      SLP Adult Swallow Evaluation     Row Name 19 1500                   Rehab Evaluation    Document Type  evaluation  -SA        Patient Observations  alert;cooperative  -SA        Patient/Family Observations  HOB elevated for eval  -SA        Patient Effort  adequate  -SA        Symptoms Noted During/After Treatment  none  -SA           General Information    Patient Profile Reviewed  yes  -SA        Pertinent History Of Current Problem  extubated 1253 today  -SA        Current Method of Nutrition  NPO  -SA        Prior Level of Function-Swallowing  no diet consistency restrictions  -SA        Plans/Goals Discussed with  patient  -SA        Barriers to Rehab  cognitive status;medically complex  -SA        Patient's Goals for Discharge  return to PO diet  -SA           Pain Assessment    Additional Documentation  Pain Scale: Numbers Pre/Post-Treatment (Group)  -SA           Pain Scale: Numbers Pre/Post-Treatment    Pain Scale: Numbers, Pretreatment  0/10 - no pain  -SA        Pain  Scale: Numbers, Post-Treatment  0/10 - no pain  -SA           Oral Motor and Function    Dentition Assessment  edentulous, dentures not available  -SA           Oral Musculature and Cranial Nerve Assessment    Oral Motor General Assessment  generalized oral motor weakness  -SA           Clinical Swallow Eval    Clinical Swallow Evaluation Summary  Pt demonstrated delayed throat clear with thin liquid.  Did not accept more than single drink nectar.  No overt s/s aspiration with honey or puree.  Solids not trialed at this time.  REC puree with honey thick liquids, meds crushed w/ puree.  Will reassess for tolerance and ability to upgrade.  -SA           Clinical Impression    SLP Swallowing Diagnosis  suspected pharyngeal dysfunction  -SA        Functional Impact  risk of aspiration/pneumonia  -SA        Rehab Potential/Prognosis, Swallowing  good, to achieve stated therapy goals  -SA        Swallow Criteria for Skilled Therapeutic Interventions Met  demonstrates skilled criteria  -SA           Recommendations    Therapy Frequency (Swallow)  PRN  -SA        Predicted Duration Therapy Intervention (Days)  until discharge  -SA        SLP Diet Recommendation  puree;honey thick liquids  -SA        Recommended Diagnostics  reassess via clinical swallow evaluation  -SA        Recommended Precautions and Strategies  upright posture during/after eating;small bites of food and sips of liquid  -SA        SLP Rec. for Method of Medication Administration  meds crushed;with pudding or applesauce  -        Monitor for Signs of Aspiration  notify SLP if any concerns  -SA        Anticipated Dischage Disposition  unknown  -SA           Swallow Goals (SLP)    Oral Nutrition/Hydration Goal Selection (SLP)  oral nutrition/hydration, SLP goal 1  -SA           Oral Nutrition/Hydration Goal 1 (SLP)    Oral Nutrition/Hydration Goal 1, SLP  Pt will tolerate least restrictive diet  -SA        Time Frame (Oral Nutrition/Hydration Goal 1, SLP)   by discharge  -          User Key  (r) = Recorded By, (t) = Taken By, (c) = Cosigned By    Initials Name Effective Dates    Maegan Peck MS [facility]-SLP 03/07/18 -           EDUCATION  The patient has been educated in the following areas:   Dysphagia (Swallowing Impairment) Modified Diet Instruction.    SLP Recommendation and Plan  SLP Swallowing Diagnosis: suspected pharyngeal dysfunction  SLP Diet Recommendation: puree, honey thick liquids  Recommended Precautions and Strategies: upright posture during/after eating, small bites of food and sips of liquid  SLP Rec. for Method of Medication Administration: meds crushed, with pudding or applesauce     Monitor for Signs of Aspiration: notify SLP if any concerns  Recommended Diagnostics: reassess via clinical swallow evaluation  Swallow Criteria for Skilled Therapeutic Interventions Met: demonstrates skilled criteria  Anticipated Dischage Disposition: unknown  Rehab Potential/Prognosis, Swallowing: good, to achieve stated therapy goals  Therapy Frequency (Swallow): PRN  Predicted Duration Therapy Intervention (Days): until discharge       Plan of Care Reviewed With: patient  Plan of Care Review  Plan of Care Reviewed With: patient  Outcome Summary: Pt demonstrated delayed throat clear with thin liquid.  Did not accept more than single drink nectar.  No overt s/s aspiration with honey or puree.  Solids not trialed at this time.  REC puree with honey thick liquids, meds crushed w/ puree.  Will reassess for tolerance and ability to upgrade.    SLP GOALS     Row Name 05/16/19 1500             Oral Nutrition/Hydration Goal 1 (SLP)    Oral Nutrition/Hydration Goal 1, SLP  Pt will tolerate least restrictive diet  -SA      Time Frame (Oral Nutrition/Hydration Goal 1, SLP)  by discharge  -        User Key  (r) = Recorded By, (t) = Taken By, (c) = Cosigned By    Initials Name Provider Type    Maegan Peck MS CCC-SLP Speech and Language Pathologist           SLP  Outcome Measures (last 72 hours)      SLP Outcome Measures     Row Name 05/16/19 1500             SLP Outcome Measures    Outcome Measure Used?  Adult NOMS  -SA         Adult FCM Scores    FCM Chosen  Swallowing  -      Swallowing FCM Score  3  -        User Key  (r) = Recorded By, (t) = Taken By, (c) = Cosigned By    Initials Name Effective Dates    Maegan Peck MS CCC-SLP 03/07/18 -            Time Calculation:   Time Calculation- SLP     Row Name 05/16/19 1559             Time Calculation- SLP    SLP Start Time  1500  -      SLP Received On  05/16/19  -        User Key  (r) = Recorded By, (t) = Taken By, (c) = Cosigned By    Initials Name Provider Type    Maegan Peck MS CCC-SLP Speech and Language Pathologist          Therapy Charges for Today     Code Description Service Date Service Provider Modifiers Qty    03722852912 HC ST EVAL ORAL PHARYNG SWALLOW 4 5/16/2019 Maegan Shaikh MS CCC-SLP GN 1               Maegan Shaikh MS CCC-RENETTA  5/16/2019

## 2019-05-16 NOTE — PLAN OF CARE
Problem: Patient Care Overview  Goal: Plan of Care Review  Outcome: Ongoing (interventions implemented as appropriate)      Problem: Ventilation, Mechanical Invasive (Adult)  Goal: Signs and Symptoms of Listed Potential Problems Will be Absent, Minimized or Managed (Ventilation, Mechanical Invasive)  Outcome: Ongoing (interventions implemented as appropriate)   05/15/19 1600   Goal/Outcome Evaluation   Problems Assessed (Mechanical Ventilation, Invasive) inability to wean;situational response   Problems Present (Mech Vent, Invasive) inability to wean;situational response       Problem: Pneumonia (Adult)  Goal: Signs and Symptoms of Listed Potential Problems Will be Absent, Minimized or Managed (Pneumonia)  Outcome: Ongoing (interventions implemented as appropriate)   05/15/19 1600   Goal/Outcome Evaluation   Problems Assessed (Pneumonia) all   Problems Present (Pneumonia) respiratory compromise       Problem: Pain, Acute (Adult)  Goal: Acceptable Pain Control/Comfort Level  Outcome: Ongoing (interventions implemented as appropriate)   05/16/19 1812   Pain, Acute (Adult)   Acceptable Pain Control/Comfort Level making progress toward outcome       Problem: Skin Injury Risk (Adult)  Goal: Skin Health and Integrity  Outcome: Ongoing (interventions implemented as appropriate)   05/16/19 1812   Skin Injury Risk (Adult)   Skin Health and Integrity making progress toward outcome       Problem: Nutrition, Enteral (Adult)  Goal: Signs and Symptoms of Listed Potential Problems Will be Absent, Minimized or Managed (Nutrition, Enteral)  Outcome: Ongoing (interventions implemented as appropriate)   05/14/19 1730   Goal/Outcome Evaluation   Problems Assessed (Enteral Nutrition) all   Problems Present (Enteral Nutrition) fluid/electrolyte imbalance       Problem: Fall Risk (Adult)  Goal: Absence of Fall  Outcome: Ongoing (interventions implemented as appropriate)   05/16/19 1812   Fall Risk (Adult)   Absence of Fall making progress  toward outcome       Problem: Restraint, Nonbehavioral (Nonviolent)  Goal: Rationale and Justification   05/16/19 1812   Restraint, Nonbehavioral (Nonviolent)   Rationale and Justification prevent line/tube removal     Goal: Nonbehavioral (Nonviolent) Restraint: Absence of Injury/Harm  Outcome: Ongoing (interventions implemented as appropriate)   05/16/19 1812   Restraint, Nonbehavioral (Nonviolent)   Nonbehavioral (Nonviolent) Restraint: Absence of Injury/Harm met     Goal: Nonbehavioral (Nonviolent) Restraint: Achievement of Discontinuation Criteria  Outcome: Ongoing (interventions implemented as appropriate)   05/16/19 1812   Restraint, Nonbehavioral (Nonviolent)   Nonbehavioral (Nonviolent) Restraint: Achievement of Discontinuation Criteria met     Goal: Nonbehavioral (Nonviolent) Restraint: Preservation of Dignity and Wellbeing  Outcome: Ongoing (interventions implemented as appropriate)   05/16/19 1812   Restraint, Nonbehavioral (Nonviolent)   Nonbehavioral (Nonviolent) Restraint: Preservation of Dignity and Wellbeing met     Patient remains in CCU. Weaned from ventilator this afternoon.  Asking frequently for pain medications-see MAR and medication orders for new meds.  Aguilar remains.  Patient starts pureed with honey thick liquids for dinner this pm.  Meds crushed with applesauce.  Will continue to monitor.

## 2019-05-16 NOTE — PROGRESS NOTES
LOS: 5 days   Patient Care Team:  Provider, No Known as PCP - General  Provider, No Known as PCP - Family Medicine    Subjective     Patient remains intubated he is sedated on Precedex at 1.4 mics per kilogram per minute.  He is awake and following commands.    He does indicate he is breathing better than prior to thoracentesis.  He is very anxious to get off the ventilator  Review of Systems:          Objective     Vital Signs  Vital Sign Min/Max for last 24 hours  Temp  Min: 98.6 °F (37 °C)  Max: 99.6 °F (37.6 °C)   BP  Min: 127/68  Max: 171/118   Pulse  Min: 44  Max: 61   Resp  Min: 20  Max: 24   SpO2  Min: 90 %  Max: 97 %   No Data Recorded   No Data Recorded        Ventilator/Non-Invasive Ventilation Settings (From admission, onward)    Start     Ordered    05/11/19 1021  Ventilator - AC/PC; (28); 100; 88%; Other (see comments); 18; 17  Continuous     Question Answer Comment   Vent Mode AC/PC    Breath rate  28   FiO2 100    FiO2 titrate for Sp02% =/> 88%    PEEP Other (see comments)    PEEP: 18    Inspiratory Pressure 17        05/11/19 1027                       Body mass index is 33.66 kg/m².  I/O last 3 completed shifts:  In: 3964 [I.V.:1820; Other:735; NG/GT:1359; IV Piggyback:50]  Out: 42021 [Urine:83459]  No intake/output data recorded.        Physical Exam:    General Appearance: Obese white male orally intubated with an 8.0 endotracheal tube at about 24 cm at the lips.  Sedated with Precedex at 1.4 mics per kilogram per minute he is awake alert cooperative.  He does not look in acute distress.  He is on pressure control 12 inspiratory pressure of 12 cm PEEP of 5 cm FiO2 of 40% he is pulling tidal volumes in the mid 500s, is not laboring and his oxygen saturations are 97%  Eyes: Conjunctival are clear and anicteric pupils are equal about 3 mm  ENT: Oral mucous membranes are dry nasal septum appears midline I do not see any exudates or erythema the oral endotracheal tube and orogastric tube are  in place  Neck: No adenopathy no jugular venous distention, trachea midline, no palpable thyromegaly  Lungs:  Seems to have breath sounds are pretty equal bilaterally I am not sure I can tell any difference in the bases regarding dullness.  Cardiac: Regular rhythm no murmur   Abdomen: Soft no palpable organomegaly or masses  :  he has a Aguilar catheter in place  Musculoskeletal: He has what looks like chronic arterial and venous insufficiency in his lower extremities.    He has just trace lower extremity edema bilaterally  Skin: He has a blood-filled blister on the dorsum of the left great toe and he is a small blood-filled blister on his right heel but no change from admission.  No jaundice no petechiae  Neuro:  He is definitely awake and alert he is cooperating he is following commands moving all extremities.    Extremities/P Vascular: He does have palpable radial pulses and very weak dorsalis pedis pulses bilaterally   MSE:  He is pretty anxious he wants off the ventilator.           Labs:  Results from last 7 days   Lab Units 05/15/19  0920 05/15/19  0507 05/14/19  0314 05/13/19  0427 05/12/19  0351 05/11/19  1104 05/11/19  0339   GLUCOSE mg/dL  --  207* 165* 278* 209* 215* 304*   SODIUM mmol/L  --  132* 137 138 139 140 136   POTASSIUM mmol/L  --  4.2 4.8 5.3* 5.1 4.3 4.7   MAGNESIUM mg/dL  --   --   --   --  1.8 1.8 1.8   CO2 mmol/L  --  39.6* 31.0* 29.4* 30.4* 35.6* 35.2*   CHLORIDE mmol/L  --  86* 96* 98 98 95* 93*   ANION GAP mmol/L  --  6.4 10.0 10.6 10.6 9.4 7.8   CREATININE mg/dL  --  0.62* 0.73* 0.99 1.28* 0.93 0.91   BUN mg/dL  --  28* 31* 38* 32* 22 23   BUN / CREAT RATIO   --  45.2* 42.5* 38.4* 25.0 23.7 25.3*   CALCIUM mg/dL  --  8.7 8.9 8.7 8.3* 8.7 8.3*   EGFR IF NONAFRICN AM mL/min/1.73  --  128 106 75 55* 80 82   ALK PHOS U/L  --   --   --   --   --  101 105   TOTAL PROTEIN g/dL 5.6*  --   --   --   --  5.9* 6.1   ALT (SGPT) U/L  --   --   --   --   --  17 16   AST (SGOT) U/L  --   --   --   --    --  17 20   BILIRUBIN mg/dL  --   --   --   --   --  0.5 0.5   ALBUMIN g/dL  --   --   --   --   --  2.30* 2.38*   GLOBULIN gm/dL  --   --   --   --   --  3.6 3.7     Estimated Creatinine Clearance: 116.1 mL/min (A) (by C-G formula based on SCr of 0.62 mg/dL (L)).      Results from last 7 days   Lab Units 05/16/19  0502 05/14/19  0314 05/12/19  0351 05/11/19  1104 05/11/19  0339   WBC 10*3/mm3 11.07* 12.21* 16.03* 14.91* 13.92*   RBC 10*6/mm3 5.50 5.06 5.07 5.32 5.05   HEMOGLOBIN g/dL 16.8 15.3 15.3 16.2 15.5   HEMATOCRIT % 50.5 47.5 48.7 52.5* 50.8   MCV fL 91.8 93.9 96.1 98.7* 100.6*   MCH pg 30.5 30.2 30.2 30.5 30.7   MCHC g/dL 33.3 32.2 31.4* 30.9* 30.5*   RDW % 14.6 14.6 14.8 14.2 14.9   RDW-SD fl 47.4 50.1 51.3 51.7 54.4*   MPV fL 12.2* 11.5 11.5 10.9 10.7   PLATELETS 10*3/mm3 141 179 211 212 208   NEUTROPHIL % %  --   --   --  86.0* 88.6*   LYMPHOCYTE % %  --   --   --  5.3* 3.2*   MONOCYTES % %  --   --   --  7.7 5.0   EOSINOPHIL % %  --   --   --  0.1* 0.1*   BASOPHIL % %  --   --   --  0.4 0.4   IMM GRAN % %  --   --   --  0.5 2.7*   NEUTROS ABS 10*3/mm3  --   --   --  12.83* 12.31*   LYMPHS ABS 10*3/mm3  --   --   --  0.79 0.45*   MONOS ABS 10*3/mm3  --   --   --  1.15* 0.70   EOS ABS 10*3/mm3  --   --   --  0.01 0.02   BASOS ABS 10*3/mm3  --   --   --  0.06 0.06   IMMATURE GRANS (ABS) 10*3/mm3  --   --   --  0.07* 0.38*   NRBC /100 WBC  --   --   --  0.1  --      Results from last 7 days   Lab Units 05/14/19  0957   PH, ARTERIAL pH units 7.357   PO2 ART mm Hg 71.9*   PCO2, ARTERIAL mm Hg 66.0*   HCO3 ART mmol/L 37.0*     Results from last 7 days   Lab Units 05/11/19  1104 05/11/19  0542 05/11/19  0339   TROPONIN T ng/mL 0.172* 0.069* 0.057*             Results from last 7 days   Lab Units 05/11/19  1104 05/11/19  0339   LACTATE mmol/L 1.5 1.0   PROCALCITONIN ng/mL 0.13  --      Results from last 7 days   Lab Units 05/15/19  0920 05/11/19  1104   INR  1.04 1.17*     Microbiology Results (last 10 days)      Procedure Component Value - Date/Time    Body Fluid Culture - Body Fluid, Pleural Cavity [[id number]] Collected:  05/15/19 1103    Lab Status:  Preliminary result Specimen:  Body Fluid from Pleural Cavity Updated:  05/15/19 1606     Gram Stain Occasional WBCs seen      No organisms seen    Respiratory Culture - Aspirate, ET Suction [[id number]] Collected:  05/11/19 1209    Lab Status:  Final result Specimen:  Aspirate from ET Suction Updated:  05/13/19 0759     Respiratory Culture Scant growth (1+) Normal Respiratory Kassandra     Gram Stain Few (2+) WBCs seen      Rare (1+) Epithelial cells per low power field      Mixed bacterial morphotypes seen on Gram Stain    Blood Culture - Blood, Blood, Venous Line [[id number]] Collected:  05/11/19 0339    Lab Status:  Final result Specimen:  Blood, Venous Line Updated:  05/16/19 0345     Blood Culture No growth at 5 days    Blood Culture - Blood, Arm, Right [[id number]] Collected:  05/11/19 0339    Lab Status:  Final result Specimen:  Blood from Arm, Right Updated:  05/16/19 0345     Blood Culture No growth at 5 days                albuterol sulfate HFA 6 puff Inhalation Q4H - RT   amLODIPine 10 mg Oral Q24H   aspirin 325 mg Oral Daily   atorvastatin 40 mg Oral Daily   ceftriaxone 1 g Intravenous Q24H   chlorhexidine 15 mL Mouth/Throat Q12H   donepezil 10 mg Oral Nightly   famotidine 20 mg Intravenous Q12H   furosemide 40 mg Intravenous Q8H   gabapentin 600 mg Nasogastric Q8H   insulin glargine 10 Units Subcutaneous Q12H   insulin lispro 0-24 Units Subcutaneous 4x Daily With Meals & Nightly   ipratropium 6 puff Inhalation Q4H - RT   lisinopril 40 mg Oral Daily   methylPREDNISolone sodium succinate 20 mg Intravenous Q12H   metoprolol tartrate 25 mg Oral Q12H   nystatin  Topical Q12H   sertraline 25 mg Oral Daily   sodium chloride 3 mL Intravenous Q12H       dexmedetomidine 0.2-1.5 mcg/kg/hr Last Rate: 1.5 mcg/kg/hr (05/16/19 0452)   hold 1 each        Diagnostics:  Ct Head  Without Contrast    Result Date: 5/11/2019  EXAMINATION: CT HEAD WO CONTRAST-  CLINICAL INDICATION:     ams  COMPARISON:    None  Technique: Multiple CT axial images were obtained through the level of the brain without IV contrast administration. Reformatted images in the coronal and/or sagittal plane(s) were generated from the axial data set to facilitate diagnostic accuracy and/or surgical planning.  Radiation dose reduction techniques were utilized per ALARA protocol. Automated exposure control was initiated through either or [employer] or [employer] software packages by  protocol.   DOSE (DLP mGy-cm): 1469.74 mGy.cm  FINDINGS:   Mild generalized cerebral atrophy and chronic small vessel ischemic disease noted. No acute intracranial abnormality. No midline shift or mass effect. No hydrocephalus or intracranial hemorrhage. There is no CT evidence of acute vascular territory infarct. Bone windows show no acute osseous abnormality. Bilateral right greater than left mastoid effusions. Pansinusitis.      1. No CT evidence of acute intracranial abnormality. 2. Estimated effusions and pansinusitis.  This report was finalized on 5/11/2019 7:24 AM by Dr. Escobar Peralta MD.      Xr Chest 1 View    Result Date: 5/12/2019  PORTABLE CHEST RADIOGRAPH  HISTORY: Intubated patient  COMPARISON: 05/11/2019  FINDINGS: Cardiomediastinal silhouette is unchanged. Endotracheal tube appears to terminate at the level of the clavicles. It could be advanced about 3 cm. A nasogastric tube is present. It appears to extend to the stomach. No pneumothorax is seen. There is persistent bibasilar consolidation. There are small bilateral pleural effusions. Vascular congestion is unchanged.      Endotracheal tube terminates at the level of clavicles. It could be advanced about 3 cm.  This report was finalized on 5/12/2019 5:00 AM by Dr. Yuliana Chandra M.D.      Xr Chest 1 View    Result Date: 5/11/2019  XR CHEST 1 VW-  HISTORY: Male who  is 69 years-old,  endotracheal intubation  TECHNIQUE: Frontal views of the chest  COMPARISON: None available  FINDINGS: Patient is rotated towards the left. The endotracheal tube tip appears to be about 8 cm above the candace. NG tube extends beyond the inferior extent of the image. Heart size is normal. Mild prominence of vascular and interstitial markings. Patchy infiltrates at the bases, small left pleural effusion. Heart, mediastinum and pulmonary vasculature are unremarkable. No focal pulmonary consolidation, pleural effusion, or pneumothorax. No acute osseous process.      Endotracheal intubation. Bibasilar infiltrates, left pleural effusion, mild prominence of vascular and interstitial markings, follow-up recommended.  This report was finalized on 5/11/2019 10:51 AM by Dr. Rufino Bello M.D.      Xr Chest 1 View    Result Date: 5/11/2019  EXAMINATION: XR CHEST 1 VW-  CLINICAL INDICATION:     tube placement  TECHNIQUE:  XR CHEST 1 VW-  COMPARISON: 08/13/2018  FINDINGS: ET tube at level of clavicles. NG tube extends into stomach.  CHF/edema developed from previous exam. Consolidative airspace disease right greater than left mid and lower lung regions. Trace pleural effusions. No pneumothorax. No acute bony findings.      1. CHF. 2. Bibasilar airspace disease. 3. Support devices as above.  This report was finalized on 5/11/2019 7:28 AM by Dr. Escobar Peralta MD.      Ct Chest Pulmonary Embolism With Contrast    Result Date: 5/11/2019  EXAMINATION: CT CHEST PULMONARY EMBOLISM W CONTRAST-  CLINICAL INDICATION:Pulmonary embolism  COMPARISON: NONE.  TECHNIQUE: Multiple CT axial images were obtained through the level of pulmonary arteries, following IV contrast administration per CT PE protocol. Volume Rendered 3D or MIP images performed.  Radiation dose reduction techniques were utilized per ALARA protocol. Automated exposure control was initiated through either or [employer] or [employer] software packages by   protocol.  DOSE (DLP mGy-cm):   FINDINGS:  PULMONARY ARTERIES: No evidence of pulmonary embolism.  LUNGS: CONSOLIDATIVE AIRSPACE DISEASE BILATERAL LOWER LOBES CONSISTENT WITH COMBINATION OF ATELECTASIS AND PNEUMONIA. PULMONARY VASCULAR CONGESTION IS NOTED WITH INTERSTITIAL EDEMA.  HEART: MILD CARDIOMEGALY WITH SEVERE CORONARY ARTERY CALCIFICATIONS.  PERICARDIUM: No effusion.  MEDIASTINUM: PROBABLE REACTIVE HILAR AND MEDIASTINAL LYMPH NODES.  PLEURA: TRACE PLEURAL EFFUSIONS.  MAJOR AIRWAYS: Clear. No intrinsic mass.  VASCULATURE: No evidence of aneurysm.  VISUALIZED UPPER ABDOMEN:NODULAR LIVER MARGINS SUGGESTING CIRRHOSIS. CYST LEFT KIDNEY.  OTHER: None.  BONES: DEGENERATIVE CHANGES THORACIC SPINE. NO ACUTE BONY FINDINGS.      1. No PE. 2. CHF/edema. 3. Bibasilar consolidations compatible with pneumonia. 4. Liver cirrhosis. 5. Indeterminant cyst left kidney. Ultrasound follow-up may be helpful.  This report was finalized on 5/11/2019 7:27 AM by Dr. Escobar Peralta MD.      Results for orders placed during the hospital encounter of 05/11/19   Adult Transthoracic Echo Complete W/ Cont if Necessary Per Protocol    Narrative · Estimated EF appears to be in the range of 51 - 55%.  · Mild tricuspid valve regurgitation is present.  · Calculated right ventricular systolic pressure from tricuspid   regurgitation is 42.7 mmHg.          X-ray reviewed endotracheal tube tip clavicles.  Bilateral pleural effusions layering with some ongoing vascular congestion, the right pleural effusion has decreased significantly there is some consolidation in that right base    Active Hospital Problems    Diagnosis  POA   • Respiratory failure (CMS/HCC) [J96.90]  Yes      Resolved Hospital Problems   No resolved problems to display.         Assessment/Plan     1. Acute on chronic hypoxemic and hypercapnic respiratory failure.    Doing a spontaneous breathing trial again today.  I think even if he meets criteria to get off will  probably have to have noninvasive ventilator available I think his lungs are that bad.  2. Pneumonia bilateral bibasilar certainly raises the concern for aspiration.  Continue antibiotics  3. Elevated  troponin no ST elevation, this could be non-ST elevation MI due to demand ischemia with his profound hypoxia.  Cardiology sees no evidence of acute coronary syndrome either echocardiogram normal LVEF   4.  bilateral pleural effusions will get 500 cc off the right yesterday the left was smaller I am not sure there is a whole lot to yield and tapping the left today the LDH was just above the cutoff between exudate and transudate but he also had some blood and there I suspect a little bit of traumatic tap the proteins were really low the cell counts look good as is the pH and glucose.  I think this is most likely just a transudative fluid  5. COPD with acute exacerbation continue steroids and bronchodilators.  6. Acute kidney injury creatinine back down to normal  7. Hypertension blood pressures a little better still little high on his home medications I think diuresis will help with this  8. Diabetes mellitus type 2 insulin requiring elevated hemoglobin A1c at presentation suggesting suboptimal control.  Sugars are better continue current treatment  9. Hypoalbuminemia probably poor nutritional status,   10. Hematuria may be secondary to Aguilar catheter placement I do recommend a follow-up urine in a couple of weeks once the catheter is out.  11. 3 mm pulmonary nodule noncalcified he will need follow-up CT on this given his smoking history  12. Incompletely evaluated renal lesion again when he recovers he should have a CT to fully evaluate  13. Peripheral vascular disease patient has declined intervention recently.  He certainly looks like he has significant disease.  14. Skin lesions right heel and left great toe    15. Candida intertrigo nystatin powder  16. Fluids/electro lites/nutrition patient is tolerating tube  feeds well will DC IV fluids if he is extubated will probably DC tube feeds as well         17.     Plan for disposition:    Jose Luis Jasmine MD  05/16/19  7:21 AM    Time: Critical care time in excess of 40 minutes thus far today

## 2019-05-16 NOTE — CONSULTS
Adult Nutrition  Assessment/PES    Patient Name:  Heath Bajwa  YOB: 1950  MRN: [id number]  Admit Date:  5/11/2019    Assessment Date:  5/16/2019    Comments:  TF follow up. Pt tolerating Diabetisource at 60ml/hr at goal rate. Will continue to follow.    Reason for Assessment     Row Name 05/16/19 0745          Reason for Assessment    Reason For Assessment  follow-up protocol;TF/PN         Nutrition/Diet History     Row Name 05/16/19 0745          Nutrition/Diet History    Typical Food/Fluid Intake  failed spontaneous breathing trial yesterday            Labs/Tests/Procedures/Meds     Row Name 05/16/19 0745          Labs/Procedures/Meds    Lab Results Reviewed  reviewed     Lab Results Comments  glu, na, wbc        Diagnostic Tests/Procedures    Diagnostic Test/Procedure Reviewed  reviewed        Medications    Pertinent Medications Reviewed  reviewed     Pertinent Medications Comments  abx, pepcid, lasix, insulin, solu-medrol         Physical Findings     Row Name 05/16/19 0747          Physical Findings    Overall Physical Appearance  overweight;on ventilator support     Gastrointestinal  feeding tube     Tubes  orogastric tube     Skin  other (see comments) left toe ulceration, blister           Nutrition Prescription Ordered     Row Name 05/16/19 0748          Nutrition Prescription PO    Current PO Diet  NPO        Nutrition Prescription EN    Enteral Route  OG     Product  Diabetisource AC (Glucerna 1.2)     TF Delivery Method  Continuous     Continuous TF Goal Rate (mL/hr)  60 mL/hr     Continuous TF Current Rate (mL/hr)  60 mL/hr     Water flush (mL)   30 mL     Water Flush Frequency  Per hour         Evaluation of Received Nutrient/Fluid Intake     Row Name 05/16/19 0748          Calories Evaluation    Enteral Calories (kcal)  1728     % of Kcal Needs  96        Protein Evaluation    Enteral Protein (gm)  86     % of Protein Needs  75        Fluid Intake Evaluation    Enteral (Free Water)  Fluid (mL)  1180     Free Water Flush Fluid (mL)  720     Total Free Water Intake (mL)  1900 mL        EN Evaluation    HOB  Greater than or equal to 30 degress               Problem/Interventions:            Intervention Goal     Row Name 05/16/19 0751          Intervention Goal    General  Maintain nutrition;Nutrition support treatment     Transition  TF to PO     Weight  No significant weight loss         Nutrition Intervention     Row Name 05/16/19 0751          Nutrition Intervention    RD/Tech Action  Care plan reviewd;Follow Tx progress         Nutrition Prescription     Row Name 05/16/19 0751          Nutrition Prescription EN    Enteral Prescription  Continue same protocol         Education/Evaluation     Row Name 05/16/19 0752          Monitor/Evaluation    Monitor  Per protocol;I&O;Pertinent labs;TF delivery/tolerance;Weight;Skin status           Electronically signed by:  Cherelle Ferrari RD  05/16/19 7:52 AM

## 2019-05-16 NOTE — THERAPY TREATMENT NOTE
Acute Care - Physical Therapy Treatment Note  [facility]     Patient Name: Heath Bajwa  : 1950  MRN: [id number]  Today's Date: 2019  Onset of Illness/Injury or Date of Surgery: 19  Date of Referral to PT: 19  Referring Physician: Mitali    Admit Date: 2019    Visit Dx:    ICD-10-CM ICD-9-CM   1. Impaired functional mobility and activity tolerance Z74.09 V49.89     Patient Active Problem List   Diagnosis   • BPH without urinary obstruction   • Elevated prostate specific antigen (PSA)   • Acute respiratory failure with hypoxia and hypercapnia (CMS/HCC)   • Venous stasis dermatitis of both lower extremities   • Elevated troponin   • Respiratory failure (CMS/HCC)       Therapy Treatment    Rehabilitation Treatment Summary     Row Name 19 1151             Treatment Time/Intention    Discipline  physical therapist  -EM      Document Type  therapy note (daily note)  -EM      Subjective Information  -- pt intubated, MAIRA Melendez encouraged pt to participate   -EM      Mode of Treatment  physical therapy  -EM      Patient/Family Observations  pt supine in bed, awake and alert, orally intubated  -EM      Patient Effort  adequate  -EM      Existing Precautions/Restrictions  fall;oxygen therapy device and L/min on vent   -EM      Recorded by [EM] She Louie, PT 19 1155      Row Name 19 1151             Vital Signs    Pre Systolic BP Rehab  134  -EM      Pre Treatment Diastolic BP  67  -EM      Pretreatment Heart Rate (beats/min)  50  -EM      Pre SpO2 (%)  99  -EM      O2 Delivery Pre Treatment  ventilator  -EM      Recorded by [EM] She Louie PT 19 1155      Row Name 19 1151             Bed Mobility Assessment/Treatment    Comment (Bed Mobility)  bed ex only today per nsg, pt agitated at times   -EM      Recorded by [EM] She Louie PT 19 1155      Row Name 19 1151             Therapeutic Exercise    Comment (Therapeutic  Exercise)  AROM x 10 reps on 4 extremities, constant verbal and tactile cues to keep patient actively engaged   -EM      Recorded by [EM] She Louie, PT 05/16/19 1155      Row Name 05/16/19 1151             Positioning and Restraints    Pre-Treatment Position  in bed  -EM      Post Treatment Position  bed  -EM      In Bed  call light within reach;supine;with nsg  -EM      Restraints  reapplied:;soft limb  -EM      Recorded by [EM] She Louie, PT 05/16/19 1155      Row Name 05/16/19 1151             Pain Scale: Word Pre/Post-Treatment    Pre/Post Treatment Pain Comment  nsg gave patient pain meds at onset of therapy   -EM      Recorded by [EM] She Louie, PT 05/16/19 1155      Row Name                Wound 05/11/19 1015 Left anterior toe blisters    Wound - Properties Group Date first assessed: 05/11/19 [JN] Time first assessed: 1015 [JN] Present On Admission : yes [JN] Side: Left [JN] Orientation: anterior [JN] Location: toe [JN], great toe  Type: blisters [JN] Recorded by:  [JN] Ariel Schulte, RN 05/11/19 1301    Row Name 05/16/19 1151             Plan of Care Review    Plan of Care Reviewed With  patient  -EM      Recorded by [EM] She Louie, PT 05/16/19 1155      Row Name 05/16/19 1151             Outcome Summary/Treatment Plan (PT)    Anticipated Discharge Disposition (PT)  skilled nursing facility  -EM      Recorded by [EM] She Louie, PT 05/16/19 1155        User Key  (r) = Recorded By, (t) = Taken By, (c) = Cosigned By    Initials Name Effective Dates Discipline    EM She Louie, PT 04/03/18 -  PT    Ariel Baez RN 10/12/16 -  Nurse          Wound 05/11/19 1015 Left anterior toe blisters (Active)   Dressing Appearance open to air 5/16/2019 12:02 AM   Closure Open to air 5/16/2019 12:02 AM   Base purple;red 5/16/2019 12:02 AM   Periwound dry;pink 5/16/2019 12:02 AM   Periwound Temperature warm 5/16/2019 12:02 AM   Periwound Skin Turgor firm  5/16/2019 12:02 AM   Care, Wound cleansed with;soap and water 5/15/2019  4:00 PM   Dressing Care, Wound open to air 5/15/2019  4:00 PM           Physical Therapy Education     Title: PT OT SLP Therapies (In Progress)     Topic: Physical Therapy (In Progress)     Point: Mobility training (In Progress)     Learning Progress Summary           Patient Acceptance, E,D, NR by PC at 5/15/2019  3:23 PM    Acceptance, E,D, NR by PC at 5/13/2019  3:05 PM                   Point: Home exercise program (In Progress)     Learning Progress Summary           Patient Acceptance, E, NR by EM at 5/16/2019 11:55 AM    Acceptance, E,D, NR by PC at 5/15/2019  3:23 PM    Acceptance, E,D, NR by PC at 5/14/2019 11:59 AM    Acceptance, E,D, NR by PC at 5/13/2019  3:05 PM                   Point: Body mechanics (In Progress)     Learning Progress Summary           Patient Acceptance, E,D, NR by PC at 5/15/2019  3:23 PM    Acceptance, E,D, NR by PC at 5/13/2019  3:05 PM                   Point: Precautions (In Progress)     Learning Progress Summary           Patient Acceptance, E,D, NR by PC at 5/15/2019  3:23 PM    Acceptance, E,D, NR by PC at 5/13/2019  3:05 PM                               User Key     Initials Effective Dates Name Provider Type Discipline    PC 04/03/18 -  Alley Frazier, PT Physical Therapist PT    EM 04/03/18 -  She Louie PT Physical Therapist PT                PT Recommendation and Plan  Anticipated Discharge Disposition (PT): skilled nursing facility  Outcome Summary/Treatment Plan (PT)  Anticipated Discharge Disposition (PT): skilled nursing facility  Plan of Care Reviewed With: patient  Outcome Summary: patient awake, able to follow commands for completion of bed exercises today with encouragement from nurse to participate.   Outcome Measures     Row Name 05/16/19 1100 05/15/19 1500 05/14/19 1200       How much help from another person do you currently need...    Turning from your back to your side  while in flat bed without using bedrails?  2  -EM  2  -PC  2  -PC    Moving from lying on back to sitting on the side of a flat bed without bedrails?  1  -EM  1  -PC  2  -PC    Moving to and from a bed to a chair (including a wheelchair)?  1  -EM  1  -PC  1  -PC    Standing up from a chair using your arms (e.g., wheelchair, bedside chair)?  1  -EM  1  -PC  1  -PC    Climbing 3-5 steps with a railing?  1  -EM  1  -PC  1  -PC    To walk in hospital room?  1  -EM  1  -PC  1  -PC    AM-PAC 6 Clicks Score  7  -EM  7  -PC  8  -PC    Row Name 05/13/19 1500             How much help from another person do you currently need...    Turning from your back to your side while in flat bed without using bedrails?  2  -PC      Moving from lying on back to sitting on the side of a flat bed without bedrails?  2  -PC      Moving to and from a bed to a chair (including a wheelchair)?  1  -PC      Standing up from a chair using your arms (e.g., wheelchair, bedside chair)?  1  -PC      Climbing 3-5 steps with a railing?  1  -PC      To walk in hospital room?  1  -PC      AM-PAC 6 Clicks Score  8  -PC         Functional Assessment    Outcome Measure Options  AM-PAC 6 Clicks Basic Mobility (PT)  -PC        User Key  (r) = Recorded By, (t) = Taken By, (c) = Cosigned By    Initials Name Provider Type    PC Alley Frazier, PT Physical Therapist    EM She Louie PT Physical Therapist         Time Calculation:   PT Charges     Row Name 05/16/19 1157             Time Calculation    Start Time  1101  -EM      Stop Time  1113  -EM      Time Calculation (min)  12 min  -EM      PT Received On  05/16/19  -EM      PT - Next Appointment  05/17/19  -EM         Time Calculation- PT    Total Timed Code Minutes- PT  12 minute(s)  -EM        User Key  (r) = Recorded By, (t) = Taken By, (c) = Cosigned By    Initials Name Provider Type    EM She Louie, JUAN Physical Therapist        Therapy Charges for Today     Code Description Service  Date Service Provider Modifiers Qty    [id number] HC PT THER PROC EA 15 MIN 5/16/2019 She Louie, PT GP 1          PT G-Codes  Outcome Measure Options: AM-PAC 6 Clicks Basic Mobility (PT)  AM-PAC 6 Clicks Score: 7    She Louie, PT  5/16/2019

## 2019-05-17 LAB
GLUCOSE BLDC GLUCOMTR-MCNC: 110 MG/DL (ref 70–130)
GLUCOSE BLDC GLUCOMTR-MCNC: 129 MG/DL (ref 70–130)
GLUCOSE BLDC GLUCOMTR-MCNC: 159 MG/DL (ref 70–130)
GLUCOSE BLDC GLUCOMTR-MCNC: 180 MG/DL (ref 70–130)
GLUCOSE BLDC GLUCOMTR-MCNC: 192 MG/DL (ref 70–130)
GLUCOSE BLDC GLUCOMTR-MCNC: 199 MG/DL (ref 70–130)
GLUCOSE BLDC GLUCOMTR-MCNC: 230 MG/DL (ref 70–130)

## 2019-05-17 PROCEDURE — 25010000002 FUROSEMIDE PER 20 MG: Performed by: INTERNAL MEDICINE

## 2019-05-17 PROCEDURE — 82962 GLUCOSE BLOOD TEST: CPT

## 2019-05-17 PROCEDURE — 92526 ORAL FUNCTION THERAPY: CPT | Performed by: SPEECH-LANGUAGE PATHOLOGIST

## 2019-05-17 PROCEDURE — 63710000001 INSULIN LISPRO (HUMAN) PER 5 UNITS: Performed by: INTERNAL MEDICINE

## 2019-05-17 PROCEDURE — 25010000002 ENOXAPARIN PER 10 MG: Performed by: INTERNAL MEDICINE

## 2019-05-17 PROCEDURE — 25010000002 CEFTRIAXONE PER 250 MG: Performed by: INTERNAL MEDICINE

## 2019-05-17 PROCEDURE — 94799 UNLISTED PULMONARY SVC/PX: CPT

## 2019-05-17 PROCEDURE — 97110 THERAPEUTIC EXERCISES: CPT

## 2019-05-17 PROCEDURE — 63710000001 INSULIN GLARGINE PER 5 UNITS: Performed by: INTERNAL MEDICINE

## 2019-05-17 PROCEDURE — 99232 SBSQ HOSP IP/OBS MODERATE 35: CPT | Performed by: INTERNAL MEDICINE

## 2019-05-17 RX ORDER — HYDROCODONE BITARTRATE AND ACETAMINOPHEN 10; 325 MG/1; MG/1
1 TABLET ORAL EVERY 4 HOURS PRN
Status: DISCONTINUED | OUTPATIENT
Start: 2019-05-17 | End: 2019-05-19

## 2019-05-17 RX ADMIN — HYDROCODONE BITARTRATE AND ACETAMINOPHEN 1 TABLET: 10; 325 TABLET ORAL at 17:19

## 2019-05-17 RX ADMIN — INSULIN LISPRO 4 UNITS: 100 INJECTION, SOLUTION INTRAVENOUS; SUBCUTANEOUS at 17:16

## 2019-05-17 RX ADMIN — FUROSEMIDE 40 MG: 10 INJECTION, SOLUTION INTRAMUSCULAR; INTRAVENOUS at 02:32

## 2019-05-17 RX ADMIN — CHLORHEXIDINE GLUCONATE 15 ML: 1.2 RINSE ORAL at 09:07

## 2019-05-17 RX ADMIN — DEXMEDETOMIDINE 1.2 MCG/KG/HR: 100 INJECTION, SOLUTION, CONCENTRATE INTRAVENOUS at 01:30

## 2019-05-17 RX ADMIN — NYSTATIN: 100000 POWDER TOPICAL at 09:07

## 2019-05-17 RX ADMIN — HYDROCODONE BITARTRATE AND ACETAMINOPHEN 1 TABLET: 10; 325 TABLET ORAL at 13:32

## 2019-05-17 RX ADMIN — IPRATROPIUM BROMIDE AND ALBUTEROL SULFATE 3 ML: 2.5; .5 SOLUTION RESPIRATORY (INHALATION) at 19:56

## 2019-05-17 RX ADMIN — NYSTATIN: 100000 POWDER TOPICAL at 21:11

## 2019-05-17 RX ADMIN — IPRATROPIUM BROMIDE AND ALBUTEROL SULFATE 3 ML: 2.5; .5 SOLUTION RESPIRATORY (INHALATION) at 15:10

## 2019-05-17 RX ADMIN — DONEPEZIL HYDROCHLORIDE 10 MG: 10 TABLET, FILM COATED ORAL at 21:11

## 2019-05-17 RX ADMIN — IPRATROPIUM BROMIDE AND ALBUTEROL SULFATE 3 ML: 2.5; .5 SOLUTION RESPIRATORY (INHALATION) at 07:33

## 2019-05-17 RX ADMIN — ASPIRIN 325 MG: 325 TABLET ORAL at 09:07

## 2019-05-17 RX ADMIN — SERTRALINE 25 MG: 25 TABLET, FILM COATED ORAL at 09:07

## 2019-05-17 RX ADMIN — HYDROCODONE BITARTRATE AND ACETAMINOPHEN 1 TABLET: 10; 325 TABLET ORAL at 09:19

## 2019-05-17 RX ADMIN — CHLORHEXIDINE GLUCONATE 15 ML: 1.2 RINSE ORAL at 21:11

## 2019-05-17 RX ADMIN — SODIUM CHLORIDE, PRESERVATIVE FREE 3 ML: 5 INJECTION INTRAVENOUS at 09:08

## 2019-05-17 RX ADMIN — IPRATROPIUM BROMIDE AND ALBUTEROL SULFATE 3 ML: 2.5; .5 SOLUTION RESPIRATORY (INHALATION) at 12:58

## 2019-05-17 RX ADMIN — INSULIN GLARGINE 10 UNITS: 100 INJECTION, SOLUTION SUBCUTANEOUS at 09:19

## 2019-05-17 RX ADMIN — METOPROLOL TARTRATE 37.5 MG: 25 TABLET ORAL at 21:11

## 2019-05-17 RX ADMIN — FUROSEMIDE 40 MG: 10 INJECTION, SOLUTION INTRAMUSCULAR; INTRAVENOUS at 09:07

## 2019-05-17 RX ADMIN — IPRATROPIUM BROMIDE AND ALBUTEROL SULFATE 3 ML: 2.5; .5 SOLUTION RESPIRATORY (INHALATION) at 23:12

## 2019-05-17 RX ADMIN — DEXMEDETOMIDINE 1.2 MCG/KG/HR: 100 INJECTION, SOLUTION, CONCENTRATE INTRAVENOUS at 04:20

## 2019-05-17 RX ADMIN — DEXMEDETOMIDINE 1.1 MCG/KG/HR: 100 INJECTION, SOLUTION, CONCENTRATE INTRAVENOUS at 09:08

## 2019-05-17 RX ADMIN — HYDROCODONE BITARTRATE AND ACETAMINOPHEN 1 TABLET: 10; 325 TABLET ORAL at 03:30

## 2019-05-17 RX ADMIN — FAMOTIDINE 20 MG: 10 INJECTION INTRAVENOUS at 09:07

## 2019-05-17 RX ADMIN — INSULIN GLARGINE 10 UNITS: 100 INJECTION, SOLUTION SUBCUTANEOUS at 21:11

## 2019-05-17 RX ADMIN — INSULIN LISPRO 8 UNITS: 100 INJECTION, SOLUTION INTRAVENOUS; SUBCUTANEOUS at 11:52

## 2019-05-17 RX ADMIN — ATORVASTATIN CALCIUM 40 MG: 20 TABLET, FILM COATED ORAL at 09:07

## 2019-05-17 RX ADMIN — DEXMEDETOMIDINE 0.4 MCG/KG/HR: 100 INJECTION, SOLUTION, CONCENTRATE INTRAVENOUS at 23:28

## 2019-05-17 RX ADMIN — LISINOPRIL 40 MG: 40 TABLET ORAL at 09:07

## 2019-05-17 RX ADMIN — SODIUM CHLORIDE, PRESERVATIVE FREE 3 ML: 5 INJECTION INTRAVENOUS at 21:12

## 2019-05-17 RX ADMIN — DEXMEDETOMIDINE 1 MCG/KG/HR: 100 INJECTION, SOLUTION, CONCENTRATE INTRAVENOUS at 12:46

## 2019-05-17 RX ADMIN — CEFTRIAXONE SODIUM 1 G: 1 INJECTION, SOLUTION INTRAVENOUS at 11:52

## 2019-05-17 RX ADMIN — HYDROCODONE BITARTRATE AND ACETAMINOPHEN 1 TABLET: 10; 325 TABLET ORAL at 21:25

## 2019-05-17 RX ADMIN — FUROSEMIDE 40 MG: 10 INJECTION, SOLUTION INTRAMUSCULAR; INTRAVENOUS at 18:28

## 2019-05-17 RX ADMIN — ENOXAPARIN SODIUM 40 MG: 40 INJECTION SUBCUTANEOUS at 13:34

## 2019-05-17 RX ADMIN — METOPROLOL TARTRATE 37.5 MG: 25 TABLET ORAL at 09:18

## 2019-05-17 RX ADMIN — AMLODIPINE BESYLATE 10 MG: 10 TABLET ORAL at 09:07

## 2019-05-17 NOTE — PROGRESS NOTES
LOS: 6 days   Patient Care Team:  Provider, No Known as PCP - General  Provider, No Known as PCP - Family Medicine    Subjective     Patient is extubated he has multiple complaints he is swollen all over he aches all over the pain medications are not enough he needs the IV pain medicine back the pills are just like candy.  And he is thirsty he cannot drink the thickened liquids.  He wants real water.  Review of Systems:          Objective     Vital Signs  Vital Sign Min/Max for last 24 hours  Temp  Min: 97.6 °F (36.4 °C)  Max: 98.9 °F (37.2 °C)   BP  Min: 114/59  Max: 169/82   Pulse  Min: 43  Max: 57   Resp  Min: 16  Max: 17   SpO2  Min: 83 %  Max: 98 %   Flow (L/min)  Min: 4  Max: 4   No Data Recorded        Ventilator/Non-Invasive Ventilation Settings (From admission, onward)    Start     Ordered    05/11/19 1021  Ventilator - AC/PC; (28); 100; 88%; Other (see comments); 18; 17  Continuous     Question Answer Comment   Vent Mode AC/PC    Breath rate  28   FiO2 100    FiO2 titrate for Sp02% =/> 88%    PEEP Other (see comments)    PEEP: 18    Inspiratory Pressure 17        05/11/19 1027                       Body mass index is 33.66 kg/m².  I/O last 3 completed shifts:  In: 1538 [I.V.:1438; IV Piggyback:100]  Out: 38814 [Urine:08410]  I/O this shift:  In: 120 [P.O.:120]  Out: -         Physical Exam:    General Appearance:  Resting in bed he is on Precedex at 1.1 mics per kilogram per minute still he is on nasal cannula O2 looks like so around 2-1/2 L his oxygen saturations are about 93%  Eyes: Conjunctival are clear and anicteric pupils are equal about 3 mm  ENT: Oral mucous membranes are little dry much better than yesterday  Neck: No adenopathy no jugular venous distention, trachea midline, no palpable thyromegaly  Lungs:  Breath sounds present bilaterally he is not labored with his respirations he did use the noninvasive ventilator last night.  Cardiac: Regular rhythm no murmur   Abdomen: Soft no  palpable organomegaly or masses  :  he has a Aguilar catheter in place  Musculoskeletal: He has what looks like chronic arterial and venous insufficiency in his lower extremities.    He has just trace lower extremity edema bilaterally  Skin: He has a blood-filled blister on the dorsum of the left great toe and he is a small blood-filled blister on his right heel but no change from admission.  No jaundice no petechiae  Neuro:  He is definitely awake and alert he is cooperating he is following commands moving all extremities.    Extremities/P Vascular: He does have palpable radial pulses and very weak dorsalis pedis pulses bilaterally   MSE:  Very grumpy this morning           Labs:  Results from last 7 days   Lab Units 05/16/19  0639 05/15/19  0920 05/15/19  0507 05/14/19  0314 05/13/19  0427 05/12/19  0351 05/11/19  1104 05/11/19  0339   GLUCOSE mg/dL 197*  --  207* 165* 278* 209* 215* 304*   SODIUM mmol/L 135*  --  132* 137 138 139 140 136   POTASSIUM mmol/L 3.8  --  4.2 4.8 5.3* 5.1 4.3 4.7   MAGNESIUM mg/dL  --   --   --   --   --  1.8 1.8 1.8   CO2 mmol/L 42.1*  --  39.6* 31.0* 29.4* 30.4* 35.6* 35.2*   CHLORIDE mmol/L 85*  --  86* 96* 98 98 95* 93*   ANION GAP mmol/L 7.9  --  6.4 10.0 10.6 10.6 9.4 7.8   CREATININE mg/dL 0.50*  --  0.62* 0.73* 0.99 1.28* 0.93 0.91   BUN mg/dL 24*  --  28* 31* 38* 32* 22 23   BUN / CREAT RATIO  48.0*  --  45.2* 42.5* 38.4* 25.0 23.7 25.3*   CALCIUM mg/dL 8.7  --  8.7 8.9 8.7 8.3* 8.7 8.3*   EGFR IF NONAFRICN AM mL/min/1.73 >150  --  128 106 75 55* 80 82   ALK PHOS U/L  --   --   --   --   --   --  101 105   TOTAL PROTEIN g/dL  --  5.6*  --   --   --   --  5.9* 6.1   ALT (SGPT) U/L  --   --   --   --   --   --  17 16   AST (SGOT) U/L  --   --   --   --   --   --  17 20   BILIRUBIN mg/dL  --   --   --   --   --   --  0.5 0.5   ALBUMIN g/dL  --   --   --   --   --   --  2.30* 2.38*   GLOBULIN gm/dL  --   --   --   --   --   --  3.6 3.7     Estimated Creatinine Clearance: 116.1  mL/min (A) (by C-G formula based on SCr of 0.5 mg/dL (L)).      Results from last 7 days   Lab Units 05/16/19  0502 05/14/19  0314 05/12/19  0351 05/11/19  1104 05/11/19  0339   WBC 10*3/mm3 11.07* 12.21* 16.03* 14.91* 13.92*   RBC 10*6/mm3 5.50 5.06 5.07 5.32 5.05   HEMOGLOBIN g/dL 16.8 15.3 15.3 16.2 15.5   HEMATOCRIT % 50.5 47.5 48.7 52.5* 50.8   MCV fL 91.8 93.9 96.1 98.7* 100.6*   MCH pg 30.5 30.2 30.2 30.5 30.7   MCHC g/dL 33.3 32.2 31.4* 30.9* 30.5*   RDW % 14.6 14.6 14.8 14.2 14.9   RDW-SD fl 47.4 50.1 51.3 51.7 54.4*   MPV fL 12.2* 11.5 11.5 10.9 10.7   PLATELETS 10*3/mm3 141 179 211 212 208   NEUTROPHIL % %  --   --   --  86.0* 88.6*   LYMPHOCYTE % %  --   --   --  5.3* 3.2*   MONOCYTES % %  --   --   --  7.7 5.0   EOSINOPHIL % %  --   --   --  0.1* 0.1*   BASOPHIL % %  --   --   --  0.4 0.4   IMM GRAN % %  --   --   --  0.5 2.7*   NEUTROS ABS 10*3/mm3  --   --   --  12.83* 12.31*   LYMPHS ABS 10*3/mm3  --   --   --  0.79 0.45*   MONOS ABS 10*3/mm3  --   --   --  1.15* 0.70   EOS ABS 10*3/mm3  --   --   --  0.01 0.02   BASOS ABS 10*3/mm3  --   --   --  0.06 0.06   IMMATURE GRANS (ABS) 10*3/mm3  --   --   --  0.07* 0.38*   NRBC /100 WBC  --   --   --  0.1  --      Results from last 7 days   Lab Units 05/16/19  1237   PH, ARTERIAL pH units 7.457*   PO2 ART mm Hg 75.3*   PCO2, ARTERIAL mm Hg 62.9*   HCO3 ART mmol/L 44.4*     Results from last 7 days   Lab Units 05/11/19  1104 05/11/19  0542 05/11/19  0339   TROPONIN T ng/mL 0.172* 0.069* 0.057*             Results from last 7 days   Lab Units 05/11/19  1104 05/11/19  0339   LACTATE mmol/L 1.5 1.0   PROCALCITONIN ng/mL 0.13  --      Results from last 7 days   Lab Units 05/15/19  0920 05/11/19  1104   INR  1.04 1.17*     Microbiology Results (last 10 days)     Procedure Component Value - Date/Time    Body Fluid Culture - Body Fluid, Pleural Cavity [[id number]] Collected:  05/15/19 1103    Lab Status:  Preliminary result Specimen:  Body Fluid from Pleural Cavity  Updated:  05/17/19 0645     Body Fluid Culture No growth at 2 days     Gram Stain Occasional WBCs seen      No organisms seen    Respiratory Culture - Aspirate, ET Suction [[id number]] Collected:  05/11/19 1209    Lab Status:  Final result Specimen:  Aspirate from ET Suction Updated:  05/13/19 0753     Respiratory Culture Scant growth (1+) Normal Respiratory Kassandra     Gram Stain Few (2+) WBCs seen      Rare (1+) Epithelial cells per low power field      Mixed bacterial morphotypes seen on Gram Stain    Blood Culture - Blood, Blood, Venous Line [[id number]] Collected:  05/11/19 0339    Lab Status:  Final result Specimen:  Blood, Venous Line Updated:  05/16/19 0345     Blood Culture No growth at 5 days    Blood Culture - Blood, Arm, Right [[id number]] Collected:  05/11/19 0339    Lab Status:  Final result Specimen:  Blood from Arm, Right Updated:  05/16/19 0345     Blood Culture No growth at 5 days                amLODIPine 10 mg Oral Q24H   aspirin 325 mg Oral Daily   atorvastatin 40 mg Oral Daily   ceftriaxone 1 g Intravenous Q24H   chlorhexidine 15 mL Mouth/Throat Q12H   donepezil 10 mg Oral Nightly   enoxaparin 40 mg Subcutaneous Q24H   famotidine 20 mg Intravenous Q12H   furosemide 40 mg Intravenous Q8H   gabapentin 600 mg Nasogastric Q8H   insulin glargine 10 Units Subcutaneous Q12H   insulin lispro 0-24 Units Subcutaneous 4x Daily With Meals & Nightly   ipratropium-albuterol 3 mL Nebulization Q4H - RT   lisinopril 40 mg Oral Daily   methylPREDNISolone sodium succinate 20 mg Intravenous Q12H   metoprolol tartrate 37.5 mg Oral Q12H   nystatin  Topical Q12H   sertraline 25 mg Oral Daily   sodium chloride 3 mL Intravenous Q12H       dexmedetomidine 0.2-1.5 mcg/kg/hr Last Rate: 1.1 mcg/kg/hr (05/17/19 0908)   hold 1 each        Diagnostics:  Ct Head Without Contrast    Result Date: 5/11/2019  EXAMINATION: CT HEAD WO CONTRAST-  CLINICAL INDICATION:     ams  COMPARISON:    None  Technique: Multiple CT axial images were  obtained through the level of the brain without IV contrast administration. Reformatted images in the coronal and/or sagittal plane(s) were generated from the axial data set to facilitate diagnostic accuracy and/or surgical planning.  Radiation dose reduction techniques were utilized per ALARA protocol. Automated exposure control was initiated through either or [employer] or DoseRight software packages by  protocol.   DOSE (DLP mGy-cm): 1469.74 mGy.cm  FINDINGS:   Mild generalized cerebral atrophy and chronic small vessel ischemic disease noted. No acute intracranial abnormality. No midline shift or mass effect. No hydrocephalus or intracranial hemorrhage. There is no CT evidence of acute vascular territory infarct. Bone windows show no acute osseous abnormality. Bilateral right greater than left mastoid effusions. Pansinusitis.      1. No CT evidence of acute intracranial abnormality. 2. Estimated effusions and pansinusitis.  This report was finalized on 5/11/2019 7:24 AM by Dr. Escobar Peralta MD.      Xr Chest 1 View    Result Date: 5/12/2019  PORTABLE CHEST RADIOGRAPH  HISTORY: Intubated patient  COMPARISON: 05/11/2019  FINDINGS: Cardiomediastinal silhouette is unchanged. Endotracheal tube appears to terminate at the level of the clavicles. It could be advanced about 3 cm. A nasogastric tube is present. It appears to extend to the stomach. No pneumothorax is seen. There is persistent bibasilar consolidation. There are small bilateral pleural effusions. Vascular congestion is unchanged.      Endotracheal tube terminates at the level of clavicles. It could be advanced about 3 cm.  This report was finalized on 5/12/2019 5:00 AM by Dr. Yuliana Chandra M.D.      Xr Chest 1 View    Result Date: 5/11/2019  XR CHEST 1 VW-  HISTORY: Male who is 69 years-old,  endotracheal intubation  TECHNIQUE: Frontal views of the chest  COMPARISON: None available  FINDINGS: Patient is rotated towards the left. The  endotracheal tube tip appears to be about 8 cm above the candace. NG tube extends beyond the inferior extent of the image. Heart size is normal. Mild prominence of vascular and interstitial markings. Patchy infiltrates at the bases, small left pleural effusion. Heart, mediastinum and pulmonary vasculature are unremarkable. No focal pulmonary consolidation, pleural effusion, or pneumothorax. No acute osseous process.      Endotracheal intubation. Bibasilar infiltrates, left pleural effusion, mild prominence of vascular and interstitial markings, follow-up recommended.  This report was finalized on 5/11/2019 10:51 AM by Dr. Rufino Bello M.D.      Xr Chest 1 View    Result Date: 5/11/2019  EXAMINATION: XR CHEST 1 VW-  CLINICAL INDICATION:     tube placement  TECHNIQUE:  XR CHEST 1 VW-  COMPARISON: 08/13/2018  FINDINGS: ET tube at level of clavicles. NG tube extends into stomach.  CHF/edema developed from previous exam. Consolidative airspace disease right greater than left mid and lower lung regions. Trace pleural effusions. No pneumothorax. No acute bony findings.      1. CHF. 2. Bibasilar airspace disease. 3. Support devices as above.  This report was finalized on 5/11/2019 7:28 AM by Dr. Escobar Peralta MD.      Ct Chest Pulmonary Embolism With Contrast    Result Date: 5/11/2019  EXAMINATION: CT CHEST PULMONARY EMBOLISM W CONTRAST-  CLINICAL INDICATION:Pulmonary embolism  COMPARISON: NONE.  TECHNIQUE: Multiple CT axial images were obtained through the level of pulmonary arteries, following IV contrast administration per CT PE protocol. Volume Rendered 3D or MIP images performed.  Radiation dose reduction techniques were utilized per ALARA protocol. Automated exposure control was initiated through either or [employer] or DoseRight software packages by  protocol.  DOSE (DLP mGy-cm):   FINDINGS:  PULMONARY ARTERIES: No evidence of pulmonary embolism.  LUNGS: CONSOLIDATIVE AIRSPACE DISEASE BILATERAL LOWER  LOBES CONSISTENT WITH COMBINATION OF ATELECTASIS AND PNEUMONIA. PULMONARY VASCULAR CONGESTION IS NOTED WITH INTERSTITIAL EDEMA.  HEART: MILD CARDIOMEGALY WITH SEVERE CORONARY ARTERY CALCIFICATIONS.  PERICARDIUM: No effusion.  MEDIASTINUM: PROBABLE REACTIVE HILAR AND MEDIASTINAL LYMPH NODES.  PLEURA: TRACE PLEURAL EFFUSIONS.  MAJOR AIRWAYS: Clear. No intrinsic mass.  VASCULATURE: No evidence of aneurysm.  VISUALIZED UPPER ABDOMEN:NODULAR LIVER MARGINS SUGGESTING CIRRHOSIS. CYST LEFT KIDNEY.  OTHER: None.  BONES: DEGENERATIVE CHANGES THORACIC SPINE. NO ACUTE BONY FINDINGS.      1. No PE. 2. CHF/edema. 3. Bibasilar consolidations compatible with pneumonia. 4. Liver cirrhosis. 5. Indeterminant cyst left kidney. Ultrasound follow-up may be helpful.  This report was finalized on 5/11/2019 7:27 AM by Dr. Escobar Peralta MD.      Results for orders placed during the hospital encounter of 05/11/19   Adult Transthoracic Echo Complete W/ Cont if Necessary Per Protocol    Narrative · Estimated EF appears to be in the range of 51 - 55%.  · Mild tricuspid valve regurgitation is present.  · Calculated right ventricular systolic pressure from tricuspid   regurgitation is 42.7 mmHg.          X-ray reviewed endotracheal tube tip clavicles.  Bilateral pleural effusions layering with some ongoing vascular congestion, the right pleural effusion has decreased significantly there is some consolidation in that right base    Active Hospital Problems    Diagnosis  POA   • Respiratory failure (CMS/HCC) [J96.90]  Yes      Resolved Hospital Problems   No resolved problems to display.         Assessment/Plan     1. Acute on chronic hypoxemic and hypercapnic respiratory failure.    Much improved.  We will continue O2 noninvasive ventilator with sleep and PRN.  When he is closer to discharge will need to get a blood gas see if he should have a chronic noninvasive ventilator at home.  2. Pneumonia bilateral bibasilar certainly raises the concern for  aspiration.  Continue antibiotics  3. Elevated  troponin no ST elevation, this could be non-ST elevation MI due to demand ischemia with his profound hypoxia.  Cardiology sees no evidence of acute coronary syndrome either, echocardiogram normal LVEF   4.  bilateral pleural effusions will get 500 cc off the right with thoracentesis the left was significantly smaller we will just follow this with radiograph  5. COPD with acute exacerbation continue steroids and bronchodilators.  6. Acute kidney injury creatinine back down to normal  7. Hypertension blood pressures better  8. Diabetes mellitus type 2 insulin requiring elevated hemoglobin A1c at presentation suggesting suboptimal control.  Sugars are better continue current treatment  9. Hypoalbuminemia probably poor nutritional status,   10. Hematuria may be secondary to Aguilar catheter placement I do recommend a follow-up urine in a couple of weeks once the catheter is out.  11. 3 mm pulmonary nodule noncalcified he will need follow-up CT on this given his smoking history  12. Incompletely evaluated renal lesion again when he recovers he should have a CT to fully evaluate  13. Peripheral vascular disease patient has declined intervention recently.  He certainly looks like he has significant disease.  If he will agree he should have this reevaluated when he is over this acute illness  14. Skin lesions right heel and left great toe    15. Candida intertrigo nystatin powder  16. Fluids/electro lites/nutrition patient was cleared for some thickened liquids yesterday he is really unhappy about this will ask speech to reevaluate and see if he can go to full liquids  17. Anxiety I think we need to continue trying to get the dexmedetomidine weaned off.         If Patient can get up and ambulate will try and get his Aguilar catheter out today    Plan for disposition:    Jose Luis Jasmine MD  05/17/19  9:51 AM    Time:

## 2019-05-17 NOTE — THERAPY RE-EVALUATION
Acute Care - Speech Language Pathology   Swallow Re-Evaluation [facility]     Patient Name: Heath Bajwa  : 1950  MRN: [id number]  Today's Date: 2019  Onset of Illness/Injury or Date of Surgery: 19     Referring Physician: Mitali      Admit Date: 2019    Visit Dx:     ICD-10-CM ICD-9-CM   1. Impaired functional mobility and activity tolerance Z74.09 V49.89     Patient Active Problem List   Diagnosis   • BPH without urinary obstruction   • Elevated prostate specific antigen (PSA)   • Acute respiratory failure with hypoxia and hypercapnia (CMS/HCC)   • Venous stasis dermatitis of both lower extremities   • Elevated troponin   • Respiratory failure (CMS/HCC)     Past Medical History:   Diagnosis Date   • COPD (chronic obstructive pulmonary disease) (CMS/HCC)    • Diabetes mellitus (CMS/HCC)    • Hypertension      History reviewed. No pertinent surgical history.     SWALLOW EVALUATION (last 72 hours)      SLP Adult Swallow Evaluation     Row Name 19 1100 19 1500                Rehab Evaluation    Document Type  re-evaluation  -SA  evaluation  -SA       Patient Observations  alert;cooperative  -SA  alert;cooperative  -SA       Patient/Family Observations  HOB elevated  -SA  HOB elevated for eval  -SA       Patient Effort  adequate  -SA  adequate  -SA       Symptoms Noted During/After Treatment  none  -SA  none  -SA          General Information    Patient Profile Reviewed  --  yes  -SA       Pertinent History Of Current Problem  --  extubated 1253 today  -SA       Current Method of Nutrition  pureed;honey-thick liquids  -SA  NPO  -SA       Prior Level of Function-Swallowing  --  no diet consistency restrictions  -SA       Plans/Goals Discussed with  patient  -SA  patient  -SA       Barriers to Rehab  medically complex  -SA  cognitive status;medically complex  -SA       Patient's Goals for Discharge  return to regular diet  -SA  return to PO diet  -SA          Pain Assessment     Additional Documentation  --  Pain Scale: Numbers Pre/Post-Treatment (Group)  -          Pain Scale: Numbers Pre/Post-Treatment    Pain Scale: Numbers, Pretreatment  0/10 - no pain  -SA  0/10 - no pain  -SA       Pain Scale: Numbers, Post-Treatment  --  0/10 - no pain  -SA          Oral Motor and Function    Dentition Assessment  --  edentulous, dentures not available  -          Oral Musculature and Cranial Nerve Assessment    Oral Motor General Assessment  --  generalized oral motor weakness  -          Clinical Swallow Eval    Clinical Swallow Evaluation Summary  Improved mastication and swallow.  No s/s aspirationwith thin liquids today.  Adequate mastication with no significant oral residue.  Throat clear was demonstrated x1 w/ dry cracker.  REC upgrade to [facility] soft, chopped meats, thin liquid.  -SA  Pt demonstrated delayed throat clear with thin liquid.  Did not accept more than single drink nectar.  No overt s/s aspiration with honey or puree.  Solids not trialed at this time.  REC puree with honey thick liquids, meds crushed w/ puree.  Will reassess for tolerance and ability to upgrade.  -SA          Clinical Impression    SLP Swallowing Diagnosis  mild  -SA  suspected pharyngeal dysfunction  -SA       Functional Impact  risk of aspiration/pneumonia  -SA  risk of aspiration/pneumonia  -SA       Rehab Potential/Prognosis, Swallowing  good, to achieve stated therapy goals  -SA  good, to achieve stated therapy goals  -       Swallow Criteria for Skilled Therapeutic Interventions Met  demonstrates skilled criteria  -SA  demonstrates skilled criteria  -SA          Recommendations    Therapy Frequency (Swallow)  PRN  -SA  PRN  -SA       Predicted Duration Therapy Intervention (Days)  until discharge  -SA  until discharge  -SA       SLP Diet Recommendation  soft textures;chopped;thin liquids  -SA  puree;honey thick liquids  -       Recommended Diagnostics  --  reassess via clinical swallow evaluation  -SA        Recommended Precautions and Strategies  upright posture during/after eating;small bites of food and sips of liquid  -SA  upright posture during/after eating;small bites of food and sips of liquid  -SA       SLP Rec. for Method of Medication Administration  as tolerated  -SA  meds crushed;with pudding or applesauce  -SA       Monitor for Signs of Aspiration  notify SLP if any concerns  -SA  notify SLP if any concerns  -SA       Anticipated Dischage Disposition  unknown  -SA  unknown  -SA          Swallow Goals (SLP)    Oral Nutrition/Hydration Goal Selection (SLP)  --  oral nutrition/hydration, SLP goal 1  -SA          Oral Nutrition/Hydration Goal 1 (SLP)    Oral Nutrition/Hydration Goal 1, SLP  --  Pt will tolerate least restrictive diet  -SA       Time Frame (Oral Nutrition/Hydration Goal 1, SLP)  --  by discharge  -SA       Progress/Outcomes (Oral Nutrition/Hydration Goal 1, SLP)  good progress toward goal  -SA  --         User Key  (r) = Recorded By, (t) = Taken By, (c) = Cosigned By    Initials Name Effective Dates    Maegna Peck MS [facility]-SLP 03/07/18 -           EDUCATION  The patient has been educated in the following areas:   Dysphagia (Swallowing Impairment) Modified Diet Instruction.    SLP Recommendation and Plan  SLP Swallowing Diagnosis: mild  SLP Diet Recommendation: soft textures, chopped, thin liquids  Recommended Precautions and Strategies: upright posture during/after eating, small bites of food and sips of liquid  SLP Rec. for Method of Medication Administration: as tolerated     Monitor for Signs of Aspiration: notify SLP if any concerns     Swallow Criteria for Skilled Therapeutic Interventions Met: demonstrates skilled criteria  Anticipated Dischage Disposition: unknown  Rehab Potential/Prognosis, Swallowing: good, to achieve stated therapy goals  Therapy Frequency (Swallow): PRN  Predicted Duration Therapy Intervention (Days): until discharge       Plan of Care Reviewed With:  patient  Plan of Care Review  Plan of Care Reviewed With: patient  Outcome Summary: Improved mastication and swallow.  No s/s aspirationwith thin liquids today.  Adequate mastication with no significant oral residue.  Throat clear was demonstrated x1 w/ dry cracker.  REC upgrade to [facility] soft, chopped meats, thin liquid.    SLP GOALS     Row Name 05/17/19 1100 05/16/19 1500          Oral Nutrition/Hydration Goal 1 (SLP)    Oral Nutrition/Hydration Goal 1, SLP  --  Pt will tolerate least restrictive diet  -SA     Time Frame (Oral Nutrition/Hydration Goal 1, SLP)  --  by discharge  -SA     Progress/Outcomes (Oral Nutrition/Hydration Goal 1, SLP)  good progress toward goal  -SA  --       User Key  (r) = Recorded By, (t) = Taken By, (c) = Cosigned By    Initials Name Provider Type    Maegan Peck MS CCC-SLP Speech and Language Pathologist           SLP Outcome Measures (last 72 hours)      SLP Outcome Measures     Row Name 05/17/19 1100 05/16/19 1500          SLP Outcome Measures    Outcome Measure Used?  Adult NOMS  -SA  Adult NOMS  -SA        Adult FCM Scores    FCM Chosen  --  Swallowing  -SA     Swallowing FCM Score  4  -SA  3  -SA       User Key  (r) = Recorded By, (t) = Taken By, (c) = Cosigned By    Initials Name Effective Dates    Maegan Peck MS CCC-SLP 03/07/18 -            Time Calculation:   Time Calculation- SLP     Row Name 05/17/19 1132             Time Calculation- SLP    SLP Start Time  1100  -SA        User Key  (r) = Recorded By, (t) = Taken By, (c) = Cosigned By    Initials Name Provider Type    Maegan Peck MS CCC-SLP Speech and Language Pathologist          Therapy Charges for Today     Code Description Service Date Service Provider Modifiers Qty    66457923209 HC ST EVAL ORAL PHARYNG SWALLOW 4 5/16/2019 Maegan Shaikh MS CCC-SLP GN 1    96056757529 HC ST TREATMENT SWALLOW 4 5/17/2019 Maegan Shaikh MS CCC-RENETTA GN 1               MS NIRAV Galeas  5/17/2019

## 2019-05-17 NOTE — PROGRESS NOTES
LOS: 6 days   Patient Care Team:  Provider, No Known as PCP - General  Provider, No Known as PCP - Family Medicine    Chief Complaint: pneumonia, elevated troponin       Interval History: He is now extubated.  No cardiac complaints.    Objective   Vital Signs  Temp:  [97.6 °F (36.4 °C)-98.9 °F (37.2 °C)] 98.1 °F (36.7 °C)  Heart Rate:  [43-57] 51  Resp:  [16-17] 16  BP: (114-169)/(58-82) 144/70  FiO2 (%):  [40 %] 40 %    Intake/Output Summary (Last 24 hours) at 5/17/2019 0956  Last data filed at 5/17/2019 0823  Gross per 24 hour   Intake 1263 ml   Output 6200 ml   Net -4937 ml       Awake, comfortable.  PERRL, conjunctiva clear  Neck supple, no JVD or thyromegaly appreciated  S1/S2 RRR, no m/r/g  Lungs CTA B, normal effort  Abdomen S/NT/ND (+) BS, no HSM appreciated  Extremities warm, no clubbing, cyanosis, or edema  No visible or palpable skin lesions      Results Review:      Results from last 7 days   Lab Units 05/16/19  0639 05/15/19  0507 05/14/19  0314   SODIUM mmol/L 135* 132* 137   POTASSIUM mmol/L 3.8 4.2 4.8   CHLORIDE mmol/L 85* 86* 96*   CO2 mmol/L 42.1* 39.6* 31.0*   BUN mg/dL 24* 28* 31*   CREATININE mg/dL 0.50* 0.62* 0.73*   GLUCOSE mg/dL 197* 207* 165*   CALCIUM mg/dL 8.7 8.7 8.9     Results from last 7 days   Lab Units 05/11/19  1104 05/11/19  0542 05/11/19  0339   TROPONIN T ng/mL 0.172* 0.069* 0.057*     Results from last 7 days   Lab Units 05/16/19  0502 05/14/19  0314 05/12/19  0351   WBC 10*3/mm3 11.07* 12.21* 16.03*   HEMOGLOBIN g/dL 16.8 15.3 15.3   HEMATOCRIT % 50.5 47.5 48.7   PLATELETS 10*3/mm3 141 179 211     Results from last 7 days   Lab Units 05/15/19  0920 05/11/19  1104   INR  1.04 1.17*         Results from last 7 days   Lab Units 05/12/19  0351   MAGNESIUM mg/dL 1.8           I reviewed the patient's new clinical results.  I personally viewed and interpreted the patient's EKG/Telemetry data        Medication Review:     amLODIPine 10 mg Oral Q24H   aspirin 325 mg Oral Daily    atorvastatin 40 mg Oral Daily   ceftriaxone 1 g Intravenous Q24H   chlorhexidine 15 mL Mouth/Throat Q12H   donepezil 10 mg Oral Nightly   enoxaparin 40 mg Subcutaneous Q24H   famotidine 20 mg Intravenous Q12H   furosemide 40 mg Intravenous Q8H   gabapentin 600 mg Nasogastric Q8H   insulin glargine 10 Units Subcutaneous Q12H   insulin lispro 0-24 Units Subcutaneous 4x Daily With Meals & Nightly   ipratropium-albuterol 3 mL Nebulization Q4H - RT   lisinopril 40 mg Oral Daily   methylPREDNISolone sodium succinate 20 mg Intravenous Q12H   metoprolol tartrate 37.5 mg Oral Q12H   nystatin  Topical Q12H   sertraline 25 mg Oral Daily   sodium chloride 3 mL Intravenous Q12H         dexmedetomidine 0.2-1.5 mcg/kg/hr Last Rate: 1.1 mcg/kg/hr (05/17/19 0908)   hold 1 each        Assessment/Plan       Respiratory failure (CMS/HCC)    69 year old man with history of multiple cardiac risk factors including DM, COPD, PAD, and hypertension.  Now with acute respiratory failure due to COPD/pneumonia.    Elevated troponin, but no ischemic changes on EKG.  I suspect Type II (demand-related) infarct.  ECHO shows normal EF.    Now on beta-blocker.  BP reasonable.  HR a touch low.  He is improved from the pneumonia standpoint.     Selvin Martinez MD  05/17/19  9:56 AM

## 2019-05-17 NOTE — PLAN OF CARE
Problem: Patient Care Overview  Goal: Plan of Care Review  Outcome: Ongoing (interventions implemented as appropriate)   05/17/19 1953   Coping/Psychosocial   Plan of Care Reviewed With patient   OTHER   Outcome Summary VSS except brief episodes of HTN that resolved with medication. Patient is stable and breathing effectively on 4LNC. No Bipap usage today, but available PRN to patient. Pt. seen by SLP, advanced to soft diet with thin liquids. Meds can still be given with applesauce if warranted. Worked with PT today, able to move to chair with assist of 2 and back to bed with assist of 2. Plan for patient to continue to wean off Precedex. Pt. continues to ask for PRN pain medication-see MAR for medication administration. Aguilar catheter removed, pt. voided by self withing DTV timeframe. Will continue to monitor patient throughout rest of shift.    Plan of Care Review   Progress improving       Problem: Pneumonia (Adult)  Goal: Signs and Symptoms of Listed Potential Problems Will be Absent, Minimized or Managed (Pneumonia)  Outcome: Ongoing (interventions implemented as appropriate)   05/15/19 1600 05/17/19 1953   Goal/Outcome Evaluation   Problems Assessed (Pneumonia) all --    Problems Present (Pneumonia) --  respiratory compromise       Problem: Pain, Acute (Adult)  Goal: Acceptable Pain Control/Comfort Level  Outcome: Ongoing (interventions implemented as appropriate)   05/17/19 1953   Pain, Acute (Adult)   Acceptable Pain Control/Comfort Level making progress toward outcome       Problem: Skin Injury Risk (Adult)  Goal: Skin Health and Integrity  Outcome: Ongoing (interventions implemented as appropriate)   05/17/19 1953   Skin Injury Risk (Adult)   Skin Health and Integrity making progress toward outcome       Problem: Fall Risk (Adult)  Goal: Absence of Fall  Outcome: Ongoing (interventions implemented as appropriate)   05/17/19 1953   Fall Risk (Adult)   Absence of Fall making progress toward outcome

## 2019-05-17 NOTE — PROGRESS NOTES
"Continued Stay Note  [facility]     Patient Name: Heath Bajwa  MRN: [id number]  Today's Date: 5/17/2019    Admit Date: 5/11/2019    Discharge Plan     Row Name 05/17/19 1247       Plan    Plan  Home; CCP to follow for home care needs    Patient/Family in Agreement with Plan  yes    Plan Comments  Pt is now off the vent. Spoke with him at bedside. He states he has oxygen at home he has been using at 4L [place]. He has a nebulizer and a \"breathing machine\" he started recently. Pt was unsure of the [employer] company he got his equipment from--said it was a local place in [facility], possibly [facility]. I was unable to find a company by that name in [facility] via internet search. Tried [facility] ([phone] and fax [phone]) and spoke with Blanca. Blanca states pt is current with them for home respiratory supplies. According to their records, pt has O2 at 3L, a nebulizer, and a Trilogy vent that was ordered for him on 4/16/19. Pt states at this time he is not sure what he will need at [place] but he wants to go home. CCP to follow............JW        Discharge Codes    No documentation.             Mariajose Capps RN    "

## 2019-05-17 NOTE — PLAN OF CARE
Problem: Patient Care Overview  Goal: Plan of Care Review  Outcome: Ongoing (interventions implemented as appropriate)   05/17/19 1131   Coping/Psychosocial   Plan of Care Reviewed With patient   OTHER   Outcome Summary Improved mastication and swallow. No s/s aspirationwith thin liquids today. Adequate mastication with no significant oral residue. Throat clear was demonstrated x1 w/ dry cracker. REC upgrade to [facility] soft, chopped meats, thin liquid.

## 2019-05-17 NOTE — CONSULTS
Adult Nutrition  Assessment/PES    Patient Name:  Heath Bajwa  YOB: 1950  MRN: [id number]  Admit Date:  5/11/2019    Assessment Date:  5/17/2019    Comments:  Follow up note. Pt now extubated and diet advanced per speech. Will honor food pref's.    Reason for Assessment     Row Name 05/17/19 1002          Reason for Assessment    Reason For Assessment  follow-up protocol     Diagnosis  -- extubated         Nutrition/Diet History     Row Name 05/17/19 1003          Nutrition/Diet History    Typical Food/Fluid Intake  off vent, diet advanced per speech           Labs/Tests/Procedures/Meds     Row Name 05/17/19 1003          Labs/Procedures/Meds    Lab Results Reviewed  reviewed        Diagnostic Tests/Procedures    Diagnostic Test/Procedure Reviewed  reviewed     Diagnostic Test/Procedures Comments  speech eval done        Medications    Pertinent Medications Reviewed  reviewed         Physical Findings     Row Name 05/17/19 1003          Physical Findings    Overall Physical Appearance  on oxygen therapy     Gastrointestinal  -- d/c'ed     Skin  -- left toe ulceration, blister           Nutrition Prescription Ordered     Row Name 05/17/19 1004          Nutrition Prescription PO    Current PO Diet  Dysphagia     Dysphagia Level  2  Pureed     Fluid Consistency  Honey thick     Common Modifiers  Consistent Carbohydrate;Low Fat                 Problem/Interventions:  Problem 1     Row Name 05/17/19 1004          Nutrition Diagnoses Problem 1    Resolved?  Yes         Problem 2     Row Name 05/17/19 1005          Nutrition Diagnoses Problem 2    Problem 2  Swallowing Difficulty     Etiology (related to)  -- extubated     Signs/Symptoms (evidenced by)  SLP/Swallow eval               Intervention Goal     Row Name 05/17/19 1006          Intervention Goal    General  Maintain nutrition     PO  Tolerate PO;PO intake (%)     PO Intake %  75 %     Weight  No significant weight loss         Nutrition  Intervention     Row Name 05/17/19 1006          Nutrition Intervention    RD/Tech Action  Care plan reviewd;Follow Tx progress           Education/Evaluation     Row Name 05/17/19 1006          Monitor/Evaluation    Monitor  Per protocol;PO intake;Pertinent labs;Weight           Electronically signed by:  Cherelle Ferrari RD  05/17/19 10:06 AM

## 2019-05-17 NOTE — THERAPY TREATMENT NOTE
Acute Care - Physical Therapy Treatment Note  [facility]     Patient Name: Heath Bajwa  : 1950  MRN: [id number]  Today's Date: 2019  Onset of Illness/Injury or Date of Surgery: 19  Date of Referral to PT: 19  Referring Physician: Mitali    Admit Date: 2019    Visit Dx:    ICD-10-CM ICD-9-CM   1. Impaired functional mobility and activity tolerance Z74.09 V49.89     Patient Active Problem List   Diagnosis   • BPH without urinary obstruction   • Elevated prostate specific antigen (PSA)   • Acute respiratory failure with hypoxia and hypercapnia (CMS/HCC)   • Venous stasis dermatitis of both lower extremities   • Elevated troponin   • Respiratory failure (CMS/HCC)       Therapy Treatment    Rehabilitation Treatment Summary     Row Name 19 1600             Treatment Time/Intention    Discipline  physical therapist  -PC      Document Type  therapy note (daily note)  -PC      Subjective Information  complains of;weakness;fatigue  -PC      Mode of Treatment  physical therapy  -PC      Patient/Family Observations  pt is in bed, he is off vent, on 4L o2  -PC      Therapy Frequency (PT Clinical Impression)  daily  -PC      Patient Effort  adequate  -PC      Existing Precautions/Restrictions  fall;oxygen therapy device and L/min  -PC      Recorded by [PC] Alley Frazier, PT 19 1638      Row Name 19 1600             Vital Signs    Pretreatment Heart Rate (beats/min)  61  -PC      Pre SpO2 (%)  90  -PC      O2 Delivery Pre Treatment  supplemental O2  -PC      Intra SpO2 (%)  84  -PC      O2 Delivery Intra Treatment  supplemental O2  -PC      Post SpO2 (%)  89  -PC      O2 Delivery Post Treatment  supplemental O2  -PC      Recorded by [PC] Alley Frazier, PT 19 1638      Row Name 19 1600             Cognitive Assessment/Intervention- PT/OT    Orientation Status (Cognition)  oriented x 3  -PC      Follows Commands (Cognition)  follows one step commands;75-90%  accuracy  -PC      Safety Deficit (Cognitive)  mild deficit;insight into deficits/self awareness;safety precautions follow-through/compliance  -PC      Recorded by [PC] Alley Frazier, PT 05/17/19 1638      Row Name 05/17/19 1600             Bed Mobility Assessment/Treatment    Supine-Sit [place] (Bed Mobility)  minimum assist (75% patient effort);2 person assist  -PC      Recorded by [PC] Alley Frazier, PT 05/17/19 1638      Row Name 05/17/19 1600             Sit-Stand Transfer    Sit-Stand [place] (Transfers)  moderate assist (50% patient effort);2 person assist  -PC      Assistive Device (Sit-Stand Transfers)  -- HHA x2  -PC      Recorded by [PC] Alley Frazier, PT 05/17/19 1638      Row Name 05/17/19 1600             Stand-Sit Transfer    Stand-Sit [place] (Transfers)  moderate assist (50% patient effort);2 person assist  -PC      Assistive Device (Stand-Sit Transfers)  -- HHA x 2  -PC      Recorded by [PC] Alley Frazier, PT 05/17/19 1638      Row Name 05/17/19 1600             Gait/Stairs Assessment/Training    [place] Level (Gait)  moderate assist (50% patient effort);2 person assist  -PC      Assistive Device (Gait)  -- HHA x 2  -PC      Distance in Feet (Gait)  3 ft, bed to chair  -PC      Pattern (Gait)  step-to  -PC      Recorded by [PC] Alley Frazier, PT 05/17/19 1638      Row Name 05/17/19 1600             Therapeutic Exercise    Comment (Therapeutic Exercise)  Sitting AP, LAQ, supine QS  -PC      Recorded by [PC] Alley Frazier, PT 05/17/19 1638      Row Name 05/17/19 1600             Positioning and Restraints    Pre-Treatment Position  in bed  -PC      Post Treatment Position  chair  -PC      In Chair  reclined;call light within reach;encouraged to call for assist  -PC      Recorded by [PC] Alley Frazier, PT 05/17/19 1638      Row Name 05/17/19 1600             Pain Scale: Word Pre/Post-Treatment    Pre/Post Treatment Pain Comment  pt c/o generalized soreness  -PC       Recorded by [PC] Alley Frazier, PT 05/17/19 1638      Row Name                Wound 05/11/19 1015 Left anterior toe blisters    Wound - Properties Group Date first assessed: 05/11/19 [JN] Time first assessed: 1015 [JN] Present On Admission : yes [JN] Side: Left [JN] Orientation: anterior [JN] Location: toe [JN], great toe  Type: blisters [JN] Recorded by:  [JN] Ariel Schulte, RN 05/11/19 1301    Row Name 05/17/19 1600             Plan of Care Review    Plan of Care Reviewed With  patient  -PC      Recorded by [PC] Alley Frazier, PT 05/17/19 1638      Row Name 05/17/19 1600             Outcome Summary/Treatment Plan (PT)    Anticipated Discharge Disposition (PT)  [facility] nursing facility  -PC      Recorded by [PC] Alley Frazier, PT 05/17/19 1638        User Key  (r) = Recorded By, (t) = Taken By, (c) = Cosigned By    Initials Name Effective Dates Discipline    PC Alley Frazier, PT 04/03/18 -  PT    JN Ariel Schulte RN 10/12/16 -  Nurse          Wound 05/11/19 1015 Left anterior toe blisters (Active)   Dressing Appearance open to air 5/17/2019  4:30 PM   Closure Open to air 5/17/2019  4:30 PM   Base purple;red 5/17/2019  4:30 PM   Periwound dry;pink 5/17/2019  4:30 PM   Periwound Temperature warm 5/17/2019  4:30 PM   Periwound Skin Turgor firm 5/17/2019  4:30 PM   Care, Wound cleansed with;sterile normal saline 5/17/2019  8:30 AM   Dressing Care, Wound open to air 5/17/2019  4:30 PM       Rehab Goal Summary     Row Name 05/17/19 1100             Oral Nutrition/Hydration Goal 1 (SLP)    Progress/Outcomes (Oral Nutrition/Hydration Goal 1, SLP)  good progress toward goal  -SA        User Key  (r) = Recorded By, (t) = Taken By, (c) = Cosigned By    Initials Name Provider Type Discipline    SA Maegan Shaikh MS CCC-SLP Speech and Language Pathologist SLP          Physical Therapy Education     Title: PT OT SLP Therapies (In Progress)     Topic: Physical Therapy (In Progress)     Point: Mobility training (In  Progress)     Learning Progress Summary           Patient Acceptance, E,D, NR by PC at 5/17/2019  4:38 PM    Acceptance, E,D, NR by PC at 5/15/2019  3:23 PM    Acceptance, E,D, NR by PC at 5/13/2019  3:05 PM                   Point: Home exercise program (In Progress)     Learning Progress Summary           Patient Acceptance, E,D, NR by PC at 5/17/2019  4:38 PM    Acceptance, E, NR by EM at 5/16/2019 11:55 AM    Acceptance, E,D, NR by PC at 5/15/2019  3:23 PM    Acceptance, E,D, NR by PC at 5/14/2019 11:59 AM    Acceptance, E,D, NR by PC at 5/13/2019  3:05 PM                   Point: Body mechanics (In Progress)     Learning Progress Summary           Patient Acceptance, E,D, NR by PC at 5/15/2019  3:23 PM    Acceptance, E,D, NR by PC at 5/13/2019  3:05 PM                   Point: Precautions (In Progress)     Learning Progress Summary           Patient Acceptance, E,D, NR by PC at 5/15/2019  3:23 PM    Acceptance, E,D, NR by PC at 5/13/2019  3:05 PM                               User Key     Initials Effective Dates Name Provider Type Discipline    PC 04/03/18 -  Alley Frazier, PT Physical Therapist PT    EM 04/03/18 -  She Louie PT Physical Therapist PT                PT Recommendation and Plan  Anticipated Discharge Disposition (PT): skilled nursing facility  Planned Therapy Interventions (PT Eval): balance training, bed mobility training, gait training, home exercise program, transfer training, strengthening  Therapy Frequency (PT Clinical Impression): daily  Outcome Summary/Treatment Plan (PT)  Anticipated Discharge Disposition (PT): skilled nursing facility  Plan of Care Reviewed With: patient  Progress: improving  Outcome Summary: pt is off vent, able to sit edge of bed and stand with assist, pt took 3 sidesteps to chair with mod a x 2, pt fatigued quickly but at least he was out of bed, will cont to advance as tolerated  Outcome Measures     Row Name 05/17/19 1600 05/16/19 1100 05/15/19 1500        How much help from another person do you currently need...    Turning from your back to your side while in flat bed without using bedrails?  2  -PC  2  -EM  2  -PC    Moving from lying on back to sitting on the side of a flat bed without bedrails?  2  -PC  1  -EM  1  -PC    Moving to and from a bed to a chair (including a wheelchair)?  2  -PC  1  -EM  1  -PC    Standing up from a chair using your arms (e.g., wheelchair, bedside chair)?  2  -PC  1  -EM  1  -PC    Climbing 3-5 steps with a railing?  1  -PC  1  -EM  1  -PC    To walk in hospital room?  2  -PC  1  -EM  1  -PC    AM-PAC 6 Clicks Score  11  -PC  7  -EM  7  -PC      User Key  (r) = Recorded By, (t) = Taken By, (c) = Cosigned By    Initials Name Provider Type    PC Alley Frazier, PT Physical Therapist    EM She Louie, PT Physical Therapist         Time Calculation:   PT Charges     Row Name 05/17/19 1640             Time Calculation    Start Time  1536  -PC      Stop Time  1600  -PC      Time Calculation (min)  24 min  -PC      PT Received On  05/17/19  -PC      PT - Next Appointment  05/18/19  -PC        User Key  (r) = Recorded By, (t) = Taken By, (c) = Cosigned By    Initials Name Provider Type    PC Alley Frazier, PT Physical Therapist        Therapy Charges for Today     Code Description Service Date Service Provider Modifiers Qty    11761771655 HC PT THER PROC EA 15 MIN 5/17/2019 Alley Frazier, PT GP 2    27872918375 HC PT THER SUPP EA 15 MIN 5/17/2019 Alley Frazier, PT GP 2          PT G-Codes  Outcome Measure Options: AM-PAC 6 Clicks Basic Mobility (PT)  AM-PAC 6 Clicks Score: 11    Alley Frazier PT  5/17/2019

## 2019-05-17 NOTE — PLAN OF CARE
Problem: Patient Care Overview  Goal: Plan of Care Review  Outcome: Ongoing (interventions implemented as appropriate)   05/17/19 2628   Coping/Psychosocial   Plan of Care Reviewed With patient   OTHER   Outcome Summary pt is off vent, able to sit edge of bed and stand with assist, pt took 3 sidesteps to chair with mod a x 2, pt fatigued quickly but at least he was out of bed, will cont to advance as tolerated   Plan of Care Review   Progress improving

## 2019-05-18 ENCOUNTER — APPOINTMENT (OUTPATIENT)
Dept: ULTRASOUND IMAGING | Facility: HOSPITAL | Age: 69
End: 2019-05-18

## 2019-05-18 LAB
ANION GAP SERPL CALCULATED.3IONS-SCNC: 10.5 MMOL/L
BACTERIA FLD CULT: NORMAL
BUN BLD-MCNC: 16 MG/DL (ref 8–23)
BUN/CREAT SERPL: 28.6 (ref 7–25)
CALCIUM SPEC-SCNC: 8.8 MG/DL (ref 8.6–10.5)
CHLORIDE SERPL-SCNC: 84 MMOL/L (ref 98–107)
CO2 SERPL-SCNC: 38.5 MMOL/L (ref 22–29)
CREAT BLD-MCNC: 0.56 MG/DL (ref 0.76–1.27)
GFR SERPL CREATININE-BSD FRML MDRD: 144 ML/MIN/1.73
GLUCOSE BLD-MCNC: 161 MG/DL (ref 65–99)
GLUCOSE BLDC GLUCOMTR-MCNC: 110 MG/DL (ref 70–130)
GLUCOSE BLDC GLUCOMTR-MCNC: 130 MG/DL (ref 70–130)
GLUCOSE BLDC GLUCOMTR-MCNC: 132 MG/DL (ref 70–130)
GLUCOSE BLDC GLUCOMTR-MCNC: 143 MG/DL (ref 70–130)
GLUCOSE BLDC GLUCOMTR-MCNC: 153 MG/DL (ref 70–130)
GLUCOSE BLDC GLUCOMTR-MCNC: 153 MG/DL (ref 70–130)
GRAM STN SPEC: NORMAL
GRAM STN SPEC: NORMAL
POTASSIUM BLD-SCNC: 3.3 MMOL/L (ref 3.5–5.2)
SODIUM BLD-SCNC: 133 MMOL/L (ref 136–145)

## 2019-05-18 PROCEDURE — 63710000001 INSULIN GLARGINE PER 5 UNITS: Performed by: INTERNAL MEDICINE

## 2019-05-18 PROCEDURE — 25010000002 ENOXAPARIN PER 10 MG: Performed by: INTERNAL MEDICINE

## 2019-05-18 PROCEDURE — 25010000002 ONDANSETRON PER 1 MG: Performed by: INTERNAL MEDICINE

## 2019-05-18 PROCEDURE — 94799 UNLISTED PULMONARY SVC/PX: CPT

## 2019-05-18 PROCEDURE — 97110 THERAPEUTIC EXERCISES: CPT

## 2019-05-18 PROCEDURE — 84132 ASSAY OF SERUM POTASSIUM: CPT | Performed by: INTERNAL MEDICINE

## 2019-05-18 PROCEDURE — 25010000002 FUROSEMIDE PER 20 MG: Performed by: INTERNAL MEDICINE

## 2019-05-18 PROCEDURE — 63710000001 INSULIN LISPRO (HUMAN) PER 5 UNITS: Performed by: INTERNAL MEDICINE

## 2019-05-18 PROCEDURE — 80048 BASIC METABOLIC PNL TOTAL CA: CPT | Performed by: INTERNAL MEDICINE

## 2019-05-18 PROCEDURE — 82962 GLUCOSE BLOOD TEST: CPT

## 2019-05-18 PROCEDURE — 99232 SBSQ HOSP IP/OBS MODERATE 35: CPT | Performed by: INTERNAL MEDICINE

## 2019-05-18 PROCEDURE — 76705 ECHO EXAM OF ABDOMEN: CPT

## 2019-05-18 PROCEDURE — 83735 ASSAY OF MAGNESIUM: CPT | Performed by: INTERNAL MEDICINE

## 2019-05-18 RX ORDER — SPIRONOLACTONE 50 MG/1
50 TABLET, FILM COATED ORAL DAILY
Status: DISCONTINUED | OUTPATIENT
Start: 2019-05-18 | End: 2019-05-22 | Stop reason: HOSPADM

## 2019-05-18 RX ORDER — ALBUTEROL SULFATE 2.5 MG/3ML
2.5 SOLUTION RESPIRATORY (INHALATION) EVERY 6 HOURS PRN
Status: DISCONTINUED | OUTPATIENT
Start: 2019-05-18 | End: 2019-05-22 | Stop reason: HOSPADM

## 2019-05-18 RX ORDER — SODIUM CHLORIDE FOR INHALATION 7 %
4 VIAL, NEBULIZER (ML) INHALATION
Status: DISCONTINUED | OUTPATIENT
Start: 2019-05-18 | End: 2019-05-22 | Stop reason: HOSPADM

## 2019-05-18 RX ORDER — BUDESONIDE AND FORMOTEROL FUMARATE DIHYDRATE 160; 4.5 UG/1; UG/1
2 AEROSOL RESPIRATORY (INHALATION)
Status: DISCONTINUED | OUTPATIENT
Start: 2019-05-18 | End: 2019-05-18

## 2019-05-18 RX ORDER — TAMSULOSIN HYDROCHLORIDE 0.4 MG/1
0.4 CAPSULE ORAL NIGHTLY
Status: DISCONTINUED | OUTPATIENT
Start: 2019-05-18 | End: 2019-05-22 | Stop reason: HOSPADM

## 2019-05-18 RX ORDER — ALBUTEROL SULFATE 90 UG/1
2 AEROSOL, METERED RESPIRATORY (INHALATION) EVERY 6 HOURS PRN
Status: DISCONTINUED | OUTPATIENT
Start: 2019-05-18 | End: 2019-05-18 | Stop reason: CLARIF

## 2019-05-18 RX ORDER — IPRATROPIUM BROMIDE AND ALBUTEROL SULFATE 2.5; .5 MG/3ML; MG/3ML
3 SOLUTION RESPIRATORY (INHALATION) EVERY 4 HOURS PRN
Status: DISCONTINUED | OUTPATIENT
Start: 2019-05-18 | End: 2019-05-19

## 2019-05-18 RX ORDER — POTASSIUM CHLORIDE 1.5 G/1.77G
40 POWDER, FOR SOLUTION ORAL AS NEEDED
Status: DISCONTINUED | OUTPATIENT
Start: 2019-05-18 | End: 2019-05-22 | Stop reason: HOSPADM

## 2019-05-18 RX ORDER — POTASSIUM CHLORIDE 7.45 MG/ML
10 INJECTION INTRAVENOUS
Status: DISCONTINUED | OUTPATIENT
Start: 2019-05-18 | End: 2019-05-22 | Stop reason: HOSPADM

## 2019-05-18 RX ORDER — BUDESONIDE 0.25 MG/2ML
0.25 INHALANT ORAL
Status: DISCONTINUED | OUTPATIENT
Start: 2019-05-18 | End: 2019-05-22 | Stop reason: HOSPADM

## 2019-05-18 RX ORDER — FUROSEMIDE 40 MG/1
40 TABLET ORAL
Status: DISCONTINUED | OUTPATIENT
Start: 2019-05-18 | End: 2019-05-21

## 2019-05-18 RX ORDER — POTASSIUM CHLORIDE 750 MG/1
40 CAPSULE, EXTENDED RELEASE ORAL AS NEEDED
Status: DISCONTINUED | OUTPATIENT
Start: 2019-05-18 | End: 2019-05-22 | Stop reason: HOSPADM

## 2019-05-18 RX ORDER — ARFORMOTEROL TARTRATE 15 UG/2ML
15 SOLUTION RESPIRATORY (INHALATION)
Status: DISCONTINUED | OUTPATIENT
Start: 2019-05-18 | End: 2019-05-22 | Stop reason: HOSPADM

## 2019-05-18 RX ADMIN — METOPROLOL TARTRATE 37.5 MG: 25 TABLET ORAL at 20:43

## 2019-05-18 RX ADMIN — ARFORMOTEROL TARTRATE 15 MCG: 15 SOLUTION RESPIRATORY (INHALATION) at 21:02

## 2019-05-18 RX ADMIN — NYSTATIN: 100000 POWDER TOPICAL at 20:50

## 2019-05-18 RX ADMIN — INSULIN GLARGINE 10 UNITS: 100 INJECTION, SOLUTION SUBCUTANEOUS at 20:44

## 2019-05-18 RX ADMIN — HYDROCODONE BITARTRATE AND ACETAMINOPHEN 1 TABLET: 10; 325 TABLET ORAL at 17:41

## 2019-05-18 RX ADMIN — HYDROCODONE BITARTRATE AND ACETAMINOPHEN 1 TABLET: 10; 325 TABLET ORAL at 09:49

## 2019-05-18 RX ADMIN — POTASSIUM CHLORIDE 40 MEQ: 750 CAPSULE, EXTENDED RELEASE ORAL at 17:45

## 2019-05-18 RX ADMIN — HYDROCODONE BITARTRATE AND ACETAMINOPHEN 1 TABLET: 10; 325 TABLET ORAL at 05:42

## 2019-05-18 RX ADMIN — FUROSEMIDE 40 MG: 10 INJECTION, SOLUTION INTRAMUSCULAR; INTRAVENOUS at 00:46

## 2019-05-18 RX ADMIN — DONEPEZIL HYDROCHLORIDE 10 MG: 10 TABLET, FILM COATED ORAL at 20:43

## 2019-05-18 RX ADMIN — LABETALOL 20 MG/4 ML (5 MG/ML) INTRAVENOUS SYRINGE 20 MG: at 04:39

## 2019-05-18 RX ADMIN — AMLODIPINE BESYLATE 10 MG: 10 TABLET ORAL at 08:18

## 2019-05-18 RX ADMIN — HYDROCODONE BITARTRATE AND ACETAMINOPHEN 1 TABLET: 10; 325 TABLET ORAL at 21:50

## 2019-05-18 RX ADMIN — SODIUM CHLORIDE, PRESERVATIVE FREE 3 ML: 5 INJECTION INTRAVENOUS at 20:50

## 2019-05-18 RX ADMIN — HYDROCODONE BITARTRATE AND ACETAMINOPHEN 1 TABLET: 10; 325 TABLET ORAL at 01:43

## 2019-05-18 RX ADMIN — INSULIN GLARGINE 10 UNITS: 100 INJECTION, SOLUTION SUBCUTANEOUS at 08:21

## 2019-05-18 RX ADMIN — HYDROCODONE BITARTRATE AND ACETAMINOPHEN 1 TABLET: 10; 325 TABLET ORAL at 13:39

## 2019-05-18 RX ADMIN — SPIRONOLACTONE 50 MG: 50 TABLET, FILM COATED ORAL at 12:18

## 2019-05-18 RX ADMIN — ENOXAPARIN SODIUM 40 MG: 40 INJECTION SUBCUTANEOUS at 12:18

## 2019-05-18 RX ADMIN — SODIUM CHLORIDE, PRESERVATIVE FREE 3 ML: 5 INJECTION INTRAVENOUS at 07:53

## 2019-05-18 RX ADMIN — NYSTATIN: 100000 POWDER TOPICAL at 08:20

## 2019-05-18 RX ADMIN — METOPROLOL TARTRATE 37.5 MG: 25 TABLET ORAL at 08:17

## 2019-05-18 RX ADMIN — ONDANSETRON HYDROCHLORIDE 4 MG: 2 SOLUTION INTRAMUSCULAR; INTRAVENOUS at 07:52

## 2019-05-18 RX ADMIN — INSULIN LISPRO 4 UNITS: 100 INJECTION, SOLUTION INTRAVENOUS; SUBCUTANEOUS at 12:18

## 2019-05-18 RX ADMIN — BUDESONIDE 0.25 MG: 0.25 INHALANT RESPIRATORY (INHALATION) at 21:02

## 2019-05-18 RX ADMIN — CHLORHEXIDINE GLUCONATE 15 ML: 1.2 RINSE ORAL at 08:18

## 2019-05-18 RX ADMIN — SERTRALINE 25 MG: 25 TABLET, FILM COATED ORAL at 08:17

## 2019-05-18 RX ADMIN — SODIUM CHLORIDE 4 ML: 7 NEBU SOLN,3 % NEBU at 21:02

## 2019-05-18 RX ADMIN — SODIUM CHLORIDE 4 ML: 7 NEBU SOLN,3 % NEBU at 11:01

## 2019-05-18 RX ADMIN — LISINOPRIL 40 MG: 40 TABLET ORAL at 08:16

## 2019-05-18 RX ADMIN — ARFORMOTEROL TARTRATE 15 MCG: 15 SOLUTION RESPIRATORY (INHALATION) at 11:01

## 2019-05-18 RX ADMIN — CHLORHEXIDINE GLUCONATE 15 ML: 1.2 RINSE ORAL at 20:44

## 2019-05-18 RX ADMIN — ASPIRIN 325 MG: 325 TABLET ORAL at 08:16

## 2019-05-18 RX ADMIN — FUROSEMIDE 40 MG: 40 TABLET ORAL at 17:41

## 2019-05-18 RX ADMIN — POTASSIUM CHLORIDE 40 MEQ: 1.5 POWDER, FOR SOLUTION ORAL at 12:18

## 2019-05-18 RX ADMIN — IPRATROPIUM BROMIDE AND ALBUTEROL SULFATE 3 ML: 2.5; .5 SOLUTION RESPIRATORY (INHALATION) at 07:11

## 2019-05-18 RX ADMIN — ATORVASTATIN CALCIUM 40 MG: 20 TABLET, FILM COATED ORAL at 08:16

## 2019-05-18 RX ADMIN — GABAPENTIN 600 MG: 250 SOLUTION ORAL at 14:40

## 2019-05-18 RX ADMIN — BUDESONIDE 0.25 MG: 0.25 INHALANT RESPIRATORY (INHALATION) at 11:01

## 2019-05-18 NOTE — PROGRESS NOTES
"                            Cesar Brower MD                          [phone]      Patient ID:    Name:  Heath Bajwa    MRN:  [id number]    1950   69 y.o.  male            Patient Care Team:  Provider, No Known as PCP - General  Provider, No Known as PCP - Family Medicine    CC/ Reason for visit: Per Assessment mentioned below    Subjective: Pt seen and examined this AM. No acute overnight events noted. Doing better.  Awake and interactive.  Still on low-dose Precedex.  Says that his breathing is better.  Still requiring 4 L nasal cannula which is apparently his baseline.  Says that he is a little anxious and wants his home \"nerve pill\"    ROS: Denies any subjective fevers, chest pain, nausea/ vomiting    Objective     Vital Signs past 24hrs    BP range: BP: (133-205)/() 149/87  Pulse range: Heart Rate:  [53-86] 86  Resp rate range: Resp:  [16-20] 18  Temp range: Temp (24hrs), Av.4 °F (36.9 °C), Min:97.7 °F (36.5 °C), Max:98.8 °F (37.1 °C)      Ventilator/Non-Invasive Ventilation Settings (From admission, onward)    Start     Ordered    19 1326  NIPPV (CPAP or BIPAP)  As Needed-RT     Comments:  Use noninvasive ventilation if patient has respiratory distress or feels increasing dyspnea or desaturates.   Question Answer Comment   Indication: Acute Respiratory Failure    Type: AVAPS/PC/PS    NIPPV Mask Interface: Per Patient Preference    Backup Rate 12    Target VT (mL) 500    EPAP/PEEP (cm H2O) 5    Min Pressure (cm H2O) 12    Max Pressure (cm H2O) 28    Titrate for SPO2 88% - 92%        19 1327    19 0857  Ventilator - AC/PC; 20; 40; 88%; 15; 15  Continuous,   Status:  Canceled     Comments:  If SPO2 greater than 92% at 1300 p.m. then decrease PEEP 2.5 cm and may continue decreasing PEEP 2.5 cm to 5 cm every 6 hours as tolerated   Question Answer Comment   Vent Mode AC/PC    Breath rate 20    FiO2 40    FiO2 titrate for Sp02% =/> 88%    PEEP 15    Inspiratory " Pressure 15        05/12/19 0908    05/11/19 1021  Ventilator - AC/PC; (28); 100; 88%; Other (see comments); 18; 17  Continuous,   Status:  Canceled     Question Answer Comment   Vent Mode AC/PC    Breath rate  28   FiO2 100    FiO2 titrate for Sp02% =/> 88%    PEEP Other (see comments)    PEEP: 18    Inspiratory Pressure 17        05/11/19 1027          Device (Oxygen Therapy): nasal cannula FiO2 (%): 40 %     107 kg (235 lb 10.8 oz); Body mass index is 30.26 kg/m².      Intake/Output Summary (Last 24 hours) at 5/18/2019 0901  Last data filed at 5/18/2019 0820  Gross per 24 hour   Intake 793.5 ml   Output 2950 ml   Net -2156.5 ml       PHYSICAL EXAM   Constitutional: Middle aged pt in bed, No acute respiratory distress, + accessory muscle use  Head: - NCAT  Eyes: No pallor, Anicteric conjunctiva, EOMI.  ENMT:  Mallampati 4, no oral thrush. No palpable cervical lymphadenopathy, Dry MM.   Heart: RRR, no murmur. No pedal edema   Lungs: TUTU +, No wheezes, Occ crackles heard.  Significantly decreased air movement at the bases bilateral.     Abdomen: Soft. Obese, No tenderness, guarding or rigidity. No palpable masses  Extremities: Extremities warm with bilateral lower extremity chronic skin changes and chronic ulcers  Neuro: Conscious, answers appropriately, no gross focal neuro deficits  Psych: Mood and affect appropriate. Not anxious    Scheduled meds:    amLODIPine 10 mg Oral Q24H   aspirin 325 mg Oral Daily   atorvastatin 40 mg Oral Daily   chlorhexidine 15 mL Mouth/Throat Q12H   donepezil 10 mg Oral Nightly   enoxaparin 40 mg Subcutaneous Q24H   furosemide 40 mg Intravenous Q8H   gabapentin 600 mg Nasogastric Q8H   insulin glargine 10 Units Subcutaneous Q12H   insulin lispro 0-24 Units Subcutaneous 4x Daily With Meals & Nightly   ipratropium-albuterol 3 mL Nebulization Q4H - RT   lisinopril 40 mg Oral Daily   metoprolol tartrate 37.5 mg Oral Q12H   nystatin  Topical Q12H   sertraline 25 mg Oral Daily   sodium  chloride 3 mL Intravenous Q12H       IV meds:                        dexmedetomidine 0.2-1.5 mcg/kg/hr Last Rate: 0.2 mcg/kg/hr (05/18/19 0508)   hold 1 each        Data Review:      Results from last 7 days   Lab Units 05/18/19  0425 05/16/19  0639 05/16/19  0502 05/15/19  0920 05/15/19  0507 05/14/19  0314  05/12/19  0351 05/11/19  1104   SODIUM mmol/L 133* 135*  --   --  132* 137   < > 139 140   POTASSIUM mmol/L 3.3* 3.8  --   --  4.2 4.8   < > 5.1 4.3   CHLORIDE mmol/L 84* 85*  --   --  86* 96*   < > 98 95*   CO2 mmol/L 38.5* 42.1*  --   --  39.6* 31.0*   < > 30.4* 35.6*   BUN mg/dL 16 24*  --   --  28* 31*   < > 32* 22   CREATININE mg/dL 0.56* 0.50*  --   --  0.62* 0.73*   < > 1.28* 0.93   CALCIUM mg/dL 8.8 8.7  --   --  8.7 8.9   < > 8.3* 8.7   BILIRUBIN mg/dL  --   --   --   --   --   --   --   --  0.5   ALK PHOS U/L  --   --   --   --   --   --   --   --  101   ALT (SGPT) U/L  --   --   --   --   --   --   --   --  17   AST (SGOT) U/L  --   --   --   --   --   --   --   --  17   GLUCOSE mg/dL 161* 197*  --   --  207* 165*   < > 209* 215*   WBC 10*3/mm3  --   --  11.07*  --   --  12.21*  --  16.03* 14.91*   HEMOGLOBIN g/dL  --   --  16.8  --   --  15.3  --  15.3 16.2   PLATELETS 10*3/mm3  --   --  141  --   --  179  --  211 212   INR   --   --   --  1.04  --   --   --   --  1.17*   PROCALCITONIN ng/mL  --   --   --   --   --   --   --   --  0.13    < > = values in this interval not displayed.       Lab Results   Component Value Date    CALCIUM 8.8 05/18/2019    PHOS 2.2 (L) 05/11/2019       Results from last 7 days   Lab Units 05/15/19  1103 05/11/19  1209   BODYFLDCX  No growth at 2 days  --    RESPCX   --  Scant growth (1+) Normal Respiratory Kassandra       Results from last 7 days   Lab Units 05/16/19  1237 05/14/19  0957 05/14/19  0330 05/13/19  0344 05/12/19  0410 05/11/19  1148   PH, ARTERIAL pH units 7.457* 7.357 7.375 7.394 7.455* 7.342*   PO2 ART mm Hg 75.3* 71.9* 77.1* 76.0* 70.8* 142.3*   PCO2,  ARTERIAL mm Hg 62.9* 66.0* 58.6* 57.6* 47.5* 67.5*   HCO3 ART mmol/L 44.4* 37.0* 34.3* 35.2* 33.4* 36.5*        Results Review:    I have reviewed the relevant laboratory results and reviewed the chest imaging from the last 3 days personally and summarized it below    Assessment    Acute on chronic hypoxemic/ hypercapnic respiratory failure - 4L @ home  Bilateral lower lobe pneumonia vs Atelectasis -suspect aspiration  Bilateral pleural effusions s/p Rt thora 5/15  Acute on chronic diastolic heart failure - Suspected  Acute COPD exacerbation  Acute kidney injury  Elevated troponin-demand ischemia  Suspected traumatic hematuria   Mild Pulm HTN (RVSP - 43) likely WHO Group 2 vs 3   Mediastinal/hilar lymphadenopathy ? reactive - need outpatient follow  Incidental 3 mm lung nodule - needs outpatient follow-up  Incidental renal lesion -needs outpatient f/u CT  Incidental Liver Cirrhosis   PAD -reportedly declined outpatient evaluation  Diabetes  Hypertension    PLAN:  Patient doing well on supplemental oxygen at 4 L nasal cannula.  We will continue to wean.  Status was right-sided thoracentesis.  Persistent hypoxemia likely multifactorial mainly from pneumonia/atelectasis and effusions. We will need mucus clearance and resolution of atelectasis.  Hold off on repeat thoracentesis for now.  Finish antibiotic course. Low procal however.   Patient with dense atelectasis along with suspected mucous plugging in the CAT scan on admission.  We might consider bronchoscopy if there is not much resolution of his pneumonia/atelectasis.   Persistent mediastinal and right hilar lymphadenopathy noted could be reactive but will need follow-up imaging to confirm resolution  Continue with bronchodilators for COPD and wean prednisone.  Will need repeat ABG once acute issues resolved to see if he would need long-term ventilator for chronic hypercapnic.  Seems to be more metabolic alkalosis at this point from aggressive diuresis  Precedex  wean ongoing from anxiety  We will wean down diuretics.   No diagnosis of cirrhosis previously noted.  We will get an ultrasound of the abdomen. Will add low dose aldactone.    Appreciate cardiology input. No plan for ischemia w/u or concern for ACS.   Tolerating modified diet cleared by speech therapy.  Working with physical therapy.  Aguilar catheter discontinued.     Tx out of ICU if doing ok off precedex    I have discussed my findings and recommendations with patient and nursing staff.     Cesar Brower MD  5/18/2019

## 2019-05-18 NOTE — PLAN OF CARE
Problem: Patient Care Overview  Goal: Plan of Care Review  Outcome: Ongoing (interventions implemented as appropriate)   05/18/19 0550   OTHER   Outcome Summary Pt remains on CCU, poor sleep, episodes of HTN resolved with medication, remains on 4l nc, no soa, pain managed with oral meds q 4 hours, up to bsc with assist, had large bm, using urinal, Precedex weaned to 0.2.   Plan of Care Review   Progress improving       Problem: Pain, Acute (Adult)  Goal: Acceptable Pain Control/Comfort Level  Outcome: Ongoing (interventions implemented as appropriate)      Problem: Skin Injury Risk (Adult)  Goal: Skin Health and Integrity  Outcome: Ongoing (interventions implemented as appropriate)      Problem: Fall Risk (Adult)  Goal: Absence of Fall  Outcome: Ongoing (interventions implemented as appropriate)

## 2019-05-18 NOTE — PLAN OF CARE
Problem: Patient Care Overview  Goal: Plan of Care Review  Outcome: Ongoing (interventions implemented as appropriate)   05/18/19 1801   Coping/Psychosocial   Plan of Care Reviewed With patient   OTHER   Outcome Summary Precedex stopped early this AM sierra well. VSS. Afebrile. up in chair and bsc with help of 1    Plan of Care Review   Progress improving       Problem: Pneumonia (Adult)  Goal: Signs and Symptoms of Listed Potential Problems Will be Absent, Minimized or Managed (Pneumonia)  Outcome: Ongoing (interventions implemented as appropriate)      Problem: Pain, Acute (Adult)  Goal: Acceptable Pain Control/Comfort Level  Outcome: Ongoing (interventions implemented as appropriate)      Problem: Skin Injury Risk (Adult)  Goal: Skin Health and Integrity  Outcome: Ongoing (interventions implemented as appropriate)      Problem: Nutrition, Enteral (Adult)  Goal: Signs and Symptoms of Listed Potential Problems Will be Absent, Minimized or Managed (Nutrition, Enteral)  Outcome: Ongoing (interventions implemented as appropriate)      Problem: Fall Risk (Adult)  Goal: Absence of Fall  Outcome: Ongoing (interventions implemented as appropriate)

## 2019-05-18 NOTE — THERAPY TREATMENT NOTE
Acute Care - Physical Therapy Treatment Note  [facility]     Patient Name: Heath Bajwa  : 1950  MRN: [id number]  Today's Date: 2019  Onset of Illness/Injury or Date of Surgery: 19  Date of Referral to PT: 19  Referring Physician: Mitali    Admit Date: 2019    Visit Dx:    ICD-10-CM ICD-9-CM   1. Impaired functional mobility and activity tolerance Z74.09 V49.89     Patient Active Problem List   Diagnosis   • BPH without urinary obstruction   • Elevated prostate specific antigen (PSA)   • Acute respiratory failure with hypoxia and hypercapnia (CMS/HCC)   • Venous stasis dermatitis of both lower extremities   • Elevated troponin   • Respiratory failure (CMS/HCC)       Therapy Treatment    Rehabilitation Treatment Summary     Row Name 19 1420             Treatment Time/Intention    Discipline  physical therapist  -DO      Document Type  therapy note (daily note)  -DO      Subjective Information  complains of;weakness;fatigue  -DO      Mode of Treatment  physical therapy  -DO      Patient/Family Observations  Pt sitting upright in chair upon entry to room, in no acute distress.   -DO      Total Minutes, Physical Therapy Treatment  11  -DO      Therapy Frequency (PT Clinical Impression)  daily  -DO      Therapy Frequency (OT Eval)  daily  -DO      Patient Effort  good  -DO      Existing Precautions/Restrictions  fall;oxygen therapy device and L/min 4L  -DO      Patient Response to Treatment  Pt tolerated session well, however became fatigued quickly and required abruptly needing to sit in chair.   -DO      Recorded by [DO] Alden Harper, PT 19 6144      Row Name 19 1422             Vital Signs    Post Systolic BP Rehab  139  -DO      Post Treatment Diastolic BP  73  -DO      Posttreatment Heart Rate (beats/min)  75  -DO      Post SpO2 (%)  94  -DO      O2 Delivery Post Treatment  supplemental O2 4L  -DO      Recorded by [DO] Alden Harper, PT 19 4764      Row  Name 05/18/19 1420             Cognitive Assessment/Intervention- PT/OT    Orientation Status (Cognition)  oriented x 3  -DO      Follows Commands (Cognition)  follows one step commands;physical/tactile prompts required;repetition of directions required;verbal cues/prompting required;75-90% accuracy  -DO      Safety Deficit (Cognitive)  mild deficit;awareness of need for assistance;insight into deficits/self awareness;impulsivity  -DO      Recorded by [DO] Alden Harper, PT 05/18/19 1459      Row Name 05/18/19 1420             Bed Mobility Assessment/Treatment    Supine-Sit [place] (Bed Mobility)  not tested  -DO      Sit-Supine [place] (Bed Mobility)  not tested  -DO      Comment (Bed Mobility)  Began and ended session in chair.   -DO      Recorded by [DO] Alden Harper, PT 05/18/19 1459      Row Name 05/18/19 1420             Sit-Stand Transfer    Sit-Stand [place] (Transfers)  minimum assist (75% patient effort);2 person assist  -DO      Assistive Device (Sit-Stand Transfers)  walker, front-wheeled  -DO      Recorded by [DO] Alden Harper, PT 05/18/19 1459      Row Name 05/18/19 1420             Stand-Sit Transfer    Stand-Sit [place] (Transfers)  minimum assist (75% patient effort);2 person assist  -DO      Assistive Device (Stand-Sit Transfers)  walker, front-wheeled  -DO      Recorded by [DO] Alden Harper, PT 05/18/19 1459      Row Name 05/18/19 1420             Gait/Stairs Assessment/Training    [place] Level (Gait)  minimum assist (75% patient effort);2 person assist;verbal cues;nonverbal cues (demo/gesture)  -DO      Assistive Device (Gait)  walker, front-wheeled  -DO      Distance in Feet (Gait)  25  -DO      Pattern (Gait)  step-through  -DO      Deviations/Abnormal Patterns (Gait)  martir decreased;gait speed decreased;stride length decreased  -DO      Bilateral Gait Deviations  forward flexed posture;weight shift ability decreased  -DO      Recorded by [DO] Leroy  Alden, PT 05/18/19 1459      Row Name 05/18/19 1420             Therapeutic Exercise    Comment (Therapeutic Exercise)  -- LAQs and ankle pumps x10 reps sitting in chair.   -DO      Recorded by [DO] Alden Harper, PT 05/18/19 1459      Row Name 05/18/19 1420             Balance    Balance  static standing balance  -DO      Recorded by [DO] Alden Harper, PT 05/18/19 1459      Row Name 05/18/19 1420             Static Standing Balance    Level of [place] (Supported Standing, Static Balance)  contact guard assist  -DO      Time Able to Maintain Position (Supported Standing, Static Balance)  1 to 2 minutes  -DO      Assistive Device Utilized (Supported Standing, Static Balance)  walker, rolling  -DO      Recorded by [DO] Alden Harper, PT 05/18/19 1459      Row Name 05/18/19 1420             Positioning and Restraints    Pre-Treatment Position  sitting in chair/recliner  -DO      Post Treatment Position  chair  -DO      In Chair  sitting;call light within reach;encouraged to call for assist  -DO      Recorded by [DO] Alden Harper, PT 05/18/19 1459      Row Name 05/18/19 1420             Pain Scale: Numbers Pre/Post-Treatment    Pain Scale: Numbers, Pretreatment  0/10 - no pain  -DO      Pain Scale: Numbers, Post-Treatment  0/10 - no pain  -DO      Recorded by [DO] Alden Harper, PT 05/18/19 1459      Row Name                Wound 05/11/19 1015 Left anterior toe blisters    Wound - Properties Group Date first assessed: 05/11/19 [JN] Time first assessed: 1015 [JN] Present On Admission : yes [JN] Side: Left [JN] Orientation: anterior [JN] Location: toe [JN], great toe  Type: blisters [JN] Recorded by:  [JN] Ariel Schulte RN 05/11/19 1301    Row Name 05/18/19 1420             Outcome Summary/Treatment Plan (PT)    Daily Summary of Progress (PT)  progress toward functional goals as expected  -DO      Recorded by [DO] Alden Harper, PT 05/18/19 1459        User Key  (r) = Recorded By, (t) = Taken By, (c)  = Cosigned By    Initials Name Effective Dates Discipline    JN Ariel Schulte, RN 10/12/16 -  Nurse    Alden Gavin, PT 03/07/18 -  PT          Wound 05/11/19 1015 Left anterior toe blisters (Active)   Dressing Appearance open to air 5/18/2019 12:04 PM   Closure Open to air 5/18/2019 12:04 PM   Base purple;red 5/18/2019 12:04 PM   Periwound dry;pink 5/18/2019 12:04 PM   Periwound Temperature warm 5/18/2019 12:04 PM   Periwound Skin Turgor firm 5/18/2019 12:04 PM   Care, Wound cleansed with;sterile normal saline 5/17/2019  8:00 PM   Dressing Care, Wound open to air 5/17/2019  8:00 PM           Physical Therapy Education     Title: PT OT SLP Therapies (In Progress)     Topic: Physical Therapy (In Progress)     Point: Mobility training (In Progress)     Learning Progress Summary           Patient Acceptance, E, NR by DO at 5/18/2019  2:59 PM    Acceptance, E,D, NR by PC at 5/17/2019  4:38 PM    Acceptance, E,D, NR by PC at 5/15/2019  3:23 PM    Acceptance, E,D, NR by PC at 5/13/2019  3:05 PM                   Point: Home exercise program (In Progress)     Learning Progress Summary           Patient Acceptance, E, NR by DO at 5/18/2019  2:59 PM    Acceptance, E,D, NR by PC at 5/17/2019  4:38 PM    Acceptance, E, NR by EM at 5/16/2019 11:55 AM    Acceptance, E,D, NR by PC at 5/15/2019  3:23 PM    Acceptance, E,D, NR by PC at 5/14/2019 11:59 AM    Acceptance, E,D, NR by PC at 5/13/2019  3:05 PM                   Point: Body mechanics (In Progress)     Learning Progress Summary           Patient Acceptance, E, NR by DO at 5/18/2019  2:59 PM    Acceptance, E,D, NR by PC at 5/15/2019  3:23 PM    Acceptance, E,D, NR by PC at 5/13/2019  3:05 PM                   Point: Precautions (In Progress)     Learning Progress Summary           Patient Acceptance, E, NR by DO at 5/18/2019  2:59 PM    Acceptance, E,D, NR by PC at 5/15/2019  3:23 PM    Acceptance, E,D, NR by PC at 5/13/2019  3:05 PM                                User Key     Initials Effective Dates Name Provider Type Discipline    PC 04/03/18 -  Alley Frazier, PT Physical Therapist PT    EM 04/03/18 -  She Louie PT Physical Therapist PT    DO 03/07/18 -  Alden Harper, PT Physical Therapist PT                PT Recommendation and Plan  Therapy Frequency (PT Clinical Impression): daily  Outcome Summary/Treatment Plan (PT)  Daily Summary of Progress (PT): progress toward functional goals as expected  Plan of Care Reviewed With: patient  Progress: improving  Outcome Summary: Pt able to ambulate 25ft with Minx2 and use of RWx today. Pt requires verbal/tactile cues to improve upright posture and for proper use of the RWx with turns, with pt inconsistent in demonstrating their understanding. Pt is impulsive at times and abandons the RWx, placing them at risk for falls. Further ambulation held secondary to fatigue. Pt may benefit from continuing skilled PT to improve his independence with functional mobility.   Outcome Measures     Row Name 05/18/19 1500 05/17/19 1600 05/16/19 1100       How much help from another person do you currently need...    Turning from your back to your side while in flat bed without using bedrails?  3  -DO  2  -PC  2  -EM    Moving from lying on back to sitting on the side of a flat bed without bedrails?  3  -DO  2  -PC  1  -EM    Moving to and from a bed to a chair (including a wheelchair)?  3  -DO  2  -PC  1  -EM    Standing up from a chair using your arms (e.g., wheelchair, bedside chair)?  3  -DO  2  -PC  1  -EM    Climbing 3-5 steps with a railing?  2  -DO  1  -PC  1  -EM    To walk in hospital room?  2  -DO  2  -PC  1  -EM    AM-PAC 6 Clicks Score  16  -DO  11  -PC  7  -EM       Functional Assessment    Outcome Measure Options  AM-PAC 6 Clicks Basic Mobility (PT)  -DO  --  --      User Key  (r) = Recorded By, (t) = Taken By, (c) = Cosigned By    Initials Name Provider Type    PC Alley Frazier, PT Physical Therapist    EM Liban  She VICTOR PT Physical Therapist    DO Alden Harper PT Physical Therapist         Time Calculation:   PT Charges     Row Name 05/18/19 1501             Time Calculation    Start Time  1420  -DO      Stop Time  1431  -DO      Time Calculation (min)  11 min  -DO      PT Received On  05/18/19  -DO      PT - Next Appointment  05/19/19  -DO        User Key  (r) = Recorded By, (t) = Taken By, (c) = Cosigned By    Initials Name Provider Type    DO Alden Harper PT Physical Therapist        Therapy Charges for Today     Code Description Service Date Service Provider Modifiers Qty    59714591659 HC PT THER PROC EA 15 MIN 5/18/2019 Alden Harper, PT GP 1          PT G-Codes  Outcome Measure Options: AM-PAC 6 Clicks Basic Mobility (PT)  AM-PAC 6 Clicks Score: 16    Alden Harper PT  5/18/2019

## 2019-05-18 NOTE — PLAN OF CARE
Problem: Patient Care Overview  Goal: Plan of Care Review  Outcome: Ongoing (interventions implemented as appropriate)   05/18/19 2050   Coping/Psychosocial   Plan of Care Reviewed With patient   OTHER   Outcome Summary Pt able to ambulate 25ft with Minx2 and use of RWx today. Pt requires verbal/tactile cues to improve upright posture and for proper use of the RWx with turns, with pt inconsistent in demonstrating their understanding. Pt is impulsive at times and abandons the RWx, placing them at risk for falls. Further ambulation held secondary to fatigue. Pt may benefit from continuing skilled PT to improve his independence with functional mobility.    Plan of Care Review   Progress improving

## 2019-05-19 LAB
ANION GAP SERPL CALCULATED.3IONS-SCNC: 8.7 MMOL/L
ARTERIAL PATENCY WRIST A: POSITIVE
ATMOSPHERIC PRESS: 748.8 MMHG
BASE EXCESS BLDA CALC-SCNC: 10.5 MMOL/L (ref 0–2)
BASOPHILS # BLD AUTO: 0.02 10*3/MM3 (ref 0–0.2)
BASOPHILS NFR BLD AUTO: 0.2 % (ref 0–1.5)
BDY SITE: ABNORMAL
BUN BLD-MCNC: 16 MG/DL (ref 8–23)
BUN/CREAT SERPL: 31.4 (ref 7–25)
CALCIUM SPEC-SCNC: 8.3 MG/DL (ref 8.6–10.5)
CHLORIDE SERPL-SCNC: 87 MMOL/L (ref 98–107)
CO2 SERPL-SCNC: 40.3 MMOL/L (ref 22–29)
CREAT BLD-MCNC: 0.51 MG/DL (ref 0.76–1.27)
DEPRECATED RDW RBC AUTO: 47.7 FL (ref 37–54)
EOSINOPHIL # BLD AUTO: 0.06 10*3/MM3 (ref 0–0.4)
EOSINOPHIL NFR BLD AUTO: 0.6 % (ref 0.3–6.2)
ERYTHROCYTE [DISTWIDTH] IN BLOOD BY AUTOMATED COUNT: 14 % (ref 12.3–15.4)
GAS FLOW AIRWAY: 4 LPM
GFR SERPL CREATININE-BSD FRML MDRD: >150 ML/MIN/1.73
GLUCOSE BLD-MCNC: 128 MG/DL (ref 65–99)
GLUCOSE BLDC GLUCOMTR-MCNC: 116 MG/DL (ref 70–130)
GLUCOSE BLDC GLUCOMTR-MCNC: 126 MG/DL (ref 70–130)
GLUCOSE BLDC GLUCOMTR-MCNC: 183 MG/DL (ref 70–130)
GLUCOSE BLDC GLUCOMTR-MCNC: 74 MG/DL (ref 70–130)
HCO3 BLDA-SCNC: 38.4 MMOL/L (ref 22–28)
HCT VFR BLD AUTO: 50.4 % (ref 37.5–51)
HGB BLD-MCNC: 16 G/DL (ref 13–17.7)
IMM GRANULOCYTES # BLD AUTO: 0.04 10*3/MM3 (ref 0–0.05)
IMM GRANULOCYTES NFR BLD AUTO: 0.4 % (ref 0–0.5)
LYMPHOCYTES # BLD AUTO: 0.96 10*3/MM3 (ref 0.7–3.1)
LYMPHOCYTES NFR BLD AUTO: 10.3 % (ref 19.6–45.3)
MAGNESIUM SERPL-MCNC: 1.3 MG/DL (ref 1.6–2.4)
MCH RBC QN AUTO: 29.7 PG (ref 26.6–33)
MCHC RBC AUTO-ENTMCNC: 31.7 G/DL (ref 31.5–35.7)
MCV RBC AUTO: 93.7 FL (ref 79–97)
MODALITY: ABNORMAL
MONOCYTES # BLD AUTO: 0.71 10*3/MM3 (ref 0.1–0.9)
MONOCYTES NFR BLD AUTO: 7.6 % (ref 5–12)
NEUTROPHILS # BLD AUTO: 7.56 10*3/MM3 (ref 1.7–7)
NEUTROPHILS NFR BLD AUTO: 80.9 % (ref 42.7–76)
NRBC BLD AUTO-RTO: 0 /100 WBC (ref 0–0.2)
PCO2 BLDA: 60.5 MM HG (ref 35–45)
PH BLDA: 7.41 PH UNITS (ref 7.35–7.45)
PLATELET # BLD AUTO: 215 10*3/MM3 (ref 140–450)
PMV BLD AUTO: 11.2 FL (ref 6–12)
PO2 BLDA: 73.3 MM HG (ref 80–100)
POTASSIUM BLD-SCNC: 3.6 MMOL/L (ref 3.5–5.2)
POTASSIUM BLD-SCNC: 3.9 MMOL/L (ref 3.5–5.2)
RBC # BLD AUTO: 5.38 10*6/MM3 (ref 4.14–5.8)
SAO2 % BLDCOA: 94.1 % (ref 92–99)
SODIUM BLD-SCNC: 136 MMOL/L (ref 136–145)
TOTAL RATE: 18 BREATHS/MINUTE
WBC NRBC COR # BLD: 9.35 10*3/MM3 (ref 3.4–10.8)

## 2019-05-19 PROCEDURE — 94799 UNLISTED PULMONARY SVC/PX: CPT

## 2019-05-19 PROCEDURE — 80048 BASIC METABOLIC PNL TOTAL CA: CPT | Performed by: INTERNAL MEDICINE

## 2019-05-19 PROCEDURE — 25010000002 ENOXAPARIN PER 10 MG: Performed by: INTERNAL MEDICINE

## 2019-05-19 PROCEDURE — 85025 COMPLETE CBC W/AUTO DIFF WBC: CPT | Performed by: INTERNAL MEDICINE

## 2019-05-19 PROCEDURE — 25010000002 ONDANSETRON PER 1 MG: Performed by: INTERNAL MEDICINE

## 2019-05-19 PROCEDURE — 82962 GLUCOSE BLOOD TEST: CPT

## 2019-05-19 PROCEDURE — 36600 WITHDRAWAL OF ARTERIAL BLOOD: CPT

## 2019-05-19 PROCEDURE — 63710000001 INSULIN LISPRO (HUMAN) PER 5 UNITS: Performed by: INTERNAL MEDICINE

## 2019-05-19 PROCEDURE — 63710000001 INSULIN GLARGINE PER 5 UNITS: Performed by: INTERNAL MEDICINE

## 2019-05-19 PROCEDURE — 25010000002 MAGNESIUM SULFATE 2 GM/50ML SOLUTION: Performed by: INTERNAL MEDICINE

## 2019-05-19 PROCEDURE — 82803 BLOOD GASES ANY COMBINATION: CPT

## 2019-05-19 RX ORDER — MAGNESIUM SULFATE HEPTAHYDRATE 40 MG/ML
4 INJECTION, SOLUTION INTRAVENOUS AS NEEDED
Status: DISCONTINUED | OUTPATIENT
Start: 2019-05-19 | End: 2019-05-22 | Stop reason: HOSPADM

## 2019-05-19 RX ORDER — MAGNESIUM SULFATE HEPTAHYDRATE 40 MG/ML
2 INJECTION, SOLUTION INTRAVENOUS AS NEEDED
Status: DISCONTINUED | OUTPATIENT
Start: 2019-05-19 | End: 2019-05-22 | Stop reason: HOSPADM

## 2019-05-19 RX ADMIN — ARFORMOTEROL TARTRATE 15 MCG: 15 SOLUTION RESPIRATORY (INHALATION) at 19:40

## 2019-05-19 RX ADMIN — METOPROLOL TARTRATE 37.5 MG: 25 TABLET ORAL at 21:23

## 2019-05-19 RX ADMIN — TAMSULOSIN HYDROCHLORIDE 0.4 MG: 0.4 CAPSULE ORAL at 21:24

## 2019-05-19 RX ADMIN — IPRATROPIUM BROMIDE AND ALBUTEROL SULFATE 3 ML: 2.5; .5 SOLUTION RESPIRATORY (INHALATION) at 07:15

## 2019-05-19 RX ADMIN — HYDROCODONE BITARTRATE AND ACETAMINOPHEN 1 TABLET: 10; 325 TABLET ORAL at 02:02

## 2019-05-19 RX ADMIN — SODIUM CHLORIDE, PRESERVATIVE FREE 3 ML: 5 INJECTION INTRAVENOUS at 21:25

## 2019-05-19 RX ADMIN — ONDANSETRON HYDROCHLORIDE 4 MG: 2 SOLUTION INTRAMUSCULAR; INTRAVENOUS at 01:41

## 2019-05-19 RX ADMIN — LISINOPRIL 40 MG: 40 TABLET ORAL at 08:08

## 2019-05-19 RX ADMIN — SODIUM CHLORIDE, PRESERVATIVE FREE 3 ML: 5 INJECTION INTRAVENOUS at 08:09

## 2019-05-19 RX ADMIN — HYDROCODONE BITARTRATE AND ACETAMINOPHEN 1 TABLET: 10; 325 TABLET ORAL at 10:36

## 2019-05-19 RX ADMIN — MAGNESIUM SULFATE HEPTAHYDRATE 4 G: 40 INJECTION, SOLUTION INTRAVENOUS at 08:06

## 2019-05-19 RX ADMIN — INSULIN GLARGINE 10 UNITS: 100 INJECTION, SOLUTION SUBCUTANEOUS at 08:50

## 2019-05-19 RX ADMIN — METOPROLOL TARTRATE 37.5 MG: 25 TABLET ORAL at 08:07

## 2019-05-19 RX ADMIN — INSULIN LISPRO 4 UNITS: 100 INJECTION, SOLUTION INTRAVENOUS; SUBCUTANEOUS at 11:31

## 2019-05-19 RX ADMIN — DONEPEZIL HYDROCHLORIDE 10 MG: 10 TABLET, FILM COATED ORAL at 21:24

## 2019-05-19 RX ADMIN — BUDESONIDE 0.25 MG: 0.25 INHALANT RESPIRATORY (INHALATION) at 19:40

## 2019-05-19 RX ADMIN — FUROSEMIDE 40 MG: 40 TABLET ORAL at 08:07

## 2019-05-19 RX ADMIN — ENOXAPARIN SODIUM 40 MG: 40 INJECTION SUBCUTANEOUS at 11:32

## 2019-05-19 RX ADMIN — HYDROCODONE BITARTRATE AND ACETAMINOPHEN 1 TABLET: 10; 325 TABLET ORAL at 06:01

## 2019-05-19 RX ADMIN — ATORVASTATIN CALCIUM 40 MG: 20 TABLET, FILM COATED ORAL at 08:08

## 2019-05-19 RX ADMIN — AMLODIPINE BESYLATE 10 MG: 10 TABLET ORAL at 08:08

## 2019-05-19 RX ADMIN — SERTRALINE 25 MG: 25 TABLET, FILM COATED ORAL at 08:08

## 2019-05-19 RX ADMIN — SODIUM CHLORIDE 4 ML: 7 NEBU SOLN,3 % NEBU at 07:15

## 2019-05-19 RX ADMIN — SPIRONOLACTONE 50 MG: 50 TABLET, FILM COATED ORAL at 08:07

## 2019-05-19 RX ADMIN — ASPIRIN 325 MG: 325 TABLET ORAL at 08:08

## 2019-05-19 RX ADMIN — MAGNESIUM SULFATE HEPTAHYDRATE 2 G: 40 INJECTION, SOLUTION INTRAVENOUS at 03:01

## 2019-05-19 RX ADMIN — SODIUM CHLORIDE 4 ML: 7 NEBU SOLN,3 % NEBU at 19:40

## 2019-05-19 RX ADMIN — POTASSIUM CHLORIDE 40 MEQ: 750 CAPSULE, EXTENDED RELEASE ORAL at 01:11

## 2019-05-19 RX ADMIN — BUDESONIDE 0.25 MG: 0.25 INHALANT RESPIRATORY (INHALATION) at 07:14

## 2019-05-19 RX ADMIN — NYSTATIN: 100000 POWDER TOPICAL at 08:08

## 2019-05-19 NOTE — PROGRESS NOTES
Cesar Brower MD                          [phone]      Patient ID:    Name:  Heath Bajwa    MRN:  [id number]    1950   69 y.o.  male            Patient Care Team:  Provider, No Known as PCP - General  Provider, No Known as PCP - Family Medicine    CC/ Reason for visit: Per Assessment mentioned below    Subjective: Pt seen and examined this AM. No acute overnight events noted.  Notified by nurse that he is more drowsy this morning.  On evaluation he is drowsy but responds to questions.  States that he uses a mask at home.  States that breathing is stable.  Stat ABG ordered and shows chronic hypercapnic respiratory failure.  Noninvasive ventilator ordered to be used at night as well as with naps.  Noted some elevated blood pressure as well.  No focal neurological deficits    ROS: Denies any subjective fevers, chest pain, nausea/ vomiting    Objective     Vital Signs past 24hrs    BP range: BP: (139-190)/(70-90) 190/90  Pulse range: Heart Rate:  [61-83] 61  Resp rate range: Resp:  [16-18] 18  Temp range: Temp (24hrs), Av.4 °F (36.9 °C), Min:97.9 °F (36.6 °C), Max:99.2 °F (37.3 °C)      Ventilator/Non-Invasive Ventilation Settings (From admission, onward)    Start     Ordered    19 1326  NIPPV (CPAP or BIPAP)  As Needed-RT     Comments:  Use noninvasive ventilation if patient has respiratory distress or feels increasing dyspnea or desaturates.   Question Answer Comment   Indication: Acute Respiratory Failure    Type: AVAPS/PC/PS    NIPPV Mask Interface: Per Patient Preference    Backup Rate 12    Target VT (mL) 500    EPAP/PEEP (cm H2O) 5    Min Pressure (cm H2O) 12    Max Pressure (cm H2O) 28    Titrate for SPO2 88% - 92%        19 1327    19 0857  Ventilator - AC/PC; 20; 40; 88%; 15; 15  Continuous,   Status:  Canceled     Comments:  If SPO2 greater than 92% at 1300 p.m. then decrease PEEP 2.5 cm and may continue decreasing PEEP 2.5 cm to 5 cm  every 6 hours as tolerated   Question Answer Comment   Vent Mode AC/PC    Breath rate 20    FiO2 40    FiO2 titrate for Sp02% =/> 88%    PEEP 15    Inspiratory Pressure 15        05/12/19 0908    05/11/19 1021  Ventilator - AC/PC; (28); 100; 88%; Other (see comments); 18; 17  Continuous,   Status:  Canceled     Question Answer Comment   Vent Mode AC/PC    Breath rate  28   FiO2 100    FiO2 titrate for Sp02% =/> 88%    PEEP Other (see comments)    PEEP: 18    Inspiratory Pressure 17        05/11/19 1027          Device (Oxygen Therapy): nasal cannula FiO2 (%): 40 %     104 kg (229 lb 1.6 oz); Body mass index is 29.41 kg/m².      Intake/Output Summary (Last 24 hours) at 5/19/2019 1342  Last data filed at 5/19/2019 1320  Gross per 24 hour   Intake 360 ml   Output 600 ml   Net -240 ml       PHYSICAL EXAM   Constitutional: Middle aged pt in bed, No acute respiratory distress, + accessory muscle use  Head: - NCAT  Eyes: No pallor, Anicteric conjunctiva, EOMI.  ENMT:  Mallampati 4, no oral thrush. No palpable cervical lymphadenopathy, Dry MM.   Heart: RRR, no murmur. No pedal edema   Lungs: TUTU +, No wheezes, Occ crackles heard.  Significantly decreased air movement at the bases bilateral.     Abdomen: Soft. Obese, No tenderness, guarding or rigidity. No palpable masses  Extremities: Extremities warm with bilateral lower extremity chronic skin changes and chronic ulcers  Neuro: Conscious, more drowsy today, no gross focal neuro deficits  Psych: Mood and affect appropriate. Not anxious    Scheduled meds:      amLODIPine 10 mg Oral Q24H   arformoterol 15 mcg Nebulization BID - RT   aspirin 325 mg Oral Daily   atorvastatin 40 mg Oral Daily   budesonide 0.25 mg Nebulization BID - RT   donepezil 10 mg Oral Nightly   enoxaparin 40 mg Subcutaneous Q24H   furosemide 40 mg Oral BID   insulin glargine 10 Units Subcutaneous Q12H   insulin lispro 0-24 Units Subcutaneous 4x Daily With Meals & Nightly   lisinopril 40 mg Oral Daily    metoprolol tartrate 37.5 mg Oral Q12H   nystatin  Topical Q12H   sertraline 25 mg Oral Daily   sodium chloride 3 mL Intravenous Q12H   sodium chloride 4 mL Nebulization BID - RT   spironolactone 50 mg Oral Daily   tamsulosin 0.4 mg Oral Nightly       IV meds:                          hold 1 each       Data Review:      Results from last 7 days   Lab Units 05/19/19  0703 05/18/19  2307 05/18/19  0425 05/16/19  0639 05/16/19  0502 05/15/19  0920  05/14/19  0314   SODIUM mmol/L 136  --  133* 135*  --   --    < > 137   POTASSIUM mmol/L 3.9 3.6 3.3* 3.8  --   --    < > 4.8   CHLORIDE mmol/L 87*  --  84* 85*  --   --    < > 96*   CO2 mmol/L 40.3*  --  38.5* 42.1*  --   --    < > 31.0*   BUN mg/dL 16  --  16 24*  --   --    < > 31*   CREATININE mg/dL 0.51*  --  0.56* 0.50*  --   --    < > 0.73*   CALCIUM mg/dL 8.3*  --  8.8 8.7  --   --    < > 8.9   GLUCOSE mg/dL 128*  --  161* 197*  --   --    < > 165*   WBC 10*3/mm3 9.35  --   --   --  11.07*  --   --  12.21*   HEMOGLOBIN g/dL 16.0  --   --   --  16.8  --   --  15.3   PLATELETS 10*3/mm3 215  --   --   --  141  --   --  179   INR   --   --   --   --   --  1.04  --   --     < > = values in this interval not displayed.       Lab Results   Component Value Date    CALCIUM 8.3 (L) 05/19/2019    PHOS 2.2 (L) 05/11/2019       Results from last 7 days   Lab Units 05/15/19  1103   BODYFLDCX  No growth at 3 days       Results from last 7 days   Lab Units 05/19/19  0854 05/16/19  1237 05/14/19  0957 05/14/19  0330 05/13/19  0344   PH, ARTERIAL pH units 7.410 7.457* 7.357 7.375 7.394   PO2 ART mm Hg 73.3* 75.3* 71.9* 77.1* 76.0*   PCO2, ARTERIAL mm Hg 60.5* 62.9* 66.0* 58.6* 57.6*   HCO3 ART mmol/L 38.4* 44.4* 37.0* 34.3* 35.2*        Results Review:    I have reviewed the relevant laboratory results and reviewed the chest imaging from the last 3 days personally and summarized it below    Assessment    Acute on chronic hypoxemic/ hypercapnic respiratory failure - 4L @  home  Bilateral lower lobe pneumonia vs Atelectasis -suspect aspiration  Bilateral pleural effusions s/p Rt thora 5/15  Acute on chronic diastolic heart failure - Suspected  Acute COPD exacerbation  Metabolic encephalopathy  Acute kidney injury  Elevated troponin-demand ischemia  Suspected traumatic hematuria   Mild Pulm HTN (RVSP - 43) likely WHO Group 2 vs 3   Mediastinal/hilar lymphadenopathy ? reactive - need outpatient follow  Incidental 3 mm lung nodule - needs outpatient follow-up  Incidental renal lesion -needs outpatient f/u CT  Incidental Liver Cirrhosis   PAD -reportedly declined outpatient evaluation  Diabetes  Hypertension    PLAN:  Worsening mental status this morning likely from chronic hypercapnia.  Also will hold Neurontin as well as narcotics for now. We will start the patient on BiPAP V 60 on AVAPS. Asked him to get home device.   Persistent hypoxemia likely multifactorial mainly from pneumonia/ atelectasis and effusions. C/w mucus clearance and resolution of atelectasis.  Hold off on repeat thoracentesis for now.  Finish antibiotic course. Low procal however.   We might consider bronchoscopy if there is not much resolution of his pneumonia/ atelectasis.   Persistent mediastinal and right hilar lymphadenopathy noted could be reactive but will need follow-up imaging to confirm resolution  Continue with bronchodilators for COPD and wean prednisone.    Precedex wean ongoing from anxiety  Ongoing diuretics per cards. BP high - will await their recs.    RUQ US not calling cirrhosis now.    Tolerating modified diet cleared by speech therapy.  Working with physical therapy.  Aguilar catheter discontinued.     I have discussed my findings and recommendations with patient and nursing staff.     Cesar Brower MD  5/19/2019

## 2019-05-19 NOTE — PLAN OF CARE
Problem: Patient Care Overview  Goal: Plan of Care Review  Outcome: Ongoing (interventions implemented as appropriate)    Goal: Individualization and Mutuality  Outcome: Ongoing (interventions implemented as appropriate)    Goal: Discharge Needs Assessment  Outcome: Ongoing (interventions implemented as appropriate)    Goal: Interprofessional Rounds/Family Conf  Outcome: Ongoing (interventions implemented as appropriate)      Problem: Pneumonia (Adult)  Goal: Signs and Symptoms of Listed Potential Problems Will be Absent, Minimized or Managed (Pneumonia)  Outcome: Ongoing (interventions implemented as appropriate)      Problem: Pain, Acute (Adult)  Goal: Acceptable Pain Control/Comfort Level  Outcome: Ongoing (interventions implemented as appropriate)      Problem: Skin Injury Risk (Adult)  Goal: Skin Health and Integrity  Outcome: Ongoing (interventions implemented as appropriate)      Problem: Nutrition, Enteral (Adult)  Goal: Signs and Symptoms of Listed Potential Problems Will be Absent, Minimized or Managed (Nutrition, Enteral)  Outcome: Ongoing (interventions implemented as appropriate)      Problem: Fall Risk (Adult)  Goal: Absence of Fall  Outcome: Ongoing (interventions implemented as appropriate)

## 2019-05-19 NOTE — NURSING NOTE
Have tried multiple times to reach someone who can bring Mr Bajwa's CPAP machine from home.  His girlfriend is unable to come here.  I cannot reach his brother.  The girlfriend has tried multiple times to reach the brother but is unable.  RT will bring one of our machines to use tonight.

## 2019-05-20 ENCOUNTER — APPOINTMENT (OUTPATIENT)
Dept: GENERAL RADIOLOGY | Facility: HOSPITAL | Age: 69
End: 2019-05-20

## 2019-05-20 LAB
ANION GAP SERPL CALCULATED.3IONS-SCNC: 10.2 MMOL/L
BASOPHILS # BLD AUTO: 0.02 10*3/MM3 (ref 0–0.2)
BASOPHILS NFR BLD AUTO: 0.2 % (ref 0–1.5)
BUN BLD-MCNC: 14 MG/DL (ref 8–23)
BUN/CREAT SERPL: 28.6 (ref 7–25)
CALCIUM SPEC-SCNC: 7.9 MG/DL (ref 8.6–10.5)
CHLORIDE SERPL-SCNC: 89 MMOL/L (ref 98–107)
CO2 SERPL-SCNC: 36.8 MMOL/L (ref 22–29)
CREAT BLD-MCNC: 0.49 MG/DL (ref 0.76–1.27)
DEPRECATED RDW RBC AUTO: 47.2 FL (ref 37–54)
EOSINOPHIL # BLD AUTO: 0.07 10*3/MM3 (ref 0–0.4)
EOSINOPHIL NFR BLD AUTO: 0.8 % (ref 0.3–6.2)
ERYTHROCYTE [DISTWIDTH] IN BLOOD BY AUTOMATED COUNT: 13.7 % (ref 12.3–15.4)
GFR SERPL CREATININE-BSD FRML MDRD: >150 ML/MIN/1.73
GLUCOSE BLD-MCNC: 147 MG/DL (ref 65–99)
GLUCOSE BLDC GLUCOMTR-MCNC: 126 MG/DL (ref 70–130)
GLUCOSE BLDC GLUCOMTR-MCNC: 137 MG/DL (ref 70–130)
GLUCOSE BLDC GLUCOMTR-MCNC: 143 MG/DL (ref 70–130)
GLUCOSE BLDC GLUCOMTR-MCNC: 198 MG/DL (ref 70–130)
HCT VFR BLD AUTO: 47.8 % (ref 37.5–51)
HGB BLD-MCNC: 15.3 G/DL (ref 13–17.7)
IMM GRANULOCYTES # BLD AUTO: 0.03 10*3/MM3 (ref 0–0.05)
IMM GRANULOCYTES NFR BLD AUTO: 0.4 % (ref 0–0.5)
LYMPHOCYTES # BLD AUTO: 1.04 10*3/MM3 (ref 0.7–3.1)
LYMPHOCYTES NFR BLD AUTO: 12.3 % (ref 19.6–45.3)
MAGNESIUM SERPL-MCNC: 2.1 MG/DL (ref 1.6–2.4)
MCH RBC QN AUTO: 30.1 PG (ref 26.6–33)
MCHC RBC AUTO-ENTMCNC: 32 G/DL (ref 31.5–35.7)
MCV RBC AUTO: 94.1 FL (ref 79–97)
MONOCYTES # BLD AUTO: 0.71 10*3/MM3 (ref 0.1–0.9)
MONOCYTES NFR BLD AUTO: 8.4 % (ref 5–12)
NEUTROPHILS # BLD AUTO: 6.59 10*3/MM3 (ref 1.7–7)
NEUTROPHILS NFR BLD AUTO: 77.9 % (ref 42.7–76)
NRBC BLD AUTO-RTO: 0 /100 WBC (ref 0–0.2)
PLATELET # BLD AUTO: 200 10*3/MM3 (ref 140–450)
PMV BLD AUTO: 11.2 FL (ref 6–12)
POTASSIUM BLD-SCNC: 3.7 MMOL/L (ref 3.5–5.2)
RBC # BLD AUTO: 5.08 10*6/MM3 (ref 4.14–5.8)
SODIUM BLD-SCNC: 136 MMOL/L (ref 136–145)
WBC NRBC COR # BLD: 8.46 10*3/MM3 (ref 3.4–10.8)

## 2019-05-20 PROCEDURE — 63710000001 INSULIN LISPRO (HUMAN) PER 5 UNITS: Performed by: INTERNAL MEDICINE

## 2019-05-20 PROCEDURE — 25010000002 ONDANSETRON PER 1 MG: Performed by: INTERNAL MEDICINE

## 2019-05-20 PROCEDURE — 25010000002 ENOXAPARIN PER 10 MG: Performed by: INTERNAL MEDICINE

## 2019-05-20 PROCEDURE — 97110 THERAPEUTIC EXERCISES: CPT

## 2019-05-20 PROCEDURE — 94799 UNLISTED PULMONARY SVC/PX: CPT

## 2019-05-20 PROCEDURE — 94640 AIRWAY INHALATION TREATMENT: CPT

## 2019-05-20 PROCEDURE — 80048 BASIC METABOLIC PNL TOTAL CA: CPT | Performed by: INTERNAL MEDICINE

## 2019-05-20 PROCEDURE — 99232 SBSQ HOSP IP/OBS MODERATE 35: CPT | Performed by: NURSE PRACTITIONER

## 2019-05-20 PROCEDURE — 85025 COMPLETE CBC W/AUTO DIFF WBC: CPT | Performed by: INTERNAL MEDICINE

## 2019-05-20 PROCEDURE — 63710000001 INSULIN GLARGINE PER 5 UNITS: Performed by: INTERNAL MEDICINE

## 2019-05-20 PROCEDURE — 83735 ASSAY OF MAGNESIUM: CPT | Performed by: INTERNAL MEDICINE

## 2019-05-20 PROCEDURE — 82962 GLUCOSE BLOOD TEST: CPT

## 2019-05-20 PROCEDURE — 71046 X-RAY EXAM CHEST 2 VIEWS: CPT

## 2019-05-20 RX ORDER — METOPROLOL TARTRATE 50 MG/1
50 TABLET, FILM COATED ORAL EVERY 12 HOURS SCHEDULED
Status: DISCONTINUED | OUTPATIENT
Start: 2019-05-20 | End: 2019-05-22 | Stop reason: HOSPADM

## 2019-05-20 RX ADMIN — SERTRALINE 25 MG: 25 TABLET, FILM COATED ORAL at 09:16

## 2019-05-20 RX ADMIN — SODIUM CHLORIDE, PRESERVATIVE FREE 3 ML: 5 INJECTION INTRAVENOUS at 09:21

## 2019-05-20 RX ADMIN — TAMSULOSIN HYDROCHLORIDE 0.4 MG: 0.4 CAPSULE ORAL at 20:18

## 2019-05-20 RX ADMIN — DONEPEZIL HYDROCHLORIDE 10 MG: 10 TABLET, FILM COATED ORAL at 20:18

## 2019-05-20 RX ADMIN — SODIUM CHLORIDE 4 ML: 7 NEBU SOLN,3 % NEBU at 18:45

## 2019-05-20 RX ADMIN — LISINOPRIL 40 MG: 40 TABLET ORAL at 09:17

## 2019-05-20 RX ADMIN — BUDESONIDE 0.25 MG: 0.25 INHALANT RESPIRATORY (INHALATION) at 06:58

## 2019-05-20 RX ADMIN — INSULIN GLARGINE 10 UNITS: 100 INJECTION, SOLUTION SUBCUTANEOUS at 09:18

## 2019-05-20 RX ADMIN — FUROSEMIDE 40 MG: 40 TABLET ORAL at 09:17

## 2019-05-20 RX ADMIN — SODIUM CHLORIDE 4 ML: 7 NEBU SOLN,3 % NEBU at 06:58

## 2019-05-20 RX ADMIN — INSULIN GLARGINE 10 UNITS: 100 INJECTION, SOLUTION SUBCUTANEOUS at 22:10

## 2019-05-20 RX ADMIN — INSULIN LISPRO 4 UNITS: 100 INJECTION, SOLUTION INTRAVENOUS; SUBCUTANEOUS at 13:00

## 2019-05-20 RX ADMIN — AMLODIPINE BESYLATE 10 MG: 10 TABLET ORAL at 09:17

## 2019-05-20 RX ADMIN — SODIUM CHLORIDE, PRESERVATIVE FREE 3 ML: 5 INJECTION INTRAVENOUS at 20:19

## 2019-05-20 RX ADMIN — NYSTATIN: 100000 POWDER TOPICAL at 20:18

## 2019-05-20 RX ADMIN — SPIRONOLACTONE 50 MG: 50 TABLET, FILM COATED ORAL at 09:17

## 2019-05-20 RX ADMIN — ENOXAPARIN SODIUM 40 MG: 40 INJECTION SUBCUTANEOUS at 13:02

## 2019-05-20 RX ADMIN — ARFORMOTEROL TARTRATE 15 MCG: 15 SOLUTION RESPIRATORY (INHALATION) at 18:45

## 2019-05-20 RX ADMIN — ARFORMOTEROL TARTRATE 15 MCG: 15 SOLUTION RESPIRATORY (INHALATION) at 06:57

## 2019-05-20 RX ADMIN — ATORVASTATIN CALCIUM 40 MG: 20 TABLET, FILM COATED ORAL at 09:17

## 2019-05-20 RX ADMIN — ASPIRIN 325 MG: 325 TABLET ORAL at 09:16

## 2019-05-20 RX ADMIN — METOPROLOL TARTRATE 50 MG: 50 TABLET, FILM COATED ORAL at 20:18

## 2019-05-20 RX ADMIN — METOPROLOL TARTRATE 37.5 MG: 25 TABLET ORAL at 09:16

## 2019-05-20 RX ADMIN — BUDESONIDE 0.25 MG: 0.25 INHALANT RESPIRATORY (INHALATION) at 18:45

## 2019-05-20 RX ADMIN — NYSTATIN: 100000 POWDER TOPICAL at 09:23

## 2019-05-20 NOTE — PLAN OF CARE
Problem: Patient Care Overview  Goal: Plan of Care Review   05/20/19 1133   Coping/Psychosocial   Plan of Care Reviewed With patient   OTHER   Outcome Summary Improved tolerance to functional activity this day with an increase in gait distance and decreased assist required for overall functional mobility. Pt. requires verbal/tactile cues for posture correction and Rwx guidance during ambulation.    Plan of Care Review   Progress improving

## 2019-05-20 NOTE — PROGRESS NOTES
Cesar Brower MD                          [phone]      Patient ID:    Name:  Heath Bajwa    MRN:  [id number]    1950   69 y.o.  male            Patient Care Team:  Provider, No Known as PCP - General  Provider, No Known as PCP - Family Medicine    CC/ Reason for visit: Per Assessment mentioned below    Subjective: Pt seen and examined this AM. No acute overnight events noted.  More awake and interactive this morning.  Has not used noninvasive ventilator however.  Improving oxygen requirements.  Sitting up in the chair and working with physical therapy    ROS: Denies any subjective fevers, chest pain, nausea/ vomiting    Objective     Vital Signs past 24hrs    BP range: BP: (143-155)/(72-83) 153/83  Pulse range: Heart Rate:  [68-84] 74  Resp rate range: Resp:  [18-20] 20  Temp range: Temp (24hrs), Av.5 °F (36.9 °C), Min:98.1 °F (36.7 °C), Max:98.8 °F (37.1 °C)      Ventilator/Non-Invasive Ventilation Settings (From admission, onward)    Start     Ordered    19 1326  NIPPV (CPAP or BIPAP)  As Needed-RT     Comments:  Use noninvasive ventilation if patient has respiratory distress or feels increasing dyspnea or desaturates.   Question Answer Comment   Indication: Acute Respiratory Failure    Type: AVAPS/PC/PS    NIPPV Mask Interface: Per Patient Preference    Backup Rate 12    Target VT (mL) 500    EPAP/PEEP (cm H2O) 5    Min Pressure (cm H2O) 12    Max Pressure (cm H2O) 28    Titrate for SPO2 88% - 92%        19 1327    19 0857  Ventilator - AC/PC; 20; 40; 88%; 15; 15  Continuous,   Status:  Canceled     Comments:  If SPO2 greater than 92% at 1300 p.m. then decrease PEEP 2.5 cm and may continue decreasing PEEP 2.5 cm to 5 cm every 6 hours as tolerated   Question Answer Comment   Vent Mode AC/PC    Breath rate 20    FiO2 40    FiO2 titrate for Sp02% =/> 88%    PEEP 15    Inspiratory Pressure 15        19 0908    19 1021  Ventilator -  AC/PC; (28); 100; 88%; Other (see comments); 18; 17  Continuous,   Status:  Canceled     Question Answer Comment   Vent Mode AC/PC    Breath rate  28   FiO2 100    FiO2 titrate for Sp02% =/> 88%    PEEP Other (see comments)    PEEP: 18    Inspiratory Pressure 17        05/11/19 1027          Device (Oxygen Therapy): nasal cannula FiO2 (%): 40 %     105 kg (230 lb 6.4 oz); Body mass index is 29.58 kg/m².      Intake/Output Summary (Last 24 hours) at 5/20/2019 1600  Last data filed at 5/20/2019 0800  Gross per 24 hour   Intake 360 ml   Output 1125 ml   Net -765 ml       PHYSICAL EXAM   Constitutional: Middle aged pt in bed, No acute respiratory distress, + accessory muscle use  Head: - NCAT  Eyes: No pallor, Anicteric conjunctiva, EOMI.  ENMT:  Mallampati 4, no oral thrush. No palpable cervical lymphadenopathy, Dry MM.   Heart: RRR, no murmur. No pedal edema   Lungs: TUTU +, No wheezes, Occ crackles heard.  Significantly decreased air movement at the bases bilateral.     Abdomen: Soft. Obese, No tenderness, guarding or rigidity. No palpable masses  Extremities: Extremities warm with bilateral lower extremity chronic skin changes and chronic ulcers  Neuro: Conscious, more drowsy today, no gross focal neuro deficits  Psych: Mood and affect appropriate. Not anxious    Scheduled meds:      amLODIPine 10 mg Oral Q24H   arformoterol 15 mcg Nebulization BID - RT   aspirin 325 mg Oral Daily   atorvastatin 40 mg Oral Daily   budesonide 0.25 mg Nebulization BID - RT   donepezil 10 mg Oral Nightly   enoxaparin 40 mg Subcutaneous Q24H   furosemide 40 mg Oral BID   insulin glargine 10 Units Subcutaneous Q12H   insulin lispro 0-24 Units Subcutaneous 4x Daily With Meals & Nightly   lisinopril 40 mg Oral Daily   metoprolol tartrate 50 mg Oral Q12H   nystatin  Topical Q12H   sertraline 25 mg Oral Daily   sodium chloride 3 mL Intravenous Q12H   sodium chloride 4 mL Nebulization BID - RT   spironolactone 50 mg Oral Daily   tamsulosin 0.4  mg Oral Nightly       IV meds:                          hold 1 each       Data Review:      Results from last 7 days   Lab Units 05/20/19  0548 05/19/19  0703 05/18/19  2307 05/18/19  0425  05/16/19  0502 05/15/19  0920   SODIUM mmol/L 136 136  --  133*   < >  --   --    POTASSIUM mmol/L 3.7 3.9 3.6 3.3*   < >  --   --    CHLORIDE mmol/L 89* 87*  --  84*   < >  --   --    CO2 mmol/L 36.8* 40.3*  --  38.5*   < >  --   --    BUN mg/dL 14 16  --  16   < >  --   --    CREATININE mg/dL 0.49* 0.51*  --  0.56*   < >  --   --    CALCIUM mg/dL 7.9* 8.3*  --  8.8   < >  --   --    GLUCOSE mg/dL 147* 128*  --  161*   < >  --   --    WBC 10*3/mm3 8.46 9.35  --   --   --  11.07*  --    HEMOGLOBIN g/dL 15.3 16.0  --   --   --  16.8  --    PLATELETS 10*3/mm3 200 215  --   --   --  141  --    INR   --   --   --   --   --   --  1.04    < > = values in this interval not displayed.       Lab Results   Component Value Date    CALCIUM 7.9 (L) 05/20/2019    PHOS 2.2 (L) 05/11/2019       Results from last 7 days   Lab Units 05/15/19  1103   BODYFLDCX  No growth at 3 days       Results from last 7 days   Lab Units 05/19/19  0854 05/16/19  1237 05/14/19  0957 05/14/19  0330   PH, ARTERIAL pH units 7.410 7.457* 7.357 7.375   PO2 ART mm Hg 73.3* 75.3* 71.9* 77.1*   PCO2, ARTERIAL mm Hg 60.5* 62.9* 66.0* 58.6*   HCO3 ART mmol/L 38.4* 44.4* 37.0* 34.3*        Results Review:    I have reviewed the relevant laboratory results and reviewed the chest imaging from the last 3 days personally and summarized it below    Assessment    Acute on chronic hypoxemic/ hypercapnic respiratory failure - 4L @ home  Bilateral lower lobe pneumonia vs Atelectasis -suspect aspiration  Bilateral pleural effusions s/p Rt thora 5/15  Acute on chronic diastolic heart failure - Suspected  Acute COPD exacerbation  Metabolic encephalopathy  Acute kidney injury  Elevated troponin-demand ischemia  Suspected traumatic hematuria   Mild Pulm HTN (RVSP - 43) likely WHO Group 2  vs 3   Mediastinal/hilar lymphadenopathy ? reactive - need outpatient follow  Incidental 3 mm lung nodule - needs outpatient follow-up  Incidental renal lesion -needs outpatient f/u CT  Incidental Liver Cirrhosis   PAD -reportedly declined outpatient evaluation  Diabetes  Hypertension    PLAN:  Mental status improved today.  ABG showed some chronic hypercapnia.  He did not get his home machine due to technical reasons.  Will use hospital machine to be used at night and with naps  Persistent hypoxemia likely multifactorial mainly from pneumonia/ atelectasis and effusions. C/w mucus clearance and resolution of atelectasis.  Hold off on repeat thoracentesis for now.  Finish antibiotic course.  We will get a repeat chest x-ray and reassess need for further intervention  We might consider bronchoscopy if there is not much resolution of his pneumonia/ atelectasis.   Persistent mediastinal and right hilar lymphadenopathy noted could be reactive but will need follow-up imaging to confirm resolution  Continue with bronchodilators for COPD and wean prednisone.    Ongoing diuretics per cards.   Tolerating modified diet cleared by speech therapy.  Working with physical therapy.    I have discussed my findings and recommendations with patient and nursing staff.     Cesar Brower MD  5/20/2019

## 2019-05-20 NOTE — PROGRESS NOTES
Continued Stay Note  [facility]     Patient Name: Heath Bajwa  MRN: [id number]  Today's Date: 5/20/2019    Admit Date: 5/11/2019    Discharge Plan     Row Name 05/20/19 1628       Plan    Plan  Home with SO     Patient/Family in Agreement with Plan  yes    Plan Comments  Met with pt at bedside who states his plans is to return home wit SO who can assist if needed.  DIscussed possible need for rehab, pt declines at this time.  Pt concerned about transportation to get home.  Spoke with brother/Jose Ramon who states he is working on getting transportation home for pt.  Informed Jose Ramon that CCP can assist with gas money if needed.  Will f/u with Jose Ramon on transportation.  MAURICE Back RN        Discharge Codes    No documentation.             Alta Back

## 2019-05-20 NOTE — PROGRESS NOTES
"    Patient Name: Heath Bajwa  :1950  69 y.o.      Patient Care Team:  Provider, No Known as PCP - General  Provider, No Known as PCP - Family Medicine    Chief Complaint: follow up elevated troponin    Interval History: he is sitting on the side of the bed. He doesn't really have any complaints.        Objective   Vital Signs  Temp:  [98.1 °F (36.7 °C)-98.8 °F (37.1 °C)] 98.2 °F (36.8 °C)  Heart Rate:  [64-84] 70  Resp:  [16-20] 20  BP: (143-158)/(72-82) 143/72    Intake/Output Summary (Last 24 hours) at 2019 1308  Last data filed at 2019 0800  Gross per 24 hour   Intake 360 ml   Output 1125 ml   Net -765 ml     Flowsheet Rows      First Filed Value   Admission Height  188 cm (74\") Documented at 2019 1138   Admission Weight  114 kg (251 lb 5.2 oz) Documented at 2019 1028          Physical Exam:   General Appearance:    Alert, cooperative, in no acute distress   Lungs:     Clear to auscultation.  Normal respiratory effort and rate.      Heart:    Regular rhythm and normal rate, normal S1 and S2, no murmurs, gallops or rubs.     Chest Wall:    No abnormalities observed   Abdomen:     Soft, nontender, positive bowel sounds.     Extremities:   no cyanosis, clubbing or edema.  No marked joint deformities.  Adequate musculoskeletal strength.       Results Review:    Results from last 7 days   Lab Units 19  0548   SODIUM mmol/L 136   POTASSIUM mmol/L 3.7   CHLORIDE mmol/L 89*   CO2 mmol/L 36.8*   BUN mg/dL 14   CREATININE mg/dL 0.49*   GLUCOSE mg/dL 147*   CALCIUM mg/dL 7.9*         Results from last 7 days   Lab Units 19  0548   WBC 10*3/mm3 8.46   HEMOGLOBIN g/dL 15.3   HEMATOCRIT % 47.8   PLATELETS 10*3/mm3 200     Results from last 7 days   Lab Units 05/15/19  0920   INR  1.04     Results from last 7 days   Lab Units 19  0548   MAGNESIUM mg/dL 2.1                   Medication Review:     amLODIPine 10 mg Oral Q24H   arformoterol 15 mcg Nebulization BID - RT   aspirin " 325 mg Oral Daily   atorvastatin 40 mg Oral Daily   budesonide 0.25 mg Nebulization BID - RT   donepezil 10 mg Oral Nightly   enoxaparin 40 mg Subcutaneous Q24H   furosemide 40 mg Oral BID   insulin glargine 10 Units Subcutaneous Q12H   insulin lispro 0-24 Units Subcutaneous 4x Daily With Meals & Nightly   lisinopril 40 mg Oral Daily   metoprolol tartrate 37.5 mg Oral Q12H   nystatin  Topical Q12H   sertraline 25 mg Oral Daily   sodium chloride 3 mL Intravenous Q12H   sodium chloride 4 mL Nebulization BID - RT   spironolactone 50 mg Oral Daily   tamsulosin 0.4 mg Oral Nightly          hold 1 each       Assessment/Plan   1. Elevated troponin - suspected type II NSETMI. EKG without ischemic changes. Echo with normal LV function and normal wall motion. On medical therapy with beta blockers, statin, and ASA.   2. Acute hypoxic respiratory failure 2/2 COPD with acute exacerbation and bibasilar pneumonia- per pulmonary  3. SISSY - resolved.  4. HTN - a little shannon. Will increase beta blocker. Also on lisinopril, amlodipine, and spironolactone.   5. Diabetes type 2 with circulatory complication  6. Altered mental status - seems better today.   7. Hypokalemia - stable today.     PANCHO Sheets  [facility]  05/20/19  1:08 PM

## 2019-05-21 LAB
ANION GAP SERPL CALCULATED.3IONS-SCNC: 10.1 MMOL/L
ANION GAP SERPL CALCULATED.3IONS-SCNC: 7.7 MMOL/L
BASOPHILS # BLD AUTO: 0.02 10*3/MM3 (ref 0–0.2)
BASOPHILS NFR BLD AUTO: 0.3 % (ref 0–1.5)
BUN BLD-MCNC: 14 MG/DL (ref 8–23)
BUN BLD-MCNC: 15 MG/DL (ref 8–23)
BUN/CREAT SERPL: 23.4 (ref 7–25)
BUN/CREAT SERPL: 25.5 (ref 7–25)
CALCIUM SPEC-SCNC: 8.1 MG/DL (ref 8.6–10.5)
CALCIUM SPEC-SCNC: 8.5 MG/DL (ref 8.6–10.5)
CHLORIDE SERPL-SCNC: 85 MMOL/L (ref 98–107)
CHLORIDE SERPL-SCNC: 91 MMOL/L (ref 98–107)
CO2 SERPL-SCNC: 32.9 MMOL/L (ref 22–29)
CO2 SERPL-SCNC: 35.3 MMOL/L (ref 22–29)
CREAT BLD-MCNC: 0.55 MG/DL (ref 0.76–1.27)
CREAT BLD-MCNC: 0.64 MG/DL (ref 0.76–1.27)
DEPRECATED RDW RBC AUTO: 47.7 FL (ref 37–54)
EOSINOPHIL # BLD AUTO: 0.06 10*3/MM3 (ref 0–0.4)
EOSINOPHIL NFR BLD AUTO: 0.9 % (ref 0.3–6.2)
ERYTHROCYTE [DISTWIDTH] IN BLOOD BY AUTOMATED COUNT: 13.6 % (ref 12.3–15.4)
GFR SERPL CREATININE-BSD FRML MDRD: 123 ML/MIN/1.73
GFR SERPL CREATININE-BSD FRML MDRD: 147 ML/MIN/1.73
GLUCOSE BLD-MCNC: 128 MG/DL (ref 65–99)
GLUCOSE BLD-MCNC: 183 MG/DL (ref 65–99)
GLUCOSE BLDC GLUCOMTR-MCNC: 108 MG/DL (ref 70–130)
GLUCOSE BLDC GLUCOMTR-MCNC: 110 MG/DL (ref 70–130)
GLUCOSE BLDC GLUCOMTR-MCNC: 153 MG/DL (ref 70–130)
HCT VFR BLD AUTO: 49.3 % (ref 37.5–51)
HGB BLD-MCNC: 15.4 G/DL (ref 13–17.7)
IMM GRANULOCYTES # BLD AUTO: 0.03 10*3/MM3 (ref 0–0.05)
IMM GRANULOCYTES NFR BLD AUTO: 0.4 % (ref 0–0.5)
LYMPHOCYTES # BLD AUTO: 1.13 10*3/MM3 (ref 0.7–3.1)
LYMPHOCYTES NFR BLD AUTO: 16.4 % (ref 19.6–45.3)
MCH RBC QN AUTO: 29.8 PG (ref 26.6–33)
MCHC RBC AUTO-ENTMCNC: 31.2 G/DL (ref 31.5–35.7)
MCV RBC AUTO: 95.5 FL (ref 79–97)
MONOCYTES # BLD AUTO: 0.66 10*3/MM3 (ref 0.1–0.9)
MONOCYTES NFR BLD AUTO: 9.6 % (ref 5–12)
NEUTROPHILS # BLD AUTO: 4.97 10*3/MM3 (ref 1.7–7)
NEUTROPHILS NFR BLD AUTO: 72.4 % (ref 42.7–76)
NRBC BLD AUTO-RTO: 0 /100 WBC (ref 0–0.2)
PLATELET # BLD AUTO: 200 10*3/MM3 (ref 140–450)
PMV BLD AUTO: 11.1 FL (ref 6–12)
POTASSIUM BLD-SCNC: 3.6 MMOL/L (ref 3.5–5.2)
POTASSIUM BLD-SCNC: 4.3 MMOL/L (ref 3.5–5.2)
RBC # BLD AUTO: 5.16 10*6/MM3 (ref 4.14–5.8)
SODIUM BLD-SCNC: 128 MMOL/L (ref 136–145)
SODIUM BLD-SCNC: 134 MMOL/L (ref 136–145)
WBC NRBC COR # BLD: 6.87 10*3/MM3 (ref 3.4–10.8)

## 2019-05-21 PROCEDURE — 25010000002 ENOXAPARIN PER 10 MG: Performed by: INTERNAL MEDICINE

## 2019-05-21 PROCEDURE — 82962 GLUCOSE BLOOD TEST: CPT

## 2019-05-21 PROCEDURE — 94799 UNLISTED PULMONARY SVC/PX: CPT

## 2019-05-21 PROCEDURE — 80048 BASIC METABOLIC PNL TOTAL CA: CPT | Performed by: NURSE PRACTITIONER

## 2019-05-21 PROCEDURE — 99232 SBSQ HOSP IP/OBS MODERATE 35: CPT | Performed by: NURSE PRACTITIONER

## 2019-05-21 PROCEDURE — 63710000001 INSULIN LISPRO (HUMAN) PER 5 UNITS: Performed by: INTERNAL MEDICINE

## 2019-05-21 PROCEDURE — 63710000001 INSULIN GLARGINE PER 5 UNITS: Performed by: INTERNAL MEDICINE

## 2019-05-21 PROCEDURE — 85025 COMPLETE CBC W/AUTO DIFF WBC: CPT | Performed by: INTERNAL MEDICINE

## 2019-05-21 PROCEDURE — 80048 BASIC METABOLIC PNL TOTAL CA: CPT | Performed by: INTERNAL MEDICINE

## 2019-05-21 PROCEDURE — 97110 THERAPEUTIC EXERCISES: CPT

## 2019-05-21 RX ORDER — AMMONIUM LACTATE 120 MG/G
CREAM TOPICAL EVERY 12 HOURS PRN
Status: DISCONTINUED | OUTPATIENT
Start: 2019-05-21 | End: 2019-05-22 | Stop reason: HOSPADM

## 2019-05-21 RX ORDER — GABAPENTIN 100 MG/1
200 CAPSULE ORAL 3 TIMES DAILY
Status: DISCONTINUED | OUTPATIENT
Start: 2019-05-21 | End: 2019-05-22 | Stop reason: HOSPADM

## 2019-05-21 RX ADMIN — TAMSULOSIN HYDROCHLORIDE 0.4 MG: 0.4 CAPSULE ORAL at 21:53

## 2019-05-21 RX ADMIN — METOPROLOL TARTRATE 50 MG: 50 TABLET, FILM COATED ORAL at 08:11

## 2019-05-21 RX ADMIN — INSULIN LISPRO 4 UNITS: 100 INJECTION, SOLUTION INTRAVENOUS; SUBCUTANEOUS at 08:12

## 2019-05-21 RX ADMIN — DONEPEZIL HYDROCHLORIDE 10 MG: 10 TABLET, FILM COATED ORAL at 21:53

## 2019-05-21 RX ADMIN — SPIRONOLACTONE 50 MG: 50 TABLET, FILM COATED ORAL at 08:12

## 2019-05-21 RX ADMIN — SODIUM CHLORIDE, PRESERVATIVE FREE 3 ML: 5 INJECTION INTRAVENOUS at 08:12

## 2019-05-21 RX ADMIN — AMLODIPINE BESYLATE 10 MG: 10 TABLET ORAL at 08:11

## 2019-05-21 RX ADMIN — BUDESONIDE 0.25 MG: 0.25 INHALANT RESPIRATORY (INHALATION) at 23:23

## 2019-05-21 RX ADMIN — ARFORMOTEROL TARTRATE 15 MCG: 15 SOLUTION RESPIRATORY (INHALATION) at 06:47

## 2019-05-21 RX ADMIN — FUROSEMIDE 40 MG: 40 TABLET ORAL at 08:11

## 2019-05-21 RX ADMIN — ATORVASTATIN CALCIUM 40 MG: 20 TABLET, FILM COATED ORAL at 08:11

## 2019-05-21 RX ADMIN — SODIUM CHLORIDE, PRESERVATIVE FREE 3 ML: 5 INJECTION INTRAVENOUS at 21:54

## 2019-05-21 RX ADMIN — SODIUM CHLORIDE 4 ML: 7 NEBU SOLN,3 % NEBU at 23:23

## 2019-05-21 RX ADMIN — INSULIN GLARGINE 10 UNITS: 100 INJECTION, SOLUTION SUBCUTANEOUS at 08:11

## 2019-05-21 RX ADMIN — INSULIN GLARGINE 10 UNITS: 100 INJECTION, SOLUTION SUBCUTANEOUS at 22:14

## 2019-05-21 RX ADMIN — NYSTATIN: 100000 POWDER TOPICAL at 21:54

## 2019-05-21 RX ADMIN — ASPIRIN 325 MG: 325 TABLET ORAL at 08:11

## 2019-05-21 RX ADMIN — SODIUM CHLORIDE 4 ML: 7 NEBU SOLN,3 % NEBU at 06:47

## 2019-05-21 RX ADMIN — LISINOPRIL 40 MG: 40 TABLET ORAL at 08:11

## 2019-05-21 RX ADMIN — BUDESONIDE 0.25 MG: 0.25 INHALANT RESPIRATORY (INHALATION) at 06:47

## 2019-05-21 RX ADMIN — ARFORMOTEROL TARTRATE 15 MCG: 15 SOLUTION RESPIRATORY (INHALATION) at 23:23

## 2019-05-21 RX ADMIN — SERTRALINE 25 MG: 25 TABLET, FILM COATED ORAL at 08:11

## 2019-05-21 RX ADMIN — ENOXAPARIN SODIUM 40 MG: 40 INJECTION SUBCUTANEOUS at 12:06

## 2019-05-21 RX ADMIN — METOPROLOL TARTRATE 50 MG: 50 TABLET, FILM COATED ORAL at 21:53

## 2019-05-21 NOTE — NURSING NOTE
[facility] Consult: reassessment right great toe below nail blister. [id number] raised purple fluid fill blister 0.2cm. Scant amount bleeding after ambulation.   Patient states can fell on toe.Pulses are not palable to touch. Bilateral legs hemosiderin staining, patient is a smoker and diabetic. States [facility]. Instructed patient to see [facility] when discharge home to [place]. Patient verbalizes understanding and states he will contact his ARNP at home. Discussed with CCP regarding referral to [facility]. Stressed importance to patient regarding follow up due to all his conditions.   Wrapped toe gauze and secured with tape.

## 2019-05-21 NOTE — PROGRESS NOTES
Cesar Brower MD                          [phone]      Patient ID:    Name:  Heath Bajwa    MRN:  [id number]    1950   69 y.o.  male            Patient Care Team:  Provider, No Known as PCP - General  Provider, No Known as PCP - Family Medicine    CC/ Reason for visit: Per Assessment mentioned below    Subjective: Pt seen and examined this AM. No acute overnight events noted.  More awake and interactive this morning.  Has not used noninvasive ventilator however.  Improving oxygen requirements.  Sitting up in the chair and working with physical therapy    ROS: Denies any subjective fevers, chest pain, nausea/ vomiting    Objective     Vital Signs past 24hrs    BP range: BP: (139-151)/(70-80) 139/73  Pulse range: Heart Rate:  [56-78] 67  Resp rate range: Resp:  [16-22] 16  Temp range: Temp (24hrs), Av.1 °F (36.7 °C), Min:97.9 °F (36.6 °C), Max:98.7 °F (37.1 °C)      Ventilator/Non-Invasive Ventilation Settings (From admission, onward)    Start     Ordered    19 1326  NIPPV (CPAP or BIPAP)  As Needed-RT     Comments:  Use noninvasive ventilation if patient has respiratory distress or feels increasing dyspnea or desaturates.   Question Answer Comment   Indication: Acute Respiratory Failure    Type: AVAPS/PC/PS    NIPPV Mask Interface: Per Patient Preference    Backup Rate 12    Target VT (mL) 500    EPAP/PEEP (cm H2O) 5    Min Pressure (cm H2O) 12    Max Pressure (cm H2O) 28    Titrate for SPO2 88% - 92%        19 1327    19 0857  Ventilator - AC/PC; 20; 40; 88%; 15; 15  Continuous,   Status:  Canceled     Comments:  If SPO2 greater than 92% at 1300 p.m. then decrease PEEP 2.5 cm and may continue decreasing PEEP 2.5 cm to 5 cm every 6 hours as tolerated   Question Answer Comment   Vent Mode AC/PC    Breath rate 20    FiO2 40    FiO2 titrate for Sp02% =/> 88%    PEEP 15    Inspiratory Pressure 15        19 0908    19 1021  Ventilator -  AC/PC; (28); 100; 88%; Other (see comments); 18; 17  Continuous,   Status:  Canceled     Question Answer Comment   Vent Mode AC/PC    Breath rate  28   FiO2 100    FiO2 titrate for Sp02% =/> 88%    PEEP Other (see comments)    PEEP: 18    Inspiratory Pressure 17        05/11/19 1027          Device (Oxygen Therapy): nasal cannula FiO2 (%): 40 %     102 kg (225 lb); Body mass index is 28.89 kg/m².      Intake/Output Summary (Last 24 hours) at 5/21/2019 1910  Last data filed at 5/21/2019 1700  Gross per 24 hour   Intake 1420 ml   Output 200 ml   Net 1220 ml       PHYSICAL EXAM   Constitutional: Middle aged pt in bed, No acute respiratory distress, + accessory muscle use  Head: - NCAT  Eyes: No pallor, Anicteric conjunctiva, EOMI.  ENMT:  Mallampati 4, no oral thrush. No palpable cervical lymphadenopathy, Dry MM.   Heart: RRR, no murmur. No pedal edema   Lungs: TUTU +, No wheezes, Occ crackles heard.  Significantly decreased air movement at the bases bilateral.     Abdomen: Soft. Obese, No tenderness, guarding or rigidity. No palpable masses  Extremities: Extremities warm with bilateral lower extremity chronic skin changes and chronic ulcers  Neuro: Conscious, more drowsy today, no gross focal neuro deficits  Psych: Mood and affect appropriate. Not anxious    Scheduled meds:      amLODIPine 10 mg Oral Q24H   arformoterol 15 mcg Nebulization BID - RT   aspirin 325 mg Oral Daily   atorvastatin 40 mg Oral Daily   budesonide 0.25 mg Nebulization BID - RT   donepezil 10 mg Oral Nightly   enoxaparin 40 mg Subcutaneous Q24H   gabapentin 200 mg Oral TID   insulin glargine 10 Units Subcutaneous Q12H   insulin lispro 0-24 Units Subcutaneous 4x Daily With Meals & Nightly   lisinopril 40 mg Oral Daily   metoprolol tartrate 50 mg Oral Q12H   nystatin  Topical Q12H   sertraline 25 mg Oral Daily   sodium chloride 3 mL Intravenous Q12H   sodium chloride 4 mL Nebulization BID - RT   spironolactone 50 mg Oral Daily   tamsulosin 0.4 mg  Oral Nightly       IV meds:                          hold 1 each       Data Review:      Results from last 7 days   Lab Units 05/21/19  1431 05/21/19  0524 05/20/19  0548 05/19/19  0703  05/15/19  0920   SODIUM mmol/L 134* 128* 136 136   < >  --    POTASSIUM mmol/L 4.3 3.6 3.7 3.9   < >  --    CHLORIDE mmol/L 91* 85* 89* 87*   < >  --    CO2 mmol/L 32.9* 35.3* 36.8* 40.3*   < >  --    BUN mg/dL 15 14 14 16   < >  --    CREATININE mg/dL 0.64* 0.55* 0.49* 0.51*   < >  --    CALCIUM mg/dL 8.5* 8.1* 7.9* 8.3*   < >  --    GLUCOSE mg/dL 128* 183* 147* 128*   < >  --    WBC 10*3/mm3  --  6.87 8.46 9.35   < >  --    HEMOGLOBIN g/dL  --  15.4 15.3 16.0   < >  --    PLATELETS 10*3/mm3  --  200 200 215   < >  --    INR   --   --   --   --   --  1.04    < > = values in this interval not displayed.       Lab Results   Component Value Date    CALCIUM 8.5 (L) 05/21/2019    PHOS 2.2 (L) 05/11/2019       Results from last 7 days   Lab Units 05/15/19  1103   BODYFLDCX  No growth at 3 days       Results from last 7 days   Lab Units 05/19/19  0854 05/16/19  1237   PH, ARTERIAL pH units 7.410 7.457*   PO2 ART mm Hg 73.3* 75.3*   PCO2, ARTERIAL mm Hg 60.5* 62.9*   HCO3 ART mmol/L 38.4* 44.4*        Results Review:    I have reviewed the relevant laboratory results and reviewed the chest imaging from the last 3 days personally and summarized it below    Assessment    Acute on chronic hypoxemic/ hypercapnic respiratory failure - 4L @ home  Bilateral lower lobe pneumonia vs Atelectasis -suspect aspiration  Bilateral pleural effusions s/p Rt thora 5/15  Acute on chronic diastolic heart failure - Suspected  Acute COPD exacerbation  Metabolic encephalopathy  Acute kidney injury  Elevated troponin-demand ischemia  Suspected traumatic hematuria   Mild Pulm HTN (RVSP - 43) likely WHO Group 2 vs 3   Mediastinal/hilar lymphadenopathy ? reactive - need outpatient follow  Incidental 3 mm lung nodule - needs outpatient follow-up  Incidental renal  lesion -needs outpatient f/u CT  Incidental Liver Cirrhosis   PAD -reportedly declined outpatient evaluation  Diabetes  Hypertension    PLAN:  Patient will need his home noninvasive ventilator which unfortunately his girlfriend or brother cannot get.  He is a has set up for repeat pneumonia and recurrent respiratory failure but is unwilling to consider rehab which would be beneficial for him.  Repeat chest x-ray shows improvement in aeration in the lungs.  Persistent hypoxemia likely multifactorial mainly from pneumonia/ atelectasis and effusions. C/w mucus clearance and resolution of atelectasis. Finish antibiotic course.   Persistent mediastinal and right hilar lymphadenopathy noted could be reactive but will need follow-up imaging to confirm resolution  Continue with bronchodilators for COPD and wean prednisone.    Ongoing diuretics per cards.     Will dc in am - Home with HH. He will most likely return back with resp failure as he is not using his PAP. [facility] being contacted to see if he can be seen by them again. ? [facility]     I have discussed my findings and recommendations with patient and nursing staff.     Cesar Brower MD  5/21/2019

## 2019-05-21 NOTE — PLAN OF CARE
Problem: Patient Care Overview  Goal: Plan of Care Review  Outcome: Ongoing (interventions implemented as appropriate)   05/20/19 1800   Coping/Psychosocial   Plan of Care Reviewed With patient   OTHER   Outcome Summary VSS, no falls/injury, oxygen at 3 liters per N/C, no c/o pain or sob , BSC with assist , gen weakness noted    Plan of Care Review   Progress improving     Goal: Individualization and Mutuality  Outcome: Ongoing (interventions implemented as appropriate)   05/20/19 1800   Individualization   Patient Specific Preferences likes television on, lights dimmed, door left open    Patient Specific Goals (Include Timeframe) sob resolved, mobility improved, po intake improved,    Patient Specific Interventions monitor vitals/02 sats, oob with assist, BSC with assist, oxygen at 3 liters per N/C    Mutuality/Individual Preferences   What Anxieties, Fears, Concerns, or Questions Do You Have About Your Care? length of hospital stay, gen weakness, oxygen therapy    What Information Would Help Us Give You More Personalized Care? provide daily updates to patient/family re: plan of care progress    How Would You and/or Your Support Person Like to Participate in Your Care? involve patient in plan of care / decision making    Mutuality/Individual Preferences   How to Address Anxieties/Fears discuss fears /concerns with [facility] staff /physicians      Goal: Discharge Needs Assessment  Outcome: Ongoing (interventions implemented as appropriate)   05/20/19 1815   Discharge Needs Assessment   Readmission Within the Last 30 Days no previous admission in last 30 days   Concerns to be Addressed discharge planning   Patient/Family Anticipates Transition to home with family   Patient/Family Anticipated Services at Transition    Transportation Concerns other (see comments)  (lives in [place], [place] )   Transportation Anticipated health plan transportation   Anticipated Changes Related to Illness inability to care for self    Equipment Needed After Discharge none   Discharge Coordination/Progress GOING HOME WITH FRIEND, REFUSING REHAB    Disability   Equipment Currently Used at Home wheelchair;bipap/cpap;oxygen       Problem: Pneumonia (Adult)  Goal: Signs and Symptoms of Listed Potential Problems Will be Absent, Minimized or Managed (Pneumonia)  Outcome: Ongoing (interventions implemented as appropriate)   05/20/19 1815   Goal/Outcome Evaluation   Problems Assessed (Pneumonia) all   Problems Present (Pneumonia) respiratory compromise      05/20/19 1815   Goal/Outcome Evaluation   Problems Assessed (Pneumonia) all   Problems Present (Pneumonia) respiratory compromise       Problem: Pain, Acute (Adult)  Goal: Acceptable Pain Control/Comfort Level  Outcome: Ongoing (interventions implemented as appropriate)   05/20/19 1815   Pain, Acute (Adult)   Acceptable Pain Control/Comfort Level making progress toward outcome       Problem: Skin Injury Risk (Adult)  Goal: Skin Health and Integrity  Outcome: Ongoing (interventions implemented as appropriate)   05/20/19 1815   Skin Injury Risk (Adult)   Skin Health and Integrity making progress toward outcome       Problem: Fall Risk (Adult)  Goal: Absence of Fall  Outcome: Ongoing (interventions implemented as appropriate)   05/20/19 1815   Fall Risk (Adult)   Absence of Fall making progress toward outcome

## 2019-05-21 NOTE — PLAN OF CARE
Problem: Patient Care Overview  Goal: Plan of Care Review  Outcome: Ongoing (interventions implemented as appropriate)   05/21/19 1615   Coping/Psychosocial   Plan of Care Reviewed With patient   OTHER   Outcome Summary VSS, pt complains of generalized pain, got order to restart Neurontin. Ambulated well with PT around nurses station. O2 sats maintained on 3LNC. Plan to DC home vs [facility]. Pt not agreeable to [facility] at this time. Will continue to monitor.   Plan of Care Review   Progress improving       Problem: Pain, Chronic (Adult)  Goal: Identify Related Risk Factors and Signs and Symptoms  Outcome: Ongoing (interventions implemented as appropriate)   05/21/19 1615   Pain, Chronic (Adult)   Related Risk Factors (Chronic Pain) procedures/treatments;disease process   Signs and Symptoms (Chronic Pain) verbalization of pain descriptors     Goal: Acceptable Pain/Comfort Level and Functional Ability  Outcome: Ongoing (interventions implemented as appropriate)   05/21/19 1615   Pain, Chronic (Adult)   Acceptable Pain/Comfort Level and Functional Ability making progress toward outcome

## 2019-05-21 NOTE — PROGRESS NOTES
"    Patient Name: Heath Bajwa  :1950  69 y.o.      Patient Care Team:  Provider, No Known as PCP - General  Provider, No Known as PCP - Family Medicine    Chief Complaint: follow up elevated troponin    Interval History: He is resting in bed. He does not really have any complaints.  Dry mucous membranes noted.        Objective   Vital Signs  Temp:  [97.9 °F (36.6 °C)-98.4 °F (36.9 °C)] 97.9 °F (36.6 °C)  Heart Rate:  [56-80] 65  Resp:  [16-22] 16  BP: (145-153)/(70-83) 145/80    Intake/Output Summary (Last 24 hours) at 2019 1251  Last data filed at 2019 1220  Gross per 24 hour   Intake 1780 ml   Output 200 ml   Net 1580 ml     Flowsheet Rows      First Filed Value   Admission Height  188 cm (74\") Documented at 2019 1138   Admission Weight  114 kg (251 lb 5.2 oz) Documented at 2019 1028          Physical Exam:   General Appearance:    Alert, cooperative, in no acute distress   Lungs:     Clear to auscultation.  Normal respiratory effort and rate.      Heart:    Regular rhythm and normal rate, normal S1 and S2, no murmurs, gallops or rubs.     Chest Wall:    No abnormalities observed   Abdomen:     Soft, nontender, positive bowel sounds.     Extremities:   no cyanosis, clubbing or edema.  No marked joint deformities.  Adequate musculoskeletal strength.       Results Review:    Results from last 7 days   Lab Units 19  0524   SODIUM mmol/L 128*   POTASSIUM mmol/L 3.6   CHLORIDE mmol/L 85*   CO2 mmol/L 35.3*   BUN mg/dL 14   CREATININE mg/dL 0.55*   GLUCOSE mg/dL 183*   CALCIUM mg/dL 8.1*         Results from last 7 days   Lab Units 19  0524   WBC 10*3/mm3 6.87   HEMOGLOBIN g/dL 15.4   HEMATOCRIT % 49.3   PLATELETS 10*3/mm3 200     Results from last 7 days   Lab Units 05/15/19  0920   INR  1.04     Results from last 7 days   Lab Units 19  0548   MAGNESIUM mg/dL 2.1                   Medication Review:     amLODIPine 10 mg Oral Q24H   arformoterol 15 mcg Nebulization BID " - RT   aspirin 325 mg Oral Daily   atorvastatin 40 mg Oral Daily   budesonide 0.25 mg Nebulization BID - RT   donepezil 10 mg Oral Nightly   enoxaparin 40 mg Subcutaneous Q24H   furosemide 40 mg Oral BID   insulin glargine 10 Units Subcutaneous Q12H   insulin lispro 0-24 Units Subcutaneous 4x Daily With Meals & Nightly   lisinopril 40 mg Oral Daily   metoprolol tartrate 50 mg Oral Q12H   nystatin  Topical Q12H   sertraline 25 mg Oral Daily   sodium chloride 3 mL Intravenous Q12H   sodium chloride 4 mL Nebulization BID - RT   spironolactone 50 mg Oral Daily   tamsulosin 0.4 mg Oral Nightly          hold 1 each       Assessment/Plan   1. Elevated troponin - suspected type II NSTEMI. EKG without ischemic changes. Echo with normal LV function and normal wall motion. On medical therapy with beta blockers, statin, and ASA.   2. Acute hypoxic respiratory failure 2/2 COPD with acute exacerbation and bibasilar pneumonia- per pulmonary  3. SISSY - resolved.  4. HTN - a little high. Will increase beta blocker. Also on lisinopril, amlodipine, and spironolactone.   5. Diabetes type 2 with circulatory complication  6. Altered mental status - seems better today.   7. Hypokalemia - stable today.   8. Hyponatremia - 128 today (136 yesterday). Appears euvolemic. Hold lasix. Recheck sodium level this afternoon. May need Nephrology assistance if sodium continues to decline.    Discussed with Dr. Ling.     PANCHO Sheets  [facility]  05/21/19  12:51 PM

## 2019-05-21 NOTE — PLAN OF CARE
Problem: Patient Care Overview  Goal: Plan of Care Review  Outcome: Ongoing (interventions implemented as appropriate)   05/21/19 9604   Coping/Psychosocial   Plan of Care Reviewed With patient   OTHER   Outcome Summary no falls. vital signs stable. remains on 3L NC. maintaining oxygen 88%-92%. refusing CPAP over night. + BM. will continue to monitor. await d/c plans.    Plan of Care Review   Progress no change       Problem: Pneumonia (Adult)  Goal: Signs and Symptoms of Listed Potential Problems Will be Absent, Minimized or Managed (Pneumonia)  Outcome: Ongoing (interventions implemented as appropriate)      Problem: Pain, Acute (Adult)  Goal: Acceptable Pain Control/Comfort Level  Outcome: Ongoing (interventions implemented as appropriate)      Problem: Fall Risk (Adult)  Goal: Absence of Fall  Outcome: Ongoing (interventions implemented as appropriate)

## 2019-05-21 NOTE — THERAPY TREATMENT NOTE
Acute Care - Physical Therapy Treatment Note  [facility]     Patient Name: Heath Bajwa  : 1950  MRN: [id number]  Today's Date: 2019  Onset of Illness/Injury or Date of Surgery: 19  Date of Referral to PT: 19  Referring Physician: Mitali    Admit Date: 2019    Visit Dx:    ICD-10-CM ICD-9-CM   1. Impaired functional mobility and activity tolerance Z74.09 V49.89     Patient Active Problem List   Diagnosis   • BPH without urinary obstruction   • Elevated prostate specific antigen (PSA)   • Acute respiratory failure with hypoxia and hypercapnia (CMS/HCC)   • Venous stasis dermatitis of both lower extremities   • Elevated troponin   • Respiratory failure (CMS/HCC)       Therapy Treatment    Rehabilitation Treatment Summary     Row Name 19 065             Treatment Time/Intention    Discipline  physical therapist  -SV      Document Type  therapy note (daily note)  -SV      Subjective Information  no complaints  -SV      Mode of Treatment  individual therapy;physical therapy  -SV      Patient/Family Observations  no family present  -SV      Patient Effort  good  -SV      Comment  3 L O2  -SV      Existing Precautions/Restrictions  fall;oxygen therapy device and L/min  -SV      Recorded by [SV] Sada Floyd, PT 19 1400      Row Name 19 1330             Vital Signs    Posttreatment Heart Rate (beats/min)  75  -SV      Pre SpO2 (%)  93  -SV      O2 Delivery Pre Treatment  supplemental O2  -SV      O2 Delivery Intra Treatment  supplemental O2  -SV      Post SpO2 (%)  95  -SV      O2 Delivery Post Treatment  supplemental O2  -SV      Pre Patient Position  Supine  -SV      Intra Patient Position  Standing  -SV      Post Patient Position  Supine  -SV      Recorded by [SV] Sada Floyd, PT 19 1400      Row Name 19 1330             Cognitive Assessment/Intervention- PT/OT    Orientation Status (Cognition)  oriented to;person;place  -SV      Follows Commands  (Cognition)  follows one step commands;over 90% accuracy  -SV      Safety Deficit (Cognitive)  mild deficit;at risk behavior observed;awareness of need for assistance  -SV      Personal Safety Interventions  fall prevention program maintained;gait belt;nonskid shoes/slippers when out of bed;supervised activity  -SV      Recorded by [SV] Sada Floyd, PT 05/21/19 1400      Row Name 05/21/19 1330             Bed Mobility Assessment/Treatment    Supine-Sit McKean (Bed Mobility)  supervision  -SV      Sit-Supine McKean (Bed Mobility)  supervision  -SV      Assistive Device (Bed Mobility)  bed rails  -SV      Recorded by [SV] Sada Floyd, PT 05/21/19 1400      Row Name 05/21/19 1330             Sit-Stand Transfer    Sit-Stand McKean (Transfers)  verbal cues;contact guard  -SV      Assistive Device (Sit-Stand Transfers)  walker, front-wheeled  -SV      Recorded by [SV] Sada Floyd, PT 05/21/19 1400      Row Name 05/21/19 1330             Stand-Sit Transfer    Stand-Sit McKean (Transfers)  verbal cues;contact guard  -SV      Assistive Device (Stand-Sit Transfers)  walker, front-wheeled  -SV      Recorded by [SV] Sada Floyd, PT 05/21/19 1400      Row Name 05/21/19 1330             Gait/Stairs Assessment/Training    McKean Level (Gait)  contact guard  -SV      Assistive Device (Gait)  walker, front-wheeled  -SV      Distance in Feet (Gait)  100'x2 with standing rest period  -SV2      Pattern (Gait)  step-through  -SV      Deviations/Abnormal Patterns (Gait)  festinating/shuffling  -SV      Bilateral Gait Deviations  forward flexed posture;heel strike decreased  -SV      Recorded by [SV] Sada Floyd, PT 05/21/19 1400  [SV2] Sada Floyd, PT 05/21/19 1403      Row Name 05/21/19 1330             Positioning and Restraints    Pre-Treatment Position  in bed  -SV      In Bed  supine;call light within reach;encouraged to call for assist;exit alarm on;patient within staff  view  -SV      Recorded by [SV] Sada Floyd, PT 05/21/19 1400      Row Name 05/21/19 1330             Pain Assessment    Additional Documentation  -- no signs of pain  -SV      Recorded by [SV] Sada Floyd, PT 05/21/19 1400      Row Name                Wound 05/11/19 1015 Left anterior toe blisters    Wound - Properties Group Date first assessed: 05/11/19 [JN] Time first assessed: 1015 [JN] Present On Admission : yes [JN] Side: Left [JN] Orientation: anterior [JN] Location: toe [JN], great toe  Type: blisters [JN] Recorded by:  [JN] Ariel Schulte RN 05/11/19 1301      User Key  (r) = Recorded By, (t) = Taken By, (c) = Cosigned By    Initials Name Effective Dates Discipline    Ariel Baez, RN 10/12/16 -  Nurse    Sada Willingham, PT 04/03/18 -  PT          Wound 05/11/19 1015 Left anterior toe blisters (Active)   Dressing Appearance open to air 5/21/2019  8:00 AM   Closure Open to air 5/21/2019  8:00 AM   Base purple;red 5/21/2019  8:00 AM   Periwound dry;pink 5/21/2019  8:00 AM   Periwound Temperature warm 5/21/2019  8:00 AM   Periwound Skin Turgor firm 5/21/2019  8:00 AM   Dressing Care, Wound open to air 5/21/2019  8:00 AM           Physical Therapy Education     Title: PT OT SLP Therapies (In Progress)     Topic: Physical Therapy (In Progress)     Point: Mobility training (Done)     Learning Progress Summary           Patient Acceptance, E,TB,D, VU,NR by SV at 5/21/2019  2:01 PM    Acceptance, E, NR by KT at 5/20/2019  2:55 PM    Acceptance, E,D, NR,VU by MS at 5/20/2019 11:32 AM    Acceptance, E, NR by DO at 5/18/2019  2:59 PM    Acceptance, E,D, NR by PC at 5/17/2019  4:38 PM    Acceptance, E,D, NR by PC at 5/15/2019  3:23 PM    Acceptance, E,D, NR by PC at 5/13/2019  3:05 PM                   Point: Home exercise program (Done)     Learning Progress Summary           Patient Acceptance, E,TB,D, VU,NR by SV at 5/21/2019  2:01 PM    Acceptance, E, NR by KT at 5/20/2019  2:55 PM     Acceptance, E,D, NR,VU by MS at 5/20/2019 11:32 AM    Acceptance, E, NR by DO at 5/18/2019  2:59 PM    Acceptance, E,D, NR by PC at 5/17/2019  4:38 PM    Acceptance, E, NR by EM at 5/16/2019 11:55 AM    Acceptance, E,D, NR by PC at 5/15/2019  3:23 PM    Acceptance, E,D, NR by PC at 5/14/2019 11:59 AM    Acceptance, E,D, NR by PC at 5/13/2019  3:05 PM                   Point: Body mechanics (In Progress)     Learning Progress Summary           Patient Acceptance, E, NR by KT at 5/20/2019  2:55 PM    Acceptance, E,D, NR,VU by MS at 5/20/2019 11:32 AM    Acceptance, E, NR by DO at 5/18/2019  2:59 PM    Acceptance, E,D, NR by PC at 5/15/2019  3:23 PM    Acceptance, E,D, NR by PC at 5/13/2019  3:05 PM                   Point: Precautions (In Progress)     Learning Progress Summary           Patient Acceptance, E, NR by KT at 5/20/2019  2:55 PM    Acceptance, E,D, NR,VU by MS at 5/20/2019 11:32 AM    Acceptance, E, NR by DO at 5/18/2019  2:59 PM    Acceptance, E,D, NR by PC at 5/15/2019  3:23 PM    Acceptance, E,D, NR by PC at 5/13/2019  3:05 PM                               User Key     Initials Effective Dates Name Provider Type Discipline    PC 04/03/18 -  Alley Frazier, PT Physical Therapist PT    EM 04/03/18 -  She Louie, PT Physical Therapist PT    MS 04/03/18 -  Jimmie Ledbetter, PT Physical Therapist PT    KT 06/16/16 -  Beatriz Brown, RN Registered Nurse Nurse     04/03/18 -  Sada Floyd, PT Physical Therapist PT    DO 03/07/18 -  Alden Harper, PT Physical Therapist PT                PT Recommendation and Plan     Plan of Care Reviewed With: patient  Progress: improving  Outcome Summary: Pt improving with less asst noted today during all activities. Pt impulsive with rwx requiring multiple cues for safe usage. Pt amb on 3 L O2 with O2 sat at 95% upon return to room  Outcome Measures     Row Name 05/21/19 1400 05/20/19 1100 05/18/19 1500       How much help from another person do you  currently need...    Turning from your back to your side while in flat bed without using bedrails?  4  -SV  3  -MS  3  -DO    Moving from lying on back to sitting on the side of a flat bed without bedrails?  4  -SV  3  -MS  3  -DO    Moving to and from a bed to a chair (including a wheelchair)?  3  -SV  3  -MS  3  -DO    Standing up from a chair using your arms (e.g., wheelchair, bedside chair)?  3  -SV  3  -MS  3  -DO    Climbing 3-5 steps with a railing?  2  -SV  2  -MS  2  -DO    To walk in hospital room?  3  -SV  3  -MS  2  -DO    AM-PAC 6 Clicks Score  19  -SV  17  -MS  16  -DO       Functional Assessment    Outcome Measure Options  --  AM-PAC 6 Clicks Basic Mobility (PT)  -MS  AM-PAC 6 Clicks Basic Mobility (PT)  -DO      User Key  (r) = Recorded By, (t) = Taken By, (c) = Cosigned By    Initials Name Provider Type    Jimmie Bruce KERI, PT Physical Therapist    Sada Willingham, PT Physical Therapist    DO Alden Harper, PT Physical Therapist         Time Calculation:   PT Charges     Row Name 05/21/19 1403             Time Calculation    Start Time  1330  -SV      Stop Time  1350  -SV      Time Calculation (min)  20 min  -SV      PT Received On  05/21/19  -      PT - Next Appointment  05/22/19  -        User Key  (r) = Recorded By, (t) = Taken By, (c) = Cosigned By    Initials Name Provider Type     Sada Floyd, PT Physical Therapist        Therapy Charges for Today     Code Description Service Date Service Provider Modifiers Qty    [id number]  PT THER PROC EA 15 MIN 5/21/2019 Sada Floyd, PT GP 1          PT G-Codes  Outcome Measure Options: AM-PAC 6 Clicks Basic Mobility (PT)  AM-PAC 6 Clicks Score: 19    Sada Floyd PT  5/21/2019

## 2019-05-21 NOTE — PROGRESS NOTES
Continued Stay Note  Psychiatric     Patient Name: Heath Bajwa  MRN: [id number]  Today's Date: 5/21/2019    Admit Date: 5/11/2019    Discharge Plan     Row Name 05/21/19 1646       Plan    Plan  Home with SO and HH    Patient/Family in Agreement with Plan  yes    Plan Comments  Met with pt at bedside and discussed need for rehab.  Pt had discussed with MD and was agreeable.  Once MD left room, pt states he needs to go home and then he will think about rehab.  Pt agreeable to  services.  Spoke with [facility] [phone].  Bliss states information can be faxed to [phone].  Will need to know pt's PCP.   Pt states he sees JAY, April Dempsey APRN.  Spoke with [facility] who state pt is no longer active with them.  Spoke with Cyndi/JAY who states pt notified them that he would only be seeing [facility] from now on.  Discussed with pt who states he did not intend to stop services with [facility] and would like to reactivate services.  Voicemail left for referral line with [facility].  Attempted to notify Bliss/ of pt's PCP but office closed at 4pm.  Will need to call back tomorrow.  Information faxd.  CCP will continue to follow.  Pt will need outpatient wound care as well and possibly at [place].  MAURICE Back RN        Discharge Codes    No documentation.             Alta Back

## 2019-05-21 NOTE — PLAN OF CARE
Problem: Patient Care Overview  Goal: Plan of Care Review  Outcome: Ongoing (interventions implemented as appropriate)   05/21/19 1401   Coping/Psychosocial   Plan of Care Reviewed With patient   OTHER   Outcome Summary Pt improving with less asst noted today during all activities. Pt impulsive with rwx requiring multiple cues for safe usage. Pt amb on 3 L O2 with O2 sat at 95% upon return to room   Plan of Care Review   Progress improving

## 2019-05-22 VITALS
HEIGHT: 74 IN | RESPIRATION RATE: 18 BRPM | WEIGHT: 222.2 LBS | TEMPERATURE: 98.1 F | DIASTOLIC BLOOD PRESSURE: 76 MMHG | OXYGEN SATURATION: 92 % | SYSTOLIC BLOOD PRESSURE: 130 MMHG | BODY MASS INDEX: 28.52 KG/M2 | HEART RATE: 61 BPM

## 2019-05-22 LAB
ANION GAP SERPL CALCULATED.3IONS-SCNC: 9.8 MMOL/L
BASOPHILS # BLD AUTO: 0.04 10*3/MM3 (ref 0–0.2)
BASOPHILS NFR BLD AUTO: 0.5 % (ref 0–1.5)
BUN BLD-MCNC: 14 MG/DL (ref 8–23)
BUN/CREAT SERPL: 27.5 (ref 7–25)
CALCIUM SPEC-SCNC: 8 MG/DL (ref 8.6–10.5)
CHLORIDE SERPL-SCNC: 94 MMOL/L (ref 98–107)
CO2 SERPL-SCNC: 30.2 MMOL/L (ref 22–29)
CREAT BLD-MCNC: 0.51 MG/DL (ref 0.76–1.27)
DEPRECATED RDW RBC AUTO: 45.7 FL (ref 37–54)
EOSINOPHIL # BLD AUTO: 0.06 10*3/MM3 (ref 0–0.4)
EOSINOPHIL NFR BLD AUTO: 0.7 % (ref 0.3–6.2)
ERYTHROCYTE [DISTWIDTH] IN BLOOD BY AUTOMATED COUNT: 13.3 % (ref 12.3–15.4)
GFR SERPL CREATININE-BSD FRML MDRD: >150 ML/MIN/1.73
GLUCOSE BLD-MCNC: 135 MG/DL (ref 65–99)
GLUCOSE BLDC GLUCOMTR-MCNC: 224 MG/DL (ref 70–130)
HCT VFR BLD AUTO: 47.6 % (ref 37.5–51)
HGB BLD-MCNC: 15.5 G/DL (ref 13–17.7)
IMM GRANULOCYTES # BLD AUTO: 0.03 10*3/MM3 (ref 0–0.05)
IMM GRANULOCYTES NFR BLD AUTO: 0.4 % (ref 0–0.5)
LYMPHOCYTES # BLD AUTO: 1.4 10*3/MM3 (ref 0.7–3.1)
LYMPHOCYTES NFR BLD AUTO: 16.5 % (ref 19.6–45.3)
MCH RBC QN AUTO: 30.3 PG (ref 26.6–33)
MCHC RBC AUTO-ENTMCNC: 32.6 G/DL (ref 31.5–35.7)
MCV RBC AUTO: 93 FL (ref 79–97)
MONOCYTES # BLD AUTO: 0.72 10*3/MM3 (ref 0.1–0.9)
MONOCYTES NFR BLD AUTO: 8.5 % (ref 5–12)
NEUTROPHILS # BLD AUTO: 6.26 10*3/MM3 (ref 1.7–7)
NEUTROPHILS NFR BLD AUTO: 73.4 % (ref 42.7–76)
NRBC BLD AUTO-RTO: 0 /100 WBC (ref 0–0.2)
PLATELET # BLD AUTO: 204 10*3/MM3 (ref 140–450)
PMV BLD AUTO: 11 FL (ref 6–12)
POTASSIUM BLD-SCNC: 3.9 MMOL/L (ref 3.5–5.2)
RBC # BLD AUTO: 5.12 10*6/MM3 (ref 4.14–5.8)
SODIUM BLD-SCNC: 134 MMOL/L (ref 136–145)
WBC NRBC COR # BLD: 8.51 10*3/MM3 (ref 3.4–10.8)

## 2019-05-22 PROCEDURE — 82962 GLUCOSE BLOOD TEST: CPT

## 2019-05-22 PROCEDURE — 85025 COMPLETE CBC W/AUTO DIFF WBC: CPT | Performed by: INTERNAL MEDICINE

## 2019-05-22 PROCEDURE — 99232 SBSQ HOSP IP/OBS MODERATE 35: CPT | Performed by: INTERNAL MEDICINE

## 2019-05-22 PROCEDURE — 80048 BASIC METABOLIC PNL TOTAL CA: CPT | Performed by: INTERNAL MEDICINE

## 2019-05-22 PROCEDURE — 63710000001 INSULIN GLARGINE PER 5 UNITS: Performed by: INTERNAL MEDICINE

## 2019-05-22 PROCEDURE — 94799 UNLISTED PULMONARY SVC/PX: CPT

## 2019-05-22 PROCEDURE — 63710000001 INSULIN LISPRO (HUMAN) PER 5 UNITS: Performed by: INTERNAL MEDICINE

## 2019-05-22 PROCEDURE — 25010000002 ENOXAPARIN PER 10 MG: Performed by: INTERNAL MEDICINE

## 2019-05-22 RX ORDER — METOPROLOL TARTRATE 50 MG/1
50 TABLET, FILM COATED ORAL EVERY 12 HOURS SCHEDULED
Qty: 30 TABLET | Refills: 0 | Status: SHIPPED | OUTPATIENT
Start: 2019-05-22

## 2019-05-22 RX ORDER — LOPERAMIDE HYDROCHLORIDE 2 MG/1
4 CAPSULE ORAL 4 TIMES DAILY PRN
Status: DISCONTINUED | OUTPATIENT
Start: 2019-05-22 | End: 2019-05-22 | Stop reason: HOSPADM

## 2019-05-22 RX ORDER — SPIRONOLACTONE 50 MG/1
50 TABLET, FILM COATED ORAL DAILY
Qty: 30 TABLET | Refills: 0 | Status: SHIPPED | OUTPATIENT
Start: 2019-05-23

## 2019-05-22 RX ADMIN — SODIUM CHLORIDE 4 ML: 7 NEBU SOLN,3 % NEBU at 08:33

## 2019-05-22 RX ADMIN — ATORVASTATIN CALCIUM 40 MG: 20 TABLET, FILM COATED ORAL at 08:15

## 2019-05-22 RX ADMIN — NYSTATIN: 100000 POWDER TOPICAL at 08:17

## 2019-05-22 RX ADMIN — INSULIN LISPRO 8 UNITS: 100 INJECTION, SOLUTION INTRAVENOUS; SUBCUTANEOUS at 11:51

## 2019-05-22 RX ADMIN — BUDESONIDE 0.25 MG: 0.25 INHALANT RESPIRATORY (INHALATION) at 08:33

## 2019-05-22 RX ADMIN — AMLODIPINE BESYLATE 10 MG: 10 TABLET ORAL at 08:15

## 2019-05-22 RX ADMIN — INSULIN GLARGINE 10 UNITS: 100 INJECTION, SOLUTION SUBCUTANEOUS at 08:15

## 2019-05-22 RX ADMIN — SODIUM CHLORIDE, PRESERVATIVE FREE 3 ML: 5 INJECTION INTRAVENOUS at 08:16

## 2019-05-22 RX ADMIN — LISINOPRIL 40 MG: 40 TABLET ORAL at 08:15

## 2019-05-22 RX ADMIN — SERTRALINE 25 MG: 25 TABLET, FILM COATED ORAL at 08:15

## 2019-05-22 RX ADMIN — ARFORMOTEROL TARTRATE 15 MCG: 15 SOLUTION RESPIRATORY (INHALATION) at 08:33

## 2019-05-22 RX ADMIN — ENOXAPARIN SODIUM 40 MG: 40 INJECTION SUBCUTANEOUS at 11:50

## 2019-05-22 RX ADMIN — METOPROLOL TARTRATE 50 MG: 50 TABLET, FILM COATED ORAL at 08:15

## 2019-05-22 RX ADMIN — SPIRONOLACTONE 50 MG: 50 TABLET, FILM COATED ORAL at 08:15

## 2019-05-22 RX ADMIN — LOPERAMIDE HYDROCHLORIDE 4 MG: 2 CAPSULE ORAL at 01:40

## 2019-05-22 RX ADMIN — ASPIRIN 325 MG: 325 TABLET ORAL at 08:15

## 2019-05-22 NOTE — PLAN OF CARE
Problem: Patient Care Overview  Goal: Plan of Care Review  Outcome: Ongoing (interventions implemented as appropriate)   05/22/19 1248   Coping/Psychosocial   Plan of Care Reviewed With patient   OTHER   Outcome Summary VSS, no complaints from patient. O2 sats maintained on 3LNC. Plan to DC home later today, pt does not have ride so hospital will provide. Home O2 being brought to hospital. Will continue to monitor.   Plan of Care Review   Progress improving       Problem: Pneumonia (Adult)  Goal: Signs and Symptoms of Listed Potential Problems Will be Absent, Minimized or Managed (Pneumonia)  Outcome: Ongoing (interventions implemented as appropriate)   05/22/19 1248   Goal/Outcome Evaluation   Problems Assessed (Pneumonia) all   Problems Present (Pneumonia) none       Problem: Pain, Acute (Adult)  Goal: Acceptable Pain Control/Comfort Level  Outcome: Ongoing (interventions implemented as appropriate)   05/22/19 1248   Pain, Acute (Adult)   Acceptable Pain Control/Comfort Level making progress toward outcome       Problem: Skin Injury Risk (Adult)  Goal: Skin Health and Integrity  Outcome: Ongoing (interventions implemented as appropriate)   05/22/19 1248   Skin Injury Risk (Adult)   Skin Health and Integrity making progress toward outcome       Problem: Fall Risk (Adult)  Goal: Absence of Fall  Outcome: Ongoing (interventions implemented as appropriate)   05/22/19 1248   Fall Risk (Adult)   Absence of Fall making progress toward outcome       Problem: Pain, Chronic (Adult)  Goal: Identify Related Risk Factors and Signs and Symptoms  Outcome: Ongoing (interventions implemented as appropriate)   05/22/19 1248   Pain, Chronic (Adult)   Related Risk Factors (Chronic Pain) procedures/treatments   Signs and Symptoms (Chronic Pain) verbalization of pain descriptors     Goal: Acceptable Pain/Comfort Level and Functional Ability  Outcome: Ongoing (interventions implemented as appropriate)   05/22/19 1248   Pain, Chronic  (Adult)   Acceptable Pain/Comfort Level and Functional Ability making progress toward outcome

## 2019-05-22 NOTE — PROGRESS NOTES
"CC: Elevated trop    Interval History:   Complaints anxious to go home.    Vital Signs  Temp:  [97.9 °F (36.6 °C)-98.7 °F (37.1 °C)] 98.3 °F (36.8 °C)  Heart Rate:  [61-80] 80  Resp:  [16-22] 16  BP: (139-169)/(73-83) 169/80    Intake/Output Summary (Last 24 hours) at 5/22/2019 0759  Last data filed at 5/22/2019 0400  Gross per 24 hour   Intake 720 ml   Output 250 ml   Net 470 ml     Flowsheet Rows      First Filed Value   Admission Height  188 cm (74\") Documented at 05/11/2019 1138   Admission Weight  114 kg (251 lb 5.2 oz) Documented at 05/11/2019 1028          PHYSICAL EXAM:  General: No acute distress  Resp:NL Rate, unlabored, clear  CV:NL rate and rhythm, NL PMI, Nl S1 and S2, no Murmur, no gallop, no rub, No JVD. Normal pedal pulses  ABD:Nl sounds, no masses or tenderness, nondistended, no guarding or rebound  Neuro: alert,cooperative and oriented  Extr: No edema or cyanosis, moves all extremities      Results Review:    Results from last 7 days   Lab Units 05/22/19  0433   SODIUM mmol/L 134*   POTASSIUM mmol/L 3.9   CHLORIDE mmol/L 94*   CO2 mmol/L 30.2*   BUN mg/dL 14   CREATININE mg/dL 0.51*   GLUCOSE mg/dL 135*   CALCIUM mg/dL 8.0*         Results from last 7 days   Lab Units 05/22/19  0433   WBC 10*3/mm3 8.51   HEMOGLOBIN g/dL 15.5   HEMATOCRIT % 47.6   PLATELETS 10*3/mm3 204     Results from last 7 days   Lab Units 05/15/19  0920   INR  1.04         Results from last 7 days   Lab Units 05/20/19  0548   MAGNESIUM mg/dL 2.1         I reviewed the patient's new clinical results.  I personally viewed and interpreted the patient's EKG/Telemetry data        Medication Review:   Meds reviewed      hold 1 each       Assessment/Plan  1. Elevated troponin - suspected type II NSTEMI. EKG without ischemic changes. Echo with normal LV function and normal wall motion. On medical therapy with beta blockers, statin, and ASA.  He is to make appoint with cardiologist in [place].  2. Acute hypoxic respiratory failure 2/2 " COPD with acute exacerbation and bibasilar pneumonia- per pulmonary  3. SISSY - resolved.  4. HTN - a little high. Will increase beta blocker. Also on lisinopril, amlodipine, and spironolactone.   5. Diabetes type 2 with circulatory complication  6. Altered mental status - seems better today.   7. Hypokalemia - stable today.   8. Hyponatremia              Jesus Browne MD  05/22/19  7:59 AM

## 2019-05-22 NOTE — PROGRESS NOTES
Case Management Discharge Note    Final Note: Pt discharged home with [facility], walker provided by [facility] and O2 tanks provided by [employer].  MAURICE smith RN    Destination      No service has been selected for the patient.      Durable Medical Equipment - Selection Complete      Service Provider Request Status Selected Services Address Phone Number Fax Number    [facility] Selected Durable Medical Equipment [address], [place] [id number] [phone]      Dialysis/Infusion      No service has been selected for the patient.      Home Medical Care - Selection Complete      Service Provider Request Status Selected Services Address Phone Number Fax Number    [facility] Selected Home Health Services [address] [place] [id number] [phone]      Therapy      No service has been selected for the patient.      Community Resources      No service has been selected for the patient.        Transportation Services  Ambulance: Yellow    Final Discharge Disposition Code: 06 - home with home health care

## 2019-05-22 NOTE — DISCHARGE SUMMARY
"                                                                  PHYSICIAN DISCHARGE SUMMARY                                                                          [facility]      Patient Identification:    Name: Heath Bajwa  Age: 69 y.o.  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 reactive - need outpatient follow  Incidental 3 mm lung nodule - needs outpatient follow-up  Incidental renal lesion -needs outpatient f/u CT  Incidental Liver Cirrhosis   PAD -reportedly declined outpatient evaluation  Diabetes  Hypertension    HPI \" Patient is a 69 y.o. male.  Who was transferred from [facility], [place] for ventilator management.  Patient apparently was brought to their facility by EMS for altered mental status and shortness of air.  Apparently he has known COPD on chronic 2 L nasal cannula to that he had turned up to 5 L or more report of 2 days of increasing shortness of breath.  There is no family here the only history I have is from reviewing computer records.  He apparently has diabetes hypertension severe peripheral vascular disease for which he has declined further work-up and treatment.  100% O2 5 of PEEP oxygen saturations around 90 to 91%.  He was on propofol and apparently got some ketamine as well in route.  He had a CT angiogram of the chest done at their facility apparently no pulmonary embolism noted there is " "bibasilar atelectasis and infiltrates possible early interstitial edema and a 3 mm right upper lobe nodule and incompletely characterized kidney lesion.  CT head report no definite acute intracranial normality with some sinus and mastoid disease.\" Per     Consults:   Patient Care Team:  Provider, No Known as PCP - General  Provider, No Known as PCP - Family Medicine    Procedures Performed:         Hospital Course:   Patient admitted to the hospital with respiratory failure thought to be from pneumonia as well as diastolic heart failure.  He was given appropriate antibiotics and aggressively diuresed which has helped with his respiratory failure and is now on low-dose supplemental oxygen.  Patient also had bilateral pleural effusions for which she underwent a right-sided thoracentesis.  His COPD exacerbation was treated with steroids and bronchodilators.  He was noted to have incidental lung nodule as well as lymphadenopathy along with renal lesion which needs to be followed up as an outpatient.  Patient was recommended to be discharged to rehab given that he is a high risk for recurrent respiratory failure given poor outpatient support but he refused multiple times and he wanted to go back to his home with home health.  He has a girlfriend and brother who unfortunately cannot help him much but he states that he is fine and he will continue with his follow-up with [facility] nurse practitioner.  [facility].  He is recommended to get a follow-up CAT scan of his chest to confirm resolution of his infiltrates as well as follow-up lung nodule as well has lymphadenopathy. Will need a follow-up CAT scan for his kidney lesion as well.     Again patient is a high risk for readmissions and recurrent respiratory failure due to poor compliance with medical recommendations    Objective:   Temp:  [97.9 °F (36.6 °C)-98.3 °F (36.8 °C)] 98.1 °F (36.7 °C)  Heart " Rate:  [61-81] 61  Resp:  [16-22] 18  BP: (130-169)/(76-83) 130/76   SpO2:  [92 %-95 %] 92 %  on  Flow (L/min):  [3] 3 Device (Oxygen Therapy): nasal cannula    Intake/Output Summary (Last 24 hours) at 5/22/2019 1530  Last data filed at 5/22/2019 1300  Gross per 24 hour   Intake 1680 ml   Output 700 ml   Net 980 ml     Body mass index is 28.53 kg/m².      05/21/19  0500 05/21/19  0840 05/22/19  0526   Weight: 105 kg (231 lb 6.4 oz) 102 kg (225 lb) 101 kg (222 lb 3.2 oz)     Weight change: -2.903 kg (-6.4 oz)      Physical Exam:  Constitutional: Middle aged pt in bed, No acute respiratory distress, + accessory muscle use  Head: - NCAT  Eyes: No pallor, Anicteric conjunctiva, EOMI.  ENMT:  Mallampati 4, no oral thrush. No palpable cervical lymphadenopathy, Dry MM.   Heart: RRR, no murmur. No pedal edema   Lungs: TUTU +, No wheezes, Occ crackles heard.  Significantly decreased air movement at the bases bilateral.     Abdomen: Soft. Obese, No tenderness, guarding or rigidity. No palpable masses  Extremities: Extremities warm with bilateral lower extremity chronic skin changes and chronic ulcers  Neuro: Conscious, more drowsy today, no gross focal neuro deficits  Psych: Mood and affect appropriate. Not anxious    Significant Discharge Diagnostics     Pertinent Lab Results:  Results from last 7 days   Lab Units 05/22/19  0433 05/21/19  1431 05/21/19  0524 05/20/19  0548 05/19/19  0703 05/18/19  2307 05/18/19  0425 05/16/19  0639   SODIUM mmol/L 134* 134* 128* 136 136  --  133* 135*   POTASSIUM mmol/L 3.9 4.3 3.6 3.7 3.9 3.6 3.3* 3.8   CHLORIDE mmol/L 94* 91* 85* 89* 87*  --  84* 85*   CO2 mmol/L 30.2* 32.9* 35.3* 36.8* 40.3*  --  38.5* 42.1*   BUN mg/dL 14 15 14 14 16  --  16 24*   CREATININE mg/dL 0.51* 0.64* 0.55* 0.49* 0.51*  --  0.56* 0.50*   GLUCOSE mg/dL 135* 128* 183* 147* 128*  --  161* 197*   CALCIUM mg/dL 8.0* 8.5* 8.1* 7.9* 8.3*  --  8.8 8.7         Results from last 7 days   Lab Units 05/22/19  9911  05/21/19  0524 05/20/19  0548 05/19/19  0703  05/16/19  0502   WBC 10*3/mm3 8.51 6.87 8.46 9.35  --  11.07*   HEMOGLOBIN g/dL 15.5 15.4 15.3 16.0  --  16.8   HEMATOCRIT % 47.6 49.3 47.8 50.4  --  50.5   PLATELETS 10*3/mm3 204 200 200 215  --  141   MCV fL 93.0 95.5 94.1 93.7  --  91.8   MCH pg 30.3 29.8 30.1 29.7  --  30.5   MCHC g/dL 32.6 31.2* 32.0 31.7  --  33.3   RDW % 13.3 13.6 13.7 14.0  --  14.6   RDW-SD fl 45.7 47.7 47.2 47.7  --  47.4   MPV fL 11.0 11.1 11.2 11.2  --  12.2*   NEUTROPHIL % % 73.4 72.4 77.9* 80.9*   < >  --    LYMPHOCYTE % % 16.5* 16.4* 12.3* 10.3*   < >  --    MONOCYTES % % 8.5 9.6 8.4 7.6   < >  --    EOSINOPHIL % % 0.7 0.9 0.8 0.6   < >  --    BASOPHIL % % 0.5 0.3 0.2 0.2   < >  --    IMM GRAN % % 0.4 0.4 0.4 0.4   < >  --    NEUTROS ABS 10*3/mm3 6.26 4.97 6.59 7.56*   < >  --    LYMPHS ABS 10*3/mm3 1.40 1.13 1.04 0.96   < >  --    MONOS ABS 10*3/mm3 0.72 0.66 0.71 0.71   < >  --    EOS ABS 10*3/mm3 0.06 0.06 0.07 0.06   < >  --    BASOS ABS 10*3/mm3 0.04 0.02 0.02 0.02   < >  --    IMMATURE GRANS (ABS) 10*3/mm3 0.03 0.03 0.03 0.04   < >  --    NRBC /100 WBC 0.0 0.0 0.0 0.0   < >  --     < > = values in this interval not displayed.         Results from last 7 days   Lab Units 05/20/19  0548 05/18/19  2307   MAGNESIUM mg/dL 2.1 1.3*             Results from last 7 days   Lab Units 05/19/19  0854 05/16/19  1237   PH, ARTERIAL pH units 7.410 7.457*   PO2 ART mm Hg 73.3* 75.3*   PCO2, ARTERIAL mm Hg 60.5* 62.9*   HCO3 ART mmol/L 38.4* 44.4*                           Imaging Results:  Imaging Results (all)     Procedure Component Value Units Date/Time    XR Chest PA & Lateral [[id number]] Collected:  05/20/19 1724     Updated:  05/20/19 1729    Narrative:       AP AND LATERAL CHEST X-RAY     CLINICAL HISTORY: Follow-up infiltrates and or pulmonary edema. Hypoxia.     Compared to the previous chest dated 05/16/2019.     The lungs are somewhat better expanded than on the previous chest  x-ray.  Mild residual pulmonary edema has essentially completely resolved. Only  tiny bilateral pleural effusions persist. The heart is normal in size. A  nasogastric tube has been removed.     This report was finalized on 5/20/2019 5:26 PM by Dr. Soy Funez M.D.        Liver [[id number]] Collected:  05/18/19 1827     Updated:  05/18/19 1834    Narrative:       LIVER ULTRASOUND     CLINICAL HISTORY: Possible cirrhosis.     Multiple images of the right upper quadrant were obtained. The liver has  slightly coarse echotexture, but demonstrates no focal masses. The  common bile duct measures 9 mm which is within normal limits for a  patient of this age who is status post cholecystectomy. The right kidney  is normal in size and shape and shows no hydronephrosis. The pancreas  could not be visualized due to overlying bowel gas.     IMPRESSIONS: Nonspecific slightly heterogeneous hepatic echotexture.  Otherwise unremarkable ultrasound of the right upper quadrant.     This report was finalized on 5/18/2019 6:31 PM by Dr. Soy Funez M.D.       XR Chest 1 View [[id number]] Collected:  05/16/19 0605     Updated:  05/17/19 0555    Narrative:       PORTABLE CHEST X-RAY     CLINICAL HISTORY: Follow-up pleural effusions; Z74.09-Other reduced  mobility     COMPARISON: 05/15/2019.     FINDINGS: Portable AP view of the chest was obtained with overlying  monitor leads in place. Life support lines are unchanged. Pulmonary  edema shows improvement as does the larger right effusion. The smaller  left effusion is stable. Stable cardiomegaly.             Impression:       Improving edema/CHF and right effusion.        This report was finalized on 5/17/2019 5:52 AM by Jimmie Zendeajs M.D.       XR Chest 1 View [[id number]] Collected:  05/15/19 0636     Updated:  05/16/19 0550    Narrative:       PORTABLE CHEST X-RAY     CLINICAL HISTORY: follow up pleural effusions; Z74.09-Other reduced  mobility     COMPARISON: 05/14/2019.      FINDINGS: Portable AP view of the chest was obtained with overlying  monitor leads in place. Life support lines are unchanged. Pulmonary  edema and right greater than left effusions are stable. Stable  cardiomegaly.             Impression:          Stable appearance of the chest by portable radiography.        This report was finalized on 5/16/2019 5:47 AM by Jimmie Zendejas M.D.        Thoracentesis [[id number]] Collected:  05/15/19 1251    Specimen:  Body Fluid Updated:  05/15/19 1257    Narrative:       ULTRASOUND-GUIDED THORACENTESIS performed on 05/15/2019.     HISTORY: 69-year-old male with shortness of breath.     FINDINGS: The patient was placed in the left lateral decubitus position.  A free-flowing pocket of pleural fluid was identified in the right  pleural space. The area was then prepped and draped in the usual sterile  fashion. The skin was anesthetized with 1% Lidocaine without  epinephrine.  Thoracentesis was then achieved using a 5F DNsolutioneh needle  system. Approximately 500 mL of clear jane  fluid was obtained. A  portion was sent to the lab for pre-ordered studies. The procedure was  then ended with no immediate complications or patient distress.       Impression:       Successful ultrasound-guided thoracentesis of the right  pleural space.     500 mL of clear jane fluid obtained.              This report was finalized on 5/15/2019 12:53 PM by Dr. Davy Wilcox MD.       XR Chest 1 View [[id number]] Collected:  05/14/19 0636     Updated:  05/15/19 0307    Narrative:       PORTABLE CHEST X-RAY     CLINICAL HISTORY: Intubated Patient; Z74.09-Other reduced mobility     COMPARISON: 05/13/2019.     FINDINGS: Portable AP view of the chest was obtained with overlying  monitor leads in place. Life support lines are unchanged. Lungs are  under aerated. Hazy perihilar and basilar predominant opacities have  increased, likely related to edema and layering effusions. Stable  cardiomegaly.              Impression:       Increasing opacities likely related to edema/CHF and  effusions.        This report was finalized on 5/15/2019 3:04 AM by Jimmie Zendejas M.D.       XR Chest 1 View [[id number]] Collected:  05/13/19 0634     Updated:  05/15/19 0307    Narrative:       PORTABLE CHEST X-RAY     CLINICAL HISTORY: Intubated Patient     COMPARISON: 05/12/2019.     FINDINGS: Portable AP view of the chest was obtained with overlying  monitor leads in place. Life support lines are unchanged, no  pneumothorax. Lungs are under aerated with some increase in suspected  basilar atelectasis and small effusions, right greater than left.  Persistent vascular congestion. Heart size likely normal considering  under aeration.             Impression:       Under aeration with some increase in right greater than left  basilar opacities as discussed.        This report was finalized on 5/15/2019 3:04 AM by Jimmie Zendejas M.D.       XR Chest 1 View [[id number]] Collected:  05/12/19 0459     Updated:  05/12/19 0503    Narrative:       PORTABLE CHEST RADIOGRAPH     HISTORY: Intubated patient     COMPARISON: 05/11/2019     FINDINGS:  Cardiomediastinal silhouette is unchanged. Endotracheal tube appears to  terminate at the level of the clavicles. It could be advanced about 3  cm. A nasogastric tube is present. It appears to extend to the stomach.  No pneumothorax is seen. There is persistent bibasilar consolidation.  There are small bilateral pleural effusions. Vascular congestion is  unchanged.       Impression:       Endotracheal tube terminates at the level of clavicles. It could be  advanced about 3 cm.     This report was finalized on 5/12/2019 5:00 AM by Dr. Yuliana Chandra M.D.       XR Chest 1 View [[id number]] Collected:  05/11/19 1049     Updated:  05/11/19 1054    Narrative:       XR CHEST 1 VW-     HISTORY: Male who is 69 years-old,  endotracheal intubation     TECHNIQUE: Frontal views of the chest     COMPARISON: None available      FINDINGS: Patient is rotated towards the left. The endotracheal tube tip  appears to be about 8 cm above the candace. NG tube extends beyond the  inferior extent of the image. Heart size is normal. Mild prominence of  vascular and interstitial markings. Patchy infiltrates at the bases,  small left pleural effusion. Heart, mediastinum and pulmonary  vasculature are unremarkable. No focal pulmonary consolidation, pleural  effusion, or pneumothorax. No acute osseous process.       Impression:       Endotracheal intubation. Bibasilar infiltrates, left pleural  effusion, mild prominence of vascular and interstitial markings,  follow-up recommended.     This report was finalized on 5/11/2019 10:51 AM by Dr. Rufino Bello M.D.             Discharge Medications and Instructions:     Discharge Medications     Discharge Medications      New Medications      Instructions Start Date   metoprolol tartrate 50 MG tablet  Commonly known as:  LOPRESSOR   50 mg, Oral, Every 12 Hours Scheduled      spironolactone 50 MG tablet  Commonly known as:  ALDACTONE   50 mg, Oral, Daily   Start Date:  5/23/2019        Continue These Medications      Instructions Start Date   albuterol sulfate  (90 Base) MCG/ACT inhaler  Commonly known as:  PROVENTIL HFA;VENTOLIN HFA;PROAIR HFA   2 puffs, Inhalation, Every 6 Hours PRN      amLODIPine 10 MG tablet  Commonly known as:  NORVASC   10 mg, Oral, Every 24 Hours Scheduled      aspirin 325 MG tablet   325 mg, Oral, Daily      atorvastatin 40 MG tablet  Commonly known as:  LIPITOR   40 mg, Oral, Daily      budesonide-formoterol 160-4.5 MCG/ACT inhaler  Commonly known as:  SYMBICORT   2 puffs, Inhalation, 2 Times Daily - RT      donepezil 10 MG tablet  Commonly known as:  ARICEPT   10 mg, Oral, Nightly      gabapentin 800 MG tablet  Commonly known as:  NEURONTIN   800 mg, Oral, 3 Times Daily      insulin NPH-insulin regular (70-30) 100 UNIT/ML injection  Commonly known as:  humuLIN  70/30,novoLIN 70/30   20 Units, Subcutaneous, 2 Times Daily With Meals      ipratropium-albuterol 0.5-2.5 mg/3 ml nebulizer  Commonly known as:  DUO-NEB   3 mL, Nebulization, Every 4 Hours PRN      levocetirizine 5 MG tablet  Commonly known as:  XYZAL   5 mg, Oral, Daily PRN      lisinopril 40 MG tablet  Commonly known as:  PRINIVIL,ZESTRIL   40 mg, Oral, Daily      sertraline 25 MG tablet  Commonly known as:  ZOLOFT   25 mg, Oral, Daily      tamsulosin 0.4 MG capsule 24 hr capsule  Commonly known as:  FLOMAX   1 capsule, Oral, Every Evening      VICTOZA 18 MG/3ML solution pen-injector injection  Generic drug:  Liraglutide   1.8 mg, Subcutaneous, Daily         Stop These Medications    hydrochlorothiazide 25 MG tablet  Commonly known as:  HYDRODIURIL            Disposition:  Home with       Contact information for follow-up providers     [facility] CARE REFERRAL [place] AND [place] Follow up.    Specialty:  Home Health Services  Contact information:  [address] [facility] [address]  [facility] [address]           Provider, No Known. Schedule an appointment as soon as possible for a visit.    Why:  Make an appointment with a cardiologist in your area.  Contact information:  [facility] [address]             Provider, No Known Follow up.    Contact information:  [facility] [address]  [phone]             Bogdaney, April, APRN Follow up on 5/24/2019.    Specialty:  Family Medicine  Contact information:  [address] [facility] [address]  [facility] [address]  [phone]                   Contact information for after-discharge care     Home Medical Care     [facility] Follow up.    Service:  Home Health Services  Contact information:  [address] [place] [address] [address] [place] [address] [phone]                             Total time spent discharging patient including evaluation, medication reconciliation, arranging follow  up, and post hospitalization instructions and education total time exceeds 30 minutes.    Signed:  Cesar Brower MD  5/22/2019  3:30 PM

## 2019-05-22 NOTE — PROGRESS NOTES
Continued Stay Note  [facility]     Patient Name: Heath Bajwa  MRN: [id number]  Today's Date: 5/22/2019    Admit Date: 5/11/2019    Discharge Plan     Row Name 05/22/19 1609       Plan    Plan  Home with SO and HH    Patient/Family in Agreement with Plan  yes    Plan Comments  Spoke with Ethan/JAY who states pt has appt to see Fabienne Mcfadden on Friday.  Spoke with Kandace/[facility] who states they received HH order and will see Mr Bajwa.  Spoke with Carmela/[facility] [phone] who states that they will deliver O2 tanks to pt. Two tanks delivered to pt to get home.  Garett delivered to pt from [place].  Pt to travel in cab home, discussed with MD who agrees pt ok to travel in cab as long as he has needed O2.  Discussed with Carmela DOYLE who approved providing pt with cab voucher to get to [place], [place].  Yellow cab to arrive at 4:30 to transport pt home.  MAURICE Back RN    Final Discharge Disposition Code  06 - home with home health care        Discharge Codes    No documentation.       Expected Discharge Date and Time     Expected Discharge Date Expected Discharge Time    May 22, 2019             Alta Back

## 2019-05-23 ENCOUNTER — READMISSION MANAGEMENT (OUTPATIENT)
Dept: CALL CENTER | Facility: HOSPITAL | Age: 69
End: 2019-05-23

## 2019-05-23 NOTE — OUTREACH NOTE
Prep Survey      Responses   Facility patient discharged from?  [facility]   Is patient eligible?  Yes   Discharge diagnosis  Acute on chronic hypoxemic/ hypercapnic respiratory failure - 4L @ home   Does the patient have one of the following disease processes/diagnoses(primary or secondary)?  COPD/Pneumonia   Does the patient have Home health ordered?  Yes   What is the Home health agency?   [facility]    Is there a DME ordered?  Yes   What DME was ordered?  Garett [facility] 02 tanks per [facility]    Medication alerts for this patient  Aldactone   Prep survey completed?  Yes          Darcie Grant RN

## 2019-05-24 ENCOUNTER — READMISSION MANAGEMENT (OUTPATIENT)
Dept: CALL CENTER | Facility: HOSPITAL | Age: 69
End: 2019-05-24

## 2019-05-24 NOTE — OUTREACH NOTE
COPD/PN Week 1 Survey      Responses   Facility patient discharged from?  [facility]   Does the patient have one of the following disease processes/diagnoses(primary or secondary)?  COPD/Pneumonia   Is there a successful TCM telephone encounter documented?  No   Was the primary reason for admission:  Pneumonia   Week 1 attempt successful?  Yes   Call end time  1106   Discharge diagnosis  Acute on chronic hypoxemic/ hypercapnic respiratory failure - 4L @ home   Is patient permission given to speak with other caregiver?  Yes   Person spoke with today (if not patient) and relationship  brother/ Jose Ramon Bajwa   Medication alerts for this patient  Aldactone   Meds reviewed with patient/caregiver?  Yes   Is the patient having any side effects they believe may be caused by any medication additions or changes?  No   Does the patient have all medications ordered at discharge?  Yes   Is the patient taking all medications as directed (includes completed medication regime)?  Yes   Does the patient have a primary care provider?   Yes   Does the patient have an appointment with their PCP or pulmonologist within 7 days of discharge?  Yes   Comments regarding PCP  Has [facility]- has an appt on Friday May 24 2019   Has the patient kept scheduled appointments due by today?  N/A   What is the Home health agency?   [facility]    Has home health visited the patient within 72 hours of discharge?  Yes   What DME was ordered?  Garett per [facility] tanks per [facility]    Has all DME been delivered?  Yes   Comments  Brother reports he is feeling better. He reports his breathing has improved.    Nursing interventions  Reviewed instructions with patient [reviewed with brother will need further followup with patient]   What is the patient's perception of their health status since discharge?  Improving   Nursing Interventions  Nurse provided patient education   Is the patient/caregiver able to teach back the hierarchy of who to call/visit for  symptoms/problems? PCP, Specialist, Home health nurse, Urgent Care, ED, 911  -- [Needs education with patient.]   Additional teach back comments   Encouraged patient to seek medical treatment if develops fever, chills, SOB, chest pain, or coughs up blood. Patient verbalized understanding   Is the patient/caregiver able to teach back signs and symptoms of worsening condition:  Fever/chills, Shortness of breath, Chest pain   Is the patient/caregiver able to teach back importance of completing antibiotic course of treatment?  Yes   Week 1 call completed?  Yes          Jm Espinal RN

## 2019-06-02 ENCOUNTER — READMISSION MANAGEMENT (OUTPATIENT)
Dept: CALL CENTER | Facility: HOSPITAL | Age: 69
End: 2019-06-02

## 2019-06-02 NOTE — OUTREACH NOTE
COPD/PN Week 2 Survey      Responses   Facility patient discharged from?  [facility]   Does the patient have one of the following disease processes/diagnoses(primary or secondary)?  COPD/Pneumonia   Was the primary reason for admission:  Pneumonia   Week 2 attempt successful?  Yes   Call start time  1238   Rescheduled  Revoked   Revoke  Decline to participate   Call end time  1238   Discharge diagnosis  Acute on chronic hypoxemic/ hypercapnic respiratory failure - 4L @ home          Kaye Naqvi RN

## 2019-06-03 NOTE — NURSING NOTE
Call placed to contract numbers listed for brother both mobile and home no answer  assisted    Spoke to pt's , Chang, for update.

## 2019-08-19 ENCOUNTER — TRANSCRIBE ORDERS (OUTPATIENT)
Dept: ADMINISTRATIVE | Facility: HOSPITAL | Age: 69
End: 2019-08-19

## 2019-08-19 DIAGNOSIS — R22.9 LOCALIZED SUPERFICIAL SWELLING, MASS, OR LUMP: Primary | ICD-10-CM

## 2019-08-20 ENCOUNTER — TRANSCRIBE ORDERS (OUTPATIENT)
Dept: ADMINISTRATIVE | Facility: HOSPITAL | Age: 69
End: 2019-08-20

## 2019-08-20 DIAGNOSIS — R91.1 LUNG NODULE: Primary | ICD-10-CM

## 2019-08-27 ENCOUNTER — APPOINTMENT (OUTPATIENT)
Dept: CT IMAGING | Facility: HOSPITAL | Age: 69
End: 2019-08-27

## 2019-09-18 ENCOUNTER — APPOINTMENT (OUTPATIENT)
Dept: CT IMAGING | Facility: HOSPITAL | Age: 69
End: 2019-09-18

## 2019-10-15 ENCOUNTER — TRANSCRIBE ORDERS (OUTPATIENT)
Dept: ADMINISTRATIVE | Facility: HOSPITAL | Age: 69
End: 2019-10-15

## 2019-10-15 DIAGNOSIS — R13.12 OROPHARYNGEAL DYSPHAGIA: Primary | ICD-10-CM

## 2022-12-17 NOTE — THERAPY TREATMENT NOTE
Acute Care - Physical Therapy Treatment Note  [facility]     Patient Name: Heath Bajwa  : 1950  MRN: [id number]  Today's Date: 2019  Onset of Illness/Injury or Date of Surgery: 19  Date of Referral to PT: 19  Referring Physician: Mitali    Admit Date: 2019    Visit Dx:    ICD-10-CM ICD-9-CM   1. Impaired functional mobility and activity tolerance Z74.09 V49.89     Patient Active Problem List   Diagnosis   • BPH without urinary obstruction   • Elevated prostate specific antigen (PSA)   • Acute respiratory failure with hypoxia and hypercapnia (CMS/HCC)   • Venous stasis dermatitis of both lower extremities   • Elevated troponin   • Respiratory failure (CMS/HCC)       Therapy Treatment    Rehabilitation Treatment Summary     Row Name 19 1465             Treatment Time/Intention    Discipline  physical therapist  -PC      Document Type  therapy note (daily note)  -PC      Subjective Information  no complaints  -PC      Mode of Treatment  physical therapy  -PC      Patient/Family Observations  pt is still intubated  -PC      Therapy Frequency (PT Clinical Impression)  daily  -PC      Patient Effort  fair  -PC      Existing Precautions/Restrictions  fall;oxygen therapy device and L/min  -PC      Recorded by [PC] Alley Frazier, PT 19 1150      Row Name 19 1159             Cognitive Assessment/Intervention- PT/OT    Orientation Status (Cognition)  oriented x 3  -PC      Follows Commands (Cognition)  follows one step commands;75-90% accuracy  -PC      Recorded by [PC] Alley Frazier, PT 19 6561      Row Name 19 115             Bed Mobility Assessment/Treatment    Bed Mobility Assessment/Treatment  --  -PC      Supine-Sit [place] (Bed Mobility)  --  -PC      Sit-Supine [place] (Bed Mobility)  --  -PC      Bed Mobility, Safety Issues  --  -PC      Assistive Device (Bed Mobility)  --  -PC      Comment (Bed Mobility)  pt is going to be extubated  later today, wants to wait until after being extubated to work on getting up, agreed to bed ex   -PC      Recorded by [PC] Alley Frazier, PT 05/14/19 1159      Row Name 05/14/19 1155             Transfer Assessment/Treatment    Transfer Assessment/Treatment  --  -PC      Recorded by [PC] Alley Frazier, PT 05/14/19 1159      Row Name 05/14/19 1155             Therapeutic Exercise    Comment (Therapeutic Exercise)  pt perf active ex B LE with assist  -PC      Recorded by [PC] Alley Frazier, PT 05/14/19 1159      Row Name 05/14/19 1155             Positioning and Restraints    Pre-Treatment Position  in bed  -PC      Post Treatment Position  bed  -PC      In Bed  supine;call light within reach;encouraged to call for assist  -PC      Recorded by [PC] Alley Frazier, PT 05/14/19 1159      Row Name                Wound 05/11/19 1015 Left anterior toe blisters    Wound - Properties Group Date first assessed: 05/11/19 [JN] Time first assessed: 1015 [JN] Present On Admission : yes [JN] Side: Left [JN] Orientation: anterior [JN] Location: toe [JN], great toe  Type: blisters [JN] Recorded by:  [JN] Ariel Schulte, RN 05/11/19 1301    Row Name 05/14/19 1157             Outcome Summary/Treatment Plan (PT)    Anticipated Discharge Disposition (PT)  skilled nursing facility;home with assist depends on progress  -PC      Recorded by [PC] Alley Frazier, PT 05/14/19 1159        User Key  (r) = Recorded By, (t) = Taken By, (c) = Cosigned By    Initials Name Effective Dates Discipline    PC Alley Frazier, PT 04/03/18 -  PT    Ariel Baez RN 10/12/16 -  Nurse          Wound 05/11/19 1015 Left anterior toe blisters (Active)   Dressing Appearance open to air 5/14/2019  8:00 AM   Closure Open to air 5/14/2019  8:00 AM   Base purple;red 5/14/2019  8:00 AM   Periwound dry;pink 5/14/2019  8:00 AM   Periwound Temperature warm 5/14/2019  8:00 AM   Periwound Skin Turgor firm 5/14/2019  8:00 AM   Dressing Care, Wound open to air  5/14/2019  8:00 AM           Physical Therapy Education     Title: PT OT SLP Therapies (In Progress)     Topic: Physical Therapy (In Progress)     Point: Mobility training (In Progress)     Learning Progress Summary           Patient Acceptance, E,D, NR by PC at 5/13/2019  3:05 PM                   Point: Home exercise program (In Progress)     Learning Progress Summary           Patient Acceptance, E,D, NR by PC at 5/14/2019 11:59 AM    Acceptance, E,D, NR by PC at 5/13/2019  3:05 PM                   Point: Body mechanics (In Progress)     Learning Progress Summary           Patient Acceptance, E,D, NR by PC at 5/13/2019  3:05 PM                   Point: Precautions (In Progress)     Learning Progress Summary           Patient Acceptance, E,D, NR by PC at 5/13/2019  3:05 PM                               User Key     Initials Effective Dates Name Provider Type Discipline     04/03/18 -  Alley Frazier, PT Physical Therapist PT                PT Recommendation and Plan  Anticipated Discharge Disposition (PT): skilled nursing facility, home with assist(depends on progress)  Planned Therapy Interventions (PT Eval): balance training, bed mobility training, gait training, home exercise program, transfer training, strengthening  Therapy Frequency (PT Clinical Impression): daily  Outcome Summary/Treatment Plan (PT)  Anticipated Discharge Disposition (PT): skilled nursing facility, home with assist(depends on progress)  Plan of Care Reviewed With: patient  Outcome Summary: pt awake and alert, perf active ex B LE, declined getting up due to getting extubated later today  Outcome Measures     Row Name 05/14/19 1200 05/13/19 1500          How much help from another person do you currently need...    Turning from your back to your side while in flat bed without using bedrails?  2  -PC  2  -PC     Moving from lying on back to sitting on the side of a flat bed without bedrails?  2  -PC  2  -PC     Moving to and from a bed to  a chair (including a wheelchair)?  1  -PC  1  -PC     Standing up from a chair using your arms (e.g., wheelchair, bedside chair)?  1  -PC  1  -PC     Climbing 3-5 steps with a railing?  1  -PC  1  -PC     To walk in hospital room?  1  -PC  1  -PC     AM-PAC 6 Clicks Score  8  -PC  8  -PC        Functional Assessment    Outcome Measure Options  --  AM-PAC 6 Clicks Basic Mobility (PT)  -PC       User Key  (r) = Recorded By, (t) = Taken By, (c) = Cosigned By    Initials Name Provider Type     Alley Frazier, PT Physical Therapist         Time Calculation:   PT Charges     Row Name 05/14/19 1202             Time Calculation    Start Time  1145  -PC      Stop Time  1155  -PC      Time Calculation (min)  10 min  -PC      PT Received On  05/14/19  -PC      PT - Next Appointment  05/15/19  -PC        User Key  (r) = Recorded By, (t) = Taken By, (c) = Cosigned By    Initials Name Provider Type     Alley Frazier, PT Physical Therapist        Therapy Charges for Today     Code Description Service Date Service Provider Modifiers Qty    99298038510 HC PT EVAL HIGH COMPLEXITY 2 5/13/2019 Alley Frazier, PT GP 1    76434154819 HC PT THER PROC EA 15 MIN 5/13/2019 Alley Frazier, PT GP 1    09767821161 HC PT THER SUPP EA 15 MIN 5/13/2019 Alley Frazier, PT GP 2    26051584988 HC PT THER PROC EA 15 MIN 5/14/2019 Alley Frazier, PT GP 1          PT G-Codes  Outcome Measure Options: AM-PAC 6 Clicks Basic Mobility (PT)  AM-PAC 6 Clicks Score: 8    Alley Frazier PT  5/14/2019        Patient with ASHLYN on admission BUN 31, Scr 2.35 -->Scr 2.31 (baseline Scr 1.1 10/22). ASHLYN likely prerenal i/s/o hypovolemia due to diarrhea and poor po intake. Given prostate mets, there may also be a post-renal component. Pt w/ mark initially, then removed after passing TOV.   - downtrending Cr 12/9  - c/w maintenance fluids w/ D5 due to low sugars  - strict I/Os  - trend Cr, avoid nephrotoxic drugs, renally dose meds

## 2023-10-30 NOTE — PLAN OF CARE
Problem: Patient Care Overview  Goal: Plan of Care Review  Outcome: Ongoing (interventions implemented as appropriate)   05/14/19 1200   Coping/Psychosocial   Plan of Care Reviewed With patient   OTHER   Outcome Summary pt awake and alert, perf active ex B LE, declined getting up due to getting extubated later today          Pt to continue gabapentin, take prn tylenol and pt to follow up with orthopedic and spine MD at [facility] as he ahs seen this docotors recently.  For pressure ulcer, order will be given to home health nurse to eval and treat